# Patient Record
Sex: FEMALE | Race: WHITE | NOT HISPANIC OR LATINO
[De-identification: names, ages, dates, MRNs, and addresses within clinical notes are randomized per-mention and may not be internally consistent; named-entity substitution may affect disease eponyms.]

---

## 2017-02-21 ENCOUNTER — RX RENEWAL (OUTPATIENT)
Age: 77
End: 2017-02-21

## 2017-05-08 ENCOUNTER — RX RENEWAL (OUTPATIENT)
Age: 77
End: 2017-05-08

## 2017-06-07 ENCOUNTER — RX RENEWAL (OUTPATIENT)
Age: 77
End: 2017-06-07

## 2017-07-14 ENCOUNTER — RX RENEWAL (OUTPATIENT)
Age: 77
End: 2017-07-14

## 2017-07-24 ENCOUNTER — RX RENEWAL (OUTPATIENT)
Age: 77
End: 2017-07-24

## 2017-07-25 ENCOUNTER — APPOINTMENT (OUTPATIENT)
Dept: CARDIOLOGY | Facility: CLINIC | Age: 77
End: 2017-07-25

## 2017-07-27 ENCOUNTER — NON-APPOINTMENT (OUTPATIENT)
Age: 77
End: 2017-07-27

## 2017-07-27 ENCOUNTER — APPOINTMENT (OUTPATIENT)
Dept: CARDIOLOGY | Facility: CLINIC | Age: 77
End: 2017-07-27
Payer: MEDICARE

## 2017-07-27 VITALS
HEIGHT: 61 IN | OXYGEN SATURATION: 96 % | SYSTOLIC BLOOD PRESSURE: 147 MMHG | DIASTOLIC BLOOD PRESSURE: 86 MMHG | HEART RATE: 95 BPM | BODY MASS INDEX: 34.17 KG/M2 | WEIGHT: 181 LBS

## 2017-07-27 DIAGNOSIS — G47.30 SLEEP APNEA, UNSPECIFIED: ICD-10-CM

## 2017-07-27 PROCEDURE — 93000 ELECTROCARDIOGRAM COMPLETE: CPT

## 2017-07-27 PROCEDURE — 99214 OFFICE O/P EST MOD 30 MIN: CPT

## 2018-01-09 ENCOUNTER — MEDICATION RENEWAL (OUTPATIENT)
Age: 78
End: 2018-01-09

## 2018-01-24 ENCOUNTER — APPOINTMENT (OUTPATIENT)
Dept: CARDIOLOGY | Facility: CLINIC | Age: 78
End: 2018-01-24

## 2018-01-31 ENCOUNTER — APPOINTMENT (OUTPATIENT)
Dept: CARDIOLOGY | Facility: CLINIC | Age: 78
End: 2018-01-31

## 2018-02-07 ENCOUNTER — APPOINTMENT (OUTPATIENT)
Dept: CARDIOLOGY | Facility: CLINIC | Age: 78
End: 2018-02-07
Payer: MEDICARE

## 2018-02-07 VITALS
HEIGHT: 61 IN | SYSTOLIC BLOOD PRESSURE: 145 MMHG | DIASTOLIC BLOOD PRESSURE: 76 MMHG | WEIGHT: 178 LBS | OXYGEN SATURATION: 96 % | BODY MASS INDEX: 33.61 KG/M2 | HEART RATE: 87 BPM

## 2018-02-07 PROCEDURE — 99214 OFFICE O/P EST MOD 30 MIN: CPT

## 2018-04-13 ENCOUNTER — RX RENEWAL (OUTPATIENT)
Age: 78
End: 2018-04-13

## 2018-06-04 ENCOUNTER — RX RENEWAL (OUTPATIENT)
Age: 78
End: 2018-06-04

## 2018-06-05 ENCOUNTER — APPOINTMENT (OUTPATIENT)
Dept: CARDIOLOGY | Facility: CLINIC | Age: 78
End: 2018-06-05

## 2018-06-20 ENCOUNTER — APPOINTMENT (OUTPATIENT)
Dept: CARDIOLOGY | Facility: CLINIC | Age: 78
End: 2018-06-20
Payer: MEDICARE

## 2018-06-20 VITALS
BODY MASS INDEX: 34.75 KG/M2 | WEIGHT: 177 LBS | HEART RATE: 86 BPM | HEIGHT: 60 IN | DIASTOLIC BLOOD PRESSURE: 78 MMHG | SYSTOLIC BLOOD PRESSURE: 121 MMHG | OXYGEN SATURATION: 94 %

## 2018-06-20 PROCEDURE — 99214 OFFICE O/P EST MOD 30 MIN: CPT

## 2018-09-17 ENCOUNTER — MEDICATION RENEWAL (OUTPATIENT)
Age: 78
End: 2018-09-17

## 2018-12-04 ENCOUNTER — MEDICATION RENEWAL (OUTPATIENT)
Age: 78
End: 2018-12-04

## 2018-12-06 RX ORDER — CITALOPRAM HYDROBROMIDE 40 MG/1
40 TABLET, FILM COATED ORAL
Qty: 90 | Refills: 3 | Status: ACTIVE | COMMUNITY
Start: 2018-06-04 | End: 1900-01-01

## 2018-12-18 ENCOUNTER — MEDICATION RENEWAL (OUTPATIENT)
Age: 78
End: 2018-12-18

## 2018-12-19 ENCOUNTER — NON-APPOINTMENT (OUTPATIENT)
Age: 78
End: 2018-12-19

## 2018-12-19 ENCOUNTER — APPOINTMENT (OUTPATIENT)
Dept: CARDIOLOGY | Facility: CLINIC | Age: 78
End: 2018-12-19
Payer: MEDICARE

## 2018-12-19 VITALS
HEIGHT: 60 IN | WEIGHT: 167 LBS | OXYGEN SATURATION: 96 % | HEART RATE: 85 BPM | DIASTOLIC BLOOD PRESSURE: 75 MMHG | BODY MASS INDEX: 32.79 KG/M2 | SYSTOLIC BLOOD PRESSURE: 119 MMHG

## 2018-12-19 DIAGNOSIS — R42 DIZZINESS AND GIDDINESS: ICD-10-CM

## 2018-12-19 PROCEDURE — 93000 ELECTROCARDIOGRAM COMPLETE: CPT

## 2018-12-19 PROCEDURE — 99215 OFFICE O/P EST HI 40 MIN: CPT

## 2018-12-19 NOTE — REASON FOR VISIT
[Follow-Up - Clinic] : a clinic follow-up of [Hyperlipidemia] : hyperlipidemia [Hypertension] : hypertension [Prosthetic Valve] : a prosthetic valve [FreeTextEntry1] : I. prostatic aortic and mitral replacement

## 2018-12-19 NOTE — HISTORY OF PRESENT ILLNESS
[FreeTextEntry1] : I saw Keisha Marcum in the office today for a followup visit. She is status post aortic and mitral valve replacement for aortic stenosis and mitral insufficiency respectively on March 2012 at [facility]. It was performed by Dr. Wood. Most recent echo 12/16 shows normal ejection fraction left well-seated mitral and aortic valve prosthesis with mild AI.\par \par She is being treated for hypertension, and hyperlipidemia. Fortunately she had no coronary disease. She also has a history of depression, anxiety, and some degree of chronic obstructive pulmonary disease with MARTINA. She is using her mask.. She also is hypothyroid.\par \par She has been diagnosed with promyelocytic leukemia and is undergoing chemotherapy at [facility]. The first treatment 5/13 was complicated by an episode of torsades with cardiac arrest, in the setting of MSSA bacteremia. QT prolongation by Arsenic. Echo showed normal LV systolic function. She subsequently underwent a second and third treatment treatment 10/13 without any trouble. Finished chemotherapy 11/13.  She's been in remission for approximately one year..\par \par The patient is physically limited by shortness of breath. Everything she does is a great effort. She has not seen a pulmonologist for some time.  She is receiving iron infusions for anemia. She is no longer taking the Lasix. Her leg edema has improved and she has no volume overload.\par \par The patient has been anemic and has some blood in her stool and had an endoscopy and colonoscopy at [place] on 1/5/17. This showed acid reflux. Cauterized bleeding.With the iron infusions her hemoglobin has been above 9 and she is feeling much better. She is able to walk and hopefully now and start losing weight.\par \par Blood work from 4/18 demonstrated a cholesterol of 176, triglycerides 238 HDL 57, LDL 71 A1c was 4.6 with a TSH of 4.87. 6/18 hemoglobin was 9.0. More recently her blood counts have dropped into the 70 range and she is getting IV iron infusions. She has been very dizzy and off-balance. Today her resting blood pressure only 96/60 and dropped to 76/50 standing up. She has been taking Lasix on an as needed basis for peripheral edema.\par \par A resting 12-lead electrocardiogram demonstrates sinus rhythm and is normal.

## 2018-12-19 NOTE — DISCUSSION/SUMMARY
[FreeTextEntry1] : The patient has significant anemia and significant orthostatic hypotension. I told her to stop the Lasix and we will reduce the Toprol once a day and lisinopril 20 once a day. She'll get support stockings for edema.\par \par I would see her again in 2 weeks to recheck her blood pressure. If there is any further problems she will call me.\par \par She is going to the hematologist to get further IV infusions and will be seeing Dr. Garcia the endocrinologist about her thyroid. She may be mildly hypothyroid as possible at her medication may need to be adjusted.

## 2018-12-19 NOTE — REVIEW OF SYSTEMS
[Feeling Fatigued] : feeling fatigued [Anxiety] : anxiety [Negative] : Heme/Lymph [Fever] : no fever [Headache] : no headache [Chills] : no chills [Blurry Vision] : no blurred vision [Earache] : no earache [Sore Throat] : no sore throat [Sinus Pressure] : no sinus pressure [Abdominal Pain] : no abdominal pain [Heartburn] : no heartburn [Dysphagia] : no dysphagia [Dysuria] : no dysuria [Joint Pain] : no joint pain [Skin: A Rash] : no rash: [Dizziness] : no dizziness [Tremor] : no tremor was seen [Convulsions] : no convulsions [Confusion] : no confusion was observed [Easy Bleeding] : no tendency for easy bleeding [Easy Bruising] : no tendency for easy bruising

## 2018-12-19 NOTE — PHYSICAL EXAM
[General Appearance - Well Developed] : well developed [Normal Appearance] : normal appearance [Well Groomed] : well groomed [General Appearance - Well Nourished] : well nourished [No Deformities] : no deformities [General Appearance - In No Acute Distress] : no acute distress [Normal Conjunctiva] : the conjunctiva exhibited no abnormalities [Eyelids - No Xanthelasma] : the eyelids demonstrated no xanthelasmas [Normal Oral Mucosa] : normal oral mucosa [Normal Jugular Venous A Waves Present] : normal jugular venous A waves present [Normal Jugular Venous V Waves Present] : normal jugular venous V waves present [No Jugular Venous Jaime A Waves] : no jugular venous jaime A waves [] : no respiratory distress [Respiration, Rhythm And Depth] : normal respiratory rhythm and effort [Exaggerated Use Of Accessory Muscles For Inspiration] : no accessory muscle use [Auscultation Breath Sounds / Voice Sounds] : lungs were clear to auscultation bilaterally [Bowel Sounds] : normal bowel sounds [Abdomen Soft] : soft [Abdomen Tenderness] : non-tender [Abnormal Walk] : normal gait [Gait - Sufficient For Exercise Testing] : the gait was sufficient for exercise testing [Nail Clubbing] : no clubbing of the fingernails [Cyanosis, Localized] : no localized cyanosis [Skin Color & Pigmentation] : normal skin color and pigmentation [Oriented To Time, Place, And Person] : oriented to person, place, and time [Impaired Insight] : insight and judgment were intact [Affect] : the affect was normal [Mood] : the mood was normal [Not Palpable] : not palpable [No Precordial Heave] : no precordial heave was noted [Normal Rate] : normal [Rhythm Regular] : regular [Normal S1] : normal S1 [Normal S2] : normal S2 [No Gallop] : no gallop heard [Prosthetic Aortic Valve] : prosthetic aortic valve heard [Prosthetic Mitral Valve] : prosthetic mitral valve heard [II] : a grade 2 [2+] : left 2+ [1+] : left 1+ [No Abnormalities] : the abdominal aorta was not enlarged and no bruit was heard [___ +] : bilateral [unfilled]U+ pitting edema to the ankles [Apical Thrill] : no thrill palpable at the apex [Click] : no click

## 2018-12-22 ENCOUNTER — OUTPATIENT (OUTPATIENT)
Dept: OUTPATIENT SERVICES | Facility: HOSPITAL | Age: 78
LOS: 1 days | End: 2018-12-22
Payer: MEDICARE

## 2018-12-22 VITALS
OXYGEN SATURATION: 94 % | HEIGHT: 61 IN | SYSTOLIC BLOOD PRESSURE: 98 MMHG | WEIGHT: 167.99 LBS | DIASTOLIC BLOOD PRESSURE: 54 MMHG | TEMPERATURE: 98 F | RESPIRATION RATE: 16 BRPM | HEART RATE: 69 BPM

## 2018-12-22 VITALS
SYSTOLIC BLOOD PRESSURE: 131 MMHG | OXYGEN SATURATION: 95 % | TEMPERATURE: 98 F | DIASTOLIC BLOOD PRESSURE: 79 MMHG | RESPIRATION RATE: 14 BRPM | HEART RATE: 71 BPM

## 2018-12-22 DIAGNOSIS — C92.41 ACUTE PROMYELOCYTIC LEUKEMIA, IN REMISSION: ICD-10-CM

## 2018-12-22 LAB
BLD GP AB SCN SERPL QL: SIGNIFICANT CHANGE UP
HCT VFR BLD CALC: 25.2 % — LOW (ref 34.5–45)
HGB BLD-MCNC: 7.3 G/DL — LOW (ref 11.5–15.5)
MCHC RBC-ENTMCNC: 28.5 PG — SIGNIFICANT CHANGE UP (ref 27–34)
MCHC RBC-ENTMCNC: 29 GM/DL — LOW (ref 32–36)
MCV RBC AUTO: 98.4 FL — SIGNIFICANT CHANGE UP (ref 80–100)
NRBC # BLD: 0 /100 WBCS — SIGNIFICANT CHANGE UP (ref 0–0)
PLATELET # BLD AUTO: 248 K/UL — SIGNIFICANT CHANGE UP (ref 150–400)
RBC # BLD: 2.56 M/UL — LOW (ref 3.8–5.2)
RBC # FLD: 22.1 % — HIGH (ref 10.3–14.5)
WBC # BLD: 4.04 K/UL — SIGNIFICANT CHANGE UP (ref 3.8–10.5)
WBC # FLD AUTO: 4.04 K/UL — SIGNIFICANT CHANGE UP (ref 3.8–10.5)

## 2018-12-22 PROCEDURE — 36430 TRANSFUSION BLD/BLD COMPNT: CPT

## 2018-12-22 PROCEDURE — 86900 BLOOD TYPING SEROLOGIC ABO: CPT

## 2018-12-22 PROCEDURE — 85027 COMPLETE CBC AUTOMATED: CPT

## 2018-12-22 PROCEDURE — 86901 BLOOD TYPING SEROLOGIC RH(D): CPT

## 2018-12-22 PROCEDURE — P9016: CPT

## 2018-12-22 PROCEDURE — 86850 RBC ANTIBODY SCREEN: CPT

## 2018-12-22 PROCEDURE — 86922 COMPATIBILITY TEST ANTIGLOB: CPT

## 2018-12-22 RX ORDER — ACETAMINOPHEN 500 MG
650 TABLET ORAL ONCE
Qty: 0 | Refills: 0 | Status: COMPLETED | OUTPATIENT
Start: 2018-12-22 | End: 2018-12-22

## 2018-12-22 RX ORDER — DIPHENHYDRAMINE HCL 50 MG
25 CAPSULE ORAL ONCE
Qty: 0 | Refills: 0 | Status: COMPLETED | OUTPATIENT
Start: 2018-12-22 | End: 2018-12-22

## 2018-12-22 RX ADMIN — Medication 650 MILLIGRAM(S): at 11:10

## 2018-12-22 RX ADMIN — Medication 25 MILLIGRAM(S): at 11:11

## 2018-12-25 ENCOUNTER — MOBILE ON CALL (OUTPATIENT)
Age: 78
End: 2018-12-25

## 2019-01-02 ENCOUNTER — TRANSCRIPTION ENCOUNTER (OUTPATIENT)
Age: 79
End: 2019-01-02

## 2019-01-08 ENCOUNTER — APPOINTMENT (OUTPATIENT)
Dept: CARDIOLOGY | Facility: CLINIC | Age: 79
End: 2019-01-08

## 2019-01-22 ENCOUNTER — APPOINTMENT (OUTPATIENT)
Dept: CARDIOLOGY | Facility: CLINIC | Age: 79
End: 2019-01-22
Payer: MEDICARE

## 2019-01-22 VITALS
HEIGHT: 60 IN | OXYGEN SATURATION: 94 % | BODY MASS INDEX: 33.38 KG/M2 | HEART RATE: 88 BPM | WEIGHT: 170 LBS | DIASTOLIC BLOOD PRESSURE: 80 MMHG | SYSTOLIC BLOOD PRESSURE: 115 MMHG

## 2019-01-22 DIAGNOSIS — D64.9 ANEMIA, UNSPECIFIED: ICD-10-CM

## 2019-01-22 PROCEDURE — 99214 OFFICE O/P EST MOD 30 MIN: CPT

## 2019-01-22 NOTE — HISTORY OF PRESENT ILLNESS
[FreeTextEntry1] : I saw Keisha Marcum in the office today for a followup visit. She is status post aortic and mitral valve replacement for aortic stenosis and mitral insufficiency respectively on March 2012 at [facility]. It was performed by Dr. Wood. Most recent echo 12/16 shows normal ejection fraction left well-seated mitral and aortic valve prosthesis with mild AI.\par \par She is being treated for hypertension, and hyperlipidemia. Fortunately she had no coronary disease. She also has a history of depression, anxiety, and some degree of chronic obstructive pulmonary disease with MARTINA. She is using her mask.. She also is hypothyroid.\par \par She has been diagnosed with promyelocytic leukemia and is undergoing chemotherapy at [facility]. The first treatment 5/13 was complicated by an episode of torsades with cardiac arrest, in the setting of MSSA bacteremia. QT prolongation by Arsenic. Echo showed normal LV systolic function. She subsequently underwent a second and third treatment treatment 10/13 without any trouble. Finished chemotherapy 11/13.  She's been in remission for approximately one year..\par \par The patient is physically limited by shortness of breath. Everything she does is a great effort. She has not seen a pulmonologist for some time.  She is receiving iron infusions for anemia. She is no longer taking the Lasix. Her leg edema has improved and she has no volume overload.\par \par The patient has been anemic and has some blood in her stool and had an endoscopy and colonoscopy at [place] on 1/5/17. This showed acid reflux. Cauterized bleeding.With the iron infusions her hemoglobin has been above 9 and she is feeling much better. She is able to walk and hopefully now and start losing weight.\par \par Blood work from 4/18 demonstrated a cholesterol of 176, triglycerides 238 HDL 57, LDL 71 A1c was 4.6 with a TSH of 4.87. 6/18 hemoglobin was 9.0. More recently her blood counts have dropped into the 70 range and she is getting IV iron infusions. She has been very dizzy and off-balance. She received blood transfusion and is getting IV iron infusions with B12. According to the patient blood counts are running between 11 and 12 and she is feeling better. Blood pressure and heart rate have improved. She is less short of breath\par

## 2019-01-22 NOTE — DISCUSSION/SUMMARY
[FreeTextEntry1] : The patient is doing better. With the higher blood count she is feeling less short of breath her blood pressure heart rate had stabilized.\par \par She'll stay on her present medication. I will speak with the hematologist. If she is bleeding I probably would stop the aspirin. There is no definite reason she is to continue this medication. Otherwise she should continue on her other medications as prescribed. If she has further problems she will call me. Otherwise I will see her in 3 months.

## 2019-02-12 ENCOUNTER — APPOINTMENT (OUTPATIENT)
Dept: CARDIOLOGY | Facility: CLINIC | Age: 79
End: 2019-02-12

## 2019-07-23 ENCOUNTER — MED ADMIN CHARGE (OUTPATIENT)
Age: 79
End: 2019-07-23

## 2019-08-28 ENCOUNTER — INPATIENT (INPATIENT)
Facility: HOSPITAL | Age: 79
LOS: 3 days | Discharge: HOME | DRG: 660 | End: 2019-09-01
Attending: INTERNAL MEDICINE | Admitting: INTERNAL MEDICINE
Payer: MEDICARE

## 2019-08-28 VITALS
SYSTOLIC BLOOD PRESSURE: 189 MMHG | RESPIRATION RATE: 18 BRPM | HEART RATE: 77 BPM | OXYGEN SATURATION: 100 % | HEIGHT: 57 IN | DIASTOLIC BLOOD PRESSURE: 114 MMHG | WEIGHT: 160.06 LBS | TEMPERATURE: 99 F

## 2019-08-28 DIAGNOSIS — E05.00 THYROTOXICOSIS WITH DIFFUSE GOITER WITHOUT THYROTOXIC CRISIS OR STORM: ICD-10-CM

## 2019-08-28 DIAGNOSIS — F32.9 MAJOR DEPRESSIVE DISORDER, SINGLE EPISODE, UNSPECIFIED: ICD-10-CM

## 2019-08-28 DIAGNOSIS — E78.5 HYPERLIPIDEMIA, UNSPECIFIED: ICD-10-CM

## 2019-08-28 DIAGNOSIS — J44.9 CHRONIC OBSTRUCTIVE PULMONARY DISEASE, UNSPECIFIED: ICD-10-CM

## 2019-08-28 DIAGNOSIS — Z98.890 OTHER SPECIFIED POSTPROCEDURAL STATES: Chronic | ICD-10-CM

## 2019-08-28 DIAGNOSIS — Z96.0 PRESENCE OF UROGENITAL IMPLANTS: Chronic | ICD-10-CM

## 2019-08-28 DIAGNOSIS — K44.9 DIAPHRAGMATIC HERNIA WITHOUT OBSTRUCTION OR GANGRENE: ICD-10-CM

## 2019-08-28 DIAGNOSIS — C95.90 LEUKEMIA, UNSPECIFIED NOT HAVING ACHIEVED REMISSION: ICD-10-CM

## 2019-08-28 DIAGNOSIS — N23 UNSPECIFIED RENAL COLIC: ICD-10-CM

## 2019-08-28 DIAGNOSIS — N20.1 CALCULUS OF URETER: ICD-10-CM

## 2019-08-28 DIAGNOSIS — I10 ESSENTIAL (PRIMARY) HYPERTENSION: ICD-10-CM

## 2019-08-28 DIAGNOSIS — Z29.9 ENCOUNTER FOR PROPHYLACTIC MEASURES, UNSPECIFIED: ICD-10-CM

## 2019-08-28 LAB
ALBUMIN SERPL ELPH-MCNC: 3.8 G/DL — SIGNIFICANT CHANGE UP (ref 3.3–5)
ALP SERPL-CCNC: 92 U/L — SIGNIFICANT CHANGE UP (ref 40–120)
ALT FLD-CCNC: 19 U/L — SIGNIFICANT CHANGE UP (ref 12–78)
ANION GAP SERPL CALC-SCNC: 7 MMOL/L — SIGNIFICANT CHANGE UP (ref 5–17)
APPEARANCE UR: CLEAR — SIGNIFICANT CHANGE UP
APTT BLD: 34.1 SEC — SIGNIFICANT CHANGE UP (ref 28.5–37)
AST SERPL-CCNC: 26 U/L — SIGNIFICANT CHANGE UP (ref 15–37)
BACTERIA # UR AUTO: ABNORMAL
BASOPHILS # BLD AUTO: 0.04 K/UL — SIGNIFICANT CHANGE UP (ref 0–0.2)
BASOPHILS NFR BLD AUTO: 0.4 % — SIGNIFICANT CHANGE UP (ref 0–2)
BILIRUB SERPL-MCNC: 0.5 MG/DL — SIGNIFICANT CHANGE UP (ref 0.2–1.2)
BILIRUB UR-MCNC: NEGATIVE — SIGNIFICANT CHANGE UP
BUN SERPL-MCNC: 20 MG/DL — SIGNIFICANT CHANGE UP (ref 7–23)
CALCIUM SERPL-MCNC: 8.3 MG/DL — LOW (ref 8.5–10.1)
CHLORIDE SERPL-SCNC: 110 MMOL/L — HIGH (ref 96–108)
CO2 SERPL-SCNC: 24 MMOL/L — SIGNIFICANT CHANGE UP (ref 22–31)
COLOR SPEC: SIGNIFICANT CHANGE UP
CREAT SERPL-MCNC: 1.1 MG/DL — SIGNIFICANT CHANGE UP (ref 0.5–1.3)
DIFF PNL FLD: ABNORMAL
EOSINOPHIL # BLD AUTO: 0.02 K/UL — SIGNIFICANT CHANGE UP (ref 0–0.5)
EOSINOPHIL NFR BLD AUTO: 0.2 % — SIGNIFICANT CHANGE UP (ref 0–6)
EPI CELLS # UR: SIGNIFICANT CHANGE UP
GLUCOSE SERPL-MCNC: 121 MG/DL — HIGH (ref 70–99)
GLUCOSE UR QL: NEGATIVE — SIGNIFICANT CHANGE UP
HCT VFR BLD CALC: 42.1 % — SIGNIFICANT CHANGE UP (ref 34.5–45)
HGB BLD-MCNC: 13.4 G/DL — SIGNIFICANT CHANGE UP (ref 11.5–15.5)
IMM GRANULOCYTES NFR BLD AUTO: 0.1 % — SIGNIFICANT CHANGE UP (ref 0–1.5)
INR BLD: 1.01 RATIO — SIGNIFICANT CHANGE UP (ref 0.88–1.16)
KETONES UR-MCNC: NEGATIVE — SIGNIFICANT CHANGE UP
LACTATE SERPL-SCNC: 2 MMOL/L — SIGNIFICANT CHANGE UP (ref 0.7–2)
LEUKOCYTE ESTERASE UR-ACNC: ABNORMAL
LIDOCAIN IGE QN: 163 U/L — SIGNIFICANT CHANGE UP (ref 73–393)
LYMPHOCYTES # BLD AUTO: 0.68 K/UL — LOW (ref 1–3.3)
LYMPHOCYTES # BLD AUTO: 7.2 % — LOW (ref 13–44)
MCHC RBC-ENTMCNC: 29.6 PG — SIGNIFICANT CHANGE UP (ref 27–34)
MCHC RBC-ENTMCNC: 31.8 GM/DL — LOW (ref 32–36)
MCV RBC AUTO: 93.1 FL — SIGNIFICANT CHANGE UP (ref 80–100)
MONOCYTES # BLD AUTO: 0.6 K/UL — SIGNIFICANT CHANGE UP (ref 0–0.9)
MONOCYTES NFR BLD AUTO: 6.4 % — SIGNIFICANT CHANGE UP (ref 2–14)
NEUTROPHILS # BLD AUTO: 8.04 K/UL — HIGH (ref 1.8–7.4)
NEUTROPHILS NFR BLD AUTO: 85.7 % — HIGH (ref 43–77)
NITRITE UR-MCNC: NEGATIVE — SIGNIFICANT CHANGE UP
NRBC # BLD: 0 /100 WBCS — SIGNIFICANT CHANGE UP (ref 0–0)
PH UR: 7 — SIGNIFICANT CHANGE UP (ref 5–8)
PLATELET # BLD AUTO: 174 K/UL — SIGNIFICANT CHANGE UP (ref 150–400)
POTASSIUM SERPL-MCNC: 4.7 MMOL/L — SIGNIFICANT CHANGE UP (ref 3.5–5.3)
POTASSIUM SERPL-SCNC: 4.7 MMOL/L — SIGNIFICANT CHANGE UP (ref 3.5–5.3)
PROT SERPL-MCNC: 7.1 G/DL — SIGNIFICANT CHANGE UP (ref 6–8.3)
PROT UR-MCNC: 25 MG/DL
PROTHROM AB SERPL-ACNC: 11.4 SEC — SIGNIFICANT CHANGE UP (ref 10–12.9)
RBC # BLD: 4.52 M/UL — SIGNIFICANT CHANGE UP (ref 3.8–5.2)
RBC # FLD: 17.3 % — HIGH (ref 10.3–14.5)
RBC CASTS # UR COMP ASSIST: ABNORMAL /HPF (ref 0–4)
SODIUM SERPL-SCNC: 141 MMOL/L — SIGNIFICANT CHANGE UP (ref 135–145)
SP GR SPEC: 1 — LOW (ref 1.01–1.02)
UROBILINOGEN FLD QL: NEGATIVE — SIGNIFICANT CHANGE UP
WBC # BLD: 9.39 K/UL — SIGNIFICANT CHANGE UP (ref 3.8–10.5)
WBC # FLD AUTO: 9.39 K/UL — SIGNIFICANT CHANGE UP (ref 3.8–10.5)
WBC UR QL: SIGNIFICANT CHANGE UP

## 2019-08-28 PROCEDURE — 93010 ELECTROCARDIOGRAM REPORT: CPT

## 2019-08-28 PROCEDURE — 99285 EMERGENCY DEPT VISIT HI MDM: CPT

## 2019-08-28 PROCEDURE — 71045 X-RAY EXAM CHEST 1 VIEW: CPT | Mod: 26

## 2019-08-28 PROCEDURE — 74177 CT ABD & PELVIS W/CONTRAST: CPT | Mod: 26

## 2019-08-28 RX ORDER — LOSARTAN POTASSIUM 100 MG/1
25 TABLET, FILM COATED ORAL DAILY
Refills: 0 | Status: DISCONTINUED | OUTPATIENT
Start: 2019-08-28 | End: 2019-08-30

## 2019-08-28 RX ORDER — ENOXAPARIN SODIUM 100 MG/ML
40 INJECTION SUBCUTANEOUS AT BEDTIME
Refills: 0 | Status: DISCONTINUED | OUTPATIENT
Start: 2019-08-28 | End: 2019-08-30

## 2019-08-28 RX ORDER — METOPROLOL TARTRATE 50 MG
25 TABLET ORAL DAILY
Refills: 0 | Status: DISCONTINUED | OUTPATIENT
Start: 2019-08-28 | End: 2019-08-30

## 2019-08-28 RX ORDER — FAMOTIDINE 10 MG/ML
1 INJECTION INTRAVENOUS
Qty: 0 | Refills: 0 | DISCHARGE

## 2019-08-28 RX ORDER — BUPROPION HYDROCHLORIDE 150 MG/1
75 TABLET, EXTENDED RELEASE ORAL THREE TIMES A DAY
Refills: 0 | Status: DISCONTINUED | OUTPATIENT
Start: 2019-08-28 | End: 2019-08-30

## 2019-08-28 RX ORDER — MORPHINE SULFATE 50 MG/1
4 CAPSULE, EXTENDED RELEASE ORAL ONCE
Refills: 0 | Status: DISCONTINUED | OUTPATIENT
Start: 2019-08-28 | End: 2019-08-28

## 2019-08-28 RX ORDER — ONDANSETRON 8 MG/1
4 TABLET, FILM COATED ORAL ONCE
Refills: 0 | Status: COMPLETED | OUTPATIENT
Start: 2019-08-28 | End: 2019-08-28

## 2019-08-28 RX ORDER — BUPROPION HYDROCHLORIDE 150 MG/1
150 TABLET, EXTENDED RELEASE ORAL DAILY
Refills: 0 | Status: DISCONTINUED | OUTPATIENT
Start: 2019-08-28 | End: 2019-08-28

## 2019-08-28 RX ORDER — ACETAMINOPHEN 500 MG
650 TABLET ORAL ONCE
Refills: 0 | Status: COMPLETED | OUTPATIENT
Start: 2019-08-28 | End: 2019-08-28

## 2019-08-28 RX ORDER — LOSARTAN POTASSIUM 100 MG/1
25 TABLET, FILM COATED ORAL DAILY
Refills: 0 | Status: DISCONTINUED | OUTPATIENT
Start: 2019-08-28 | End: 2019-08-28

## 2019-08-28 RX ORDER — METOPROLOL TARTRATE 50 MG
0 TABLET ORAL
Qty: 0 | Refills: 0 | DISCHARGE

## 2019-08-28 RX ORDER — METOPROLOL TARTRATE 50 MG
25 TABLET ORAL DAILY
Refills: 0 | Status: DISCONTINUED | OUTPATIENT
Start: 2019-08-28 | End: 2019-08-28

## 2019-08-28 RX ORDER — CITALOPRAM 10 MG/1
40 TABLET, FILM COATED ORAL DAILY
Refills: 0 | Status: DISCONTINUED | OUTPATIENT
Start: 2019-08-28 | End: 2019-08-30

## 2019-08-28 RX ORDER — TAMSULOSIN HYDROCHLORIDE 0.4 MG/1
0.4 CAPSULE ORAL AT BEDTIME
Refills: 0 | Status: DISCONTINUED | OUTPATIENT
Start: 2019-08-28 | End: 2019-08-30

## 2019-08-28 RX ORDER — FAMOTIDINE 10 MG/ML
20 INJECTION INTRAVENOUS
Refills: 0 | Status: DISCONTINUED | OUTPATIENT
Start: 2019-08-28 | End: 2019-08-30

## 2019-08-28 RX ORDER — SIMVASTATIN 20 MG/1
40 TABLET, FILM COATED ORAL AT BEDTIME
Refills: 0 | Status: DISCONTINUED | OUTPATIENT
Start: 2019-08-28 | End: 2019-08-30

## 2019-08-28 RX ORDER — LOSARTAN POTASSIUM 100 MG/1
25 TABLET, FILM COATED ORAL
Qty: 0 | Refills: 0 | DISCHARGE

## 2019-08-28 RX ORDER — SODIUM CHLORIDE 9 MG/ML
1000 INJECTION INTRAMUSCULAR; INTRAVENOUS; SUBCUTANEOUS
Refills: 0 | Status: COMPLETED | OUTPATIENT
Start: 2019-08-28 | End: 2019-08-28

## 2019-08-28 RX ORDER — LACTOBACILLUS ACIDOPHILUS 100MM CELL
1 CAPSULE ORAL DAILY
Refills: 0 | Status: DISCONTINUED | OUTPATIENT
Start: 2019-08-28 | End: 2019-08-30

## 2019-08-28 RX ORDER — LEVOTHYROXINE SODIUM 125 MCG
88 TABLET ORAL DAILY
Refills: 0 | Status: DISCONTINUED | OUTPATIENT
Start: 2019-08-28 | End: 2019-08-30

## 2019-08-28 RX ORDER — HEPARIN SODIUM 5000 [USP'U]/ML
5000 INJECTION INTRAVENOUS; SUBCUTANEOUS EVERY 8 HOURS
Refills: 0 | Status: DISCONTINUED | OUTPATIENT
Start: 2019-08-28 | End: 2019-08-28

## 2019-08-28 RX ORDER — ACETAMINOPHEN 500 MG
650 TABLET ORAL EVERY 6 HOURS
Refills: 0 | Status: DISCONTINUED | OUTPATIENT
Start: 2019-08-28 | End: 2019-08-30

## 2019-08-28 RX ORDER — SODIUM CHLORIDE 9 MG/ML
1000 INJECTION, SOLUTION INTRAVENOUS
Refills: 0 | Status: DISCONTINUED | OUTPATIENT
Start: 2019-08-28 | End: 2019-08-29

## 2019-08-28 RX ADMIN — BUPROPION HYDROCHLORIDE 75 MILLIGRAM(S): 150 TABLET, EXTENDED RELEASE ORAL at 17:59

## 2019-08-28 RX ADMIN — ONDANSETRON 4 MILLIGRAM(S): 8 TABLET, FILM COATED ORAL at 09:08

## 2019-08-28 RX ADMIN — Medication 650 MILLIGRAM(S): at 11:36

## 2019-08-28 RX ADMIN — SODIUM CHLORIDE 1000 MILLILITER(S): 9 INJECTION INTRAMUSCULAR; INTRAVENOUS; SUBCUTANEOUS at 09:08

## 2019-08-28 RX ADMIN — BUPROPION HYDROCHLORIDE 75 MILLIGRAM(S): 150 TABLET, EXTENDED RELEASE ORAL at 21:34

## 2019-08-28 RX ADMIN — SIMVASTATIN 40 MILLIGRAM(S): 20 TABLET, FILM COATED ORAL at 21:34

## 2019-08-28 RX ADMIN — ENOXAPARIN SODIUM 40 MILLIGRAM(S): 100 INJECTION SUBCUTANEOUS at 21:34

## 2019-08-28 RX ADMIN — SODIUM CHLORIDE 1000 MILLILITER(S): 9 INJECTION INTRAMUSCULAR; INTRAVENOUS; SUBCUTANEOUS at 09:55

## 2019-08-28 RX ADMIN — CITALOPRAM 40 MILLIGRAM(S): 10 TABLET, FILM COATED ORAL at 17:59

## 2019-08-28 RX ADMIN — FAMOTIDINE 20 MILLIGRAM(S): 10 INJECTION INTRAVENOUS at 17:59

## 2019-08-28 RX ADMIN — Medication 25 MILLIGRAM(S): at 17:59

## 2019-08-28 RX ADMIN — SODIUM CHLORIDE 100 MILLILITER(S): 9 INJECTION, SOLUTION INTRAVENOUS at 15:02

## 2019-08-28 RX ADMIN — TAMSULOSIN HYDROCHLORIDE 0.4 MILLIGRAM(S): 0.4 CAPSULE ORAL at 21:34

## 2019-08-28 RX ADMIN — Medication 650 MILLIGRAM(S): at 10:00

## 2019-08-28 RX ADMIN — SODIUM CHLORIDE 1000 MILLILITER(S): 9 INJECTION INTRAMUSCULAR; INTRAVENOUS; SUBCUTANEOUS at 11:37

## 2019-08-28 NOTE — ED ADULT NURSE NOTE - PSH
After cataract, bilateral  2010  h/o  endometrial ablation  1998  H/O cone biopsy of cervix  1974  History of coronary angiogram  1/31/12  History of tonsillectomy  childhood

## 2019-08-28 NOTE — H&P ADULT - PROBLEM SELECTOR PLAN 2
- Not currently on medications at home d/t stated inability to visit otpt pulm/obtain medications  - Follows with Dr. Mcmahon  - Can monitor for symptoms of COPD, if presents, give DuoNebs prn for SOB or wheezing  - Continue to monitor routine pulse ox

## 2019-08-28 NOTE — ED ADULT NURSE NOTE - NSIMPLEMENTINTERV_GEN_ALL_ED
Implemented All Universal Safety Interventions:  [place] to call system. Call bell, personal items and telephone within reach. Instruct patient to call for assistance. Room bathroom lighting operational. Non-slip footwear when patient is off stretcher. Physically safe environment: no spills, clutter or unnecessary equipment. Stretcher in lowest position, wheels locked, appropriate side rails in place.

## 2019-08-28 NOTE — ED PROVIDER NOTE - CPE EDP ENMT NORM
Nathan Hayward       normal...

## 2019-08-28 NOTE — ED PROVIDER NOTE - OBJECTIVE STATEMENT
78 yo F p/w RLQ abd pain since 1am. Assoc with nausea, vomiting. NO cp/sob/palp. no fever/chills. Pt states pain occ rad to R back. no dysuria / hematuria. no numb/ting/focal weak. No neck / back pain. no agg/allev factors. No other inj or co.

## 2019-08-28 NOTE — H&P ADULT - NSICDXPASTMEDICALHX_GEN_ALL_CORE_FT
PAST MEDICAL HISTORY:  Acid reflux     Aortic stenosis     Asthma with COPD with exacerbation     Carpal tunnel syndrome     Coronary atherosclerosis of native coronary artery     Depression     Diverticulosis of colon     Duodenal ulcer at age 24    Dyslipidemia     Emphysema     Former cigarette smoker     Graves disease     Hernia, hiatal     Hypertension     Mitral regurgitation     Obstructive sleep apnea     Osteoporosis     Rotator cuff tear Right    Spinal stenosis

## 2019-08-28 NOTE — ED ADULT NURSE NOTE - CHPI ED NUR SYMPTOMS NEG
no dysuria/no diarrhea/no chills/no vomiting/no abdominal distension/no fever/no hematuria/no blood in stool/no burning urination

## 2019-08-28 NOTE — CONSULT NOTE ADULT - SUBJECTIVE AND OBJECTIVE BOX
CHIEF COMPLAINT: Right flank pain.    HISTORY OF PRESENT ILLNESS:    The patient is an 79 year old female with right renal colic. She awoke at 1AM with abdominal pain and subsequently developed nausea and vomiting. CT stone study demonstrated  4 and 5 mm right distal ureteral calculi. Temp 99.7 in ED. She denies fever and chills at home. She had a h/o urolithiasis in the past requiring intervention in . She was started on Levaquin on admission.    PAST MEDICAL & SURGICAL HISTORY:  Spinal stenosis  Mitral regurgitation  Aortic stenosis  Emphysema  Duodenal ulcer: at age 24  Rotator cuff tear: Right  Diverticulosis of colon  Coronary atherosclerosis of native coronary artery  Carpal tunnel syndrome  Dyslipidemia  Obstructive sleep apnea  Osteoporosis  Hypertension  Hernia, hiatal  Former cigarette smoker  Depression  Asthma with COPD with exacerbation  Acid reflux  Graves disease  History of coronary angiogram: 11  h/o  endometrial ablation:   H/O cone biopsy of cervix:   History of tonsillectomy: childhood  After cataract, bilateral:       REVIEW OF SYSTEMS:    CONSTITUTIONAL: No weakness, fevers or chills  EYES/ENT: No visual changes;  No vertigo or throat pain   NECK: No pain or stiffness  RESPIRATORY: No cough, wheezing, hemoptysis; No shortness of breath  CARDIOVASCULAR: No chest pain or palpitations  GASTROINTESTINAL: as above  GENITOURINARY: as above  NEUROLOGICAL: No numbness or weakness  SKIN: No itching, burning, rashes, or lesions   All other review of systems is negative unless indicated above.    MEDICATIONS  (STANDING):    MEDICATIONS  (PRN):      Allergies    adhesives (Rash; Other)  Cat dander- wheezing, itchy throat, SOB (Other)  penicillin (Rash)  sulfa drugs (Rash)    Intolerances        SOCIAL HISTORY:    FAMILY HISTORY:      Vital Signs Last 24 Hrs  T(C): 37.6 (28 Aug 2019 08:40), Max: 37.6 (28 Aug 2019 08:40)  T(F): 99.7 (28 Aug 2019 08:40), Max: 99.7 (28 Aug 2019 08:40)  HR: 77 (28 Aug 2019 08:31) (77 - 77)  BP: 189/114 (28 Aug 2019 08:31) (189/114 - 189/114)  BP(mean): --  RR: 18 (28 Aug 2019 08:31) (18 - 18)  SpO2: 100% (28 Aug 2019 08:31) (100% - 100%)    PHYSICAL EXAM:    Constitutional: NAD, well-developed  Back: Normal spine flexure, mild right CVA tenderness  Abd: Soft, NT/ND,mild right CVAT  : urethra and meatus normal. No cystocele,  or enterocele  Extremities: No peripheral edema  Neurological: A/O x 3, Psychiatric: Normal mood, normal affect  Skin: No rashes    LABS:                        13.4   9.39  )-----------( 174      ( 28 Aug 2019 09:05 )             42.1         141  |  110<H>  |  20  ----------------------------<  121<H>  4.7   |  24  |  1.10    Ca    8.3<L>      28 Aug 2019 09:05    TPro  7.1  /  Alb  3.8  /  TBili  0.5  /  DBili  x   /  AST  26  /  ALT  19  /  AlkPhos  92      PT/INR - ( 28 Aug 2019 09:05 )   PT: 11.4 sec;   INR: 1.01 ratio         PTT - ( 28 Aug 2019 09:05 )  PTT:34.1 sec  Urinalysis Basic - ( 28 Aug 2019 09:24 )    Color: Pale Yellow / Appearance: Clear / S.005 / pH: x  Gluc: x / Ketone: Negative  / Bili: Negative / Urobili: Negative   Blood: x / Protein: 25 mg/dL / Nitrite: Negative   Leuk Esterase: Trace / RBC: 25-50 /HPF / WBC 3-5   Sq Epi: x / Non Sq Epi: Few / Bacteria: Occasional    RADIOLOGY & ADDITIONAL STUDIES:    < from: CT Abdomen and Pelvis w/ IV Cont (19 @ 10:15) >    EXAM:  CT ABDOMEN AND PELVIS IC                            PROCEDURE DATE:  2019          INTERPRETATION:  CLINICAL INFORMATION: Right lower quadrant abdominal   pain.    COMPARISON: CT scan abdomen pelvis 3/27/2015.    PROCEDURE:   CT of theAbdomen and Pelvis was performed without intravenous contrast.   Intravenous contrast: None.  Oral contrast: None.  Sagittal and coronal reformats were performed.    FINDINGS:    LOWER CHEST: Large hiatal hernia again noted.    LIVER: Several small, subcentimeter indeterminate hypodense lesions   throughout the liver.  Approximately 2.5 cm hypodense lesion posterior right hepatic lobe   compatible with hepatic cysts.  BILE DUCTS: Normal caliber.  GALLBLADDER: Dependent gallstones.  No gallbladderwall thickening noted.  SPLEEN: Within normal limits.  PANCREAS: Within normal limits.  ADRENALS: Within normal limits.  KIDNEYS/URETERS:   There is moderate right-sided hydroureteronephrosis with two,  adjacent,   4 and 5 mm calculi at the distal right ureter and ureterovesical junction.  There is moderate right-sided perinephric stranding as well as   uroepithelial enhancement, findings which may reflect superimposed   urinary tract infection.  There are small bilateral nonobstructing intrarenal calculi.    There  is a cyst at the lower pole of the left kidney as well as smaller   bilateral indeterminate hypodense renal lesions.    BLADDER: Mildly distended otherwise unremarkable.  REPRODUCTIVE ORGANS: Mild fullness left adnexa.  No enlarged pelvic sidewall lymphadenopathy or significant pelvic free   fluid.    BOWEL: No bowel obstruction. Appendix normal in appearance.  PERITONEUM: No ascites.  VESSELS: Mild atherosclerotic calcification of the abdominal aorta, which   is normal in caliber.  RETROPERITONEUM/LYMPH NODES: No lymphadenopathy.    ABDOMINAL WALL: Tiny fat-containing periumbilical hernia.  BONES:   Degenerative changes of the lumbar spine with grade 1 anterior   spondylolisthesis L5 on S1.  Chronic parasymphyseal fracture deformity.  Chronic bilateral sacral alae as insufficiency fractures.    Impression:    Moderate right-sided hydroureteronephrosis secondary to 4 and 5 mm distal   ureteral and UVJ calculi.  Perinephric stranding and urothelial enhancement, may reflect   superimposed urinary tract infection.    Cholelithiasis.    Other findings as discussed above.                VALARIE BROTHERS M.D., ATTENDING RADIOLOGIST  This document has been electronically signed. Aug 28 2019 10:36AM                < end of copied text >

## 2019-08-28 NOTE — H&P ADULT - NSHPPHYSICALEXAM_GEN_ALL_CORE
T(C): 36.9 (08-28-19 @ 12:20), Max: 37.6 (08-28-19 @ 08:40)  HR: 92 (08-28-19 @ 12:20) (77 - 92)  BP: 138/87 (08-28-19 @ 12:20) (138/87 - 189/114)  RR: 17 (08-28-19 @ 12:20) (17 - 18)  SpO2: 100% (08-28-19 @ 12:20) (100% - 100%)    GENERAL: elderly female laying in bed comfortably, pleasant, in NAD  EYES: sclera clear, no exudates  ENMT: oropharynx clear without erythema, no exudates, moist mucous membranes  NECK: supple, soft, no thyromegaly noted  LUNGS: good air entry bilaterally, clear to auscultation, symmetric breath sounds, no wheezing or rhonchi appreciated  HEART: soft S1/S2, regular rate and rhythm, no murmurs noted, no lower extremity edema  GASTROINTESTINAL: abdomen is soft, nontender, nondistended, normoactive bowel sounds, no palpable masses  INTEGUMENT: skin erosion approx. 1in in length of L medial ankle  MUSCULOSKELETAL: no clubbing or cyanosis, no obvious deformity  NEUROLOGIC: awake, alert, oriented x3, good muscle tone in 4 extremities, no obvious sensory deficits, 2+ B/L patellar reflexes  HEME/LYMPH: no palpable supraclavicular nodules, no obvious ecchymosis or petechiae T(C): 36.9 (08-28-19 @ 12:20), Max: 37.6 (08-28-19 @ 08:40)  HR: 92 (08-28-19 @ 12:20) (77 - 92)  BP: 138/87 (08-28-19 @ 12:20) (138/87 - 189/114)  RR: 17 (08-28-19 @ 12:20) (17 - 18)  SpO2: 100% (08-28-19 @ 12:20) (100% - 100%)    GENERAL: elderly female laying in bed comfortably, pleasant, in NAD  EYES: sclera clear, no exudates  ENMT: oropharynx clear without erythema, no exudates, moist mucous membranes  NECK: supple, soft, no thyromegaly noted  LUNGS: good air entry bilaterally, clear to auscultation, symmetric breath sounds, no wheezing or rhonchi appreciated  HEART: soft S1/S2, regular rate and rhythm, no murmurs noted, no lower extremity edema  GASTROINTESTINAL: abdomen is soft, nontender, nondistended, normoactive bowel sounds, no palpable masses  INTEGUMENT: skin erosion approx. 1in in length of L medial ankle  MUSCULOSKELETAL: no clubbing or cyanosis, no obvious deformity  NEUROLOGIC: awake, alert, oriented x3, good muscle tone in 4 extremities, no obvious sensory deficits, 2+ B/L patellar reflexes  EXTREMITIES: 2+ pitting edema of B/L LE up to tibial tuberosity  HEME/LYMPH: no palpable supraclavicular nodules, no obvious ecchymosis or petechiae

## 2019-08-28 NOTE — H&P ADULT - PROBLEM SELECTOR PLAN 8
- Follows as outpatient with Dr. Temple, not an active issue at this time  - Receives monthly iron infusions, states she is next due on 9/4

## 2019-08-28 NOTE — H&P ADULT - PROBLEM SELECTOR PLAN 1
- Admit to medical floor  - CT shows moderate right-sided hydroureteronephrosis with 2 ureteral calculi at UVJ and moderate right-sided perinephric stranding as well as uroepithelial enhancement, possibly reflective of superimposed UTI.  - Received one dose of Levaquin 500mg in ED, c/w IV Levaquin 500mg qDaily  - Received 2L NS in ED, maintenance IVF D5NS @100cc/hr  - Currently afebrile, hemodynamically stable; continue to monitor for signs of emerging sepsis. If pt deteriorates, plan for emergent stent placement per uro Dr. Condon  - F/u AM CBC  - Keep NPO except meds  - C/w Tamsulosin and f/u KUB tomorrow AM, per uro, recs appreciates    Pt has no absolute medical contraindications for  procedure and is medically optimized. 0 pts per RCRI risk score, pt is Class I risk. She is considered low risk for intermediate-risk procedure. - Admit to medical floor  - CT shows moderate right-sided hydroureteronephrosis with 2 ureteral calculi at UVJ and moderate right-sided perinephric stranding as well as uroepithelial enhancement, possibly reflective of superimposed UTI.  - Received one dose of Levaquin 500mg in ED, c/w IV Levaquin 500mg qDaily  - Received 2L NS in ED, maintenance IVF D5NS @100cc/hr  - Currently afebrile, hemodynamically stable; continue to monitor for signs of emerging sepsis. If pt deteriorates, plan for emergent stent placement per uro Dr. Condon  - F/u AM CBC, UCx and blood cx  - Keep NPO except meds  - C/w Tamsulosin and f/u KUB tomorrow AM, per uro, recs appreciates    Pt has no absolute medical contraindications for  procedure and is medically optimized. 0 pts per RCRI risk score, pt is Class I risk. She is considered low risk for intermediate-risk procedure.

## 2019-08-28 NOTE — H&P ADULT - ASSESSMENT
78yo F, w/ PMH/o leukemia, COPD, Grave's disease, HLD, previous aortic stenosis and mitral regurgitation (s/p bovine/porcine valve replacement 5 yrs ago), medullary kidney disease, hiatal hernia, depression, R rotator cuff tear, presenting with RLQ abdominal pain radiating into her R groin without hematuria or other associated urinary symptoms, admitted for R hydroureteronephrosis 2/2 distal ureteral and UVJ caclucli.

## 2019-08-28 NOTE — ED PROVIDER NOTE - PROGRESS NOTE DETAILS
Dw Dr Escobar - states hasn't seen pt in some time, should give to on call urology. Dr Fernandez paged. Gerald Condon - admit to med, will see pt. Dw Dr Condon - agree with admit to med, will see pt. NPO for now, possible OR Dw Dr D Perlman ([facility] [place]) - will see pt to admit

## 2019-08-28 NOTE — PATIENT PROFILE ADULT - ABILITY TO HEAR (WITH HEARING AID OR HEARING APPLIANCE IF NORMALLY USED):
hears well on left ear/Mildly to Moderately Impaired: difficulty hearing in some environments or speaker may need to increase volume or speak distinctly

## 2019-08-28 NOTE — ED ADULT NURSE NOTE - OBJECTIVE STATEMENT
Pt p/w RLQ abdominal pain and R back pain since 0100 today. Per patient noted pain gradually building since yesterday evening, w/ worsening nausea. Denies vomiting, diarrhea, fever, dysuria

## 2019-08-28 NOTE — H&P ADULT - NSICDXPASTSURGICALHX_GEN_ALL_CORE_FT
PAST SURGICAL HISTORY:  After cataract, bilateral 2010    h/o  endometrial ablation 1998    H/O aortic valve repair 2014    H/O cone biopsy of cervix 1974    H/O dilation and curettage     H/O mitral valve repair 2014    History of coronary angiogram 1/31/12    History of tonsillectomy childhood    S/P ureteral stent placement 2015, Dr. Escobar

## 2019-08-28 NOTE — H&P ADULT - PROBLEM SELECTOR PLAN 9
IMPROVE VTE Individual Risk Assessment          RISK                                                          Points  [  ] Previous VTE                                                3  [  ] Thrombophilia                                             2  [  ] Lower limb paralysis                                   2        (unable to hold up >15 seconds)    [ X ] Current Cancer                                             2         (within 6 months)  [  ] Immobilization > 24 hrs                              1  [  ] ICU/CCU stay > 24 hours                             1  [ X ] Age > 60                                                         1    IMPROVE VTE Score: 3    DVT ppx: Lovenox 40mg subq qHs

## 2019-08-28 NOTE — H&P ADULT - HISTORY OF PRESENT ILLNESS
80yo F, w/ PMH/o leukemia (receives monthly iron infusions, next due 9/4), COPD, Grave's disease, HLD, previous aortic stenosis and mitral regurgitation (s/p bovine/porcine valve replacement 5 yrs ago), medullary kidney disease, hiatal hernia, depression, R rotator cuff tear, presenting with RLQ abdominal pain radiating into her R groin that began at 1am this morning. Pt states pain was only associated with nausea and was rated as 6-7/10 at its worst. She attempted to relieve her pain with aspirin at home, but this provided no relief. Of note, pt describes life stressors that have prevented her from visiting doctors and obtaining medications, particularly her COPD meds. She endorse occasional SOB and cough; denies fevers, chills, headaches, vision changes, numbness/tingling, chest pain, palpitations, abdominal pain except as noted above, V/D/C, hematuria, dysuria, increased urinary frequency. Last formed BM this AM.    In the ED:  VS T 98.4 /114 now 138/87 HR 92 RR 17 SpO2 100% RA    UA with large blood, 25-50 RBCs, trace LE, - nitrites, occasional bacteria.   CXR shows hiatal hernia, no infiltrates.   CT shows moderate right-sided hydroureteronephrosis with two,  adjacent, 4 and 5 mm calculi at the distal right ureter and ureterovesical junction. There is moderate right-sided perinephric stranding as well as uroepithelial enhancement, findings which may reflect superimposed UTI. There are small bilateral nonobstructing intrarenal calculi.  EKG: NSR at 76/min  Received IV Levaquin 500mg x1, 2L NS, Tylenol 650mg, IV Zofran 4mg. 78yo F, w/ PMH/o leukemia (receives monthly iron infusions, next due 9/4), COPD, Grave's disease, HLD, previous aortic stenosis and mitral regurgitation (s/p bovine/porcine valve replacement 5 yrs ago), medullary kidney disease, hiatal hernia, depression, R rotator cuff tear, presenting with RLQ abdominal pain radiating into her R groin that began at 1am this morning. Pt states pain was only associated with nausea and was rated as 6-7/10 at its worst. She attempted to relieve her pain with aspirin at home, but this provided no relief. Of note, pt describes life stressors that have prevented her from visiting doctors and obtaining medications, particularly her COPD meds. She endorse occasional SOB and cough; denies fevers, chills, headaches, vision changes, numbness/tingling, chest pain, palpitations, abdominal pain except as noted above, V/D/C, hematuria, dysuria, increased urinary frequency. Last formed BM this AM.    In the ED:  VS T 99.7 now 98.4 /114 now 138/87 HR 92 RR 17 SpO2 100% RA    UA with large blood, 25-50 RBCs, trace LE, - nitrites, occasional bacteria.   CXR shows hiatal hernia, no infiltrates.   CT shows moderate right-sided hydroureteronephrosis with two,  adjacent, 4 and 5 mm calculi at the distal right ureter and ureterovesical junction. There is moderate right-sided perinephric stranding as well as uroepithelial enhancement, findings which may reflect superimposed UTI. There are small bilateral nonobstructing intrarenal calculi.  EKG: NSR at 76/min  Received IV Levaquin 500mg x1, 2L NS, Tylenol 650mg, IV Zofran 4mg.

## 2019-08-28 NOTE — CONSULT NOTE ADULT - PROBLEM SELECTOR RECOMMENDATION 9
2 right ureteral calculi. Continue to observe for signs of sepsis. On Levaquin. Urine and blood cultures pending. Tamsulosin ordered. KUB ordered for AM Thursday. Emergent stent placement will be performed if signs of sepsis emerge. Maintain NPO at this time. 2 right ureteral calculi. Continue to observe for signs of sepsis. On Levaquin. Urine and blood cultures pending. Tamsulosin ordered. KUB ordered for AM Thursday. Emergent stent placement will be performed if signs of sepsis emerge. NPO after midnight in her clinical status changes in a way to justify urgent intervention.

## 2019-08-28 NOTE — H&P ADULT - NSHPSOCIALHISTORY_GEN_ALL_CORE
Former tobacco smoker, quit 33 years ago, previously smoke 1ppd x34yrs  Lost home in 10/18, now living in [place] housing  Ambulates with cane

## 2019-08-28 NOTE — H&P ADULT - NSHPREVIEWOFSYSTEMS_GEN_ALL_CORE
CONSTITUTIONAL: denies fever, chills, fatigue, weakness  HEENT: denies blurred vision, sore throat  SKIN: denies new lesions, rash  CARDIOVASCULAR: denies chest pain, chest pressure, palpitations  RESPIRATORY: admits shortness of breath, cough  GASTROINTESTINAL: admits nausea; denies vomiting, diarrhea, abdominal pain  GENITOURINARY: denies dysuria, discharge, hematuria  NEUROLOGICAL: denies numbness, headache, focal weakness  MUSCULOSKELETAL: denies new joint pain, muscle aches  HEMATOLOGIC: denies gross bleeding, bruising  LYMPHATICS: admits LE swelling; denies enlarged lymph nodes  PSYCHIATRIC: denies recent changes in anxiety, depression  ENDOCRINOLOGIC: denies sweating, cold or heat intolerance

## 2019-08-28 NOTE — ED ADULT NURSE NOTE - PMH
Acid reflux    Aortic stenosis    Asthma with COPD with exacerbation    Carpal tunnel syndrome    Coronary atherosclerosis of native coronary artery    Depression    Diverticulosis of colon    Duodenal ulcer  at age 24  Dyslipidemia    Emphysema    Former cigarette smoker    Graves disease    Hernia, hiatal    Hypertension    Mitral regurgitation    Obstructive sleep apnea    Osteoporosis    Rotator cuff tear  Right  Spinal stenosis

## 2019-08-29 ENCOUNTER — TRANSCRIPTION ENCOUNTER (OUTPATIENT)
Age: 79
End: 2019-08-29

## 2019-08-29 LAB
ALBUMIN SERPL ELPH-MCNC: 2.9 G/DL — LOW (ref 3.3–5)
ALP SERPL-CCNC: 74 U/L — SIGNIFICANT CHANGE UP (ref 40–120)
ALT FLD-CCNC: 15 U/L — SIGNIFICANT CHANGE UP (ref 12–78)
ANION GAP SERPL CALC-SCNC: 7 MMOL/L — SIGNIFICANT CHANGE UP (ref 5–17)
AST SERPL-CCNC: 16 U/L — SIGNIFICANT CHANGE UP (ref 15–37)
BILIRUB SERPL-MCNC: 0.6 MG/DL — SIGNIFICANT CHANGE UP (ref 0.2–1.2)
BUN SERPL-MCNC: 13 MG/DL — SIGNIFICANT CHANGE UP (ref 7–23)
CALCIUM SERPL-MCNC: 7.3 MG/DL — LOW (ref 8.5–10.1)
CHLORIDE SERPL-SCNC: 115 MMOL/L — HIGH (ref 96–108)
CO2 SERPL-SCNC: 25 MMOL/L — SIGNIFICANT CHANGE UP (ref 22–31)
CREAT SERPL-MCNC: 0.72 MG/DL — SIGNIFICANT CHANGE UP (ref 0.5–1.3)
CULTURE RESULTS: SIGNIFICANT CHANGE UP
GLUCOSE SERPL-MCNC: 104 MG/DL — HIGH (ref 70–99)
HCT VFR BLD CALC: 36.3 % — SIGNIFICANT CHANGE UP (ref 34.5–45)
HGB BLD-MCNC: 11.3 G/DL — LOW (ref 11.5–15.5)
MCHC RBC-ENTMCNC: 29.7 PG — SIGNIFICANT CHANGE UP (ref 27–34)
MCHC RBC-ENTMCNC: 31.1 GM/DL — LOW (ref 32–36)
MCV RBC AUTO: 95.5 FL — SIGNIFICANT CHANGE UP (ref 80–100)
NRBC # BLD: 0 /100 WBCS — SIGNIFICANT CHANGE UP (ref 0–0)
PLATELET # BLD AUTO: 151 K/UL — SIGNIFICANT CHANGE UP (ref 150–400)
POTASSIUM SERPL-MCNC: 3.5 MMOL/L — SIGNIFICANT CHANGE UP (ref 3.5–5.3)
POTASSIUM SERPL-SCNC: 3.5 MMOL/L — SIGNIFICANT CHANGE UP (ref 3.5–5.3)
PROT SERPL-MCNC: 5.7 G/DL — LOW (ref 6–8.3)
RBC # BLD: 3.8 M/UL — SIGNIFICANT CHANGE UP (ref 3.8–5.2)
RBC # FLD: 18 % — HIGH (ref 10.3–14.5)
SODIUM SERPL-SCNC: 147 MMOL/L — HIGH (ref 135–145)
SPECIMEN SOURCE: SIGNIFICANT CHANGE UP
T4 AB SER-ACNC: 5.7 UG/DL — SIGNIFICANT CHANGE UP (ref 4.6–12)
TSH SERPL-MCNC: 1.36 UIU/ML — SIGNIFICANT CHANGE UP (ref 0.36–3.74)
WBC # BLD: 4.54 K/UL — SIGNIFICANT CHANGE UP (ref 3.8–10.5)
WBC # FLD AUTO: 4.54 K/UL — SIGNIFICANT CHANGE UP (ref 3.8–10.5)

## 2019-08-29 PROCEDURE — 74018 RADEX ABDOMEN 1 VIEW: CPT | Mod: 26

## 2019-08-29 RX ORDER — DEXTROSE MONOHYDRATE, SODIUM CHLORIDE, AND POTASSIUM CHLORIDE 50; .745; 4.5 G/1000ML; G/1000ML; G/1000ML
1000 INJECTION, SOLUTION INTRAVENOUS
Refills: 0 | Status: DISCONTINUED | OUTPATIENT
Start: 2019-08-29 | End: 2019-08-30

## 2019-08-29 RX ADMIN — FAMOTIDINE 20 MILLIGRAM(S): 10 INJECTION INTRAVENOUS at 06:01

## 2019-08-29 RX ADMIN — BUPROPION HYDROCHLORIDE 75 MILLIGRAM(S): 150 TABLET, EXTENDED RELEASE ORAL at 13:36

## 2019-08-29 RX ADMIN — CITALOPRAM 40 MILLIGRAM(S): 10 TABLET, FILM COATED ORAL at 13:36

## 2019-08-29 RX ADMIN — Medication 88 MICROGRAM(S): at 06:01

## 2019-08-29 RX ADMIN — BUPROPION HYDROCHLORIDE 75 MILLIGRAM(S): 150 TABLET, EXTENDED RELEASE ORAL at 06:01

## 2019-08-29 RX ADMIN — TAMSULOSIN HYDROCHLORIDE 0.4 MILLIGRAM(S): 0.4 CAPSULE ORAL at 21:26

## 2019-08-29 RX ADMIN — Medication 25 MILLIGRAM(S): at 06:01

## 2019-08-29 RX ADMIN — SIMVASTATIN 40 MILLIGRAM(S): 20 TABLET, FILM COATED ORAL at 21:26

## 2019-08-29 RX ADMIN — ENOXAPARIN SODIUM 40 MILLIGRAM(S): 100 INJECTION SUBCUTANEOUS at 21:26

## 2019-08-29 RX ADMIN — BUPROPION HYDROCHLORIDE 75 MILLIGRAM(S): 150 TABLET, EXTENDED RELEASE ORAL at 21:26

## 2019-08-29 RX ADMIN — LOSARTAN POTASSIUM 25 MILLIGRAM(S): 100 TABLET, FILM COATED ORAL at 06:01

## 2019-08-29 RX ADMIN — FAMOTIDINE 20 MILLIGRAM(S): 10 INJECTION INTRAVENOUS at 17:27

## 2019-08-29 RX ADMIN — DEXTROSE MONOHYDRATE, SODIUM CHLORIDE, AND POTASSIUM CHLORIDE 100 MILLILITER(S): 50; .745; 4.5 INJECTION, SOLUTION INTRAVENOUS at 11:17

## 2019-08-29 RX ADMIN — Medication 1 TABLET(S): at 13:36

## 2019-08-29 NOTE — PROGRESS NOTE ADULT - SUBJECTIVE AND OBJECTIVE BOX
INTERVAL HPI/OVERNIGHT EVENTS: There has been no further pain. KUB shows bilateral pelvic calcifications. Tm 100 At 2PM yesterday.    MEDICATIONS  (STANDING):  buPROPion . 75 milliGRAM(s) Oral three times a day  citalopram 40 milliGRAM(s) Oral daily  dextrose 5% + sodium chloride 0.9%. 1000 milliLiter(s) (100 mL/Hr) IV Continuous <Continuous>  enoxaparin Injectable 40 milliGRAM(s) SubCutaneous at bedtime  famotidine    Tablet 20 milliGRAM(s) Oral two times a day  lactobacillus acidophilus 1 Tablet(s) Oral daily  levoFLOXacin IVPB 500 milliGRAM(s) IV Intermittent every 24 hours  levothyroxine 88 MICROGram(s) Oral daily  losartan 25 milliGRAM(s) Oral daily  metoprolol succinate ER 25 milliGRAM(s) Oral daily  simvastatin 40 milliGRAM(s) Oral at bedtime  tamsulosin 0.4 milliGRAM(s) Oral at bedtime    MEDICATIONS  (PRN):  acetaminophen   Tablet .. 650 milliGRAM(s) Oral every 6 hours PRN Temp greater or equal to 38C (100.4F), Mild Pain (1 - 3)        Vital Signs Last 24 Hrs  T(C): 36.7 (29 Aug 2019 05:43), Max: 38.1 (28 Aug 2019 14:13)  T(F): 98 (29 Aug 2019 05:43), Max: 100.5 (28 Aug 2019 14:13)  HR: 78 (29 Aug 2019 05:43) (75 - 92)  BP: 121/70 (29 Aug 2019 05:43) (119/65 - 142/84)  BP(mean): --  RR: 17 (29 Aug 2019 05:43) (16 - 17)  SpO2: 93% (29 Aug 2019 05:43) (93% - 100%)    PHYSICAL EXAM:    ABDOMEN: soft, no cvat      LABS:                        11.3   4.54  )-----------( 151      ( 29 Aug 2019 08:28 )             36.3     08-28    141  |  110<H>  |  20  ----------------------------<  121<H>  4.7   |  24  |  1.10    Ca    8.3<L>      28 Aug 2019 09:05    TPro  7.1  /  Alb  3.8  /  TBili  0.5  /  DBili  x   /  AST  26  /  ALT  19  /  AlkPhos  92  -    PT/INR - ( 28 Aug 2019 09:05 )   PT: 11.4 sec;   INR: 1.01 ratio         PTT - ( 28 Aug 2019 09:05 )  PTT:34.1 sec  Urinalysis Basic - ( 28 Aug 2019 09:24 )    Color: Pale Yellow / Appearance: Clear / S.005 / pH: x  Gluc: x / Ketone: Negative  / Bili: Negative / Urobili: Negative   Blood: x / Protein: 25 mg/dL / Nitrite: Negative   Leuk Esterase: Trace / RBC: 25-50 /HPF / WBC 3-5   Sq Epi: x / Non Sq Epi: Few / Bacteria: Occasional          RADIOLOGY & ADDITIONAL TESTS:  EXAM:  XR KUB 1 VIEW                            PROCEDURE DATE:  2019          INTERPRETATION:  CLINICAL INFORMATION: Right ureteral stones.    TECHNIQUE: AP supine views of the abdomen.    COMPARISON: 2019    FINDINGS:    Air and stool noted within the colon. There are no dilated loops of small   bowel to suggest obstruction. Numerous calcifications are noted within   the pelvis, compatible with multiple phleboliths seen in this location on   CT. Distal ureteral stones may still be present but are difficult to   distinguish. Contrast within the bladder is compatible with excretion   from recent CT. Chronic deformity of the left pubic rami are noted.   Degenerative changes are noted throughout the spine and hips. Patient is   status post median sternotomy and 2 valve replacements.    IMPRESSION: Nonobstructed bowel gas pattern. Difficult to distinguish   distal ureteral stones from multiple phleboliths within the pelvis.                  MARVIN WRIGHT M.D., ATTENDING RADIOLOGIST  This document has been electronically signed. Aug 29 2019  8:31AM

## 2019-08-29 NOTE — PROGRESS NOTE ADULT - PROBLEM SELECTOR PLAN 1
Await c&s, which is pending. No overt signs of infection, will continue levaquin. If pt has not passed stones by tomorrow, will proceed with ureteroscopy.

## 2019-08-29 NOTE — PROGRESS NOTE ADULT - SUBJECTIVE AND OBJECTIVE BOX
Patient is a 79y old  Female who presents with a chief complaint of ureteral stones (29 Aug 2019 08:38)      INTERVAL HPI/OVERNIGHT EVENTS: Pt seen and examined at bedside. She is without any complaints at this time and states she is comfortable. Denies fevers, headaches, chest pain, palpitations, SOB, abd pain, N/V/D/C, hematuria, dysuria, or increased urinary frequency.      T(C): 36.6 (19 @ 13:52), Max: 38.1 (19 @ 14:13)  HR: 76 (19 @ 13:52) (75 - 90)  BP: 138/79 (19 @ 13:52) (119/65 - 142/84)  RR: 17 (19 @ 13:52) (16 - 17)  SpO2: 94% (19 @ 13:52) (93% - 94%)      I&O's Summary    28 Aug 2019 07:  -  29 Aug 2019 07:00  --------------------------------------------------------  IN: 400 mL / OUT: 1 mL / NET: 399 mL    29 Aug 2019 07:  -  29 Aug 2019 14:12  --------------------------------------------------------  IN: 240 mL / OUT: 0 mL / NET: 240 mL        LABS:                        11.3   4.54  )-----------( 151      ( 29 Aug 2019 08:28 )             36.3         147<H>  |  115<H>  |  13  ----------------------------<  104<H>  3.5   |  25  |  0.72    Ca    7.3<L>      29 Aug 2019 08:28    TPro  5.7<L>  /  Alb  2.9<L>  /  TBili  0.6  /  DBili  x   /  AST  16  /  ALT  15  /  AlkPhos  74  08-29    PT/INR - ( 28 Aug 2019 09:05 )   PT: 11.4 sec;   INR: 1.01 ratio      PTT - ( 28 Aug 2019 09:05 )  PTT:34.1 sec      Urinalysis Basic - ( 28 Aug 2019 09:24 )    Color: Pale Yellow / Appearance: Clear / S.005 / pH: x  Gluc: x / Ketone: Negative  / Bili: Negative / Urobili: Negative   Blood: x / Protein: 25 mg/dL / Nitrite: Negative   Leuk Esterase: Trace / RBC: 25-50 /HPF / WBC 3-5   Sq Epi: x / Non Sq Epi: Few / Bacteria: Occasional      MEDICATIONS  (STANDING):  buPROPion . 75 milliGRAM(s) Oral three times a day  citalopram 40 milliGRAM(s) Oral daily  enoxaparin Injectable 40 milliGRAM(s) SubCutaneous at bedtime  famotidine    Tablet 20 milliGRAM(s) Oral two times a day  lactobacillus acidophilus 1 Tablet(s) Oral daily  levoFLOXacin IVPB 500 milliGRAM(s) IV Intermittent every 24 hours  levothyroxine 88 MICROGram(s) Oral daily  losartan 25 milliGRAM(s) Oral daily  metoprolol succinate ER 25 milliGRAM(s) Oral daily  simvastatin 40 milliGRAM(s) Oral at bedtime  sodium chloride 0.45% with potassium chloride 20 mEq/L 1000 milliLiter(s) (100 mL/Hr) IV Continuous <Continuous>  tamsulosin 0.4 milliGRAM(s) Oral at bedtime    MEDICATIONS  (PRN):  acetaminophen   Tablet .. 650 milliGRAM(s) Oral every 6 hours PRN Temp greater or equal to 38C (100.4F), Mild Pain (1 - 3)      REVIEW OF SYSTEMS:  CONSTITUTIONAL: No fever, weight loss, or fatigue  EYES: No eye pain, visual disturbances, or discharge  ENMT:  No difficulty hearing, tinnitus, vertigo; No sinus or throat pain  NECK: No pain or stiffness  RESPIRATORY: No cough, wheezing, chills or hemoptysis; No shortness of breath  CARDIOVASCULAR: No chest pain, palpitations, dizziness, or leg swelling  GASTROINTESTINAL: No abdominal or epigastric pain. No nausea, vomiting, or hematemesis; No diarrhea or constipation. No melena or hematochezia.  GENITOURINARY: No dysuria, frequency, hematuria, or incontinence  NEUROLOGICAL: No headaches, memory loss, loss of strength, numbness, or tremors  SKIN: No itching, burning, rashes, or lesions   LYMPH NODES: No enlarged glands  ENDOCRINE: No heat or cold intolerance; No hair loss  MUSCULOSKELETAL: No joint pain or swelling; No muscle, back, or extremity pain  PSYCHIATRIC: No depression, anxiety, mood swings, or difficulty sleeping  HEME/LYMPH: admits chronic LE swelling; No easy bruising, or bleeding gums  ALLERGY AND IMMUNOLOGIC: No hives or eczema        RADIOLOGY & ADDITIONAL TESTS:     EXAM:  CT ABDOMEN AND PELVIS IC                        PROCEDURE DATE:  2019      INTERPRETATION:  CLINICAL INFORMATION: Right lower quadrant abdominal   pain.    COMPARISON: CT scan abdomen pelvis 3/27/2015.    PROCEDURE:   CT of theAbdomen and Pelvis was performed without intravenous contrast.   Intravenous contrast: None.  Oral contrast: None.  Sagittal and coronal reformats were performed.    FINDINGS:    LOWER CHEST: Large hiatal hernia again noted.    LIVER: Several small, subcentimeter indeterminate hypodense lesions   throughout the liver.  Approximately 2.5 cm hypodense lesion posterior right hepatic lobe   compatible with hepatic cysts.  BILE DUCTS: Normal caliber.  GALLBLADDER: Dependent gallstones.  No gallbladderwall thickening noted.  SPLEEN: Within normal limits.  PANCREAS: Within normal limits.  ADRENALS: Within normal limits.  KIDNEYS/URETERS:   There is moderate right-sided hydroureteronephrosis with two,  adjacent,   4 and 5 mm calculi at the distal right ureter and ureterovesical junction.  There is moderate right-sided perinephric stranding as well as   uroepithelial enhancement, findings which may reflect superimposed   urinary tract infection.  There are small bilateral nonobstructing intrarenal calculi.    There  is a cyst at the lower pole of the left kidney as well as smaller   bilateral indeterminate hypodense renal lesions.    BLADDER: Mildly distended otherwise unremarkable.  REPRODUCTIVE ORGANS: Mild fullness left adnexa.  No enlarged pelvic sidewall lymphadenopathy or significant pelvic free   fluid.    BOWEL: No bowel obstruction. Appendix normal in appearance.  PERITONEUM: No ascites.  VESSELS: Mild atherosclerotic calcification of the abdominal aorta, which   is normal in caliber.  RETROPERITONEUM/LYMPH NODES: No lymphadenopathy.    ABDOMINAL WALL: Tiny fat-containing periumbilical hernia.  BONES:   Degenerative changes of the lumbar spine with grade 1 anterior   spondylolisthesis L5 on S1.  Chronic parasymphyseal fracture deformity.  Chronic bilateral sacral alae as insufficiency fractures.    Impression:    Moderate right-sided hydroureteronephrosis secondary to 4 and 5 mm distal   ureteral and UVJ calculi.  Perinephric stranding and urothelial enhancement, may reflect   superimposed urinary tract infection.    Cholelithiasis.        EXAM:  XR KUB 1 VIEW                        PROCEDURE DATE:  2019      INTERPRETATION:  CLINICAL INFORMATION: Right ureteral stones.    TECHNIQUE: AP supine views of the abdomen.    COMPARISON: 2019    FINDINGS:    Air and stool noted within the colon. There are no dilated loops of small   bowel to suggest obstruction. Numerous calcifications are noted within   the pelvis, compatible with multiple phleboliths seen in this location on   CT. Distal ureteral stones may still be present but are difficult to   distinguish. Contrast within the bladder is compatible with excretion   from recent CT. Chronic deformity of the left pubic rami are noted.   Degenerative changes are noted throughout the spine and hips. Patient is   status post median sternotomy and 2 valve replacements.    IMPRESSION: Nonobstructed bowel gas pattern. Difficult to distinguish   distal ureteral stones from multiple phleboliths within the pelvis.          Imaging Personally Reviewed:  [X ] YES  [ ] NO    Consultant(s) Notes Reviewed:  [X ] YES  [ ] NO        PHYSICAL EXAM:  GENERAL: elderly female laying in bed comfortably in NAD, well-groomed, well-developed  HEAD:  Atraumatic, Normocephalic  EYES: EOMI, conjunctiva and sclera clear  ENMT: No tonsillar erythema, exudates, or enlargement; Moist mucous membranes, Good dentition, No lesions  NECK: Supple, No JVD, Normal thyroid  NERVOUS SYSTEM:  Alert & Oriented X3, Good concentration; Motor Strength 5/5 B/L upper and lower extremities; sensation intact  CHEST/LUNG: CTA bilaterally; No rales, rhonchi, wheezing, or rubs  HEART: Regular rate and rhythm; No murmurs, rubs, or gallops  ABDOMEN: Soft, Nontender, Nondistended; Bowel sounds present  EXTREMITIES:  2+ Peripheral Pulses, No clubbing or cyanosis, 1+ pitting edema B/L LE  LYMPH: No lymphadenopathy noted  SKIN: skin erosion approx. 1in in length of L medial ankle covered with bandage    Care Discussed with Consultants/Other Providers [X ] YES  [ ] NO

## 2019-08-29 NOTE — PROGRESS NOTE ADULT - PROBLEM SELECTOR PLAN 1
- CT shows moderate right-sided hydroureteronephrosis with 2 ureteral calculi at UVJ and moderate right-sided perinephric stranding as well as uroepithelial enhancement, possibly reflective of superimposed UTI.  - KUB shows B/L pelvic calcifications, difficult to distinguish distal ureteral stones  - C/w IV Levaquin 500mg qDaily  - Continue maintenance IVF 0.5NS + 20mEq KCl @100c/hr  - Currently afebrile, hemodynamically stable; continue to monitor for signs of emerging sepsis. If pt deteriorates, plan for emergent stent placement  - If pt has not passed stones by tomorrow, uro will proceed with ureteroscopy  - F/u UCx and blood cx  - Regular diet for today, to be made NPO after midnight for possible procedure tomorrow  - C/w Tamsulosin    Pt has no absolute medical contraindications for  procedure and is medically optimized. 0 pts per RCRI risk score, pt is Class I risk. She is considered low risk for intermediate-risk procedure. - CT shows moderate right-sided hydroureteronephrosis with 2 ureteral calculi at UVJ and moderate right-sided perinephric stranding as well as uroepithelial enhancement, possibly reflective of superimposed UTI.  - KUB shows B/L pelvic calcifications, difficult to distinguish distal ureteral stones  - C/w IV Levaquin 500mg qDaily  - Continue maintenance IVF 0.5NS + 20mEq KCl @100c/hr  - Currently afebrile, hemodynamically stable; continue to monitor for signs of emerging sepsis. If pt deteriorates, plan for emergent stent placement  - If pt has not passed stones by tomorrow, uro will proceed with ureteroscopy  - F/u UCx  - F/u blood cx according to initial plan on admission; cultures not ordered at time of admission prior to abx administration, will order now, aware results will be suboptimal  - Regular diet for today, to be made NPO after midnight for possible procedure tomorrow  - C/w Tamsulosin    Pt has no absolute medical contraindications for  procedure and is medically optimized. 0 pts per RCRI risk score, pt is Class I risk. She is considered low risk for intermediate-risk procedure.

## 2019-08-30 ENCOUNTER — TRANSCRIPTION ENCOUNTER (OUTPATIENT)
Age: 79
End: 2019-08-30

## 2019-08-30 DIAGNOSIS — G47.30 SLEEP APNEA, UNSPECIFIED: ICD-10-CM

## 2019-08-30 LAB
ALBUMIN SERPL ELPH-MCNC: 2.7 G/DL — LOW (ref 3.3–5)
ALP SERPL-CCNC: 67 U/L — SIGNIFICANT CHANGE UP (ref 40–120)
ALT FLD-CCNC: 13 U/L — SIGNIFICANT CHANGE UP (ref 12–78)
ANION GAP SERPL CALC-SCNC: 6 MMOL/L — SIGNIFICANT CHANGE UP (ref 5–17)
AST SERPL-CCNC: 14 U/L — LOW (ref 15–37)
BILIRUB SERPL-MCNC: 0.4 MG/DL — SIGNIFICANT CHANGE UP (ref 0.2–1.2)
BUN SERPL-MCNC: 22 MG/DL — SIGNIFICANT CHANGE UP (ref 7–23)
CALCIUM SERPL-MCNC: 7.3 MG/DL — LOW (ref 8.5–10.1)
CHLORIDE SERPL-SCNC: 111 MMOL/L — HIGH (ref 96–108)
CO2 SERPL-SCNC: 26 MMOL/L — SIGNIFICANT CHANGE UP (ref 22–31)
CREAT SERPL-MCNC: 0.63 MG/DL — SIGNIFICANT CHANGE UP (ref 0.5–1.3)
GLUCOSE SERPL-MCNC: 89 MG/DL — SIGNIFICANT CHANGE UP (ref 70–99)
HCT VFR BLD CALC: 34.4 % — LOW (ref 34.5–45)
HGB BLD-MCNC: 10.7 G/DL — LOW (ref 11.5–15.5)
MCHC RBC-ENTMCNC: 29.6 PG — SIGNIFICANT CHANGE UP (ref 27–34)
MCHC RBC-ENTMCNC: 31.1 GM/DL — LOW (ref 32–36)
MCV RBC AUTO: 95 FL — SIGNIFICANT CHANGE UP (ref 80–100)
NRBC # BLD: 0 /100 WBCS — SIGNIFICANT CHANGE UP (ref 0–0)
PLATELET # BLD AUTO: 151 K/UL — SIGNIFICANT CHANGE UP (ref 150–400)
POTASSIUM SERPL-MCNC: 4.1 MMOL/L — SIGNIFICANT CHANGE UP (ref 3.5–5.3)
POTASSIUM SERPL-SCNC: 4.1 MMOL/L — SIGNIFICANT CHANGE UP (ref 3.5–5.3)
PROT SERPL-MCNC: 5.5 G/DL — LOW (ref 6–8.3)
RBC # BLD: 3.62 M/UL — LOW (ref 3.8–5.2)
RBC # FLD: 17.3 % — HIGH (ref 10.3–14.5)
SODIUM SERPL-SCNC: 143 MMOL/L — SIGNIFICANT CHANGE UP (ref 135–145)
WBC # BLD: 5.21 K/UL — SIGNIFICANT CHANGE UP (ref 3.8–10.5)
WBC # FLD AUTO: 5.21 K/UL — SIGNIFICANT CHANGE UP (ref 3.8–10.5)

## 2019-08-30 PROCEDURE — 74018 RADEX ABDOMEN 1 VIEW: CPT | Mod: 26

## 2019-08-30 RX ORDER — TAMSULOSIN HYDROCHLORIDE 0.4 MG/1
0.4 CAPSULE ORAL AT BEDTIME
Refills: 0 | Status: DISCONTINUED | OUTPATIENT
Start: 2019-08-30 | End: 2019-09-01

## 2019-08-30 RX ORDER — SIMVASTATIN 20 MG/1
40 TABLET, FILM COATED ORAL AT BEDTIME
Refills: 0 | Status: DISCONTINUED | OUTPATIENT
Start: 2019-08-30 | End: 2019-09-01

## 2019-08-30 RX ORDER — DEXTROSE MONOHYDRATE, SODIUM CHLORIDE, AND POTASSIUM CHLORIDE 50; .745; 4.5 G/1000ML; G/1000ML; G/1000ML
1000 INJECTION, SOLUTION INTRAVENOUS
Refills: 0 | Status: DISCONTINUED | OUTPATIENT
Start: 2019-08-30 | End: 2019-09-01

## 2019-08-30 RX ORDER — DEXTROSE MONOHYDRATE, SODIUM CHLORIDE, AND POTASSIUM CHLORIDE 50; .745; 4.5 G/1000ML; G/1000ML; G/1000ML
1000 INJECTION, SOLUTION INTRAVENOUS
Refills: 0 | Status: DISCONTINUED | OUTPATIENT
Start: 2019-08-30 | End: 2019-08-30

## 2019-08-30 RX ORDER — SODIUM CHLORIDE 9 MG/ML
1000 INJECTION, SOLUTION INTRAVENOUS
Refills: 0 | Status: DISCONTINUED | OUTPATIENT
Start: 2019-08-30 | End: 2019-08-30

## 2019-08-30 RX ORDER — LEVOTHYROXINE SODIUM 125 MCG
88 TABLET ORAL DAILY
Refills: 0 | Status: DISCONTINUED | OUTPATIENT
Start: 2019-08-30 | End: 2019-09-01

## 2019-08-30 RX ORDER — ENOXAPARIN SODIUM 100 MG/ML
40 INJECTION SUBCUTANEOUS AT BEDTIME
Refills: 0 | Status: DISCONTINUED | OUTPATIENT
Start: 2019-08-30 | End: 2019-09-01

## 2019-08-30 RX ORDER — ONDANSETRON 8 MG/1
4 TABLET, FILM COATED ORAL ONCE
Refills: 0 | Status: DISCONTINUED | OUTPATIENT
Start: 2019-08-30 | End: 2019-08-30

## 2019-08-30 RX ORDER — FAMOTIDINE 10 MG/ML
20 INJECTION INTRAVENOUS
Refills: 0 | Status: DISCONTINUED | OUTPATIENT
Start: 2019-08-30 | End: 2019-09-01

## 2019-08-30 RX ORDER — HYDROMORPHONE HYDROCHLORIDE 2 MG/ML
0.5 INJECTION INTRAMUSCULAR; INTRAVENOUS; SUBCUTANEOUS
Refills: 0 | Status: DISCONTINUED | OUTPATIENT
Start: 2019-08-30 | End: 2019-08-30

## 2019-08-30 RX ORDER — ACETAMINOPHEN 500 MG
650 TABLET ORAL EVERY 6 HOURS
Refills: 0 | Status: DISCONTINUED | OUTPATIENT
Start: 2019-08-30 | End: 2019-09-01

## 2019-08-30 RX ORDER — LACTOBACILLUS ACIDOPHILUS 100MM CELL
1 CAPSULE ORAL DAILY
Refills: 0 | Status: DISCONTINUED | OUTPATIENT
Start: 2019-08-30 | End: 2019-09-01

## 2019-08-30 RX ORDER — CITALOPRAM 10 MG/1
40 TABLET, FILM COATED ORAL DAILY
Refills: 0 | Status: DISCONTINUED | OUTPATIENT
Start: 2019-08-30 | End: 2019-09-01

## 2019-08-30 RX ORDER — IPRATROPIUM/ALBUTEROL SULFATE 18-103MCG
3 AEROSOL WITH ADAPTER (GRAM) INHALATION ONCE
Refills: 0 | Status: COMPLETED | OUTPATIENT
Start: 2019-08-30 | End: 2019-08-30

## 2019-08-30 RX ADMIN — Medication 88 MICROGRAM(S): at 05:44

## 2019-08-30 RX ADMIN — SODIUM CHLORIDE 100 MILLILITER(S): 9 INJECTION, SOLUTION INTRAVENOUS at 14:21

## 2019-08-30 RX ADMIN — CITALOPRAM 40 MILLIGRAM(S): 10 TABLET, FILM COATED ORAL at 16:58

## 2019-08-30 RX ADMIN — DEXTROSE MONOHYDRATE, SODIUM CHLORIDE, AND POTASSIUM CHLORIDE 100 MILLILITER(S): 50; .745; 4.5 INJECTION, SOLUTION INTRAVENOUS at 16:59

## 2019-08-30 RX ADMIN — Medication 650 MILLIGRAM(S): at 05:54

## 2019-08-30 RX ADMIN — SIMVASTATIN 40 MILLIGRAM(S): 20 TABLET, FILM COATED ORAL at 21:38

## 2019-08-30 RX ADMIN — Medication 1 TABLET(S): at 16:58

## 2019-08-30 RX ADMIN — FAMOTIDINE 20 MILLIGRAM(S): 10 INJECTION INTRAVENOUS at 05:45

## 2019-08-30 RX ADMIN — ENOXAPARIN SODIUM 40 MILLIGRAM(S): 100 INJECTION SUBCUTANEOUS at 21:38

## 2019-08-30 RX ADMIN — FAMOTIDINE 20 MILLIGRAM(S): 10 INJECTION INTRAVENOUS at 16:58

## 2019-08-30 RX ADMIN — Medication 25 MILLIGRAM(S): at 05:44

## 2019-08-30 RX ADMIN — Medication 3 MILLILITER(S): at 10:07

## 2019-08-30 RX ADMIN — BUPROPION HYDROCHLORIDE 75 MILLIGRAM(S): 150 TABLET, EXTENDED RELEASE ORAL at 05:44

## 2019-08-30 RX ADMIN — DEXTROSE MONOHYDRATE, SODIUM CHLORIDE, AND POTASSIUM CHLORIDE 100 MILLILITER(S): 50; .745; 4.5 INJECTION, SOLUTION INTRAVENOUS at 03:49

## 2019-08-30 RX ADMIN — TAMSULOSIN HYDROCHLORIDE 0.4 MILLIGRAM(S): 0.4 CAPSULE ORAL at 21:38

## 2019-08-30 RX ADMIN — LOSARTAN POTASSIUM 25 MILLIGRAM(S): 100 TABLET, FILM COATED ORAL at 05:44

## 2019-08-30 NOTE — PROGRESS NOTE ADULT - PROBLEM SELECTOR PLAN 8
- Follows as outpatient with Dr. Temple, not an active issue at this time  - Receives monthly iron infusions, states she is next due on 9/4 - C/w home Buproprion 2 tabs in AM & 1 tab at night and Celexa

## 2019-08-30 NOTE — DISCHARGE NOTE PROVIDER - HOSPITAL COURSE
80 yo F, w/ PMH/o leukemia, COPD, Grave's disease, HLD, previous aortic stenosis and mitral regurgitation (s/p bovine/porcine valve replacement 5 yrs ago), medullary kidney disease, hiatal hernia, depression, R rotator cuff tear, presented with RLQ abdominal pain radiating into her R groin that began early in the morning of presentation. Pt stated pain was only associated with nausea and was rated as 6-7/10 at its worst. She attempted to relieve her pain with aspirin at home, but this provided no relief. Of note, pt described life stressors that have prevented her from visiting doctors and obtaining medications, particularly her COPD meds. She endorsed occasional SOB and cough; denied fevers, chills, headaches, vision changes, numbness/tingling, chest pain, palpitations, abdominal pain except as noted above, V/D/C, hematuria, dysuria, increased urinary frequency. In the ED, she was hemodynamically stable. Urinalysis showed large blood with occasional bacteria and trace leukocyte esterase. CT showed moderate right-sided hydroureteronephrosis with two, adjacent, 4 and 5 mm calculi at the distal right ureter and ureterovesical with moderate right-sided perinephric stranding as well as uroepithelial enhancement, findings which might have reflected superimposed UTI. There are small bilateral nonobstructing intrarenal calculi. She received one dose of IV Levaquin 500mg, 2L NS, Tylenol 650mg, and IV Zofran 4mg. While still in the ED, she was seen by urologist Dr. Condon who started the patient on tamsulosin and recommended emergent stent placement if patient developed signs of sepsis.         She was subsequently admitted to the general medicine floor. IV fluid hydration was continued. She was continued on IV Levaquin. KUB was ordered and showed bilateral pelvic calcifications though difficult to distinguish ureteral stones. Urine cultures showed >/= 3 organisms, likely contaminated. Patient was given an opportunity to pass the stones but ureteroscopy was inevitable. On day of procedure, patient developed a T 100.9 and received Tylenol. She also developed mild shortness of breath, likely in the setting of her COPD and admitted anxiety over her upcoming procedure, received one treatment of DuoNebs. Stent placement was performed by urologist Dr. Fernandez. 78 yo F, w/ PMH/o leukemia, COPD, Grave's disease, HLD, previous aortic stenosis and mitral regurgitation (s/p bovine/porcine valve replacement 5 yrs ago), medullary kidney disease, hiatal hernia, depression, R rotator cuff tear, presented with RLQ abdominal pain radiating into her R groin that began early in the morning of presentation. Pt stated pain was only associated with nausea and was rated as 6-7/10 at its worst. She attempted to relieve her pain with aspirin at home, but this provided no relief. Of note, pt described life stressors that have prevented her from visiting doctors and obtaining medications, particularly her COPD meds. She endorsed occasional SOB and cough; denied fevers, chills, headaches, vision changes, numbness/tingling, chest pain, palpitations, abdominal pain except as noted above, V/D/C, hematuria, dysuria, increased urinary frequency. In the ED, she was hemodynamically stable. Urinalysis showed large blood with occasional bacteria and trace leukocyte esterase. CT showed moderate right-sided hydroureteronephrosis with two, adjacent, 4 and 5 mm calculi at the distal right ureter and ureterovesical with moderate right-sided perinephric stranding as well as uroepithelial enhancement, findings which might have reflected superimposed UTI. There are small bilateral nonobstructing intrarenal calculi. She received one dose of IV Levaquin 500mg, 2L NS, Tylenol 650mg, and IV Zofran 4mg. While still in the ED, she was seen by urologist Dr. Condon who started the patient on tamsulosin and recommended emergent stent placement if patient developed signs of sepsis.         She was subsequently admitted to the general medicine floor. IV fluid hydration was continued. She was continued on IV Levaquin. KUB was ordered and showed bilateral pelvic calcifications though difficult to distinguish ureteral stones. Urine cultures showed >/= 3 organisms, likely contaminated. Patient was given an opportunity to pass the stones but ureteroscopy was inevitable. On day of procedure, patient developed a T 100.9 and received Tylenol. She also developed mild shortness of breath, likely in the setting of her COPD and admitted anxiety over her upcoming procedure, received one treatment of DuoNebs. Stent placement was performed by urologist Dr. Fernandez. Kidney urine cx and OR collection cx ngtd. Blood cx (8/29) ngtd. Pt completed 5 day course of Levaquin. Patient medically stable for discharge home.         On day of discharge, patient's vitals and physical exam were:     Vital Signs Last 24 Hrs    T(C): 36.8 (01 Sep 2019 06:23), Max: 36.9 (31 Aug 2019 15:36)    T(F): 98.3 (01 Sep 2019 06:23), Max: 98.5 (31 Aug 2019 15:36)    HR: 93 (01 Sep 2019 06:23) (89 - 99)    BP: 124/85 (01 Sep 2019 06:23) (124/85 - 146/83)    BP(mean): 98 (01 Sep 2019 06:23) (98 - 104)    RR: 18 (01 Sep 2019 06:23) (18 - 18)    SpO2: 94% (01 Sep 2019 06:23) (94% - 96%)        PHYSICAL EXAM:    GENERAL: NAD, well-groomed, well-developed    HEAD:  Atraumatic, Normocephalic    EYES: EOMI, PERRLA, conjunctiva and sclera clear    ENMT: No tonsillar erythema, exudates, or enlargement; Moist mucous membranes, Good dentition, No lesions    NECK: Supple, No JVD, Normal thyroid    NERVOUS SYSTEM:  Alert & Oriented, answering questions appropriately, Good concentration    CHEST/LUNG: Clear to percussion bilaterally; No rales, rhonchi, wheezing, or rubs    HEART: Regular rate and rhythm; No murmurs, rubs, or gallops    ABDOMEN: Soft, Nontender, Nondistended; Bowel sounds present    BACK: no cva tenderness     EXTREMITIES:  2+ Peripheral Pulses, No clubbing, cyanosis, or edema    LYMPH: No lymphadenopathy noted    SKIN: skin appears dry. There is a small abrasion located on her left medial aspect of her foot covered with dressing. 78 yo F, w/ PMH/o leukemia, COPD, Grave's disease, HLD, previous aortic stenosis and mitral regurgitation (s/p bovine/porcine valve replacement 5 yrs ago), medullary kidney disease, hiatal hernia, depression, R rotator cuff tear, presented with RLQ abdominal pain radiating into her R groin that began early in the morning of presentation. Pt stated pain was only associated with nausea and was rated as 6-7/10 at its worst. She attempted to relieve her pain with aspirin at home, but this provided no relief. Of note, pt described life stressors that have prevented her from visiting doctors and obtaining medications, particularly her COPD meds. She endorsed occasional SOB and cough; denied fevers, chills, headaches, vision changes, numbness/tingling, chest pain, palpitations, abdominal pain except as noted above, V/D/C, hematuria, dysuria, increased urinary frequency. In the ED, she was hemodynamically stable. Urinalysis showed large blood with occasional bacteria and trace leukocyte esterase. CT showed moderate right-sided hydroureteronephrosis with two, adjacent, 4 and 5 mm calculi at the distal right ureter and ureterovesical with moderate right-sided perinephric stranding as well as uroepithelial enhancement, findings which might have reflected superimposed UTI. There are small bilateral nonobstructing intrarenal calculi. She received one dose of IV Levaquin 500mg, 2L NS, Tylenol 650mg, and IV Zofran 4mg. While still in the ED, she was seen by urologist Dr. Condon who started the patient on tamsulosin and recommended emergent stent placement if patient developed signs of sepsis.         She was subsequently admitted to the general medicine floor. IV fluid hydration was continued. She was continued on IV Levaquin. KUB was ordered and showed bilateral pelvic calcifications though difficult to distinguish ureteral stones. Urine cultures showed >/= 3 organisms, likely contaminated. Patient was given an opportunity to pass the stones but ureteroscopy was inevitable. On day of procedure, patient developed a T 100.9 and received Tylenol. She also developed mild shortness of breath, likely in the setting of her COPD and admitted anxiety over her upcoming procedure, received one treatment of DuoNebs. Stent placement was performed by urologist Dr. Fernandez. Kidney urine cx and OR collection cx ngtd. Blood cx (8/29) ngtd. Pt will complete a  7 day course of Levaquin on 9/3. She was instructed to continue to stay hydrated and strain her urine.  Patient medically stable for discharge home.         On day of discharge, patient's vitals and physical exam were:     Vital Signs Last 24 Hrs    T(C): 36.8 (01 Sep 2019 06:23), Max: 36.9 (31 Aug 2019 15:36)    T(F): 98.3 (01 Sep 2019 06:23), Max: 98.5 (31 Aug 2019 15:36)    HR: 93 (01 Sep 2019 06:23) (89 - 99)    BP: 124/85 (01 Sep 2019 06:23) (124/85 - 146/83)    BP(mean): 98 (01 Sep 2019 06:23) (98 - 104)    RR: 18 (01 Sep 2019 06:23) (18 - 18)    SpO2: 94% (01 Sep 2019 06:23) (94% - 96%)        PHYSICAL EXAM:    GENERAL: NAD, well-groomed, well-developed    HEAD:  Atraumatic, Normocephalic    EYES: EOMI, PERRLA, conjunctiva and sclera clear    ENMT: No tonsillar erythema, exudates, or enlargement; Moist mucous membranes, Good dentition, No lesions    NECK: Supple, No JVD, Normal thyroid    NERVOUS SYSTEM:  Alert & Oriented, answering questions appropriately, Good concentration    CHEST/LUNG: Clear to percussion bilaterally; No rales, rhonchi, wheezing, or rubs    HEART: Regular rate and rhythm; No murmurs, rubs, or gallops    ABDOMEN: Soft, Nontender, Nondistended; Bowel sounds present    BACK: no cva tenderness     EXTREMITIES:  2+ Peripheral Pulses, No clubbing, cyanosis, or edema    LYMPH: No lymphadenopathy noted    SKIN: skin appears dry. There is a small abrasion located on her left medial aspect of her foot covered with dressing.

## 2019-08-30 NOTE — PHYSICAL THERAPY INITIAL EVALUATION ADULT - ADDITIONAL COMMENTS
Pt lives alone in a house, no steps. Ambulates independently without device indoors and uses SC for outdoor ambulation, independent with ADL. No driving

## 2019-08-30 NOTE — PROGRESS NOTE ADULT - PROBLEM SELECTOR PLAN 1
- CT shows moderate right-sided hydroureteronephrosis with 2 ureteral calculi at UVJ and moderate right-sided perinephric stranding as well as uroepithelial enhancement, possibly reflective of superimposed UTI.  - KUB shows B/L pelvic calcifications, difficult to distinguish distal ureteral stones  - C/w IV Levaquin 500mg qDaily  - Continue maintenance IVF 0.5NS + 20mEq KCl @100c/hr  - Current T 100.8, hemodynamically stable; continue to monitor for signs of emerging sepsis  - Plan for ureteroscopy  - UCx likely contaminated  - F/u blood cx according to initial plan on admission; cultures not ordered at time of admission prior to abx administration, will order now, aware results will be suboptimal  - NPO for procedure  - C/w Tamsulosin    Pt has no absolute medical contraindications for  procedure and is medically optimized. 0 pts per RCRI risk score, pt is Class I risk. She is considered low risk for intermediate-risk procedure.

## 2019-08-30 NOTE — PROGRESS NOTE ADULT - PROBLEM SELECTOR PLAN 9
IMPROVE VTE Individual Risk Assessment          RISK                                                          Points  [  ] Previous VTE                                                3  [  ] Thrombophilia                                             2  [  ] Lower limb paralysis                                   2        (unable to hold up >15 seconds)    [ X ] Current Cancer                                             2         (within 6 months)  [  ] Immobilization > 24 hrs                              1  [  ] ICU/CCU stay > 24 hours                             1  [ X ] Age > 60                                                         1    IMPROVE VTE Score: 3    DVT ppx: Lovenox 40mg subq qHs - Follows as outpatient with Dr. Temple, not an active issue at this time  - Receives monthly iron infusions, states she is next due on 9/4

## 2019-08-30 NOTE — PROGRESS NOTE ADULT - SUBJECTIVE AND OBJECTIVE BOX
Patient is a 79y old  Female who presents with a chief complaint of ureteral stones (29 Aug 2019 14:12)      INTERVAL HPI/OVERNIGHT EVENTS: Pt had a temperature overnight 100.9, was given Tylenol 650mg. T 100.8 on reassessment 2hrs later, provided ice packs. She is hemodynamically stable otherwise. Endorses increasingly bothersome post-nasal drip which is chronic for her but feels that it is causing her to wheeze. H/o COPD with inconsistent medication use at home. Denies chills, headache, vision changes, chest pain, abdominal pain, N/V/D/C, hematuria, dysuria, or increased urinary frequency.       T(C): 38.2 (19 @ 07:38), Max: 38.3 (19 @ 05:45)  HR: 73 (19 @ 07:38) (73 - 82)  BP: 154/90 (19 @ 07:38) (138/79 - 165/80)  RR: 18 (19 @ 07:38) (17 - 18)  SpO2: 93% (19 @ 07:38) (92% - 96%)      I&O's Summary    29 Aug 2019 07:01  -  30 Aug 2019 07:00  --------------------------------------------------------  IN: 1340 mL / OUT: 600 mL / NET: 740 mL        LABS:                        10.7   5.21  )-----------( 151      ( 30 Aug 2019 08:42 )             34.4         143  |  111<H>  |  x   ----------------------------<  x   4.1   |  x   |  x     Ca    7.3<L>      29 Aug 2019 08:28    TPro  5.7<L>  /  Alb  2.9<L>  /  TBili  0.6  /  DBili  x   /  AST  16  /  ALT  15  /  AlkPhos  74        Urinalysis Basic - ( 28 Aug 2019 09:24 )    Color: Pale Yellow / Appearance: Clear / S.005 / pH: x  Gluc: x / Ketone: Negative  / Bili: Negative / Urobili: Negative   Blood: x / Protein: 25 mg/dL / Nitrite: Negative   Leuk Esterase: Trace / RBC: 25-50 /HPF / WBC 3-5   Sq Epi: x / Non Sq Epi: Few / Bacteria: Occasional      Urinalysis Basic - ( 28 Aug 2019 09:24 )    Color: Pale Yellow / Appearance: Clear / S.005 / pH: x  Gluc: x / Ketone: Negative  / Bili: Negative / Urobili: Negative   Blood: x / Protein: 25 mg/dL / Nitrite: Negative   Leuk Esterase: Trace / RBC: 25-50 /HPF / WBC 3-5   Sq Epi: x / Non Sq Epi: Few / Bacteria: Occasional        MEDICATIONS  (STANDING):  buPROPion . 75 milliGRAM(s) Oral three times a day  citalopram 40 milliGRAM(s) Oral daily  enoxaparin Injectable 40 milliGRAM(s) SubCutaneous at bedtime  famotidine    Tablet 20 milliGRAM(s) Oral two times a day  lactobacillus acidophilus 1 Tablet(s) Oral daily  levoFLOXacin IVPB 500 milliGRAM(s) IV Intermittent every 24 hours  levothyroxine 88 MICROGram(s) Oral daily  losartan 25 milliGRAM(s) Oral daily  metoprolol succinate ER 25 milliGRAM(s) Oral daily  simvastatin 40 milliGRAM(s) Oral at bedtime  sodium chloride 0.45% with potassium chloride 20 mEq/L 1000 milliLiter(s) (100 mL/Hr) IV Continuous <Continuous>  tamsulosin 0.4 milliGRAM(s) Oral at bedtime    MEDICATIONS  (PRN):  acetaminophen   Tablet .. 650 milliGRAM(s) Oral every 6 hours PRN Temp greater or equal to 38C (100.4F), Mild Pain (1 - 3)      REVIEW OF SYSTEMS:  CONSTITUTIONAL: No fever, weight loss, or fatigue  EYES: No eye pain, visual disturbances, or discharge  ENMT:  No difficulty hearing, tinnitus, vertigo; No sinus or throat pain  NECK: No pain or stiffness  RESPIRATORY: No cough, wheezing, chills or hemoptysis; No shortness of breath  CARDIOVASCULAR: Admits chronic leg swelling; No chest pain, palpitations, dizziness  GASTROINTESTINAL: No abdominal or epigastric pain. No nausea, vomiting, or hematemesis; No diarrhea or constipation. No melena or hematochezia.  GENITOURINARY: No dysuria, frequency, hematuria, or incontinence  NEUROLOGICAL: No headaches, memory loss, loss of strength, numbness, or tremors  SKIN: No itching, burning, rashes, or lesions   LYMPH NODES: No enlarged glands  ENDOCRINE: No heat or cold intolerance; No hair loss  MUSCULOSKELETAL: No joint pain or swelling; No muscle, back, or extremity pain  PSYCHIATRIC: No depression, anxiety, mood swings, or difficulty sleeping  HEME/LYMPH: No easy bruising, or bleeding gums  ALLERY AND IMMUNOLOGIC: No hives or eczema    RADIOLOGY & ADDITIONAL TESTS: No new imaging    Imaging Personally Reviewed:  [X ] YES  [ ] NO    Consultant(s) Notes Reviewed:  [X ] YES  [ ] NO        PHYSICAL EXAM:  GENERAL: NAD, well-groomed, well-developed  HEAD:  Atraumatic, Normocephalic  EYES: EOMI, conjunctiva and sclera clear  ENMT: No tonsillar erythema, exudates, or enlargement; Moist mucous membranes  NECK: Supple, No JVD, Normal thyroid  NERVOUS SYSTEM:  Alert & Oriented X3, Good concentration; Motor Strength 5/5 B/L upper and lower extremities; sensation intact  CHEST/LUNG: CTA bilaterally; No rales, rhonchi, wheezing, or rubs  HEART: Regular rate and rhythm; No murmurs, rubs, or gallops  ABDOMEN: Soft, Nontender, Nondistended; Bowel sounds present  EXTREMITIES:  2+ pitting edema B/L LE, 2+ Peripheral Pulses, No clubbing or cyanosis  LYMPH: No lymphadenopathy noted  SKIN: No rashes or lesions    Care Discussed with Consultants/Other Providers [X ] YES  [ ] NO

## 2019-08-30 NOTE — BRIEF OPERATIVE NOTE - NSICDXBRIEFPOSTOP_GEN_ALL_CORE_FT
POST-OP DIAGNOSIS:  Right ureteral calculus 30-Aug-2019 13:15:33  Jarrell Fernandez  Urinary tract infection with fever 30-Aug-2019 13:15:25  Jarrell Fernandez

## 2019-08-30 NOTE — BRIEF OPERATIVE NOTE - NSICDXBRIEFPREOP_GEN_ALL_CORE_FT
PRE-OP DIAGNOSIS:  Urinary tract infection with fever 30-Aug-2019 13:15:01  Jarrell Fernandez  Right ureteral calculus 30-Aug-2019 13:14:44  Jarrell Fernandez

## 2019-08-30 NOTE — PROGRESS NOTE ADULT - ATTENDING COMMENTS
pt s/p placement of uteral stent   will d/c tomorrow to follow up with urology to remove stent as an out patient.  pt advised to strain urine to document renal stonme loss

## 2019-08-30 NOTE — PROGRESS NOTE ADULT - PROBLEM SELECTOR PLAN 4
- C/w home Losartan 25mg daily  - Monitor routine hemodynamics - C/w home Simvastatin 40mg at bedtime

## 2019-08-30 NOTE — DISCHARGE NOTE PROVIDER - CARE PROVIDER_API CALL
Jarrell Fernandez)  Urology  [address], [address]  [place] [address]  Phone: [phone]  Fax: [phone]  Follow Up Time:     Sammy Man)  Internal Medicine  [address], [address]  [place] [address]  Phone: [phone]  Fax: [phone]  Follow Up Time:

## 2019-08-30 NOTE — PROGRESS NOTE ADULT - PROBLEM SELECTOR PLAN 2
- Not currently on medications at home d/t stated inability to visit otpt pulm/obtain medications  - Follows with Dr. Mcmahon  - Continue to monitor for symptoms of COPD; if present, give DuoNebs prn for SOB or wheezing  - Continue to monitor routine pulse ox - Not currently on medications at home d/t stated inability to visit otpt pulm/obtain medications  - Follows with Dr. Mcmahon  - C/o wheezing today, Nick x1 given  - Will continue to assess for symptoms of COPD; if present, can order Albuterol MDR prn  - Continue to monitor routine pulse ox

## 2019-08-30 NOTE — PROGRESS NOTE ADULT - PROBLEM SELECTOR PLAN 3
- C/w home Simvastatin 40mg at bedtime - Newly aware of pt's h/o sleep apnea  - Has CPAP machine at home but hasn't used it in ~2 yrs, currently refusing  - Will reassess need for respiratory support overnight

## 2019-08-30 NOTE — DISCHARGE NOTE PROVIDER - NSDCCPCAREPLAN_GEN_ALL_CORE_FT
PRINCIPAL DISCHARGE DIAGNOSIS  Diagnosis: Ureteral colic  Assessment and Plan of Treatment: - You were found to have two stones in your right ureter near your bladder.  - You were started on an antibiotic called Levaquin.  - You underwent a procedure to place a stent in your ureter. PRINCIPAL DISCHARGE DIAGNOSIS  Diagnosis: Ureteral colic  Assessment and Plan of Treatment: - You were found to have two stones in your right ureter near your bladder.  - You completed a 5 day course of Levaquin.   - Please continue Flomax 0.4mg at bedtime.   - You underwent a procedure to place a stent in your ureter.  - Please follow up with Urology, Dr. Fernandez upon discharge to remove the stent.

## 2019-08-31 LAB
ALBUMIN SERPL ELPH-MCNC: 2.8 G/DL — LOW (ref 3.3–5)
ALP SERPL-CCNC: 61 U/L — SIGNIFICANT CHANGE UP (ref 40–120)
ALT FLD-CCNC: 13 U/L — SIGNIFICANT CHANGE UP (ref 12–78)
ANION GAP SERPL CALC-SCNC: 6 MMOL/L — SIGNIFICANT CHANGE UP (ref 5–17)
AST SERPL-CCNC: 15 U/L — SIGNIFICANT CHANGE UP (ref 15–37)
BILIRUB SERPL-MCNC: 0.4 MG/DL — SIGNIFICANT CHANGE UP (ref 0.2–1.2)
BUN SERPL-MCNC: 19 MG/DL — SIGNIFICANT CHANGE UP (ref 7–23)
CALCIUM SERPL-MCNC: 7.5 MG/DL — LOW (ref 8.5–10.1)
CHLORIDE SERPL-SCNC: 110 MMOL/L — HIGH (ref 96–108)
CO2 SERPL-SCNC: 25 MMOL/L — SIGNIFICANT CHANGE UP (ref 22–31)
CREAT SERPL-MCNC: 0.72 MG/DL — SIGNIFICANT CHANGE UP (ref 0.5–1.3)
GLUCOSE SERPL-MCNC: 97 MG/DL — SIGNIFICANT CHANGE UP (ref 70–99)
HCT VFR BLD CALC: 33 % — LOW (ref 34.5–45)
HGB BLD-MCNC: 10.4 G/DL — LOW (ref 11.5–15.5)
MCHC RBC-ENTMCNC: 29.7 PG — SIGNIFICANT CHANGE UP (ref 27–34)
MCHC RBC-ENTMCNC: 31.5 GM/DL — LOW (ref 32–36)
MCV RBC AUTO: 94.3 FL — SIGNIFICANT CHANGE UP (ref 80–100)
NRBC # BLD: 0 /100 WBCS — SIGNIFICANT CHANGE UP (ref 0–0)
PLATELET # BLD AUTO: 152 K/UL — SIGNIFICANT CHANGE UP (ref 150–400)
POTASSIUM SERPL-MCNC: 4.3 MMOL/L — SIGNIFICANT CHANGE UP (ref 3.5–5.3)
POTASSIUM SERPL-SCNC: 4.3 MMOL/L — SIGNIFICANT CHANGE UP (ref 3.5–5.3)
PROT SERPL-MCNC: 5.6 G/DL — LOW (ref 6–8.3)
RBC # BLD: 3.5 M/UL — LOW (ref 3.8–5.2)
RBC # FLD: 17.6 % — HIGH (ref 10.3–14.5)
SODIUM SERPL-SCNC: 141 MMOL/L — SIGNIFICANT CHANGE UP (ref 135–145)
WBC # BLD: 5.98 K/UL — SIGNIFICANT CHANGE UP (ref 3.8–10.5)
WBC # FLD AUTO: 5.98 K/UL — SIGNIFICANT CHANGE UP (ref 3.8–10.5)

## 2019-08-31 RX ORDER — NYSTATIN CREAM 100000 [USP'U]/G
1 CREAM TOPICAL
Refills: 0 | Status: DISCONTINUED | OUTPATIENT
Start: 2019-08-31 | End: 2019-09-01

## 2019-08-31 RX ORDER — FLUTICASONE PROPIONATE 50 MCG
1 SPRAY, SUSPENSION NASAL
Refills: 0 | Status: DISCONTINUED | OUTPATIENT
Start: 2019-08-31 | End: 2019-09-01

## 2019-08-31 RX ADMIN — SIMVASTATIN 40 MILLIGRAM(S): 20 TABLET, FILM COATED ORAL at 21:25

## 2019-08-31 RX ADMIN — NYSTATIN CREAM 1 APPLICATION(S): 100000 CREAM TOPICAL at 17:51

## 2019-08-31 RX ADMIN — FAMOTIDINE 20 MILLIGRAM(S): 10 INJECTION INTRAVENOUS at 17:51

## 2019-08-31 RX ADMIN — Medication 1 SPRAY(S): at 14:26

## 2019-08-31 RX ADMIN — Medication 88 MICROGRAM(S): at 06:09

## 2019-08-31 RX ADMIN — TAMSULOSIN HYDROCHLORIDE 0.4 MILLIGRAM(S): 0.4 CAPSULE ORAL at 21:24

## 2019-08-31 RX ADMIN — ENOXAPARIN SODIUM 40 MILLIGRAM(S): 100 INJECTION SUBCUTANEOUS at 21:25

## 2019-08-31 RX ADMIN — FAMOTIDINE 20 MILLIGRAM(S): 10 INJECTION INTRAVENOUS at 06:09

## 2019-08-31 RX ADMIN — DEXTROSE MONOHYDRATE, SODIUM CHLORIDE, AND POTASSIUM CHLORIDE 100 MILLILITER(S): 50; .745; 4.5 INJECTION, SOLUTION INTRAVENOUS at 04:39

## 2019-08-31 RX ADMIN — Medication 1 TABLET(S): at 12:24

## 2019-08-31 RX ADMIN — CITALOPRAM 40 MILLIGRAM(S): 10 TABLET, FILM COATED ORAL at 12:24

## 2019-08-31 NOTE — PROGRESS NOTE ADULT - PROBLEM SELECTOR PLAN 2
- Not currently on medications at home d/t stated inability to visit otpt pulm/obtain medications  - Follows with Dr. Mcmahon  - Will continue to assess for symptoms of COPD; if present, can order Albuterol MDR prn  - Continue to monitor routine pulse ox  - added flonase for post nasal drip

## 2019-08-31 NOTE — PROGRESS NOTE ADULT - PROBLEM SELECTOR PLAN 3
- Has CPAP machine at home but hasn't used it in ~2 yrs, currently refusing  - Will reassess need for respiratory support overnight

## 2019-08-31 NOTE — PROGRESS NOTE ADULT - PROBLEM SELECTOR PLAN 1
- CT shows moderate right-sided hydroureteronephrosis with 2 ureteral calculi at UVJ and moderate right-sided perinephric stranding as well as uroepithelial enhancement, possibly reflective of superimposed UTI.   - KUB shows B/L pelvic calcifications, difficult to distinguish distal ureteral stones  - She is s/p right ureteroscopy on 8/30.   - C/w IV Levaquin 500mg qDaily pending repeat Urine cx (8/30). If cx results return, will transition to PO abx for dc planning.    - Continue maintenance IVF 0.5NS + 20mEq KCl @100c/hr  - Afebrile overnight. hemodynamically stable; continue to monitor for signs of emerging sepsis  - UCx likely contaminated. f/u repeat ucx on 8/30   - blood cx were drawn after abx as they were not ordered on admission (aware results are suboptimal). NGTD (8/29)   - C/w Tamsulosin

## 2019-08-31 NOTE — PROGRESS NOTE ADULT - PROBLEM SELECTOR PLAN 10
IMPROVE VTE Individual Risk Assessment          RISK                                                          Points  [  ] Previous VTE                                                3  [  ] Thrombophilia                                             2  [  ] Lower limb paralysis                                   2        (unable to hold up >15 seconds)    [ X ] Current Cancer                                             2         (within 6 months)  [  ] Immobilization > 24 hrs                              1  [  ] ICU/CCU stay > 24 hours                             1  [ X ] Age > 60                                                         1    IMPROVE VTE Score: 3    DVT ppx: Lovenox 40mg subq qHs
IMPROVE VTE Individual Risk Assessment          RISK                                                          Points  [  ] Previous VTE                                                3  [  ] Thrombophilia                                             2  [  ] Lower limb paralysis                                   2        (unable to hold up >15 seconds)    [ X ] Current Cancer                                             2         (within 6 months)  [  ] Immobilization > 24 hrs                              1  [  ] ICU/CCU stay > 24 hours                             1  [ X ] Age > 60                                                         1    IMPROVE VTE Score: 3    DVT ppx: Lovenox 40mg subq qHs

## 2019-08-31 NOTE — PROGRESS NOTE ADULT - SUBJECTIVE AND OBJECTIVE BOX
INTERVAL HPI/OVERNIGHT EVENTS:  Pain resolved. Feeling better. Afeb    MEDICATIONS  (STANDING):  citalopram 40 milliGRAM(s) Oral daily  enoxaparin Injectable 40 milliGRAM(s) SubCutaneous at bedtime  famotidine    Tablet 20 milliGRAM(s) Oral two times a day  lactobacillus acidophilus 1 Tablet(s) Oral daily  levoFLOXacin IVPB 500 milliGRAM(s) IV Intermittent every 24 hours  levothyroxine 88 MICROGram(s) Oral daily  simvastatin 40 milliGRAM(s) Oral at bedtime  sodium chloride 0.45% with potassium chloride 20 mEq/L 1000 milliLiter(s) (100 mL/Hr) IV Continuous <Continuous>  tamsulosin 0.4 milliGRAM(s) Oral at bedtime    MEDICATIONS  (PRN):  acetaminophen   Tablet .. 650 milliGRAM(s) Oral every 6 hours PRN Temp greater or equal to 38C (100.4F), Mild Pain (1 - 3)        Vital Signs Last 24 Hrs  T(C): 36.7 (31 Aug 2019 05:24), Max: 37.5 (30 Aug 2019 20:08)  T(F): 98.1 (31 Aug 2019 05:24), Max: 99.5 (30 Aug 2019 20:08)  HR: 81 (31 Aug 2019 05:24) (72 - 88)  BP: 135/76 (31 Aug 2019 05:24) (130/65 - 174/86)  BP(mean): --  RR: 18 (31 Aug 2019 05:24) (12 - 18)  SpO2: 95% (31 Aug 2019 05:24) (94% - 99%)    PHYSICAL EXAM:    No CVAT  ABDOMEN: benign    LABS:                        10.7   5.21  )-----------( 151      ( 30 Aug 2019 08:42 )             34.4     08-30    143  |  111<H>  |  22  ----------------------------<  89  4.1   |  26  |  0.63    Ca    7.3<L>      30 Aug 2019 08:42    TPro  5.5<L>  /  Alb  2.7<L>  /  TBili  0.4  /  DBili  x   /  AST  14<L>  /  ALT  13  /  AlkPhos  67  08-30        Urine culture:  08-29 @ 21:11 --   No growth to date.  Urine culture:  08-29 @ 21:06 --   No growth to date.  Urine culture:  08-28 @ 11:38 --   >=3 organisms. Probable collection contamination.    Blood Cultures:  08-29 @ 21:11   No growth to date.  --  --  08-29 @ 21:06   No growth to date.  --  --  08-28 @ 11:38   >=3 organisms. Probable collection contamination.  --  --    RADIOLOGY & ADDITIONAL TESTS:

## 2019-08-31 NOTE — PROGRESS NOTE ADULT - SUBJECTIVE AND OBJECTIVE BOX
Patient is a 79y old  Female who presents with a chief complaint of ureteral stones (31 Aug 2019 08:40)      INTERVAL HPI/OVERNIGHT EVENTS: No events overnight. She was seen and examined lying in bed comfortably. She stated she has a chronic post nasal drip. She denied chest pain, sob, abdominal pain, dysuria.     T(C): 36.7 (08-31-19 @ 05:24), Max: 37.5 (08-30-19 @ 20:08)  HR: 81 (08-31-19 @ 05:24) (72 - 88)  BP: 135/76 (08-31-19 @ 05:24) (130/65 - 174/86)  RR: 18 (08-31-19 @ 05:24) (12 - 18)  SpO2: 95% (08-31-19 @ 05:24) (94% - 99%)    I&O's Summary    30 Aug 2019 07:01  -  31 Aug 2019 07:00  --------------------------------------------------------  IN: 1700 mL / OUT: 602 mL / NET: 1098 mL    31 Aug 2019 07:01  -  31 Aug 2019 09:45  --------------------------------------------------------  IN: 200 mL / OUT: 0 mL / NET: 200 mL        PAST MEDICAL & SURGICAL HISTORY:  Spinal stenosis  Mitral regurgitation  Aortic stenosis  Emphysema  Duodenal ulcer: at age 24  Rotator cuff tear: Right  Diverticulosis of colon  Coronary atherosclerosis of native coronary artery  Carpal tunnel syndrome  Dyslipidemia  Obstructive sleep apnea  Osteoporosis  Hypertension  Hernia, hiatal  Former cigarette smoker  Depression  Asthma with COPD with exacerbation  Acid reflux  Graves disease  H/O dilation and curettage  S/P ureteral stent placement: 2015, Dr. Escobar  H/O mitral valve repair: 2014  H/O aortic valve repair: 2014  History of coronary angiogram: 1/31/12  h/o  endometrial ablation: 1998  H/O cone biopsy of cervix: 1974  History of tonsillectomy: childhood  After cataract, bilateral: 2010    Home Medications:  buPROPion 75 mg oral tablet: 2 tabs in AM, 1 tab at night (28 Aug 2019 15:27)  CeleXA 40 mg oral tablet: 1 tab(s) orally once a day (28 Aug 2019 15:27)  levothyroxine 88 mcg (0.088 mg) oral tablet: 1 tab(s) orally once a day (28 Aug 2019 15:27)  losartan 25 mg oral tablet: 1 tab(s) orally once a day (28 Aug 2019 15:27)  metoprolol succinate 25 mg oral tablet, extended release: 1 tab(s) orally once a day (28 Aug 2019 15:27)  Pepcid 40 mg oral tablet: 1 tab(s) orally 2 times a day (28 Aug 2019 15:27)  simvastatin 40 mg oral tablet: 1 tab(s) orally once a day (at bedtime) (28 Aug 2019 15:27)        LABS:                        10.4   5.98  )-----------( 152      ( 31 Aug 2019 09:05 )             33.0     08-31    141  |  110<H>  |  19  ----------------------------<  97  4.3   |  25  |  0.72    Ca    7.5<L>      31 Aug 2019 09:05    TPro  5.6<L>  /  Alb  2.8<L>  /  TBili  0.4  /  DBili  x   /  AST  15  /  ALT  13  /  AlkPhos  61  08-31        CAPILLARY BLOOD GLUCOSE    MEDICATIONS  (STANDING):  citalopram 40 milliGRAM(s) Oral daily  enoxaparin Injectable 40 milliGRAM(s) SubCutaneous at bedtime  famotidine    Tablet 20 milliGRAM(s) Oral two times a day  lactobacillus acidophilus 1 Tablet(s) Oral daily  levoFLOXacin IVPB 500 milliGRAM(s) IV Intermittent every 24 hours  levothyroxine 88 MICROGram(s) Oral daily  simvastatin 40 milliGRAM(s) Oral at bedtime  sodium chloride 0.45% with potassium chloride 20 mEq/L 1000 milliLiter(s) (100 mL/Hr) IV Continuous <Continuous>  tamsulosin 0.4 milliGRAM(s) Oral at bedtime    MEDICATIONS  (PRN):  acetaminophen   Tablet .. 650 milliGRAM(s) Oral every 6 hours PRN Temp greater or equal to 38C (100.4F), Mild Pain (1 - 3)      REVIEW OF SYSTEMS:  CONSTITUTIONAL: No fever, weight loss, or fatigue  EYES: No eye pain, visual disturbances, or discharge  ENMT:  Complained of post nasal drip. No difficulty hearing, tinnitus, vertigo; No sinus or throat pain  NECK: No pain or stiffness  RESPIRATORY: No cough, wheezing, chills or hemoptysis; No shortness of breath  CARDIOVASCULAR: No chest pain, palpitations, dizziness, or leg swelling  GASTROINTESTINAL: No abdominal or epigastric pain. No nausea, vomiting, or hematemesis; No diarrhea or constipation. No melena or hematochezia.  GENITOURINARY: No dysuria, frequency, hematuria, or incontinence  NEUROLOGICAL: No headaches, memory loss, loss of strength, numbness, or tremors  SKIN: No itching, burning, rashes, or lesions   LYMPH NODES: No enlarged glands  ENDOCRINE: No heat or cold intolerance; No hair loss  MUSCULOSKELETAL: No joint pain or swelling; No muscle, back, or extremity pain  PSYCHIATRIC: No depression, anxiety, mood swings, or difficulty sleeping  HEME/LYMPH: No easy bruising, or bleeding gums  ALLERY AND IMMUNOLOGIC: No hives or eczema    RADIOLOGY & ADDITIONAL TESTS: no new imaging     Imaging Personally Reviewed:  [x ] YES  [ ] NO    Consultant(s) Notes Reviewed:  [x ] YES  [ ] NO      Culture - Blood (collected 29 Aug 2019 21:11)  Source: .Blood Blood-Peripheral  Preliminary Report (30 Aug 2019 22:01):    No growth to date.    Culture - Blood (collected 29 Aug 2019 21:06)  Source: .Blood Blood-Peripheral  Preliminary Report (30 Aug 2019 22:01):    No growth to date.    Culture - Urine (collected 28 Aug 2019 11:38)  Source: .Urine Clean Catch (Midstream)  Final Report (29 Aug 2019 17:06):    >=3 organisms. Probable collection contamination.      PHYSICAL EXAM:  GENERAL: NAD, well-groomed, well-developed  HEAD:  Atraumatic, Normocephalic  EYES: EOMI, PERRLA, conjunctiva and sclera clear  ENMT: No tonsillar erythema, exudates, or enlargement; Moist mucous membranes, Good dentition, No lesions  NECK: Supple, No JVD, Normal thyroid  NERVOUS SYSTEM:  Alert & Oriented X3, Good concentration  CHEST/LUNG: Clear to percussion bilaterally; No rales, rhonchi, wheezing, or rubs  HEART: Regular rate and rhythm; No murmurs, rubs, or gallops  ABDOMEN: Soft, Nontender, Nondistended; Bowel sounds present  BACK: no cva tenderness   EXTREMITIES:  2+ Peripheral Pulses, No clubbing, cyanosis, or edema  LYMPH: No lymphadenopathy noted  SKIN: skin appears dry. There is a small abrasion located on her left medial aspect of her foot covered with dressign.     Care Discussed with Consultants/Other Providers [ ] YES  [ ] NO Patient is a 79y old  Female who presents with a chief complaint of ureteral stones (31 Aug 2019 08:40)      INTERVAL HPI/OVERNIGHT EVENTS: No events overnight. She was seen and examined lying in bed comfortably. She stated she has a chronic post nasal drip. She denied chest pain, sob, abdominal pain, dysuria.     T(C): 36.7 (08-31-19 @ 05:24), Max: 37.5 (08-30-19 @ 20:08)  HR: 81 (08-31-19 @ 05:24) (72 - 88)  BP: 135/76 (08-31-19 @ 05:24) (130/65 - 174/86)  RR: 18 (08-31-19 @ 05:24) (12 - 18)  SpO2: 95% (08-31-19 @ 05:24) (94% - 99%)    I&O's Summary    30 Aug 2019 07:01  -  31 Aug 2019 07:00  --------------------------------------------------------  IN: 1700 mL / OUT: 602 mL / NET: 1098 mL    31 Aug 2019 07:01  -  31 Aug 2019 09:45  --------------------------------------------------------  IN: 200 mL / OUT: 0 mL / NET: 200 mL        PAST MEDICAL & SURGICAL HISTORY:  Spinal stenosis  Mitral regurgitation  Aortic stenosis  Emphysema  Duodenal ulcer: at age 24  Rotator cuff tear: Right  Diverticulosis of colon  Coronary atherosclerosis of native coronary artery  Carpal tunnel syndrome  Dyslipidemia  Obstructive sleep apnea  Osteoporosis  Hypertension  Hernia, hiatal  Former cigarette smoker  Depression  Asthma with COPD with exacerbation  Acid reflux  Graves disease  H/O dilation and curettage  S/P ureteral stent placement: 2015, Dr. Escobar  H/O mitral valve repair: 2014  H/O aortic valve repair: 2014  History of coronary angiogram: 1/31/12  h/o  endometrial ablation: 1998  H/O cone biopsy of cervix: 1974  History of tonsillectomy: childhood  After cataract, bilateral: 2010    Home Medications:  buPROPion 75 mg oral tablet: 2 tabs in AM, 1 tab at night (28 Aug 2019 15:27)  CeleXA 40 mg oral tablet: 1 tab(s) orally once a day (28 Aug 2019 15:27)  levothyroxine 88 mcg (0.088 mg) oral tablet: 1 tab(s) orally once a day (28 Aug 2019 15:27)  losartan 25 mg oral tablet: 1 tab(s) orally once a day (28 Aug 2019 15:27)  metoprolol succinate 25 mg oral tablet, extended release: 1 tab(s) orally once a day (28 Aug 2019 15:27)  Pepcid 40 mg oral tablet: 1 tab(s) orally 2 times a day (28 Aug 2019 15:27)  simvastatin 40 mg oral tablet: 1 tab(s) orally once a day (at bedtime) (28 Aug 2019 15:27)        LABS:                        10.4   5.98  )-----------( 152      ( 31 Aug 2019 09:05 )             33.0     08-31    141  |  110<H>  |  19  ----------------------------<  97  4.3   |  25  |  0.72    Ca    7.5<L>      31 Aug 2019 09:05    TPro  5.6<L>  /  Alb  2.8<L>  /  TBili  0.4  /  DBili  x   /  AST  15  /  ALT  13  /  AlkPhos  61  08-31        CAPILLARY BLOOD GLUCOSE    MEDICATIONS  (STANDING):  citalopram 40 milliGRAM(s) Oral daily  enoxaparin Injectable 40 milliGRAM(s) SubCutaneous at bedtime  famotidine    Tablet 20 milliGRAM(s) Oral two times a day  lactobacillus acidophilus 1 Tablet(s) Oral daily  levoFLOXacin IVPB 500 milliGRAM(s) IV Intermittent every 24 hours  levothyroxine 88 MICROGram(s) Oral daily  simvastatin 40 milliGRAM(s) Oral at bedtime  sodium chloride 0.45% with potassium chloride 20 mEq/L 1000 milliLiter(s) (100 mL/Hr) IV Continuous <Continuous>  tamsulosin 0.4 milliGRAM(s) Oral at bedtime    MEDICATIONS  (PRN):  acetaminophen   Tablet .. 650 milliGRAM(s) Oral every 6 hours PRN Temp greater or equal to 38C (100.4F), Mild Pain (1 - 3)      REVIEW OF SYSTEMS:  CONSTITUTIONAL: No fever, weight loss, or fatigue  EYES: No eye pain, visual disturbances, or discharge  ENMT:  Complained of post nasal drip. No difficulty hearing, tinnitus, vertigo; No sinus or throat pain  NECK: No pain or stiffness  RESPIRATORY: No cough, wheezing, chills or hemoptysis; No shortness of breath  CARDIOVASCULAR: No chest pain, palpitations, dizziness, or leg swelling  GASTROINTESTINAL: No abdominal or epigastric pain. No nausea, vomiting, or hematemesis; No diarrhea or constipation. No melena or hematochezia.  GENITOURINARY: No dysuria, frequency, hematuria, or incontinence  NEUROLOGICAL: No headaches, memory loss, loss of strength, numbness, or tremors  SKIN: No itching, burning, rashes, or lesions   LYMPH NODES: No enlarged glands  ENDOCRINE: No heat or cold intolerance; No hair loss  MUSCULOSKELETAL: No joint pain or swelling; No muscle, back, or extremity pain  PSYCHIATRIC: No depression, anxiety, mood swings, or difficulty sleeping  HEME/LYMPH: No easy bruising, or bleeding gums  ALLERY AND IMMUNOLOGIC: No hives or eczema    RADIOLOGY & ADDITIONAL TESTS: no new imaging     Imaging Personally Reviewed:  [x ] YES  [ ] NO    Consultant(s) Notes Reviewed:  [x ] YES  [ ] NO      Culture - Blood (collected 29 Aug 2019 21:11)  Source: .Blood Blood-Peripheral  Preliminary Report (30 Aug 2019 22:01):    No growth to date.    Culture - Blood (collected 29 Aug 2019 21:06)  Source: .Blood Blood-Peripheral  Preliminary Report (30 Aug 2019 22:01):    No growth to date.    Culture - Urine (collected 28 Aug 2019 11:38)  Source: .Urine Clean Catch (Midstream)  Final Report (29 Aug 2019 17:06):    >=3 organisms. Probable collection contamination.      PHYSICAL EXAM:  GENERAL: NAD, well-groomed, well-developed  HEAD:  Atraumatic, Normocephalic  EYES: EOMI, PERRLA, conjunctiva and sclera clear  ENMT: No tonsillar erythema, exudates, or enlargement; Moist mucous membranes, Good dentition, No lesions  NECK: Supple, No JVD, Normal thyroid  NERVOUS SYSTEM:  Alert & Oriented, answering questions appropriately, Good concentration  CHEST/LUNG: Clear to percussion bilaterally; No rales, rhonchi, wheezing, or rubs  HEART: Regular rate and rhythm; No murmurs, rubs, or gallops  ABDOMEN: Soft, Nontender, Nondistended; Bowel sounds present  BACK: no cva tenderness   EXTREMITIES:  2+ Peripheral Pulses, No clubbing, cyanosis, or edema  LYMPH: No lymphadenopathy noted  SKIN: skin appears dry. There is a small abrasion located on her left medial aspect of her foot covered with dressing.     Care Discussed with Consultants/Other Providers [x ] YES  [ ] NO

## 2019-08-31 NOTE — PROGRESS NOTE ADULT - SUBJECTIVE AND OBJECTIVE BOX
The patient was interviewed and evaluated  79y Female    Vital Signs Last 24 Hrs  T(C): 36.7 (31 Aug 2019 05:24), Max: 37.5 (30 Aug 2019 20:08)  T(F): 98.1 (31 Aug 2019 05:24), Max: 99.5 (30 Aug 2019 20:08)  HR: 81 (31 Aug 2019 05:24) (72 - 81)  BP: 135/76 (31 Aug 2019 05:24) (130/65 - 143/82)  BP(mean): --  RR: 18 (31 Aug 2019 05:24) (12 - 18)  SpO2: 95% (31 Aug 2019 05:24) (94% - 98%)    Pt seen, doing well, no anesthesia complications or complaints noted or reported.   No Nausea    No additional recommendations.     Pain well controlled

## 2019-09-01 ENCOUNTER — TRANSCRIPTION ENCOUNTER (OUTPATIENT)
Age: 79
End: 2019-09-01

## 2019-09-01 VITALS
SYSTOLIC BLOOD PRESSURE: 124 MMHG | HEART RATE: 93 BPM | TEMPERATURE: 98 F | DIASTOLIC BLOOD PRESSURE: 85 MMHG | RESPIRATION RATE: 18 BRPM | OXYGEN SATURATION: 94 %

## 2019-09-01 LAB
ANION GAP SERPL CALC-SCNC: 8 MMOL/L — SIGNIFICANT CHANGE UP (ref 5–17)
BUN SERPL-MCNC: 14 MG/DL — SIGNIFICANT CHANGE UP (ref 7–23)
CALCIUM SERPL-MCNC: 7.6 MG/DL — LOW (ref 8.5–10.1)
CHLORIDE SERPL-SCNC: 111 MMOL/L — HIGH (ref 96–108)
CO2 SERPL-SCNC: 24 MMOL/L — SIGNIFICANT CHANGE UP (ref 22–31)
CREAT SERPL-MCNC: 0.64 MG/DL — SIGNIFICANT CHANGE UP (ref 0.5–1.3)
CULTURE RESULTS: SIGNIFICANT CHANGE UP
CULTURE RESULTS: SIGNIFICANT CHANGE UP
GLUCOSE SERPL-MCNC: 92 MG/DL — SIGNIFICANT CHANGE UP (ref 70–99)
HCT VFR BLD CALC: 30.6 % — LOW (ref 34.5–45)
HGB BLD-MCNC: 9.5 G/DL — LOW (ref 11.5–15.5)
MCHC RBC-ENTMCNC: 29.5 PG — SIGNIFICANT CHANGE UP (ref 27–34)
MCHC RBC-ENTMCNC: 31 GM/DL — LOW (ref 32–36)
MCV RBC AUTO: 95 FL — SIGNIFICANT CHANGE UP (ref 80–100)
NRBC # BLD: 0 /100 WBCS — SIGNIFICANT CHANGE UP (ref 0–0)
PLATELET # BLD AUTO: 152 K/UL — SIGNIFICANT CHANGE UP (ref 150–400)
POTASSIUM SERPL-MCNC: 4.3 MMOL/L — SIGNIFICANT CHANGE UP (ref 3.5–5.3)
POTASSIUM SERPL-SCNC: 4.3 MMOL/L — SIGNIFICANT CHANGE UP (ref 3.5–5.3)
RBC # BLD: 3.22 M/UL — LOW (ref 3.8–5.2)
RBC # FLD: 17.9 % — HIGH (ref 10.3–14.5)
SODIUM SERPL-SCNC: 143 MMOL/L — SIGNIFICANT CHANGE UP (ref 135–145)
SPECIMEN SOURCE: SIGNIFICANT CHANGE UP
SPECIMEN SOURCE: SIGNIFICANT CHANGE UP
WBC # BLD: 5.13 K/UL — SIGNIFICANT CHANGE UP (ref 3.8–10.5)
WBC # FLD AUTO: 5.13 K/UL — SIGNIFICANT CHANGE UP (ref 3.8–10.5)

## 2019-09-01 PROCEDURE — 80048 BASIC METABOLIC PNL TOTAL CA: CPT

## 2019-09-01 PROCEDURE — 85730 THROMBOPLASTIN TIME PARTIAL: CPT

## 2019-09-01 PROCEDURE — 84443 ASSAY THYROID STIM HORMONE: CPT

## 2019-09-01 PROCEDURE — 36415 COLL VENOUS BLD VENIPUNCTURE: CPT

## 2019-09-01 PROCEDURE — 81001 URINALYSIS AUTO W/SCOPE: CPT

## 2019-09-01 PROCEDURE — 96375 TX/PRO/DX INJ NEW DRUG ADDON: CPT

## 2019-09-01 PROCEDURE — 87040 BLOOD CULTURE FOR BACTERIA: CPT

## 2019-09-01 PROCEDURE — 83605 ASSAY OF LACTIC ACID: CPT

## 2019-09-01 PROCEDURE — 94640 AIRWAY INHALATION TREATMENT: CPT

## 2019-09-01 PROCEDURE — 80053 COMPREHEN METABOLIC PANEL: CPT

## 2019-09-01 PROCEDURE — 93005 ELECTROCARDIOGRAM TRACING: CPT

## 2019-09-01 PROCEDURE — 84436 ASSAY OF TOTAL THYROXINE: CPT

## 2019-09-01 PROCEDURE — 87086 URINE CULTURE/COLONY COUNT: CPT

## 2019-09-01 PROCEDURE — 71045 X-RAY EXAM CHEST 1 VIEW: CPT

## 2019-09-01 PROCEDURE — C1769: CPT

## 2019-09-01 PROCEDURE — 99221 1ST HOSP IP/OBS SF/LOW 40: CPT

## 2019-09-01 PROCEDURE — 85027 COMPLETE CBC AUTOMATED: CPT

## 2019-09-01 PROCEDURE — 96365 THER/PROPH/DIAG IV INF INIT: CPT

## 2019-09-01 PROCEDURE — 74177 CT ABD & PELVIS W/CONTRAST: CPT

## 2019-09-01 PROCEDURE — 85610 PROTHROMBIN TIME: CPT

## 2019-09-01 PROCEDURE — 97161 PT EVAL LOW COMPLEX 20 MIN: CPT

## 2019-09-01 PROCEDURE — 99285 EMERGENCY DEPT VISIT HI MDM: CPT | Mod: 25

## 2019-09-01 PROCEDURE — C1758: CPT

## 2019-09-01 PROCEDURE — 94760 N-INVAS EAR/PLS OXIMETRY 1: CPT

## 2019-09-01 PROCEDURE — 76000 FLUOROSCOPY <1 HR PHYS/QHP: CPT

## 2019-09-01 PROCEDURE — C2617: CPT

## 2019-09-01 PROCEDURE — 83690 ASSAY OF LIPASE: CPT

## 2019-09-01 PROCEDURE — 94664 DEMO&/EVAL PT USE INHALER: CPT

## 2019-09-01 PROCEDURE — 74018 RADEX ABDOMEN 1 VIEW: CPT

## 2019-09-01 RX ORDER — TAMSULOSIN HYDROCHLORIDE 0.4 MG/1
1 CAPSULE ORAL
Qty: 7 | Refills: 0
Start: 2019-09-01 | End: 2019-09-07

## 2019-09-01 RX ORDER — TAMSULOSIN HYDROCHLORIDE 0.4 MG/1
1 CAPSULE ORAL
Qty: 7 | Refills: 0
Start: 2019-09-01

## 2019-09-01 RX ORDER — CIPROFLOXACIN LACTATE 400MG/40ML
1 VIAL (ML) INTRAVENOUS
Qty: 2 | Refills: 0
Start: 2019-09-01 | End: 2019-09-02

## 2019-09-01 RX ADMIN — FAMOTIDINE 20 MILLIGRAM(S): 10 INJECTION INTRAVENOUS at 05:39

## 2019-09-01 RX ADMIN — NYSTATIN CREAM 1 APPLICATION(S): 100000 CREAM TOPICAL at 05:39

## 2019-09-01 RX ADMIN — DEXTROSE MONOHYDRATE, SODIUM CHLORIDE, AND POTASSIUM CHLORIDE 100 MILLILITER(S): 50; .745; 4.5 INJECTION, SOLUTION INTRAVENOUS at 05:39

## 2019-09-01 RX ADMIN — Medication 1 TABLET(S): at 11:31

## 2019-09-01 RX ADMIN — CITALOPRAM 40 MILLIGRAM(S): 10 TABLET, FILM COATED ORAL at 11:31

## 2019-09-01 RX ADMIN — Medication 88 MICROGRAM(S): at 05:39

## 2019-09-01 NOTE — DISCHARGE NOTE NURSING/CASE MANAGEMENT/SOCIAL WORK - PATIENT PORTAL LINK FT
You can access the FollowMyHealth Patient Portal offered by [facility] by registering at the following website: [email or web address]. By joining [facility]’s FollowMyHealth portal, you will also be able to view your health information using other applications (apps) compatible with our system.

## 2019-09-01 NOTE — PROGRESS NOTE ADULT - ASSESSMENT
80yo F, w/ PMH/o leukemia, COPD, Grave's disease, HLD, previous aortic stenosis and mitral regurgitation (s/p bovine/porcine valve replacement 5 yrs ago), medullary kidney disease, hiatal hernia, depression, R rotator cuff tear, presenting with RLQ abdominal pain radiating into her R groin without hematuria or other associated urinary symptoms, admitted for R hydroureteronephrosis 2/2 distal ureteral and UVJ caclucli.
Clinically improved. Cx's neg. Afeb.      OK for  discharge on po antibx  Outpt  f/u for definitive stone tx
Improved post stent.      Antibx per cx results.   Probable discharge 9/1  Cecily need outpt tx of ureteral calculus
78yo F, w/ PMH/o leukemia, COPD, Grave's disease, HLD, previous aortic stenosis and mitral regurgitation (s/p bovine/porcine valve replacement 5 yrs ago), medullary kidney disease, hiatal hernia, depression, R rotator cuff tear, presenting with RLQ abdominal pain radiating into her R groin without hematuria or other associated urinary symptoms, admitted for R hydroureteronephrosis 2/2 distal ureteral and UVJ caclucli.
80yo F, w/ PMH/o leukemia, COPD, Grave's disease, HLD, previous aortic stenosis and mitral regurgitation (s/p bovine/porcine valve replacement 5 yrs ago), medullary kidney disease, hiatal hernia, depression, R rotator cuff tear, presenting with RLQ abdominal pain radiating into her R groin without hematuria or other associated urinary symptoms, admitted for R hydroureteronephrosis 2/2 distal ureteral and UVJ caclucli.

## 2019-09-01 NOTE — PROGRESS NOTE ADULT - REASON FOR ADMISSION
ureteral stones

## 2019-09-03 LAB
CULTURE RESULTS: SIGNIFICANT CHANGE UP
CULTURE RESULTS: SIGNIFICANT CHANGE UP
SPECIMEN SOURCE: SIGNIFICANT CHANGE UP
SPECIMEN SOURCE: SIGNIFICANT CHANGE UP

## 2019-10-01 ENCOUNTER — APPOINTMENT (OUTPATIENT)
Dept: RADIOLOGY | Facility: CLINIC | Age: 79
End: 2019-10-01
Payer: MEDICARE

## 2019-10-01 ENCOUNTER — OUTPATIENT (OUTPATIENT)
Dept: OUTPATIENT SERVICES | Facility: HOSPITAL | Age: 79
LOS: 1 days | End: 2019-10-01
Payer: MEDICARE

## 2019-10-01 DIAGNOSIS — Z96.0 PRESENCE OF UROGENITAL IMPLANTS: Chronic | ICD-10-CM

## 2019-10-01 DIAGNOSIS — Z98.890 OTHER SPECIFIED POSTPROCEDURAL STATES: Chronic | ICD-10-CM

## 2019-10-01 DIAGNOSIS — Z00.8 ENCOUNTER FOR OTHER GENERAL EXAMINATION: ICD-10-CM

## 2019-10-01 PROCEDURE — 74018 RADEX ABDOMEN 1 VIEW: CPT | Mod: 26

## 2019-10-01 PROCEDURE — 74018 RADEX ABDOMEN 1 VIEW: CPT

## 2019-11-02 ENCOUNTER — APPOINTMENT (OUTPATIENT)
Dept: RADIOLOGY | Facility: CLINIC | Age: 79
End: 2019-11-02

## 2019-11-15 ENCOUNTER — OUTPATIENT (OUTPATIENT)
Dept: OUTPATIENT SERVICES | Facility: HOSPITAL | Age: 79
LOS: 1 days | End: 2019-11-15
Payer: MEDICARE

## 2019-11-15 VITALS
HEART RATE: 77 BPM | HEIGHT: 62 IN | RESPIRATION RATE: 16 BRPM | DIASTOLIC BLOOD PRESSURE: 55 MMHG | OXYGEN SATURATION: 97 % | TEMPERATURE: 98 F | WEIGHT: 160.94 LBS | SYSTOLIC BLOOD PRESSURE: 152 MMHG

## 2019-11-15 DIAGNOSIS — Z98.890 OTHER SPECIFIED POSTPROCEDURAL STATES: Chronic | ICD-10-CM

## 2019-11-15 DIAGNOSIS — Z96.0 PRESENCE OF UROGENITAL IMPLANTS: Chronic | ICD-10-CM

## 2019-11-15 DIAGNOSIS — Z01.818 ENCOUNTER FOR OTHER PREPROCEDURAL EXAMINATION: ICD-10-CM

## 2019-11-15 DIAGNOSIS — N20.1 CALCULUS OF URETER: ICD-10-CM

## 2019-11-15 LAB
ALBUMIN SERPL ELPH-MCNC: 3.4 G/DL — SIGNIFICANT CHANGE UP (ref 3.3–5)
ALP SERPL-CCNC: 94 U/L — SIGNIFICANT CHANGE UP (ref 40–120)
ALT FLD-CCNC: 16 U/L — SIGNIFICANT CHANGE UP (ref 12–78)
ANION GAP SERPL CALC-SCNC: 5 MMOL/L — SIGNIFICANT CHANGE UP (ref 5–17)
APPEARANCE UR: ABNORMAL
AST SERPL-CCNC: 20 U/L — SIGNIFICANT CHANGE UP (ref 15–37)
BILIRUB SERPL-MCNC: 0.3 MG/DL — SIGNIFICANT CHANGE UP (ref 0.2–1.2)
BILIRUB UR-MCNC: ABNORMAL
BUN SERPL-MCNC: 21 MG/DL — SIGNIFICANT CHANGE UP (ref 7–23)
CALCIUM SERPL-MCNC: 7.9 MG/DL — LOW (ref 8.5–10.1)
CHLORIDE SERPL-SCNC: 113 MMOL/L — HIGH (ref 96–108)
CO2 SERPL-SCNC: 23 MMOL/L — SIGNIFICANT CHANGE UP (ref 22–31)
COLOR SPEC: ABNORMAL
CREAT SERPL-MCNC: 1 MG/DL — SIGNIFICANT CHANGE UP (ref 0.5–1.3)
DIFF PNL FLD: ABNORMAL
GLUCOSE SERPL-MCNC: 96 MG/DL — SIGNIFICANT CHANGE UP (ref 70–99)
GLUCOSE UR QL: NEGATIVE — SIGNIFICANT CHANGE UP
HCT VFR BLD CALC: 34.4 % — LOW (ref 34.5–45)
HGB BLD-MCNC: 10.6 G/DL — LOW (ref 11.5–15.5)
KETONES UR-MCNC: ABNORMAL
LEUKOCYTE ESTERASE UR-ACNC: ABNORMAL
MCHC RBC-ENTMCNC: 28.1 PG — SIGNIFICANT CHANGE UP (ref 27–34)
MCHC RBC-ENTMCNC: 30.8 GM/DL — LOW (ref 32–36)
MCV RBC AUTO: 91.2 FL — SIGNIFICANT CHANGE UP (ref 80–100)
NITRITE UR-MCNC: POSITIVE
NRBC # BLD: 0 /100 WBCS — SIGNIFICANT CHANGE UP (ref 0–0)
PH UR: 5 — SIGNIFICANT CHANGE UP (ref 5–8)
PLATELET # BLD AUTO: 214 K/UL — SIGNIFICANT CHANGE UP (ref 150–400)
POTASSIUM SERPL-MCNC: 4.5 MMOL/L — SIGNIFICANT CHANGE UP (ref 3.5–5.3)
POTASSIUM SERPL-SCNC: 4.5 MMOL/L — SIGNIFICANT CHANGE UP (ref 3.5–5.3)
PROT SERPL-MCNC: 6.7 G/DL — SIGNIFICANT CHANGE UP (ref 6–8.3)
PROT UR-MCNC: 500 MG/DL
RBC # BLD: 3.77 M/UL — LOW (ref 3.8–5.2)
RBC # FLD: 22.9 % — HIGH (ref 10.3–14.5)
SODIUM SERPL-SCNC: 141 MMOL/L — SIGNIFICANT CHANGE UP (ref 135–145)
SP GR SPEC: 1.02 — SIGNIFICANT CHANGE UP (ref 1.01–1.02)
UROBILINOGEN FLD QL: NEGATIVE — SIGNIFICANT CHANGE UP
WBC # BLD: 6.58 K/UL — SIGNIFICANT CHANGE UP (ref 3.8–10.5)
WBC # FLD AUTO: 6.58 K/UL — SIGNIFICANT CHANGE UP (ref 3.8–10.5)

## 2019-11-15 PROCEDURE — 81001 URINALYSIS AUTO W/SCOPE: CPT

## 2019-11-15 PROCEDURE — 36415 COLL VENOUS BLD VENIPUNCTURE: CPT

## 2019-11-15 PROCEDURE — 86900 BLOOD TYPING SEROLOGIC ABO: CPT

## 2019-11-15 PROCEDURE — 93010 ELECTROCARDIOGRAM REPORT: CPT

## 2019-11-15 PROCEDURE — 86901 BLOOD TYPING SEROLOGIC RH(D): CPT

## 2019-11-15 PROCEDURE — 87086 URINE CULTURE/COLONY COUNT: CPT

## 2019-11-15 PROCEDURE — 80053 COMPREHEN METABOLIC PANEL: CPT

## 2019-11-15 PROCEDURE — 93005 ELECTROCARDIOGRAM TRACING: CPT

## 2019-11-15 PROCEDURE — 85027 COMPLETE CBC AUTOMATED: CPT

## 2019-11-15 PROCEDURE — 86850 RBC ANTIBODY SCREEN: CPT

## 2019-11-15 PROCEDURE — G0463: CPT

## 2019-11-15 NOTE — H&P PST ADULT - ASSESSMENT
80 yo F for cystoscopy   right ureteroscopy   stone extraction   insertion right stent      Med cl pending

## 2019-11-15 NOTE — H&P PST ADULT - NSALCOHOLAMT_GEN_A_CORE_SD
[address] [facility] [address] [facility]  [phone]  Veronia Florence, MD Coy Lundborg, MD    Patient ID:  Edwina Red  [id number]  1938/79 y.o. Visit date: 2/20/2018      Diagnosis:       ICD-10-CM ICD-9-CM    1. 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 Recent office visit for vulvitis. Rx Clobetasol qhs. Pathology:   FINAL PATHOLOGIC DIAGNOSIS  1. Vulva, right 11 o'clock, biopsy:  Subacute spongiotic vulvitis  2. Vulva, left 1 o'clock, biopsy:  Subacute spongiotic vulvitis  See comment  Comment  The findings could be seen in a variety of forms of spongiotic (eczematous) vulvitis. Leading considerations would  include contact vulvitis and a drug reaction. A review of medications especially topical agents may be beneficial    Imaging history: Mammogram current  Chemotherapy history: See record    2/20/2018: The patient is seen acutely today with a concern for progressive labial agglutination. Ongoing Urology workup  Continues to void without pain. OB/GYN ROS: Denies, dysuria, hematuria, vaginal discharge, abnormal vaginal bleeding, pelvic pain    Past Medical History:   Diagnosis Date    History of chemotherapy 2002    Taxol/carboplatin x 6    Ovarian cancer ([address] 2002       Past Surgical History:   Procedure Laterality Date    BREAST SURGERY PROCEDURE UNLISTED      breast biopsy 1983    HX GYN  2002    MARGARETTE/BSO, dilation and curratagex2    HX TONSILLECTOMY      HX VASCULAR ACCESS  2002    INSERTION / REMOVAL       Social History     Social History    Marital status:      Spouse name: N/A    Number of children: N/A    Years of education: N/A     Occupational History    Not on file.      Social History Main Topics    Smoking status: Former Smoker    Smokeless tobacco: Never Used    Alcohol use Yes      Comment: RARE    Drug use: No    Sexual activity: Not on file     Other Topics Concern    Not on file     Social History Narrative       Family History   Problem Relation Age of Onset    Heart Disease Mother     Heart Disease Father     Cancer Maternal Aunt      LUNG    Cancer Maternal Aunt      BREAST       Current Outpatient Prescriptions on File Prior to Visit   Medication Sig Dispense Refill    ESTRACE 0.01 % (0.1 mg/gram) vaginal cream       CALCIUM CARBONATE/VITAMIN D3 (CALCIUM + D PO) Take  by mouth daily.  CARBOXYMETHYLCELLULOSE SODIUM (THERA TEARS OP) Apply  to eye four (4) times daily.  BIOTIN PO Take  by mouth daily.  cholecalciferol (VITAMIN D3) 1,000 unit tablet Take  by mouth daily.  clobetasol (TEMOVATE) 0.05 % ointment Apply  to affected area nightly. 15 g 3     No current facility-administered medications on file prior to visit. No Known Allergies    ROS:  Review of Systems - General ROS: negative for - chills, fatigue, hot flashes or weight loss  Hematological and Lymphatic ROS: negative for - bruising or weight loss  Endocrine ROS: Negative  Respiratory ROS: no cough, shortness of breath, or wheezing  Cardiovascular ROS: no chest pain or dyspnea on exertion  Gastrointestinal ROS: no abdominal pain, change in bowel habits, or black or bloody stools  Genito-Urinary ROS: negative for - genital discharge, hematuria       OBJECTIVE:  PHYSICAL EXAM  VITAL SIGNS: Visit Vitals    BP (!) 180/93 (BP 1 Location: Left arm, BP Patient Position: Sitting)    Pulse 64    Ht 5' 6\" (1.676 m)    Wt 174 lb 9.6 oz (79.2 kg)    BMI 28.18 kg/m2      GENERAL ERIN: in no apparent distress, well developed and well nourished, in no respiratory distress and acyanotic, alert and afebrile  Nodes negative  Lungs: Clear to auscultation and percussion  Cardiac: RRR  Abdomen: Soft, nontender. No masses, organomegaly or fluid wave.         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 No raised or friable lesions. Introitus not accessible. Neuro: Grossly intact. DATE REVIEW as available:  Lab Results   Component Value Date/Time    WBC 6.2 02/26/2013 01:50 PM     Lab Results   Component Value Date/Time    Sodium 139 02/26/2013 01:50 PM       IMPRESSION AND PLAN:    Pooja Funez has a working diagnosis of a normal annual examination. History of ovarian cancer. ZACK   Labial agglutination--inflamatory    Office EUA, release/vulvoplasty of the Labia  I have expressed for concern for complete obliteration of introital outlet. The patient desires to consider my surgical recommendations and will get back with her decision   The patient is encouraged to continue her Gyn-Urology consultation.         Jackie Leigh MD  2/20/2018/7:41 AM 1-2 drinks

## 2019-11-15 NOTE — H&P PST ADULT - ATTENDING COMMENTS
Pt was noted to have low Phosphorus by oncology after Fe transfusion, she is now on supplementation and hematology this should not interfere with surgery/anesthesia.

## 2019-11-15 NOTE — H&P PST ADULT - HISTORY OF PRESENT ILLNESS
78 yo F  for cystoscopy   right ureteroscopy  stone extraction   insertion right stent   Pt poor historian and unsure if she has a ureteral stent presently 78 yo F  for cystoscopy   right ureteroscopy  stone extraction   insertion right stent   Pt poor historian and unsure if she has a ureteral stent presently   Pt has an indwelling stent placed when she had fever and obstructing stone 8/19

## 2019-11-15 NOTE — H&P PST ADULT - NSICDXPASTMEDICALHX_GEN_ALL_CORE_FT
PAST MEDICAL HISTORY:  Acid reflux     Anemia     Aortic stenosis     Asthma with COPD with exacerbation     Carpal tunnel syndrome     Coronary atherosclerosis of native coronary artery     Depression     Diverticulosis of colon     Duodenal ulcer at age 24    Dyslipidemia     Emphysema     Former cigarette smoker     Graves disease     Hernia, hiatal     Hypertension     Leukemia     Mitral regurgitation     Obstructive sleep apnea     Osteoporosis     Rotator cuff tear Right    Spinal stenosis PAST MEDICAL HISTORY:  Acid reflux     Anemia     Aortic stenosis     Asthma with COPD with exacerbation     Carpal tunnel syndrome     Coronary atherosclerosis of native coronary artery     Depression     Diverticulosis of colon     Duodenal ulcer at age 24    Dyslipidemia     Emphysema     Former cigarette smoker     Graves disease     Hernia, hiatal     Hypertension     Leukemia     Mitral regurgitation     Obstructive sleep apnea cpap    Osteoporosis     Rotator cuff tear Right    Spinal stenosis

## 2019-11-16 LAB
CULTURE RESULTS: NO GROWTH — SIGNIFICANT CHANGE UP
SPECIMEN SOURCE: SIGNIFICANT CHANGE UP

## 2019-11-25 ENCOUNTER — APPOINTMENT (OUTPATIENT)
Dept: CARDIOLOGY | Facility: CLINIC | Age: 79
End: 2019-11-25
Payer: MEDICARE

## 2019-11-25 ENCOUNTER — NON-APPOINTMENT (OUTPATIENT)
Age: 79
End: 2019-11-25

## 2019-11-25 VITALS
SYSTOLIC BLOOD PRESSURE: 120 MMHG | HEART RATE: 81 BPM | WEIGHT: 161 LBS | DIASTOLIC BLOOD PRESSURE: 73 MMHG | HEIGHT: 60 IN | OXYGEN SATURATION: 93 % | BODY MASS INDEX: 31.61 KG/M2

## 2019-11-25 PROCEDURE — 93000 ELECTROCARDIOGRAM COMPLETE: CPT

## 2019-11-25 PROCEDURE — 99214 OFFICE O/P EST MOD 30 MIN: CPT

## 2019-11-25 NOTE — HISTORY OF PRESENT ILLNESS
[FreeTextEntry1] : I saw Keisha Marcum in the office today for a followup visit, in anticipation of cystoscopy. She is status post aortic and mitral valve replacement for aortic stenosis and mitral insufficiency respectively on March 2012 at [facility]. It was performed by Dr. Wood. Most recent echo 12/16 shows normal ejection fraction left well-seated mitral and aortic valve prosthesis with mild AI.\par \par She is being treated for hypertension, and hyperlipidemia. Fortunately she had no coronary disease. She also has a history of depression, anxiety, and some degree of chronic obstructive pulmonary disease with MARTINA. She is using her mask.. She also is hypothyroid.\par \par She has been diagnosed with promyelocytic leukemia and is undergoing chemotherapy at [facility]. The first treatment 5/13 was complicated by an episode of torsades with cardiac arrest, in the setting of MSSA bacteremia. QT prolongation by Arsenic. Echo showed normal LV systolic function. She subsequently underwent a second and third treatment treatment 10/13 without any trouble. Finished chemotherapy 11/13.  She's been in remission for approximately one year..\par \par The patient is physically limited by shortness of breath. Everything she does is a great effort. She has not seen a pulmonologist for some time.  She is receiving iron infusions for anemia. She is no longer taking the Lasix. Her leg edema has improved and she has no volume overload.She is wearing support stockings.\par \par The patient has been anemic and has some blood in her stool and had an endoscopy and colonoscopy at [facility] on 1/5/17. This showed acid reflux. Cauterized bleeding.With the iron infusions her hemoglobin has been above 9 and she is feeling much better. She is able to walk and hopefully now and start losing weight.She has been off of low-dose aspirin for several years.\par \par She has recurrent kidney stones and had a stent placed in September. She is now scheduled for cystoscopy with laser therapy to treat her stones. She'll then have a new stent placed in about 2 weeks.\par \par ECG shows sinus rhythm with poor R. progression anteroseptal leads. This represents no change.\par

## 2019-11-25 NOTE — REASON FOR VISIT
[Hyperlipidemia] : hyperlipidemia [Follow-Up - Clinic] : a clinic follow-up of [Hypertension] : hypertension [Prosthetic Valve] : a prosthetic valve [FreeTextEntry1] : I. prostatic aortic and mitral replacement

## 2019-11-25 NOTE — DISCUSSION/SUMMARY
[FreeTextEntry1] : The patient has no signs or symptoms of active heart disease. On her medications her blood pressure heart rate well controlled. She has chronic shortness of breath which has been stable over the past several years.\par \par Review of presurgical testing demonstrates an ECG without change. Hemoglobin is 10.6 with normal chemistries.\par \par The patient's cardiac status is stable and she represents a satisfactory candidate for the planned cystoscopy. No special precautions other than routine hemodynamic monitoring and antibiotic prophylaxis should be required.

## 2019-11-25 NOTE — PHYSICAL EXAM
[General Appearance - Well Developed] : well developed [Normal Appearance] : normal appearance [General Appearance - Well Nourished] : well nourished [Well Groomed] : well groomed [Normal Conjunctiva] : the conjunctiva exhibited no abnormalities [General Appearance - In No Acute Distress] : no acute distress [No Deformities] : no deformities [Eyelids - No Xanthelasma] : the eyelids demonstrated no xanthelasmas [Normal Jugular Venous V Waves Present] : normal jugular venous V waves present [Normal Oral Mucosa] : normal oral mucosa [Normal Jugular Venous A Waves Present] : normal jugular venous A waves present [] : no respiratory distress [No Jugular Venous Jaime A Waves] : no jugular venous jaime A waves [Exaggerated Use Of Accessory Muscles For Inspiration] : no accessory muscle use [Respiration, Rhythm And Depth] : normal respiratory rhythm and effort [Abdomen Soft] : soft [Auscultation Breath Sounds / Voice Sounds] : lungs were clear to auscultation bilaterally [Bowel Sounds] : normal bowel sounds [Gait - Sufficient For Exercise Testing] : the gait was sufficient for exercise testing [Abdomen Tenderness] : non-tender [Abnormal Walk] : normal gait [Skin Color & Pigmentation] : normal skin color and pigmentation [Cyanosis, Localized] : no localized cyanosis [Nail Clubbing] : no clubbing of the fingernails [Oriented To Time, Place, And Person] : oriented to person, place, and time [Affect] : the affect was normal [Impaired Insight] : insight and judgment were intact [Mood] : the mood was normal [Not Palpable] : not palpable [No Precordial Heave] : no precordial heave was noted [Rhythm Regular] : regular [Normal S1] : normal S1 [Normal Rate] : normal [No Gallop] : no gallop heard [Normal S2] : normal S2 [Prosthetic Mitral Valve] : prosthetic mitral valve heard [Prosthetic Aortic Valve] : prosthetic aortic valve heard [2+] : Carotid: right 2+ [II] : a grade 2 [No Abnormalities] : the abdominal aorta was not enlarged and no bruit was heard [1+] : right 1+ [___ +] : bilateral [unfilled]U+ pitting edema to the ankles [Click] : no click [Apical Thrill] : no thrill palpable at the apex

## 2019-11-25 NOTE — REVIEW OF SYSTEMS
[Feeling Fatigued] : feeling fatigued [Anxiety] : anxiety [Negative] : Heme/Lymph [Chills] : no chills [Headache] : no headache [Fever] : no fever [Blurry Vision] : no blurred vision [Sore Throat] : no sore throat [Earache] : no earache [Abdominal Pain] : no abdominal pain [Heartburn] : no heartburn [Dysphagia] : no dysphagia [Sinus Pressure] : no sinus pressure [Dysuria] : no dysuria [Joint Pain] : no joint pain [Skin: A Rash] : no rash: [Dizziness] : no dizziness [Convulsions] : no convulsions [Tremor] : no tremor was seen [Confusion] : no confusion was observed [Easy Bleeding] : no tendency for easy bleeding [Easy Bruising] : no tendency for easy bruising

## 2019-12-04 ENCOUNTER — APPOINTMENT (OUTPATIENT)
Dept: RADIOLOGY | Facility: CLINIC | Age: 79
End: 2019-12-04
Payer: MEDICARE

## 2019-12-04 ENCOUNTER — OUTPATIENT (OUTPATIENT)
Dept: OUTPATIENT SERVICES | Facility: HOSPITAL | Age: 79
LOS: 1 days | End: 2019-12-04
Payer: MEDICARE

## 2019-12-04 DIAGNOSIS — Z00.8 ENCOUNTER FOR OTHER GENERAL EXAMINATION: ICD-10-CM

## 2019-12-04 DIAGNOSIS — Z98.890 OTHER SPECIFIED POSTPROCEDURAL STATES: Chronic | ICD-10-CM

## 2019-12-04 DIAGNOSIS — Z96.0 PRESENCE OF UROGENITAL IMPLANTS: Chronic | ICD-10-CM

## 2019-12-04 PROCEDURE — 74018 RADEX ABDOMEN 1 VIEW: CPT

## 2019-12-04 PROCEDURE — 74018 RADEX ABDOMEN 1 VIEW: CPT | Mod: 26

## 2019-12-05 ENCOUNTER — TRANSCRIPTION ENCOUNTER (OUTPATIENT)
Age: 79
End: 2019-12-05

## 2019-12-05 NOTE — ASU PATIENT PROFILE, ADULT - PSH
After cataract, bilateral  2010  h/o  endometrial ablation  1998  H/O aortic valve repair  2014  H/O cone biopsy of cervix  1974  H/O dilation and curettage    H/O mitral valve repair  2014  History of coronary angiogram  1/31/12  History of tonsillectomy  childhood  S/P ureteral stent placement  2015, Dr. Escobar

## 2019-12-05 NOTE — ASU PATIENT PROFILE, ADULT - PMH
Acid reflux    Anemia    Aortic stenosis    Asthma with COPD with exacerbation    Carpal tunnel syndrome    Coronary atherosclerosis of native coronary artery    Depression    Diverticulosis of colon    Duodenal ulcer  at age 24  Dyslipidemia    Emphysema    Former cigarette smoker    Graves disease    Hernia, hiatal    Hypertension    Leukemia    Mitral regurgitation    Obstructive sleep apnea  cpap  Osteoporosis    Rotator cuff tear  Right  Spinal stenosis

## 2019-12-06 ENCOUNTER — RESULT REVIEW (OUTPATIENT)
Age: 79
End: 2019-12-06

## 2019-12-06 ENCOUNTER — OUTPATIENT (OUTPATIENT)
Dept: OUTPATIENT SERVICES | Facility: HOSPITAL | Age: 79
LOS: 1 days | End: 2019-12-06
Payer: MEDICARE

## 2019-12-06 VITALS
WEIGHT: 160.94 LBS | DIASTOLIC BLOOD PRESSURE: 75 MMHG | RESPIRATION RATE: 14 BRPM | TEMPERATURE: 99 F | OXYGEN SATURATION: 94 % | HEART RATE: 73 BPM | HEIGHT: 62 IN | SYSTOLIC BLOOD PRESSURE: 143 MMHG

## 2019-12-06 VITALS — OXYGEN SATURATION: 95 % | RESPIRATION RATE: 14 BRPM

## 2019-12-06 DIAGNOSIS — Z01.818 ENCOUNTER FOR OTHER PREPROCEDURAL EXAMINATION: ICD-10-CM

## 2019-12-06 DIAGNOSIS — Z96.0 PRESENCE OF UROGENITAL IMPLANTS: Chronic | ICD-10-CM

## 2019-12-06 DIAGNOSIS — Z98.890 OTHER SPECIFIED POSTPROCEDURAL STATES: Chronic | ICD-10-CM

## 2019-12-06 DIAGNOSIS — N20.1 CALCULUS OF URETER: ICD-10-CM

## 2019-12-06 PROCEDURE — C1769: CPT

## 2019-12-06 PROCEDURE — 86901 BLOOD TYPING SEROLOGIC RH(D): CPT

## 2019-12-06 PROCEDURE — 36415 COLL VENOUS BLD VENIPUNCTURE: CPT

## 2019-12-06 PROCEDURE — 52356 CYSTO/URETERO W/LITHOTRIPSY: CPT | Mod: RT

## 2019-12-06 PROCEDURE — 76000 FLUOROSCOPY <1 HR PHYS/QHP: CPT

## 2019-12-06 PROCEDURE — C1758: CPT

## 2019-12-06 PROCEDURE — C1889: CPT

## 2019-12-06 PROCEDURE — 86850 RBC ANTIBODY SCREEN: CPT

## 2019-12-06 PROCEDURE — 88300 SURGICAL PATH GROSS: CPT | Mod: 26

## 2019-12-06 PROCEDURE — 88300 SURGICAL PATH GROSS: CPT

## 2019-12-06 PROCEDURE — C2617: CPT

## 2019-12-06 PROCEDURE — 86900 BLOOD TYPING SEROLOGIC ABO: CPT

## 2019-12-06 RX ORDER — HYDROMORPHONE HYDROCHLORIDE 2 MG/ML
0.5 INJECTION INTRAMUSCULAR; INTRAVENOUS; SUBCUTANEOUS
Refills: 0 | Status: DISCONTINUED | OUTPATIENT
Start: 2019-12-06 | End: 2019-12-06

## 2019-12-06 RX ORDER — SODIUM CHLORIDE 9 MG/ML
1000 INJECTION, SOLUTION INTRAVENOUS
Refills: 0 | Status: DISCONTINUED | OUTPATIENT
Start: 2019-12-06 | End: 2019-12-06

## 2019-12-06 RX ORDER — HYDROMORPHONE HYDROCHLORIDE 2 MG/ML
1 INJECTION INTRAMUSCULAR; INTRAVENOUS; SUBCUTANEOUS
Refills: 0 | Status: DISCONTINUED | OUTPATIENT
Start: 2019-12-06 | End: 2019-12-06

## 2019-12-06 RX ORDER — VANCOMYCIN HCL 1 G
1000 VIAL (EA) INTRAVENOUS ONCE
Refills: 0 | Status: COMPLETED | OUTPATIENT
Start: 2019-12-06 | End: 2019-12-06

## 2019-12-06 RX ORDER — GENTAMICIN SULFATE 40 MG/ML
80 VIAL (ML) INJECTION ONCE
Refills: 0 | Status: COMPLETED | OUTPATIENT
Start: 2019-12-06 | End: 2019-12-06

## 2019-12-06 RX ORDER — ONDANSETRON 8 MG/1
4 TABLET, FILM COATED ORAL ONCE
Refills: 0 | Status: DISCONTINUED | OUTPATIENT
Start: 2019-12-06 | End: 2019-12-06

## 2019-12-06 RX ADMIN — SODIUM CHLORIDE 75 MILLILITER(S): 9 INJECTION, SOLUTION INTRAVENOUS at 11:00

## 2019-12-06 RX ADMIN — Medication 250 MILLIGRAM(S): at 10:56

## 2019-12-06 RX ADMIN — SODIUM CHLORIDE 50 MILLILITER(S): 9 INJECTION, SOLUTION INTRAVENOUS at 13:57

## 2019-12-06 NOTE — ASU DISCHARGE PLAN (ADULT/PEDIATRIC) - CARE PROVIDER_API CALL
Jarrell Fernandez)  Urology  [address], [address]  [place] [address]  Phone: [phone]  Fax: [phone]  Follow Up Time: 2 weeks

## 2019-12-06 NOTE — ASU DISCHARGE PLAN (ADULT/PEDIATRIC) - PROCEDURE
cysto ,right retrograde pylogram right ureteroscopy , laser lithotripsy cysto ,right retrograde pyelogram right ureteroscopy , laser lithotripsy

## 2019-12-06 NOTE — ASU DISCHARGE PLAN (ADULT/PEDIATRIC) - CALL YOUR DOCTOR IF YOU HAVE ANY OF THE FOLLOWING:
Fever greater than (need to indicate Fahrenheit or Celsius) Fever greater than (need to indicate Fahrenheit or Celsius)/Unable to urinate

## 2019-12-11 LAB — SURGICAL PATHOLOGY STUDY: SIGNIFICANT CHANGE UP

## 2019-12-12 RX ORDER — SODIUM,POTASSIUM PHOSPHATES 278-250MG
0 POWDER IN PACKET (EA) ORAL
Qty: 0 | Refills: 0 | DISCHARGE
Start: 2019-12-12 | End: 2019-12-19

## 2019-12-19 ENCOUNTER — OUTPATIENT (OUTPATIENT)
Dept: OUTPATIENT SERVICES | Facility: HOSPITAL | Age: 79
LOS: 1 days | End: 2019-12-19
Payer: MEDICARE

## 2019-12-19 VITALS
OXYGEN SATURATION: 96 % | HEART RATE: 71 BPM | DIASTOLIC BLOOD PRESSURE: 60 MMHG | SYSTOLIC BLOOD PRESSURE: 109 MMHG | WEIGHT: 160.94 LBS | TEMPERATURE: 98 F | RESPIRATION RATE: 16 BRPM

## 2019-12-19 DIAGNOSIS — Z98.890 OTHER SPECIFIED POSTPROCEDURAL STATES: Chronic | ICD-10-CM

## 2019-12-19 DIAGNOSIS — Z41.9 ENCOUNTER FOR PROCEDURE FOR PURPOSES OTHER THAN REMEDYING HEALTH STATE, UNSPECIFIED: Chronic | ICD-10-CM

## 2019-12-19 DIAGNOSIS — G47.33 OBSTRUCTIVE SLEEP APNEA (ADULT) (PEDIATRIC): ICD-10-CM

## 2019-12-19 DIAGNOSIS — Z01.818 ENCOUNTER FOR OTHER PREPROCEDURAL EXAMINATION: ICD-10-CM

## 2019-12-19 DIAGNOSIS — N20.1 CALCULUS OF URETER: ICD-10-CM

## 2019-12-19 DIAGNOSIS — Z96.0 PRESENCE OF UROGENITAL IMPLANTS: Chronic | ICD-10-CM

## 2019-12-19 LAB
ALBUMIN SERPL ELPH-MCNC: 3.5 G/DL — SIGNIFICANT CHANGE UP (ref 3.3–5)
ALP SERPL-CCNC: 104 U/L — SIGNIFICANT CHANGE UP (ref 40–120)
ALT FLD-CCNC: 21 U/L — SIGNIFICANT CHANGE UP (ref 12–78)
ANION GAP SERPL CALC-SCNC: 5 MMOL/L — SIGNIFICANT CHANGE UP (ref 5–17)
APPEARANCE UR: ABNORMAL
AST SERPL-CCNC: 23 U/L — SIGNIFICANT CHANGE UP (ref 15–37)
BILIRUB SERPL-MCNC: 0.4 MG/DL — SIGNIFICANT CHANGE UP (ref 0.2–1.2)
BILIRUB UR-MCNC: ABNORMAL
BUN SERPL-MCNC: 19 MG/DL — SIGNIFICANT CHANGE UP (ref 7–23)
CALCIUM SERPL-MCNC: 8.4 MG/DL — LOW (ref 8.5–10.1)
CHLORIDE SERPL-SCNC: 112 MMOL/L — HIGH (ref 96–108)
CO2 SERPL-SCNC: 24 MMOL/L — SIGNIFICANT CHANGE UP (ref 22–31)
COLOR SPEC: ABNORMAL
CREAT SERPL-MCNC: 1.2 MG/DL — SIGNIFICANT CHANGE UP (ref 0.5–1.3)
DIFF PNL FLD: ABNORMAL
GLUCOSE SERPL-MCNC: 92 MG/DL — SIGNIFICANT CHANGE UP (ref 70–99)
GLUCOSE UR QL: NEGATIVE — SIGNIFICANT CHANGE UP
HCT VFR BLD CALC: 40.1 % — SIGNIFICANT CHANGE UP (ref 34.5–45)
HGB BLD-MCNC: 12.5 G/DL — SIGNIFICANT CHANGE UP (ref 11.5–15.5)
KETONES UR-MCNC: ABNORMAL
LEUKOCYTE ESTERASE UR-ACNC: ABNORMAL
MCHC RBC-ENTMCNC: 29.8 PG — SIGNIFICANT CHANGE UP (ref 27–34)
MCHC RBC-ENTMCNC: 31.2 GM/DL — LOW (ref 32–36)
MCV RBC AUTO: 95.5 FL — SIGNIFICANT CHANGE UP (ref 80–100)
NITRITE UR-MCNC: POSITIVE
NRBC # BLD: 0 /100 WBCS — SIGNIFICANT CHANGE UP (ref 0–0)
PH UR: 5 — SIGNIFICANT CHANGE UP (ref 5–8)
PLATELET # BLD AUTO: 188 K/UL — SIGNIFICANT CHANGE UP (ref 150–400)
POTASSIUM SERPL-MCNC: 4.6 MMOL/L — SIGNIFICANT CHANGE UP (ref 3.5–5.3)
POTASSIUM SERPL-SCNC: 4.6 MMOL/L — SIGNIFICANT CHANGE UP (ref 3.5–5.3)
PROT SERPL-MCNC: 7.3 G/DL — SIGNIFICANT CHANGE UP (ref 6–8.3)
PROT UR-MCNC: 500 MG/DL
RBC # BLD: 4.2 M/UL — SIGNIFICANT CHANGE UP (ref 3.8–5.2)
RBC # FLD: 20.7 % — HIGH (ref 10.3–14.5)
SODIUM SERPL-SCNC: 141 MMOL/L — SIGNIFICANT CHANGE UP (ref 135–145)
SP GR SPEC: 1.01 — SIGNIFICANT CHANGE UP (ref 1.01–1.02)
UROBILINOGEN FLD QL: NEGATIVE — SIGNIFICANT CHANGE UP
WBC # BLD: 5.54 K/UL — SIGNIFICANT CHANGE UP (ref 3.8–10.5)
WBC # FLD AUTO: 5.54 K/UL — SIGNIFICANT CHANGE UP (ref 3.8–10.5)

## 2019-12-19 PROCEDURE — 85027 COMPLETE CBC AUTOMATED: CPT

## 2019-12-19 PROCEDURE — 87186 SC STD MICRODIL/AGAR DIL: CPT

## 2019-12-19 PROCEDURE — 87086 URINE CULTURE/COLONY COUNT: CPT

## 2019-12-19 PROCEDURE — 80053 COMPREHEN METABOLIC PANEL: CPT

## 2019-12-19 PROCEDURE — 81001 URINALYSIS AUTO W/SCOPE: CPT

## 2019-12-19 PROCEDURE — 36415 COLL VENOUS BLD VENIPUNCTURE: CPT

## 2019-12-19 PROCEDURE — G0463: CPT

## 2019-12-19 PROCEDURE — 84100 ASSAY OF PHOSPHORUS: CPT

## 2019-12-19 NOTE — H&P PST ADULT - ASSESSMENT
78 yo F for cystoscopy   right ureteroscopy   stone extraction   insertion right stent      Med cl pending 80yo female with calculus of ureter.

## 2019-12-19 NOTE — H&P PST ADULT - HISTORY OF PRESENT ILLNESS
80 yo F  for cystoscopy   right ureteroscopy  stone extraction   insertion right stent   Pt poor historian and unsure if she has a ureteral stent presently   Pt has an indwelling stent placed when she had fever and obstructing stone 8/19    ADDENDUM: Pt's history reviewed as above. Pt s/p cystoscopy, right ureteroscopy, laser lithotripsy of right ureteral stone, right ureteral stent removal and replacement, right retrograde pyelogram on 12/6/19. Pt reports to having hematuria "but bleeding getting better than before". Pt denies dysuria but reports to urinary frequency. Pt denies abdominal pain and right flank pain. Pt electing for removal right ureteral stent on 12/27/19.

## 2019-12-19 NOTE — H&P PST ADULT - PSYCHIATRIC
details… detailed exam Affect and characteristics of appearance, verbalizations, behaviors are appropriate

## 2019-12-19 NOTE — H&P PST ADULT - NSICDXPASTSURGICALHX_GEN_ALL_CORE_FT
PAST SURGICAL HISTORY:  After cataract, bilateral 2010    Elective surgery cystoscopy, right ureteroscopy, laser lithotripsy of right ureteral stone, right ureteral stent removal and replacement, right retrograde pyelogram on 12/6/19    h/o  endometrial ablation 1998    H/O aortic valve repair 2014    H/O cone biopsy of cervix 1974    H/O dilation and curettage     H/O mitral valve repair 2014    History of coronary angiogram 1/31/12    History of tonsillectomy childhood    S/P ureteral stent placement 2015, Dr. Escobar PAST SURGICAL HISTORY:  After cataract, bilateral 2010    Elective surgery Cystoscopy, right ureteroscopy, laser lithotripsy of right ureteral stone, right ureteral stent removal and replacement, right retrograde pyelogram on 12/6/19    h/o  endometrial ablation 1998    H/O aortic valve repair 2014    H/O cone biopsy of cervix 1974    H/O dilation and curettage     H/O mitral valve repair 2014    History of coronary angiogram 1/31/12    History of tonsillectomy childhood    S/P ureteral stent placement 2015, Dr. Escobar

## 2019-12-19 NOTE — H&P PST ADULT - MUSCULOSKELETAL COMMENTS
(+) spinal stenosis cane use (+) spinal stenosis, (+) right shoulder rotator cuff tear Pt ambulates with cane, (+) unsteady gait

## 2019-12-19 NOTE — H&P PST ADULT - NS PRO TALK SOMEONE YN
----- Message from MARKO Howell sent at 2018  5:59 AM CDT -----  Please notify [employer] of the followin.  PSA is within recommended range  2.  Electrolytes are normal  3.  Fasting blood sugars are normal at 86  4.  Liver function is normal  5.  Kidney function remains stable  6.  Cholesterol is as follows:  -total cholesterol is at goal at 129  -LDL is at goal at 49  -HDL is at goal at 46  -triglycerides are just a tab bit above goal at 168.    Continue with current dose of Crestor.  Recommend to follow a heart healthy, mediterranean diet along with exercise and weight management.  Thank you.  
Patient informed of labs and advise per PCP.   
no

## 2019-12-19 NOTE — H&P PST ADULT - NEGATIVE CARDIOVASCULAR SYMPTOMS
no peripheral edema/no palpitations/no orthopnea/no paroxysmal nocturnal dyspnea/no claudication/no chest pain

## 2019-12-19 NOTE — H&P PST ADULT - NSICDXPASTMEDICALHX_GEN_ALL_CORE_FT
PAST MEDICAL HISTORY:  Acid reflux     Anemia     Aortic stenosis     Asthma with COPD with exacerbation     Carpal tunnel syndrome     Coronary atherosclerosis of native coronary artery     Depression     Diverticulosis of colon     Dyslipidemia     Emphysema     Former cigarette smoker     Graves disease     Hernia, hiatal     Hypertension     Leukemia (APL, s/p chemo in 2012, now in remission, followed by oncologist)    Mitral regurgitation     MARTINA on CPAP     Osteoporosis     Rotator cuff tear Right    Spinal stenosis PAST MEDICAL HISTORY:  Acid reflux     Anemia     Aortic stenosis     Asthma with COPD with exacerbation     Calculus of ureter     Carpal tunnel syndrome     Coronary atherosclerosis of native coronary artery     Depression     Diverticulosis of colon     Dyslipidemia     Emphysema     Former cigarette smoker     Graves disease     Hernia, hiatal     Hypertension     Leukemia (APL, s/p chemo in 2012, now in remission, followed by oncologist)    Mitral regurgitation     MARTINA on CPAP     Osteoporosis     Rotator cuff tear Right    Spinal stenosis

## 2019-12-19 NOTE — H&P PST ADULT - LYMPHATIC
anterior cervical L/supraclavicular R/posterior cervical R/anterior cervical R/supraclavicular L/posterior cervical L

## 2019-12-19 NOTE — H&P PST ADULT - ENMT COMMENTS
Pt complaining of having "sore under tongue for the last 3-4 days after starting Phosphorous supplement ordered by oncologist" (+) apthous ulcer to below right tongue

## 2019-12-19 NOTE — H&P PST ADULT - RS GEN PE MLT RESP DETAILS PC
clear to auscultation bilaterally/respirations non-labored/breath sounds equal normal/airway patent/respirations non-labored/good air movement/breath sounds equal/clear to auscultation bilaterally

## 2019-12-19 NOTE — H&P PST ADULT - NSICDXPROBLEM_GEN_ALL_CORE_FT
PROBLEM DIAGNOSES  Problem: Preoperative testing  Assessment and Plan: Medical clearance needed as per surgeon. Cardiology clearance from last procedure on 12/6/19 in chart. CBC, Comprehensive panel, UA, Urine culture and EKG ordered. Pre-op instructions given and pt verbalized understanding. Operative note in chart.       Problem: MARTINA on CPAP  Assessment and Plan: Pt informed of need for extended stay post op if deemed necessary by anesthesiologist. Pt verbalized understanding.      Problem: Calculus of ureter  Assessment and Plan: Removal right ureteral stent on 12/27/19.

## 2020-01-06 PROBLEM — C95.90 LEUKEMIA, UNSPECIFIED NOT HAVING ACHIEVED REMISSION: Chronic | Status: ACTIVE | Noted: 2019-11-15

## 2020-01-13 ENCOUNTER — OUTPATIENT (OUTPATIENT)
Dept: OUTPATIENT SERVICES | Facility: HOSPITAL | Age: 80
LOS: 1 days | End: 2020-01-13
Payer: MEDICARE

## 2020-01-13 VITALS — HEIGHT: 62 IN | WEIGHT: 164.02 LBS

## 2020-01-13 DIAGNOSIS — Z01.818 ENCOUNTER FOR OTHER PREPROCEDURAL EXAMINATION: ICD-10-CM

## 2020-01-13 DIAGNOSIS — N20.1 CALCULUS OF URETER: ICD-10-CM

## 2020-01-13 DIAGNOSIS — Z96.0 PRESENCE OF UROGENITAL IMPLANTS: Chronic | ICD-10-CM

## 2020-01-13 DIAGNOSIS — Z41.9 ENCOUNTER FOR PROCEDURE FOR PURPOSES OTHER THAN REMEDYING HEALTH STATE, UNSPECIFIED: Chronic | ICD-10-CM

## 2020-01-13 DIAGNOSIS — Z98.890 OTHER SPECIFIED POSTPROCEDURAL STATES: Chronic | ICD-10-CM

## 2020-01-13 LAB
ALBUMIN SERPL ELPH-MCNC: 3.3 G/DL — SIGNIFICANT CHANGE UP (ref 3.3–5)
ALP SERPL-CCNC: 106 U/L — SIGNIFICANT CHANGE UP (ref 40–120)
ALT FLD-CCNC: 19 U/L — SIGNIFICANT CHANGE UP (ref 12–78)
ANION GAP SERPL CALC-SCNC: 7 MMOL/L — SIGNIFICANT CHANGE UP (ref 5–17)
APPEARANCE UR: ABNORMAL
AST SERPL-CCNC: 21 U/L — SIGNIFICANT CHANGE UP (ref 15–37)
BILIRUB SERPL-MCNC: 0.3 MG/DL — SIGNIFICANT CHANGE UP (ref 0.2–1.2)
BILIRUB UR-MCNC: ABNORMAL
BUN SERPL-MCNC: 21 MG/DL — SIGNIFICANT CHANGE UP (ref 7–23)
CALCIUM SERPL-MCNC: 8.4 MG/DL — LOW (ref 8.5–10.1)
CHLORIDE SERPL-SCNC: 112 MMOL/L — HIGH (ref 96–108)
CO2 SERPL-SCNC: 24 MMOL/L — SIGNIFICANT CHANGE UP (ref 22–31)
COLOR SPEC: YELLOW — SIGNIFICANT CHANGE UP
CREAT SERPL-MCNC: 0.99 MG/DL — SIGNIFICANT CHANGE UP (ref 0.5–1.3)
DIFF PNL FLD: ABNORMAL
GLUCOSE SERPL-MCNC: 128 MG/DL — HIGH (ref 70–99)
GLUCOSE UR QL: NEGATIVE — SIGNIFICANT CHANGE UP
HCT VFR BLD CALC: 39.9 % — SIGNIFICANT CHANGE UP (ref 34.5–45)
HGB BLD-MCNC: 12.6 G/DL — SIGNIFICANT CHANGE UP (ref 11.5–15.5)
KETONES UR-MCNC: ABNORMAL
LEUKOCYTE ESTERASE UR-ACNC: ABNORMAL
MCHC RBC-ENTMCNC: 30.3 PG — SIGNIFICANT CHANGE UP (ref 27–34)
MCHC RBC-ENTMCNC: 31.6 GM/DL — LOW (ref 32–36)
MCV RBC AUTO: 95.9 FL — SIGNIFICANT CHANGE UP (ref 80–100)
NITRITE UR-MCNC: NEGATIVE — SIGNIFICANT CHANGE UP
NRBC # BLD: 0 /100 WBCS — SIGNIFICANT CHANGE UP (ref 0–0)
PH UR: 5 — SIGNIFICANT CHANGE UP (ref 5–8)
PLATELET # BLD AUTO: 194 K/UL — SIGNIFICANT CHANGE UP (ref 150–400)
POTASSIUM SERPL-MCNC: 4 MMOL/L — SIGNIFICANT CHANGE UP (ref 3.5–5.3)
POTASSIUM SERPL-SCNC: 4 MMOL/L — SIGNIFICANT CHANGE UP (ref 3.5–5.3)
PROT SERPL-MCNC: 6.9 G/DL — SIGNIFICANT CHANGE UP (ref 6–8.3)
PROT UR-MCNC: 150 MG/DL
RBC # BLD: 4.16 M/UL — SIGNIFICANT CHANGE UP (ref 3.8–5.2)
RBC # FLD: 17.5 % — HIGH (ref 10.3–14.5)
SODIUM SERPL-SCNC: 143 MMOL/L — SIGNIFICANT CHANGE UP (ref 135–145)
SP GR SPEC: 1.02 — SIGNIFICANT CHANGE UP (ref 1.01–1.02)
UROBILINOGEN FLD QL: NEGATIVE — SIGNIFICANT CHANGE UP
WBC # BLD: 6.44 K/UL — SIGNIFICANT CHANGE UP (ref 3.8–10.5)
WBC # FLD AUTO: 6.44 K/UL — SIGNIFICANT CHANGE UP (ref 3.8–10.5)

## 2020-01-13 PROCEDURE — 71046 X-RAY EXAM CHEST 2 VIEWS: CPT

## 2020-01-13 PROCEDURE — 87086 URINE CULTURE/COLONY COUNT: CPT

## 2020-01-13 PROCEDURE — 85027 COMPLETE CBC AUTOMATED: CPT

## 2020-01-13 PROCEDURE — G0463: CPT

## 2020-01-13 PROCEDURE — 81001 URINALYSIS AUTO W/SCOPE: CPT

## 2020-01-13 PROCEDURE — 71046 X-RAY EXAM CHEST 2 VIEWS: CPT | Mod: 26

## 2020-01-13 PROCEDURE — 80053 COMPREHEN METABOLIC PANEL: CPT

## 2020-01-13 PROCEDURE — 84100 ASSAY OF PHOSPHORUS: CPT

## 2020-01-13 PROCEDURE — 36415 COLL VENOUS BLD VENIPUNCTURE: CPT

## 2020-01-13 NOTE — H&P PST ADULT - NSICDXPROBLEM_GEN_ALL_CORE_FT
PROBLEM DIAGNOSES  Problem: Calculus of ureter  Assessment and Plan: scheduled for cystoscopy right stent removal on 1/23/2020 with Dr. Fernandez.    Problem: Pre-op evaluation  Assessment and Plan: Labs - CBC, CMP, UA, C/S and CXR pending. EKG on chart   MC with Dr. Man  Pre op instructions reviewed and given. Take routine am meds DOS with sip of water. Instructed to avoid NSAIDs and OTC supplements. Verbalized understanding     Problem: MARTINA on CPAP  Assessment and Plan: Close post op monitoring. Advised of a possible extended stay.     Problem: Presence of prosthetic heart valve  Assessment and Plan: CC with Dr. Harmon. Continue with cardio meds. Take DOS with sip of water.     Problem: Serum phosphorus decreased  Assessment and Plan: Repeat phosphorus level. Patient advised compliance with medication, as prescribed. Going to Dr. Temple for heme clearance.

## 2020-01-13 NOTE — H&P PST ADULT - ASSESSMENT
79 yo female with calculus of ureter scheduled for cystoscopy right stent removal on 1/23/2020 with Dr. Fernandez.

## 2020-01-13 NOTE — H&P PST ADULT - NSICDXPASTSURGICALHX_GEN_ALL_CORE_FT
PAST SURGICAL HISTORY:  After cataract, bilateral 2010    Elective surgery Cystoscopy, right ureteroscopy, laser lithotripsy of right ureteral stone, right ureteral stent removal and replacement, right retrograde pyelogram on 12/6/19    h/o  endometrial ablation 1998    H/O aortic valve repair 2014    H/O cone biopsy of cervix 1974    H/O dilation and curettage     H/O mitral valve repair 2014    History of coronary angiogram 1/31/12    History of tonsillectomy childhood    S/P ureteral stent placement 2015, Dr. Escobar

## 2020-01-13 NOTE — H&P PST ADULT - NSICDXPASTMEDICALHX_GEN_ALL_CORE_FT
PAST MEDICAL HISTORY:  Acid reflux     Anemia Iron infusions every 2 months    Aortic stenosis     Asthma with COPD with exacerbation     Calculus of ureter     Carpal tunnel syndrome     Coronary atherosclerosis of native coronary artery     Depression     Diverticulosis of colon     Dyslipidemia     Emphysema     Former cigarette smoker     Graves disease     Hernia, hiatal     Hypertension     Leukemia (APL, s/p chemo in 2012, now in remission, followed by oncologist)    Mitral regurgitation     MARTINA on CPAP     Osteoporosis     Rotator cuff tear Right    Spinal stenosis PAST MEDICAL HISTORY:  Acid reflux     Anemia Iron infusions every 2 months    Aortic stenosis     Asthma with COPD with exacerbation     Calculus of ureter     Carpal tunnel syndrome     Coronary atherosclerosis of native coronary artery     Depression     Diverticulosis of colon     Dyslipidemia     Emphysema     Former cigarette smoker     Graves disease     Hernia, hiatal     Hypertension     Leukemia (APL, s/p chemo in 2012, now in remission, followed by oncologist)    Mitral regurgitation     MARTINA on CPAP     Osteoporosis     Rotator cuff tear Right    Serum phosphorus decreased Last level 1.0. Supplemented by heme, but non compliant with medication    Spinal stenosis

## 2020-01-13 NOTE — H&P PST ADULT - HISTORY OF PRESENT ILLNESS
80 yo F  for cystoscopy   right ureteroscopy  stone extraction   insertion right stent   Pt poor historian and unsure if she has a ureteral stent presently   Pt has an indwelling stent placed when she had fever and obstructing stone 8/19    ADDENDUM: Pt's history reviewed as above. Pt s/p cystoscopy, right ureteroscopy, laser lithotripsy of right ureteral stone, right ureteral stent removal and replacement, right retrograde pyelogram on 12/6/19. Pt reports to having hematuria "but bleeding getting better than before". Pt denies dysuria but reports to urinary frequency. Pt denies abdominal pain and right flank pain. Pt electing for removal right ureteral stent on 12/27/19. 80 yo F  for cystoscopy   right ureteroscopy  stone extraction   insertion right stent   Pt poor historian and unsure if she has a ureteral stent presently   Pt has an indwelling stent placed when she had fever and obstructing stone 8/19    ADDENDUM: Pt's history reviewed as above. Pt s/p cystoscopy, right ureteroscopy, laser lithotripsy of right ureteral stone, right ureteral stent removal and replacement, right retrograde pyelogram on 12/6/19. Pt reports to having hematuria "but bleeding getting better than before". Pt denies dysuria but reports to urinary frequency. Pt denies abdominal pain and right flank pain. Pt electing for removal right ureteral stent on 12/27/19.     ADD 1/13/2020- History noted above. Previously cancelled secondary to UTI. Was started and treated with Ceftin. Presents to PST rescheduled for cystoscopy right stent removal on 1/23/2020 with Dr. Fernandez. Reports frequency and gross hematuria.

## 2020-01-13 NOTE — H&P PST ADULT - HEMATOLOGY/LYMPHATICS COMMENTS
Iron Infusions every 2 months H/O leukemia and anemia; Iron Infusions every 2 months;  Low phosphorus level 1.0 supplemented by heme

## 2020-01-14 DIAGNOSIS — E83.39 OTHER DISORDERS OF PHOSPHORUS METABOLISM: ICD-10-CM

## 2020-01-14 DIAGNOSIS — N20.1 CALCULUS OF URETER: ICD-10-CM

## 2020-01-14 DIAGNOSIS — G47.33 OBSTRUCTIVE SLEEP APNEA (ADULT) (PEDIATRIC): ICD-10-CM

## 2020-01-14 DIAGNOSIS — Z95.2 PRESENCE OF PROSTHETIC HEART VALVE: ICD-10-CM

## 2020-01-14 DIAGNOSIS — Z01.818 ENCOUNTER FOR OTHER PREPROCEDURAL EXAMINATION: ICD-10-CM

## 2020-01-14 LAB
CULTURE RESULTS: SIGNIFICANT CHANGE UP
SPECIMEN SOURCE: SIGNIFICANT CHANGE UP

## 2020-01-15 ENCOUNTER — APPOINTMENT (OUTPATIENT)
Dept: CARDIOLOGY | Facility: CLINIC | Age: 80
End: 2020-01-15

## 2020-01-16 ENCOUNTER — INPATIENT (INPATIENT)
Facility: HOSPITAL | Age: 80
LOS: 4 days | Discharge: HOME HEALTH | DRG: 543 | End: 2020-01-21
Attending: INTERNAL MEDICINE | Admitting: INTERNAL MEDICINE
Payer: MEDICARE

## 2020-01-16 VITALS
HEART RATE: 90 BPM | WEIGHT: 156.09 LBS | SYSTOLIC BLOOD PRESSURE: 134 MMHG | RESPIRATION RATE: 14 BRPM | OXYGEN SATURATION: 97 % | TEMPERATURE: 99 F | HEIGHT: 62 IN | DIASTOLIC BLOOD PRESSURE: 80 MMHG

## 2020-01-16 DIAGNOSIS — Z96.0 PRESENCE OF UROGENITAL IMPLANTS: Chronic | ICD-10-CM

## 2020-01-16 DIAGNOSIS — E83.39 OTHER DISORDERS OF PHOSPHORUS METABOLISM: ICD-10-CM

## 2020-01-16 DIAGNOSIS — G47.33 OBSTRUCTIVE SLEEP APNEA (ADULT) (PEDIATRIC): ICD-10-CM

## 2020-01-16 DIAGNOSIS — J11.1 INFLUENZA DUE TO UNIDENTIFIED INFLUENZA VIRUS WITH OTHER RESPIRATORY MANIFESTATIONS: ICD-10-CM

## 2020-01-16 DIAGNOSIS — F32.9 MAJOR DEPRESSIVE DISORDER, SINGLE EPISODE, UNSPECIFIED: ICD-10-CM

## 2020-01-16 DIAGNOSIS — E78.5 HYPERLIPIDEMIA, UNSPECIFIED: ICD-10-CM

## 2020-01-16 DIAGNOSIS — Z29.9 ENCOUNTER FOR PROPHYLACTIC MEASURES, UNSPECIFIED: ICD-10-CM

## 2020-01-16 DIAGNOSIS — E03.9 HYPOTHYROIDISM, UNSPECIFIED: ICD-10-CM

## 2020-01-16 DIAGNOSIS — Z98.890 OTHER SPECIFIED POSTPROCEDURAL STATES: Chronic | ICD-10-CM

## 2020-01-16 DIAGNOSIS — S22.080A WEDGE COMPRESSION FRACTURE OF T11-T12 VERTEBRA, INITIAL ENCOUNTER FOR CLOSED FRACTURE: ICD-10-CM

## 2020-01-16 DIAGNOSIS — Z41.9 ENCOUNTER FOR PROCEDURE FOR PURPOSES OTHER THAN REMEDYING HEALTH STATE, UNSPECIFIED: Chronic | ICD-10-CM

## 2020-01-16 DIAGNOSIS — I10 ESSENTIAL (PRIMARY) HYPERTENSION: ICD-10-CM

## 2020-01-16 LAB
ALBUMIN SERPL ELPH-MCNC: 3.3 G/DL — SIGNIFICANT CHANGE UP (ref 3.3–5)
ALP SERPL-CCNC: 117 U/L — SIGNIFICANT CHANGE UP (ref 40–120)
ALT FLD-CCNC: 17 U/L — SIGNIFICANT CHANGE UP (ref 12–78)
ANION GAP SERPL CALC-SCNC: 6 MMOL/L — SIGNIFICANT CHANGE UP (ref 5–17)
APPEARANCE UR: ABNORMAL
APTT BLD: 26.7 SEC — LOW (ref 28.5–37)
AST SERPL-CCNC: 20 U/L — SIGNIFICANT CHANGE UP (ref 15–37)
BASOPHILS # BLD AUTO: 0.03 K/UL — SIGNIFICANT CHANGE UP (ref 0–0.2)
BASOPHILS NFR BLD AUTO: 0.4 % — SIGNIFICANT CHANGE UP (ref 0–2)
BILIRUB SERPL-MCNC: 0.5 MG/DL — SIGNIFICANT CHANGE UP (ref 0.2–1.2)
BILIRUB UR-MCNC: NEGATIVE — SIGNIFICANT CHANGE UP
BUN SERPL-MCNC: 14 MG/DL — SIGNIFICANT CHANGE UP (ref 7–23)
CALCIUM SERPL-MCNC: 8.1 MG/DL — LOW (ref 8.5–10.1)
CHLORIDE SERPL-SCNC: 107 MMOL/L — SIGNIFICANT CHANGE UP (ref 96–108)
CO2 SERPL-SCNC: 26 MMOL/L — SIGNIFICANT CHANGE UP (ref 22–31)
COLOR SPEC: ABNORMAL
CREAT SERPL-MCNC: 0.89 MG/DL — SIGNIFICANT CHANGE UP (ref 0.5–1.3)
DIFF PNL FLD: ABNORMAL
EOSINOPHIL # BLD AUTO: 0.07 K/UL — SIGNIFICANT CHANGE UP (ref 0–0.5)
EOSINOPHIL NFR BLD AUTO: 0.9 % — SIGNIFICANT CHANGE UP (ref 0–6)
FLU A RESULT: DETECTED
FLU A RESULT: DETECTED
FLUAV AG NPH QL: DETECTED
FLUBV AG NPH QL: SIGNIFICANT CHANGE UP
GLUCOSE SERPL-MCNC: 109 MG/DL — HIGH (ref 70–99)
GLUCOSE UR QL: NEGATIVE — SIGNIFICANT CHANGE UP
HCT VFR BLD CALC: 39.5 % — SIGNIFICANT CHANGE UP (ref 34.5–45)
HGB BLD-MCNC: 12.5 G/DL — SIGNIFICANT CHANGE UP (ref 11.5–15.5)
IMM GRANULOCYTES NFR BLD AUTO: 0.4 % — SIGNIFICANT CHANGE UP (ref 0–1.5)
INR BLD: 1.14 RATIO — SIGNIFICANT CHANGE UP (ref 0.88–1.16)
KETONES UR-MCNC: ABNORMAL
LACTATE SERPL-SCNC: 0.9 MMOL/L — SIGNIFICANT CHANGE UP (ref 0.7–2)
LEUKOCYTE ESTERASE UR-ACNC: ABNORMAL
LYMPHOCYTES # BLD AUTO: 1.01 K/UL — SIGNIFICANT CHANGE UP (ref 1–3.3)
LYMPHOCYTES # BLD AUTO: 13 % — SIGNIFICANT CHANGE UP (ref 13–44)
MCHC RBC-ENTMCNC: 30.4 PG — SIGNIFICANT CHANGE UP (ref 27–34)
MCHC RBC-ENTMCNC: 31.6 GM/DL — LOW (ref 32–36)
MCV RBC AUTO: 96.1 FL — SIGNIFICANT CHANGE UP (ref 80–100)
MONOCYTES # BLD AUTO: 0.73 K/UL — SIGNIFICANT CHANGE UP (ref 0–0.9)
MONOCYTES NFR BLD AUTO: 9.4 % — SIGNIFICANT CHANGE UP (ref 2–14)
NEUTROPHILS # BLD AUTO: 5.89 K/UL — SIGNIFICANT CHANGE UP (ref 1.8–7.4)
NEUTROPHILS NFR BLD AUTO: 75.9 % — SIGNIFICANT CHANGE UP (ref 43–77)
NITRITE UR-MCNC: NEGATIVE — SIGNIFICANT CHANGE UP
NRBC # BLD: 0 /100 WBCS — SIGNIFICANT CHANGE UP (ref 0–0)
PH UR: 7 — SIGNIFICANT CHANGE UP (ref 5–8)
PLATELET # BLD AUTO: 190 K/UL — SIGNIFICANT CHANGE UP (ref 150–400)
POTASSIUM SERPL-MCNC: 4.5 MMOL/L — SIGNIFICANT CHANGE UP (ref 3.5–5.3)
POTASSIUM SERPL-SCNC: 4.5 MMOL/L — SIGNIFICANT CHANGE UP (ref 3.5–5.3)
PROT SERPL-MCNC: 7.1 G/DL — SIGNIFICANT CHANGE UP (ref 6–8.3)
PROT UR-MCNC: 100
PROTHROM AB SERPL-ACNC: 13 SEC — HIGH (ref 10–12.9)
RBC # BLD: 4.11 M/UL — SIGNIFICANT CHANGE UP (ref 3.8–5.2)
RBC # FLD: 17 % — HIGH (ref 10.3–14.5)
RSV RESULT: SIGNIFICANT CHANGE UP
RSV RNA RESP QL NAA+PROBE: SIGNIFICANT CHANGE UP
SODIUM SERPL-SCNC: 139 MMOL/L — SIGNIFICANT CHANGE UP (ref 135–145)
SP GR SPEC: 1.01 — SIGNIFICANT CHANGE UP (ref 1.01–1.02)
UROBILINOGEN FLD QL: NEGATIVE — SIGNIFICANT CHANGE UP
WBC # BLD: 7.76 K/UL — SIGNIFICANT CHANGE UP (ref 3.8–10.5)
WBC # FLD AUTO: 7.76 K/UL — SIGNIFICANT CHANGE UP (ref 3.8–10.5)

## 2020-01-16 PROCEDURE — 93010 ELECTROCARDIOGRAM REPORT: CPT

## 2020-01-16 PROCEDURE — 99284 EMERGENCY DEPT VISIT MOD MDM: CPT

## 2020-01-16 PROCEDURE — 71045 X-RAY EXAM CHEST 1 VIEW: CPT | Mod: 26

## 2020-01-16 PROCEDURE — 74018 RADEX ABDOMEN 1 VIEW: CPT | Mod: 26

## 2020-01-16 PROCEDURE — 74176 CT ABD & PELVIS W/O CONTRAST: CPT | Mod: 26

## 2020-01-16 RX ORDER — METOPROLOL TARTRATE 50 MG
25 TABLET ORAL DAILY
Refills: 0 | Status: DISCONTINUED | OUTPATIENT
Start: 2020-01-16 | End: 2020-01-21

## 2020-01-16 RX ORDER — CITALOPRAM 10 MG/1
40 TABLET, FILM COATED ORAL DAILY
Refills: 0 | Status: DISCONTINUED | OUTPATIENT
Start: 2020-01-16 | End: 2020-01-21

## 2020-01-16 RX ORDER — ACETAMINOPHEN 500 MG
650 TABLET ORAL ONCE
Refills: 0 | Status: COMPLETED | OUTPATIENT
Start: 2020-01-16 | End: 2020-01-16

## 2020-01-16 RX ORDER — KETOROLAC TROMETHAMINE 30 MG/ML
30 SYRINGE (ML) INJECTION EVERY 6 HOURS
Refills: 0 | Status: DISCONTINUED | OUTPATIENT
Start: 2020-01-16 | End: 2020-01-21

## 2020-01-16 RX ORDER — LEVOTHYROXINE SODIUM 125 MCG
88 TABLET ORAL DAILY
Refills: 0 | Status: DISCONTINUED | OUTPATIENT
Start: 2020-01-16 | End: 2020-01-21

## 2020-01-16 RX ORDER — ACETAMINOPHEN 500 MG
650 TABLET ORAL EVERY 4 HOURS
Refills: 0 | Status: DISCONTINUED | OUTPATIENT
Start: 2020-01-16 | End: 2020-01-21

## 2020-01-16 RX ORDER — FAMOTIDINE 10 MG/ML
40 INJECTION INTRAVENOUS
Refills: 0 | Status: DISCONTINUED | OUTPATIENT
Start: 2020-01-16 | End: 2020-01-21

## 2020-01-16 RX ORDER — IPRATROPIUM/ALBUTEROL SULFATE 18-103MCG
3 AEROSOL WITH ADAPTER (GRAM) INHALATION EVERY 6 HOURS
Refills: 0 | Status: DISCONTINUED | OUTPATIENT
Start: 2020-01-16 | End: 2020-01-21

## 2020-01-16 RX ORDER — LOSARTAN POTASSIUM 100 MG/1
25 TABLET, FILM COATED ORAL DAILY
Refills: 0 | Status: DISCONTINUED | OUTPATIENT
Start: 2020-01-16 | End: 2020-01-21

## 2020-01-16 RX ORDER — CALCITONIN SALMON 200 [IU]/ML
1 INJECTION, SOLUTION INTRAMUSCULAR DAILY
Refills: 0 | Status: DISCONTINUED | OUTPATIENT
Start: 2020-01-16 | End: 2020-01-21

## 2020-01-16 RX ORDER — ENOXAPARIN SODIUM 100 MG/ML
40 INJECTION SUBCUTANEOUS ONCE
Refills: 0 | Status: COMPLETED | OUTPATIENT
Start: 2020-01-16 | End: 2020-01-16

## 2020-01-16 RX ORDER — SODIUM CHLORIDE 9 MG/ML
1550 INJECTION INTRAMUSCULAR; INTRAVENOUS; SUBCUTANEOUS ONCE
Refills: 0 | Status: COMPLETED | OUTPATIENT
Start: 2020-01-16 | End: 2020-01-16

## 2020-01-16 RX ORDER — CEFTRIAXONE 500 MG/1
1000 INJECTION, POWDER, FOR SOLUTION INTRAMUSCULAR; INTRAVENOUS ONCE
Refills: 0 | Status: COMPLETED | OUTPATIENT
Start: 2020-01-16 | End: 2020-01-16

## 2020-01-16 RX ORDER — ACETAMINOPHEN 500 MG
1000 TABLET ORAL EVERY 6 HOURS
Refills: 0 | Status: DISCONTINUED | OUTPATIENT
Start: 2020-01-16 | End: 2020-01-21

## 2020-01-16 RX ORDER — BUPROPION HYDROCHLORIDE 150 MG/1
75 TABLET, EXTENDED RELEASE ORAL AT BEDTIME
Refills: 0 | Status: DISCONTINUED | OUTPATIENT
Start: 2020-01-16 | End: 2020-01-21

## 2020-01-16 RX ORDER — BUPROPION HYDROCHLORIDE 150 MG/1
150 TABLET, EXTENDED RELEASE ORAL
Refills: 0 | Status: DISCONTINUED | OUTPATIENT
Start: 2020-01-16 | End: 2020-01-21

## 2020-01-16 RX ORDER — SIMVASTATIN 20 MG/1
40 TABLET, FILM COATED ORAL AT BEDTIME
Refills: 0 | Status: DISCONTINUED | OUTPATIENT
Start: 2020-01-16 | End: 2020-01-21

## 2020-01-16 RX ADMIN — Medication 3 MILLILITER(S): at 20:53

## 2020-01-16 RX ADMIN — SODIUM CHLORIDE 1550 MILLILITER(S): 9 INJECTION INTRAMUSCULAR; INTRAVENOUS; SUBCUTANEOUS at 13:32

## 2020-01-16 RX ADMIN — CEFTRIAXONE 100 MILLIGRAM(S): 500 INJECTION, POWDER, FOR SOLUTION INTRAMUSCULAR; INTRAVENOUS at 13:32

## 2020-01-16 RX ADMIN — BUPROPION HYDROCHLORIDE 75 MILLIGRAM(S): 150 TABLET, EXTENDED RELEASE ORAL at 23:20

## 2020-01-16 RX ADMIN — ENOXAPARIN SODIUM 40 MILLIGRAM(S): 100 INJECTION SUBCUTANEOUS at 22:50

## 2020-01-16 RX ADMIN — Medication 30 MILLIGRAM(S): at 14:56

## 2020-01-16 RX ADMIN — Medication 650 MILLIGRAM(S): at 13:32

## 2020-01-16 RX ADMIN — CEFTRIAXONE 1000 MILLIGRAM(S): 500 INJECTION, POWDER, FOR SOLUTION INTRAMUSCULAR; INTRAVENOUS at 14:57

## 2020-01-16 RX ADMIN — FAMOTIDINE 40 MILLIGRAM(S): 10 INJECTION INTRAVENOUS at 19:35

## 2020-01-16 RX ADMIN — Medication 650 MILLIGRAM(S): at 14:56

## 2020-01-16 RX ADMIN — Medication 1000 MILLIGRAM(S): at 21:30

## 2020-01-16 RX ADMIN — Medication 1000 MILLIGRAM(S): at 19:34

## 2020-01-16 RX ADMIN — SIMVASTATIN 40 MILLIGRAM(S): 20 TABLET, FILM COATED ORAL at 23:21

## 2020-01-16 RX ADMIN — SODIUM CHLORIDE 1550 MILLILITER(S): 9 INJECTION INTRAMUSCULAR; INTRAVENOUS; SUBCUTANEOUS at 15:00

## 2020-01-16 NOTE — ED PROVIDER NOTE - CARE PLAN
Principal Discharge DX:	Influenza  Secondary Diagnosis:	Compression fracture of T12 vertebra, initial encounter

## 2020-01-16 NOTE — H&P ADULT - HISTORY OF PRESENT ILLNESS
Patient is an 81 y/o F with PMHx of HLD, HTN, hypothyroidism, depression, GERD, leukemia (s/p chemo in 2012 now in remission followed by Dr. Temple) who presents c/o fever and low back pain since last night. Of note, patient was in PST on 1/13/2020 for future removal of R ureteral stent on 1/23/2020.    In ED,  VS: afebrile, HR 90, /80, RR14, SpO2 97% RA  CBC and CMP unremarkable, lactate WNL  UA showing red urine, mod LE, large blood, 11-25 WBCs, >50 RBCs, few bacteria  Flu A positive  Urine Culture from 1/13/2020 showing normal urogenital bjorn  CT A/P: Status post right ureteral stent. No obstructive urolithiasis. Mild right hydroureteronephrosis decreased compared to prior. Bilateral nonobstructive nephrolithiasis. Mild age-indeterminate superior endplate compression deformity at T12, new from prior study.  CXR: Heart surgery, hiatal hernia, and retrocardiac scar again noted.  XRay KUB: Unchanged from 12/4/2019  EKG:     S/p Tamiflu x1, rocephin 1000mg x1, 1550 mL NS bolus x1, 650mg tylenol PO x1 Patient is an 79 y/o F with PMHx of HLD, HTN, Graves s/p thyroid surgery, depression, GERD, leukemia (s/p chemo in 2012 now in remission followed by Dr. Temple) with residual chronic anemia requiring iron infusions which have now been discontinued due to stable Hb, MVR and AVR in 2011, COPD (not on home O2), MARTINA on CPAP (patient admits to being non-compliant with use), hx of R ureteral stone s/p stent in 9/2019 revised in 12/2019, who presents c/o fever and chills since last night. States she took her temperature and it was 99.5 orally at maximum. Endorses two episodes of NBNB vomiting yesterday with associated nausea. No further episodes of n/v today, admits to decreased appetite and decreased oral intake. Endorses associated cough since yesterday, productive, not sure of color of sputum. Admits to some difficulty breathing and hearing herself wheezing. Admits to associated generalized body aches. Patient states she's also had low back pain which started a few weeks ago when she "pulled her back out" as a friend was helping pull her out of her car. States this past Sunday she pulled her back again while moving a garbage can. Pain is located around her waistline, sharp, non-radiating, worse with movement or a deep breath. Patient states tylenol helps the pain. Denies any neurologic symptoms or red flag signs. Of note, patient was in PST on 1/13/2020 for future removal of R ureteral stent on 1/23/2020 with Dr. Fernandez. Stent was placed on 9/2019 and replaced on 12/2019. Patient has had grossly bloody urine and urinary frequency since 12/2019 when stent replaced. Admits to left sided tongue sore which is being followed by her oral surgeon Dr. Renee, to be biopsied in follow up. Denies headache, rhinorrhea, sore throat, chest pain, palpitations, abdominal pain, diarrhea. Admits to some chronic leg swelling.    In ED,  VS: afebrile, HR 90, /80, RR14, SpO2 97% RA  CBC and CMP unremarkable, lactate WNL  UA showing red urine, mod LE, large blood, 11-25 WBCs, >50 RBCs, few bacteria  Flu A positive  Urine Culture from 1/13/2020 showing normal urogenital bjorn  CT A/P: Status post right ureteral stent. No obstructive urolithiasis. Mild right hydroureteronephrosis decreased compared to prior. Bilateral nonobstructive nephrolithiasis. Mild age-indeterminate superior endplate compression deformity at T12, new from prior study.  CXR: Heart surgery, hiatal hernia, and retrocardiac scar again noted.  XRay KUB: Unchanged from 12/4/2019    S/p Tamiflu x1, rocephin 1000mg x1, 1550 mL NS bolus x1, 650mg tylenol PO x1 Patient is an 79 y/o F with PMHx of HLD, HTN, Graves s/p thyroid surgery, depression, GERD, leukemia (s/p chemo in 2012 now in remission followed by Dr. Temple) with residual chronic anemia requiring iron infusions which have now been discontinued due to stable Hb, MVR and AVR in 2011, COPD (not on home O2), MARTINA on CPAP (patient admits to being non-compliant with use), hx of R ureteral stone s/p stent in 9/2019 revised in 12/2019, who presents c/o fever and chills since last night. States she took her temperature and it was 99.5 orally at maximum. Endorses two episodes of NBNB vomiting yesterday with associated nausea. No further episodes of n/v today, admits to decreased appetite and decreased oral intake. Endorses associated cough since yesterday, productive, not sure of color of sputum. Admits to some difficulty breathing and hearing herself wheezing. Admits to associated generalized body aches. Positive sick contacts with multiple sick family members. Patient states she's also had low back pain which started a few weeks ago when she "pulled her back out" as a friend was helping pull her out of her car. States this past Sunday she pulled her back again while moving a garbage can. Pain is located around her waistline, sharp, non-radiating, worse with movement or a deep breath. Patient states tylenol helps the pain. Denies any neurologic symptoms or red flag signs. Of note, patient was in PST on 1/13/2020 for future removal of R ureteral stent on 1/23/2020 with Dr. Fernandez. Stent was placed on 9/2019 and replaced on 12/2019. Patient has had grossly bloody urine and urinary frequency since 12/2019 when stent replaced. Admits to left sided tongue sore which is being followed by her oral surgeon Dr. Renee, to be biopsied in follow up. Denies headache, rhinorrhea, sore throat, chest pain, palpitations, abdominal pain, diarrhea. Admits to some chronic leg swelling.    In ED,  VS: afebrile, HR 90, /80, RR14, SpO2 97% RA  CBC and CMP unremarkable, lactate WNL  UA showing red urine, mod LE, large blood, 11-25 WBCs, >50 RBCs, few bacteria  Flu A positive  Urine Culture from 1/13/2020 showing normal urogenital bjorn  CT A/P: Status post right ureteral stent. No obstructive urolithiasis. Mild right hydroureteronephrosis decreased compared to prior. Bilateral nonobstructive nephrolithiasis. Mild age-indeterminate superior endplate compression deformity at T12, new from prior study.  CXR: Heart surgery, hiatal hernia, and retrocardiac scar again noted.  XRay KUB: Unchanged from 12/4/2019    S/p Tamiflu x1, rocephin 1000mg x1, 1550 mL NS bolus x1, 650mg tylenol PO x1

## 2020-01-16 NOTE — H&P ADULT - PROBLEM SELECTOR PLAN 3
History of hypophosphatemia  - Most recent level 1.5 at PST on 1/13, patient states she was instructed to take phosphorus "packets" but wasn't able to pick them up  - F/u AM phos level and supplemental PRN

## 2020-01-16 NOTE — H&P ADULT - PROBLEM SELECTOR PLAN 9
DVT ppx with lovenox 40mg daily          RISK                                                          Points  [  ] Previous VTE                                                3  [  ] Thrombophilia                                             2  [  ] Lower limb paralysis                                   2        (unable to hold up >15 seconds)    [  ] Current Cancer                                             2         (within 6 months)  [  ] Immobilization > 24 hrs                              1  [  ] ICU/CCU stay > 24 hours                             1  [ x ] Age > 60                                                         1    IMPROVE VTE Score: 1

## 2020-01-16 NOTE — ED ADULT NURSE NOTE - PMH
Acid reflux    Anemia  Iron infusions every 2 months  Aortic stenosis    Asthma with COPD with exacerbation    Calculus of ureter    Carpal tunnel syndrome    Coronary atherosclerosis of native coronary artery    Depression    Diverticulosis of colon    Dyslipidemia    Emphysema    Former cigarette smoker    Graves disease    Hernia, hiatal    Hypertension    Leukemia  (APL, s/p chemo in 2012, now in remission, followed by oncologist)  Mitral regurgitation    MARTINA on CPAP    Osteoporosis    Rotator cuff tear  Right  Serum phosphorus decreased  Last level 1.0. Supplemented by heme, but non compliant with medication  Spinal stenosis

## 2020-01-16 NOTE — H&P ADULT - NSHPREVIEWOFSYSTEMS_GEN_ALL_CORE
Constitutional: denies fever, chills, sweating  HEENT: denies headache, dizziness, or lightheadedness  Respiratory: denies SOB, cough, or wheezing  Cardiovascular: denies CP, palpitations  Gastrointestinal: denies nausea, vomiting, diarrhea, constipation, abdominal pain, or bloody stools  Genitourinary: denies painful urination, increased frequency, urgency, or bloody urine  Skin/Breast: denies rashes or itching  Musculoskeletal: denies muscle aches, joint swelling, or muscle weakness  Neurologic: denies loss of sensation, numbness, or tingling  ROS negative except as noted above Constitutional: admits to fever and chills   HEENT: denies headache, dizziness, or lightheadedness  Respiratory: admits to wheezing and cough; denies SOB  Cardiovascular: denies CP, palpitations  Gastrointestinal: admits to vomiting; denies nausea, diarrhea, constipation, abdominal pain, or bloody stools  Genitourinary: admits to increased frequency and bloody urine; denies painful urination, urgency  Skin/Breast: denies rashes or itching  Musculoskeletal: admits to low back pain; denies joint swelling, or muscle weakness  Neurologic: denies loss of sensation, numbness, or tingling  ROS negative except as noted above

## 2020-01-16 NOTE — H&P ADULT - PROBLEM SELECTOR PLAN 2
Low back pain for weeks with T12 compression fracture found on CT A/P  - Patient neurologically intact with no red flag symptoms  - Lumbar XRays as per Ortho, patient refusing in ED due to pain. Will give pain medication and reassess  - Pain control Tylenol 650mg PRN for mild, Tylenol 1000mg PRN for moderate, and Toradol 30mg IV for severe  - Ortho consulted, will f/u recs Low back pain for weeks with T12 compression fracture found on CT A/P  - Patient neurologically intact with no red flag symptoms  - Lumbar XRays as per Ortho, patient refusing in ED due to pain. Will give pain medication and reassess  - Pain control Tylenol 650mg PRN for mild, Tylenol 1000mg PRN for moderate, and Toradol 30mg IV for severe  - Started intranasal calcitonin daily  - Ortho consulted, recs appreciated

## 2020-01-16 NOTE — CONSULT NOTE ADULT - SUBJECTIVE AND OBJECTIVE BOX
HPI:     Pt is a 80y F complains of pain in the low back. Pt denies any recent falls, however reports she was taking out the trash and felt like she injured her back. Pt denies numbness/tingling of the BL LE. Patient denies changes in bowel or bladder, Denies pain down legs, denies paresthesias. No other complaints at this time.       PAST MEDICAL & SURGICAL HISTORY:  Serum phosphorus decreased: Last level 1.5 at PST   Calculus of ureter: s/p R ureteral stent 2020  MARTINA on CPAP: Pt admits to be non-compliant  Leukemia: (APL, s/p chemo in , now in remission, followed by oncologist)  Anemia: Completed iron infusions every 2 months, now Hb stable  Aortic stenosis  Emphysema  Rotator cuff tear: Right  Diverticulosis of colon  Coronary atherosclerosis of native coronary artery  Carpal tunnel syndrome  Dyslipidemia  Osteoporosis  Hypertension  Hernia, hiatal  Former cigarette smoker  Depression  Asthma with COPD with exacerbation: Not on home O2  Acid reflux  Graves disease: s/p surgery  Elective surgery: Cystoscopy, right ureteroscopy, laser lithotripsy of right ureteral stone, right ureteral stent removal and replacement, right retrograde pyelogram on 19  H/O dilation and curettage  S/P ureteral stent placement: R in 2020  H/O mitral valve repair:   H/O aortic valve repair:   History of coronary angiogram: 12  h/o  endometrial ablation:   H/O cone biopsy of cervix:   History of tonsillectomy: childhood  After cataract, bilateral:       FAMILY HISTORY:  FH: myocardial infarction: (Father )      SOCIAL HISTORY:     Vital Signs Last 24 Hrs  T(C): 37.5 (2020 14:57), Max: 37.5 (2020 14:57)  T(F): 99.5 (2020 14:57), Max: 99.5 (2020 14:57)  HR: 80 (2020 17:29) (80 - 90)  BP: 145/82 (2020 17:29) (129/85 - 145/82)  BP(mean): --  RR: 17 (2020 17:29) (14 - 17)  SpO2: 97% (2020 17:29) (97% - 98%)  I&O's Detail      LABS:                        12.5   7.76  )-----------( 190      ( 2020 13:04 )             39.5     -16    139  |  107  |  14  ----------------------------<  109<H>  4.5   |  26  |  0.89    Ca    8.1<L>      2020 13:04    TPro  7.1  /  Alb  3.3  /  TBili  0.5  /  DBili  x   /  AST  20  /  ALT  17  /  AlkPhos  117  -16    PT/INR - ( 2020 13:04 )   PT: 13.0 sec;   INR: 1.14 ratio         PTT - ( 2020 13:04 )  PTT:26.7 sec  Urinalysis Basic - ( 2020 13:04 )    Color: Red / Appearance: Turbid / S.010 / pH: x  Gluc: x / Ketone: Trace  / Bili: Negative / Urobili: Negative   Blood: x / Protein: 100 / Nitrite: Negative   Leuk Esterase: Moderate / RBC: >50 /HPF / WBC 11-25   Sq Epi: x / Non Sq Epi: Occasional / Bacteria: Few        RADIOLOGY & ADDITIONAL STUDIES:    PHYSICAL EXAM:    General; Awake and alert, Oriented x 3    Hips/Pelvis:   AB  able to SLR bilaterally. Negative log roll, heel strike. No pain on passive Int/Ext hip rotation.  Spine exam:  Neck: supple, NT, Full Painless baseline AROM  Back: TTP Over the L Spine, non TTP C/T spine, skin intact.     Spine:                       Motor exam:    Right Upper extremity: Delt 2+/5, Biceps 5/5, Triceps 5/5, Wrist Ext 5/5, Wrist Flexion 5/5,  Strength 5/5   SILT C5-S1, No Clonus, Negative Garcia, +RP, Compartments soft           Left Upper extremity: Delt 5/5, Biceps 5/5, Triceps 5/5, Wrist Ext 5/5, Wrist Flexion 5/5,  Strength 5/5         SILT C5-S1, No Clonus, Negative Garcia, +RP, Compartments soft           Right Lower Extremity: Hip Flexion 5/5, Hip Extension 5/5, Knee Flexion 5/5, Knee Extension 5/5, Ankle Dorsiflexion 5/5,          Ankle Plantar Flexion 5/5, Extensor hallucis Longus 5/5         SILT L2-S1, No pain with SLR, Negative Clonus, Negative Babinski                  Left Lower Extremity: Hip Flexion 5/5, Hip Extension 5/5, Knee Flexion 5/5, Knee Extension 5/5, Ankle Dorsiflexion 5/5,          Ankle Plantar Flexion 5/5, Extensor hallucis Longus 5/5         SILT L2-S1, No pain with SLR, Negative Clonus, Negative Babinski

## 2020-01-16 NOTE — ED PROVIDER NOTE - PHYSICAL EXAMINATION
Gen:  alert, awake, no acute distress  HEENT:  atraumatic head, airway clear, pupils equal and round  CV:  rrr, nl S1, S2, no m/r/g  Pulm:  lungs with mild rhonchi  Abd: s/nt/nd, +BS  Ext:  moving all extremities  Neuro:  grossly intact, no focal deficits  Skin:  clear, dry, intact  Psych: AOx3, normal affect, no apparent risk to self or others

## 2020-01-16 NOTE — ED PROVIDER NOTE - CLINICAL SUMMARY MEDICAL DECISION MAKING FREE TEXT BOX
pt with increasing back pain and now fever, ED work up shows infection with influenza virus and new T12 compression fracture 2/2 to fall 1 week ago.  pt reporting generalized weakness and inability to take care of herself as she lives alone.

## 2020-01-16 NOTE — H&P ADULT - PROBLEM SELECTOR PLAN 4
Chronic, stable  - Continue home Toprol XL 25mg daily  - Continue home Losartan 25mg daily  - Monitor routine hemodynamics

## 2020-01-16 NOTE — ED ADULT NURSE NOTE - OBJECTIVE STATEMENT
Pt. received alert and oriented x3 with chief complaint of generalized weakness w/ bilateral lower back pain for last two days.

## 2020-01-16 NOTE — H&P ADULT - NSHPOUTPATIENTPROVIDERS_GEN_ALL_CORE
PCP: Dr. Man  Cardio: Dr. Harmon  Heme/Onc: Dr. Temple  Urology: Dr. Fernandez PCP: Dr. Man  Cardio: Dr. Harmon  Heme/Onc: Dr. Temple  Urology: Dr. Fernandez  Oral Surgeon: Dr. Renee

## 2020-01-16 NOTE — H&P ADULT - PROBLEM SELECTOR PLAN 1
Found flu A positive in ED, +sick contacts  - Admit to [facility]  - Tamiflu 75mg BID for total of 5 days  - Duonebs Q6H standing

## 2020-01-16 NOTE — H&P ADULT - NSICDXPASTSURGICALHX_GEN_ALL_CORE_FT
PAST SURGICAL HISTORY:  After cataract, bilateral 2010    Elective surgery Cystoscopy, right ureteroscopy, laser lithotripsy of right ureteral stone, right ureteral stent removal and replacement, right retrograde pyelogram on 12/6/19    h/o  endometrial ablation 1998    H/O aortic valve repair 2014    H/O cone biopsy of cervix 1974    H/O dilation and curettage     H/O mitral valve repair 2014    History of coronary angiogram 1/31/12    History of tonsillectomy childhood    S/P ureteral stent placement 2015, Dr. Escobar PAST SURGICAL HISTORY:  After cataract, bilateral 2010    Elective surgery Cystoscopy, right ureteroscopy, laser lithotripsy of right ureteral stone, right ureteral stent removal and replacement, right retrograde pyelogram on 12/6/19    h/o  endometrial ablation 1998    H/O aortic valve repair 2014    H/O cone biopsy of cervix 1974    H/O dilation and curettage     H/O mitral valve repair 2014    History of coronary angiogram 1/31/12    History of tonsillectomy childhood    S/P ureteral stent placement R in 12/2020

## 2020-01-16 NOTE — CONSULT NOTE ADULT - ASSESSMENT
A/P: 80y F w/ T12 VCF       Patient reports of moderate Low back pain 2/2 to injury that occurred while taking out the trash 2-3 days ago. Pt is able to ambulate.   -No red flag symptoms at this time  -Rec Pain management, limit narcotics   -Nsaids, Steroids   -Will consider intranasal calcitonin  -Osteoporotic Work up as outpatient  -WBAT BL LE   -no further ortho intervention at this time  -Ortho stable   -Will d/w attending and advise if plan changes.

## 2020-01-16 NOTE — ED PROVIDER NOTE - OBJECTIVE STATEMENT
pt reports fever and low back pain last night, pt has a ureteral stent in the L ureter for recent kidney stone, pt c/o moderate severity low back pain and nausea with one episode of vomiting.  also reports now have URI sx as well, + sick contacts at home.

## 2020-01-16 NOTE — H&P ADULT - NSHPSOCIALHISTORY_GEN_ALL_CORE
Social History/Preventive Medicine:    Marital Status:   Occupation:   Lives with:   Ambulates at home:    Substance Use:  Tobacco Usage:   Alcohol Usage:   Illicit Drug Usage: Social History/Preventive Medicine:    Lives alone  Ambulates at home with cane    Substance Use:  Tobacco Usage: quit 34 years ago, smoked for 20 years  Alcohol Usage: Denies  Illicit Drug Usage: Denies

## 2020-01-16 NOTE — H&P ADULT - PROBLEM SELECTOR PLAN 8
Chronic, stable  - Continue home bupropion 150mg in the morning and 75mg in the evening  - Continue home Celexa 40mg daily

## 2020-01-16 NOTE — H&P ADULT - NSHPPHYSICALEXAM_GEN_ALL_CORE
Vital Signs Last 24 Hrs  T(C): 37.5 (16 Jan 2020 14:57), Max: 37.5 (16 Jan 2020 14:57)  T(F): 99.5 (16 Jan 2020 14:57), Max: 99.5 (16 Jan 2020 14:57)  HR: 84 (16 Jan 2020 14:57) (84 - 90)  BP: 129/85 (16 Jan 2020 14:57) (129/85 - 134/80)  RR: 15 (16 Jan 2020 14:57) (14 - 15)  SpO2: 98% (16 Jan 2020 14:57) (97% - 98%)    Physical Exam:  General: Well developed, well nourished, NAD  HEENT: NC/AT, PERRLA, EOMI B/L, moist mucous membranes   Neck: Supple, nontender, no masses  CV: RRR, +S1/S2, no murmurs, rubs or gallops  Respiratory: CTA B/L, No W/R/R  Abdominal: Soft, NT, ND +BSx4  Extremities: No C/C/E, + peripheral pulses  Neurology: AAOx3, nonfocal, CN II-XII grossly intact, sensation intact Vital Signs Last 24 Hrs  T(C): 37.5 (16 Jan 2020 14:57), Max: 37.5 (16 Jan 2020 14:57)  T(F): 99.5 (16 Jan 2020 14:57), Max: 99.5 (16 Jan 2020 14:57)  HR: 84 (16 Jan 2020 14:57) (84 - 90)  BP: 129/85 (16 Jan 2020 14:57) (129/85 - 134/80)  RR: 15 (16 Jan 2020 14:57) (14 - 15)  SpO2: 98% (16 Jan 2020 14:57) (97% - 98%)    Physical Exam:  General: Elderly female, resting comfortably, NAD  HEENT: EOMI B/L, moist mucous membranes, +left sided tongue sore  Neck: Supple, no LAD  CV: RRR, +S1/S2, no murmurs, rubs or gallops  Respiratory: Diffuse bilateral wheezing; moving air well through all lung fields  Abdominal: Soft, NT, ND +BSx4  Extremities: 4/5 strength b/l LEs, sensation intact to light touch, negative babinski, +DP pulses bilaterally; chronic erythematous skin changes on b/l LEs  Neurology: AAOx3, nonfocal

## 2020-01-16 NOTE — ED ADULT NURSE NOTE - NS ED NURSE LEVEL OF CONSCIOUSNESS ORIENTATION
Oriented - self; Oriented - place; Oriented - time Helical Rim Advancement Flap Text: The defect edges were debeveled with a #15 blade scalpel.  Given the location of the defect and the proximity to free margins (helical rim) a double helical rim advancement flap was deemed most appropriate.  Using a sterile surgical marker, the appropriate advancement flaps were drawn incorporating the defect and placing the expected incisions between the helical rim and antihelix where possible.  The area thus outlined was incised through and through with a #15 scalpel blade.  With a skin hook and iris scissors, the flaps were gently and sharply undermined and freed up.

## 2020-01-16 NOTE — H&P ADULT - NSICDXPASTMEDICALHX_GEN_ALL_CORE_FT
PAST MEDICAL HISTORY:  Acid reflux     Anemia Iron infusions every 2 months    Aortic stenosis     Asthma with COPD with exacerbation     Calculus of ureter     Carpal tunnel syndrome     Coronary atherosclerosis of native coronary artery     Depression     Diverticulosis of colon     Dyslipidemia     Emphysema     Former cigarette smoker     Graves disease     Hernia, hiatal     Hypertension     Leukemia (APL, s/p chemo in 2012, now in remission, followed by oncologist)    Mitral regurgitation     MARTINA on CPAP     Osteoporosis     Rotator cuff tear Right    Serum phosphorus decreased Last level 1.0. Supplemented by heme, but non compliant with medication    Spinal stenosis PAST MEDICAL HISTORY:  Acid reflux     Anemia Completed iron infusions every 2 months, now Hb stable    Aortic stenosis     Asthma with COPD with exacerbation Not on home O2    Calculus of ureter s/p R ureteral stent 12/20/2020    Carpal tunnel syndrome     Coronary atherosclerosis of native coronary artery     Depression     Diverticulosis of colon     Dyslipidemia     Emphysema     Former cigarette smoker     Graves disease s/p surgery    Hernia, hiatal     Hypertension     Leukemia (APL, s/p chemo in 2012, now in remission, followed by oncologist)    MARTINA on CPAP Pt admits to be non-compliant    Osteoporosis     Rotator cuff tear Right    Serum phosphorus decreased Last level 1.5 at PST 1/13

## 2020-01-16 NOTE — ED PROVIDER NOTE - PSH
After cataract, bilateral  2010  Elective surgery  Cystoscopy, right ureteroscopy, laser lithotripsy of right ureteral stone, right ureteral stent removal and replacement, right retrograde pyelogram on 12/6/19  h/o  endometrial ablation  1998  H/O aortic valve repair  2014  H/O cone biopsy of cervix  1974  H/O dilation and curettage    H/O mitral valve repair  2014  History of coronary angiogram  1/31/12  History of tonsillectomy  childhood  S/P ureteral stent placement  2015, Dr. Escobar

## 2020-01-16 NOTE — H&P ADULT - ASSESSMENT
Patient is an 79 y/o F with PMHx of HLD, HTN, hypothyroidism, depression, GERD, leukemia (s/p chemo in 2012 now in remission followed by Dr. Temple) who presents c/o fever and low back pain since last night admitted for further management of flu. Patient is an 79 y/o F with PMHx of HLD, HTN, hypothyroidism, depression, GERD, leukemia (s/p chemo in 2012 now in remission followed by Dr. Temple) who presents c/o fever and low back pain since last night admitted for further management of flu and T12 compression fracture Patient is an 79 y/o F with PMHx of HLD, HTN, hypothyroidism, depression, GERD, leukemia (s/p chemo in 2012 now in remission followed by Dr. Temple) who presents c/o fever and low back pain since last night admitted for further management of flu and T12 compression fracture.

## 2020-01-17 ENCOUNTER — TRANSCRIPTION ENCOUNTER (OUTPATIENT)
Age: 80
End: 2020-01-17

## 2020-01-17 LAB
ANION GAP SERPL CALC-SCNC: 6 MMOL/L — SIGNIFICANT CHANGE UP (ref 5–17)
BUN SERPL-MCNC: 12 MG/DL — SIGNIFICANT CHANGE UP (ref 7–23)
CALCIUM SERPL-MCNC: 7.9 MG/DL — LOW (ref 8.5–10.1)
CHLORIDE SERPL-SCNC: 108 MMOL/L — SIGNIFICANT CHANGE UP (ref 96–108)
CO2 SERPL-SCNC: 27 MMOL/L — SIGNIFICANT CHANGE UP (ref 22–31)
CREAT SERPL-MCNC: 0.85 MG/DL — SIGNIFICANT CHANGE UP (ref 0.5–1.3)
CULTURE RESULTS: SIGNIFICANT CHANGE UP
GLUCOSE SERPL-MCNC: 91 MG/DL — SIGNIFICANT CHANGE UP (ref 70–99)
HCT VFR BLD CALC: 40.8 % — SIGNIFICANT CHANGE UP (ref 34.5–45)
HGB BLD-MCNC: 12.6 G/DL — SIGNIFICANT CHANGE UP (ref 11.5–15.5)
MCHC RBC-ENTMCNC: 30.1 PG — SIGNIFICANT CHANGE UP (ref 27–34)
MCHC RBC-ENTMCNC: 30.9 GM/DL — LOW (ref 32–36)
MCV RBC AUTO: 97.6 FL — SIGNIFICANT CHANGE UP (ref 80–100)
NRBC # BLD: 0 /100 WBCS — SIGNIFICANT CHANGE UP (ref 0–0)
PHOSPHATE SERPL-MCNC: 1.6 MG/DL — LOW (ref 2.5–4.5)
PLATELET # BLD AUTO: 157 K/UL — SIGNIFICANT CHANGE UP (ref 150–400)
POTASSIUM SERPL-MCNC: 4.3 MMOL/L — SIGNIFICANT CHANGE UP (ref 3.5–5.3)
POTASSIUM SERPL-SCNC: 4.3 MMOL/L — SIGNIFICANT CHANGE UP (ref 3.5–5.3)
RBC # BLD: 4.18 M/UL — SIGNIFICANT CHANGE UP (ref 3.8–5.2)
RBC # FLD: 17.2 % — HIGH (ref 10.3–14.5)
SODIUM SERPL-SCNC: 141 MMOL/L — SIGNIFICANT CHANGE UP (ref 135–145)
SPECIMEN SOURCE: SIGNIFICANT CHANGE UP
WBC # BLD: 4.38 K/UL — SIGNIFICANT CHANGE UP (ref 3.8–10.5)
WBC # FLD AUTO: 4.38 K/UL — SIGNIFICANT CHANGE UP (ref 3.8–10.5)

## 2020-01-17 PROCEDURE — 72070 X-RAY EXAM THORAC SPINE 2VWS: CPT | Mod: 26

## 2020-01-17 PROCEDURE — 72100 X-RAY EXAM L-S SPINE 2/3 VWS: CPT | Mod: 26

## 2020-01-17 RX ORDER — ACETAMINOPHEN 500 MG
650 TABLET ORAL EVERY 6 HOURS
Refills: 0 | Status: DISCONTINUED | OUTPATIENT
Start: 2020-01-17 | End: 2020-01-21

## 2020-01-17 RX ORDER — SODIUM,POTASSIUM PHOSPHATES 278-250MG
1 POWDER IN PACKET (EA) ORAL ONCE
Refills: 0 | Status: COMPLETED | OUTPATIENT
Start: 2020-01-17 | End: 2020-01-18

## 2020-01-17 RX ORDER — LIDOCAINE 4 G/100G
1 CREAM TOPICAL DAILY
Refills: 0 | Status: DISCONTINUED | OUTPATIENT
Start: 2020-01-17 | End: 2020-01-21

## 2020-01-17 RX ORDER — ENOXAPARIN SODIUM 100 MG/ML
40 INJECTION SUBCUTANEOUS AT BEDTIME
Refills: 0 | Status: DISCONTINUED | OUTPATIENT
Start: 2020-01-17 | End: 2020-01-21

## 2020-01-17 RX ADMIN — Medication 3 MILLILITER(S): at 07:58

## 2020-01-17 RX ADMIN — BUPROPION HYDROCHLORIDE 150 MILLIGRAM(S): 150 TABLET, EXTENDED RELEASE ORAL at 12:54

## 2020-01-17 RX ADMIN — CITALOPRAM 40 MILLIGRAM(S): 10 TABLET, FILM COATED ORAL at 12:54

## 2020-01-17 RX ADMIN — Medication 1000 MILLIGRAM(S): at 19:52

## 2020-01-17 RX ADMIN — Medication 3 MILLILITER(S): at 13:59

## 2020-01-17 RX ADMIN — FAMOTIDINE 40 MILLIGRAM(S): 10 INJECTION INTRAVENOUS at 17:30

## 2020-01-17 RX ADMIN — SIMVASTATIN 40 MILLIGRAM(S): 20 TABLET, FILM COATED ORAL at 22:38

## 2020-01-17 RX ADMIN — Medication 88 MICROGRAM(S): at 06:33

## 2020-01-17 RX ADMIN — ENOXAPARIN SODIUM 40 MILLIGRAM(S): 100 INJECTION SUBCUTANEOUS at 22:38

## 2020-01-17 RX ADMIN — Medication 25 MILLIGRAM(S): at 06:34

## 2020-01-17 RX ADMIN — Medication 75 MILLIGRAM(S): at 17:29

## 2020-01-17 RX ADMIN — FAMOTIDINE 40 MILLIGRAM(S): 10 INJECTION INTRAVENOUS at 06:33

## 2020-01-17 RX ADMIN — Medication 3 MILLILITER(S): at 20:43

## 2020-01-17 RX ADMIN — LIDOCAINE 1 PATCH: 4 CREAM TOPICAL at 12:54

## 2020-01-17 RX ADMIN — LOSARTAN POTASSIUM 25 MILLIGRAM(S): 100 TABLET, FILM COATED ORAL at 06:33

## 2020-01-17 RX ADMIN — BUPROPION HYDROCHLORIDE 75 MILLIGRAM(S): 150 TABLET, EXTENDED RELEASE ORAL at 22:38

## 2020-01-17 RX ADMIN — Medication 75 MILLIGRAM(S): at 06:34

## 2020-01-17 NOTE — PHYSICAL THERAPY INITIAL EVALUATION ADULT - ADDITIONAL COMMENTS
Pt lives alone in an apt, no steps. Pt ambulates independently with SC and is independent with ADLs. Pt has RW at home but does not use.

## 2020-01-17 NOTE — DISCHARGE NOTE PROVIDER - CARE PROVIDERS DIRECT ADDRESSES
[email or web address]

## 2020-01-17 NOTE — PROGRESS NOTE ADULT - PROBLEM SELECTOR PLAN 6
MARTINA on CPAP, patient admits to being non-compliant with CPAP at home MARTINA on CPAP, patient admits to being non-compliant with CPAP at home  - Will recommend outpatient follow up

## 2020-01-17 NOTE — DISCHARGE NOTE PROVIDER - CARE PROVIDER_API CALL
Sammy Man)  Internal Medicine  [address] [address], [address]  [place] [address]  Phone: [phone]  Fax: [phone]  Follow Up Time: 1 week    Kel Harmon)  Cardiovascular Disease; Internal Medicine  [address] [place] [address]  Phone: [phone]  Fax: [phone]  Follow Up Time: 1-3 days Sammy Man)  Internal Medicine  [address] [facility], [address]  [place] [address]  Phone: [phone]  Fax: [phone]  Follow Up Time: 1-3 days    Kel Harmon)  Cardiovascular Disease; Internal Medicine  [address] [place] [address]  Phone: [phone]  Fax: [phone]  Follow Up Time: 1-3 days    Franklin Spann)  Orthopaedic Surgery  [address] [facility], [address] [place] [address]  Phone: [phone]  Fax: [phone]  Follow Up Time: 1 week

## 2020-01-17 NOTE — PROGRESS NOTE ADULT - SUBJECTIVE AND OBJECTIVE BOX
Patient seen and examined at bedside. Pain well controlled. Denies nausea/vomiting.    adhesives (Rash; Other)  Cat dander- wheezing, itchy throat, SOB (Other)  penicillin (Rash)  sulfa drugs (Rash)  tetracycline (Stomach Upset)      Vital Signs Last 24 Hrs  T(C): 37.3 (17 Jan 2020 05:57), Max: 37.5 (16 Jan 2020 14:57)  T(F): 99.2 (17 Jan 2020 05:57), Max: 99.5 (16 Jan 2020 14:57)  HR: 71 (17 Jan 2020 05:57) (71 - 90)  BP: 164/83 (17 Jan 2020 05:57) (129/85 - 164/83)  BP(mean): --  RR: 18 (17 Jan 2020 05:57) (14 - 18)  SpO2: 97% (17 Jan 2020 05:57) (97% - 100%)    I&O's Detail    16 Jan 2020 07:01  -  17 Jan 2020 06:28  --------------------------------------------------------  IN:    Oral Fluid: 200 mL  Total IN: 200 mL    OUT:  Total OUT: 0 mL    Total NET: 200 mL          PE:   Gen: NAD  Spine:   +TA/EHL/FHL/GSc  SILT L3-S1  +AIN/PIN/U  SILT C4-T1  DP 2+  Compartments soft  No calf ttp    A/P: 80yFemale with T12 VCF    -FU AM labs  -Pain control  -PT/OT: WBAT  -DVT ppx- SCDs  -Dispo planning  -No acute orthopaedic surgical intervention at this time, please re-consult as needed  -Ortho Stable for DC  -Case discussed with attending who agrees with above plan

## 2020-01-17 NOTE — DISCHARGE NOTE PROVIDER - NSDCFUADDINST_GEN_ALL_CORE_FT
Follow up with your cardiologist Dr Harmon at scheduled appointment today (1/21/20) and with your primary care provider Dr Man within 1 week of discharge. Follow up with your cardiologist Dr Harmon at scheduled appointment today (1/21/20), with your primary care provider Dr Man within 1-3 days for repeat labwork (BMP, phosphorus) and with orthopedic Dr Spann within 1 week of discharge.

## 2020-01-17 NOTE — DISCHARGE NOTE PROVIDER - PROVIDER TOKENS
PROVIDER:[TOKEN:[[id number]],FOLLOWUP:[1 week]],PROVIDER:[TOKEN:[[id number]],FOLLOWUP:[1-3 days]] PROVIDER:[TOKEN:[[id number]],FOLLOWUP:[1-3 days]],PROVIDER:[TOKEN:[[id number]],FOLLOWUP:[1-3 days]],PROVIDER:[TOKEN:[[id number]],FOLLOWUP:[1 week]]

## 2020-01-17 NOTE — PROGRESS NOTE ADULT - PROBLEM SELECTOR PLAN 1
Found flu A positive in ED, +sick contacts  - Continue Tamiflu 75mg BID for total of 5 days  - Duonebs Q6H standing, wheezing improving  - Febrile this morning to 100.4F, decreased to 99.2F s/p tylenol  - Continue tylenol 650mg PO PRN for fever  - Trend fever curve Found flu A positive in ED, +sick contacts  - Continue Tamiflu 75mg BID for total of 5 days  - Duonebs Q6H standing, wheezing improving  - Febrile this morning to 100.4F, decreased to 99.2F s/p Tylenol  - Continue Tylenol 650mg PO PRN for fever  - Trend fever curve

## 2020-01-17 NOTE — DISCHARGE NOTE PROVIDER - NSDCCPCAREPLAN_GEN_ALL_CORE_FT
PRINCIPAL DISCHARGE DIAGNOSIS  Diagnosis: Influenza  Assessment and Plan of Treatment: You were diagnosed with Influenza A and treated with tamiflu.      SECONDARY DISCHARGE DIAGNOSES  Diagnosis: Hypertension  Assessment and Plan of Treatment: Continue your home metoprolol succinate and losartan    Diagnosis: Chronic GERD  Assessment and Plan of Treatment: Continue home pepcid    Diagnosis: Compression fracture of T12 vertebra, initial encounter  Assessment and Plan of Treatment: You were found to have a compression fracture of your T12 vetebra on xray and cat scan. An orthopedic surgeon evaluated you and no surgery was recommended. Physical therapy evaluated you and recommended ______. Follow up with orthopedic surgeon Dr. Spann within one week.    Diagnosis: Hypothyroidism  Assessment and Plan of Treatment: Continue your home levothyroxine.    Diagnosis: Serum phosphorus decreased  Assessment and Plan of Treatment: You had low phosphorus levels which were supplemented. Continue taking ______    Diagnosis: Dyslipidemia  Assessment and Plan of Treatment: Continue your home simvastatin    Diagnosis: Depression  Assessment and Plan of Treatment: Continue your home bupropion and citalopram. PRINCIPAL DISCHARGE DIAGNOSIS  Diagnosis: Influenza  Assessment and Plan of Treatment: You were diagnosed with Influenza A and treated with Tamiflu.      SECONDARY DISCHARGE DIAGNOSES  Diagnosis: Hypertension  Assessment and Plan of Treatment: Continue your home metoprolol succinate and losartan    Diagnosis: Chronic GERD  Assessment and Plan of Treatment: Continue home pepcid    Diagnosis: Dyslipidemia  Assessment and Plan of Treatment: Continue your home simvastatin    Diagnosis: Serum phosphorus decreased  Assessment and Plan of Treatment: You had low phosphorus levels which were supplemented. Continue taking ______    Diagnosis: Hypothyroidism  Assessment and Plan of Treatment: Continue your home levothyroxine.    Diagnosis: Depression  Assessment and Plan of Treatment: Continue your home bupropion and citalopram.    Diagnosis: Compression fracture of T12 vertebra, initial encounter  Assessment and Plan of Treatment: You were found to have a compression fracture of your T12 vetebra on xray and cat scan. An orthopedic surgeon evaluated you and no surgery was recommended. Physical therapy evaluated you and recommended ______. Follow up with orthopedic surgeon Dr. Spann within one week. PRINCIPAL DISCHARGE DIAGNOSIS  Diagnosis: Influenza  Assessment and Plan of Treatment: You were diagnosed with Influenza A and treated with Tamiflu.      SECONDARY DISCHARGE DIAGNOSES  Diagnosis: Compression fracture of T12 vertebra, initial encounter  Assessment and Plan of Treatment: - You were found to have a compression fracture of your T12 vetebra on xray and cat scan. An orthopedic surgeon evaluated you and no surgery was recommended.   - Physical therapy evaluated you and recommended discharge home with home PT.  - Follow up with orthopedic surgeon Dr. Spann within one week.    Diagnosis: Serum phosphorus decreased  Assessment and Plan of Treatment: - You had low phosphorus levels which were supplemented.   - Have repeat labwork (BMP, phosphorus level) checked in 1-3 days with your primary care provider Dr Man.   - Follow up with Dr Man regarding in 1-3 days regarding continuing the phosphorus packets that were prescribed for you before your hospitalization.    Diagnosis: Hypertension  Assessment and Plan of Treatment: Continue your home metoprolol succinate and losartan    Diagnosis: Chronic GERD  Assessment and Plan of Treatment: Continue home pepcid    Diagnosis: Dyslipidemia  Assessment and Plan of Treatment: Continue your home simvastatin    Diagnosis: Hypothyroidism  Assessment and Plan of Treatment: Continue your home levothyroxine.    Diagnosis: Depression  Assessment and Plan of Treatment: Continue your home bupropion and citalopram.

## 2020-01-17 NOTE — PROGRESS NOTE ADULT - PROBLEM SELECTOR PLAN 9
DVT ppx with lovenox 40mg daily          RISK                                                          Points  [  ] Previous VTE                                                3  [  ] Thrombophilia                                             2  [  ] Lower limb paralysis                                   2        (unable to hold up >15 seconds)    [  ] Current Cancer                                             2         (within 6 months)  [  ] Immobilization > 24 hrs                              1  [  ] ICU/CCU stay > 24 hours                             1  [ x ] Age > 60                                                         1    IMPROVE VTE Score: 1 DVT ppx with Lovenox 40mg daily          RISK                                                          Points  [  ] Previous VTE                                                3  [  ] Thrombophilia                                             2  [  ] Lower limb paralysis                                   2        (unable to hold up >15 seconds)    [  ] Current Cancer                                             2         (within 6 months)  [  ] Immobilization > 24 hrs                              1  [  ] ICU/CCU stay > 24 hours                             1  [ x ] Age > 60                                                         1    IMPROVE VTE Score: 1

## 2020-01-17 NOTE — DISCHARGE NOTE PROVIDER - HOSPITAL COURSE
Patient is a 80y old  Female who presents with a chief complaint of Flu (17 Jan 2020 10:36)            FROM ADMISSION H+P:     HPI:    Patient is an 79 y/o F with PMHx of HLD, HTN, Graves s/p thyroid surgery, depression, GERD, leukemia (s/p chemo in 2012 now in remission followed by Dr. Temple) with residual chronic anemia requiring iron infusions which have now been discontinued due to stable Hb, MVR and AVR in 2011, COPD (not on home O2), MARTINA on CPAP (patient admits to being non-compliant with use), hx of R ureteral stone s/p stent in 9/2019 revised in 12/2019, who presents c/o fever and chills since last night. States she took her temperature and it was 99.5 orally at maximum. Endorses two episodes of NBNB vomiting yesterday with associated nausea. No further episodes of n/v today, admits to decreased appetite and decreased oral intake. Endorses associated cough since yesterday, productive, not sure of color of sputum. Admits to some difficulty breathing and hearing herself wheezing. Admits to associated generalized body aches. Positive sick contacts with multiple sick family members. Patient states she's also had low back pain which started a few weeks ago when she "pulled her back out" as a friend was helping pull her out of her car. States this past Sunday she pulled her back again while moving a garbage can. Pain is located around her waistline, sharp, non-radiating, worse with movement or a deep breath. Patient states tylenol helps the pain. Denies any neurologic symptoms or red flag signs. Of note, patient was in PST on 1/13/2020 for future removal of R ureteral stent on 1/23/2020 with Dr. Fernandez. Stent was placed on 9/2019 and replaced on 12/2019. Patient has had grossly bloody urine and urinary frequency since 12/2019 when stent replaced. Admits to left sided tongue sore which is being followed by her oral surgeon Dr. Renee, to be biopsied in follow up. Denies headache, rhinorrhea, sore throat, chest pain, palpitations, abdominal pain, diarrhea. Admits to some chronic leg swelling.        In ED,    VS: afebrile, HR 90, /80, RR14, SpO2 97% RA    CBC and CMP unremarkable, lactate WNL    UA showing red urine, mod LE, large blood, 11-25 WBCs, >50 RBCs, few bacteria    Flu A positive    Urine Culture from 1/13/2020 showing normal urogenital bojrn    CT A/P: Status post right ureteral stent. No obstructive urolithiasis. Mild right hydroureteronephrosis decreased compared to prior. Bilateral nonobstructive nephrolithiasis. Mild age-indeterminate superior endplate compression deformity at T12, new from prior study.    CXR: Heart surgery, hiatal hernia, and retrocardiac scar again noted.    XRay KUB: Unchanged from 12/4/2019        S/p Tamiflu x1, rocephin 1000mg x1, 1550 mL NS bolus x1, 650mg tylenol PO x1 (16 Jan 2020 16:28)            ----    HOSPITAL COURSE: Patient was admitted to general medical floor for further management of influenza A and T12 compression fracture. Patient was continued on tamiflu and nebulizer treatments for management of influenza. Orthopedics was consulted and no acute surgical intervention was recommended. Patient was started on nasal calcitonin daily for management of osteoporosis related to fracture. XRays of the thoracic and lumbar spine were performed and showed a mild, age indeterminate compression fracture deformity T12 vertebral body , a chronic appearing fracture deformity left parasymphyseal pubic ramus, thoracic kyphosis, and multilevel degenerative change of the thoracic spine. Back pain was managed with as needed oral pain medications and a lidocaine patch. Patient noted to have a fever and was given tylenol. Phosphorus was noted to be low and was supplemented as needed. Patient remained hemodynamically stable throughout her hospital course. Physical therapy evaluated patient and recommended ______. Patient was medically optimized and seen and examined on day of discharge.        ----    DAY OF DISCHARGE VITALS:        PHYSICAL EXAM DAY OF DISCHARGE:        ----    CONSULTANTS:    Ortho Dr. Spann HPI:    Patient is an 79 y/o F with PMHx of HLD, HTN, Graves s/p thyroid surgery, depression, GERD, leukemia (s/p chemo in 2012 now in remission followed by Dr. Temple) with residual chronic anemia requiring iron infusions which have now been discontinued due to stable Hb, MVR and AVR in 2011, COPD (not on home O2), MARTINA on CPAP (patient admits to being non-compliant with use), hx of R ureteral stone s/p stent in 9/2019 revised in 12/2019, who presents c/o fever and chills since last night. States she took her temperature and it was 99.5 orally at maximum. Endorses two episodes of NBNB vomiting yesterday with associated nausea. No further episodes of n/v today, admits to decreased appetite and decreased oral intake. Endorses associated cough since yesterday, productive, not sure of color of sputum. Admits to some difficulty breathing and hearing herself wheezing. Admits to associated generalized body aches. Positive sick contacts with multiple sick family members. Patient states she's also had low back pain which started a few weeks ago when she "pulled her back out" as a friend was helping pull her out of her car. States this past Sunday she pulled her back again while moving a garbage can. Pain is located around her waistline, sharp, non-radiating, worse with movement or a deep breath. Patient states tylenol helps the pain. Denies any neurologic symptoms or red flag signs. Of note, patient was in PST on 1/13/2020 for future removal of R ureteral stent on 1/23/2020 with Dr. Fernandez. Stent was placed on 9/2019 and replaced on 12/2019. Patient has had grossly bloody urine and urinary frequency since 12/2019 when stent replaced. Admits to left sided tongue sore which is being followed by her oral surgeon Dr. Renee, to be biopsied in follow up. Denies headache, rhinorrhea, sore throat, chest pain, palpitations, abdominal pain, diarrhea. Admits to some chronic leg swelling.        In ED,    VS: afebrile, HR 90, /80, RR14, SpO2 97% RA    CBC and CMP unremarkable, lactate WNL    UA showing red urine, mod LE, large blood, 11-25 WBCs, >50 RBCs, few bacteria    Flu A positive    Urine Culture from 1/13/2020 showing normal urogenital bjorn    CT A/P: Status post right ureteral stent. No obstructive urolithiasis. Mild right hydroureteronephrosis decreased compared to prior. Bilateral nonobstructive nephrolithiasis. Mild age-indeterminate superior endplate compression deformity at T12, new from prior study.    CXR: Heart surgery, hiatal hernia, and retrocardiac scar again noted.    XRay KUB: Unchanged from 12/4/2019        S/p Tamiflu x1, rocephin 1000mg x1, 1550 mL NS bolus x1, 650mg tylenol PO x1 (16 Jan 2020 16:28)            ----    HOSPITAL COURSE: Patient was admitted to general medical floor for further management of influenza A and T12 compression fracture. Patient was continued on tamiflu and nebulizer treatments for management of influenza. Orthopedics was consulted and no acute surgical intervention was recommended. Patient was started on nasal calcitonin daily for management of osteoporosis related to fracture. XRays of the thoracic and lumbar spine were performed and showed a mild, age indeterminate compression fracture deformity T12 vertebral body , a chronic appearing fracture deformity left parasymphyseal pubic ramus, thoracic kyphosis, and multilevel degenerative change of the thoracic spine. Back pain was managed with as needed oral pain medications and a lidocaine patch. Patient noted to have a fever and was given tylenol. Phosphorus was noted to be low and was supplemented as needed. Patient remained hemodynamically stable throughout her hospital course. Physical therapy evaluated patient and recommended home with home PT. Patient was medically optimized and seen and examined on day of discharge.        Vital Signs Last 24 Hrs    T(C): 36.6 (21 Jan 2020 05:19), Max: 37.1 (20 Jan 2020 11:21)    T(F): 97.9 (21 Jan 2020 05:19), Max: 98.7 (20 Jan 2020 11:21)    HR: 78 (21 Jan 2020 05:19) (72 - 83)    BP: 128/77 (21 Jan 2020 05:19) (117/75 - 161/81)    BP(mean): --    RR: 18 (21 Jan 2020 05:19) (16 - 18)    SpO2: 91% (21 Jan 2020 05:19) (91% - 96%)        ----    DAY OF DISCHARGE VITALS:        PHYSICAL EXAM DAY OF DISCHARGE:        ----    CONSULTANTS:    Ortho Dr. Spann HPI:    Patient is an 79 y/o F with PMHx of HLD, HTN, Graves s/p thyroid surgery, depression, GERD, leukemia (s/p chemo in 2012 now in remission followed by Dr. Temple) with residual chronic anemia requiring iron infusions which have now been discontinued due to stable Hb, MVR and AVR in 2011, COPD (not on home O2), MARTINA on CPAP (patient admits to being non-compliant with use), hx of R ureteral stone s/p stent in 9/2019 revised in 12/2019, who presents c/o fever and chills since last night. States she took her temperature and it was 99.5 orally at maximum. Endorses two episodes of NBNB vomiting yesterday with associated nausea. No further episodes of n/v today, admits to decreased appetite and decreased oral intake. Endorses associated cough since yesterday, productive, not sure of color of sputum. Admits to some difficulty breathing and hearing herself wheezing. Admits to associated generalized body aches. Positive sick contacts with multiple sick family members. Patient states she's also had low back pain which started a few weeks ago when she "pulled her back out" as a friend was helping pull her out of her car. States this past Sunday she pulled her back again while moving a garbage can. Pain is located around her waistline, sharp, non-radiating, worse with movement or a deep breath. Patient states tylenol helps the pain. Denies any neurologic symptoms or red flag signs. Of note, patient was in PST on 1/13/2020 for future removal of R ureteral stent on 1/23/2020 with Dr. Fernandez. Stent was placed on 9/2019 and replaced on 12/2019. Patient has had grossly bloody urine and urinary frequency since 12/2019 when stent replaced. Admits to left sided tongue sore which is being followed by her oral surgeon Dr. Renee, to be biopsied in follow up. Denies headache, rhinorrhea, sore throat, chest pain, palpitations, abdominal pain, diarrhea. Admits to some chronic leg swelling.        In ED,    VS: afebrile, HR 90, /80, RR14, SpO2 97% RA    CBC and CMP unremarkable, lactate WNL    UA showing red urine, mod LE, large blood, 11-25 WBCs, >50 RBCs, few bacteria    Flu A positive    Urine Culture from 1/13/2020 showing normal urogenital bjorn    CT A/P: Status post right ureteral stent. No obstructive urolithiasis. Mild right hydroureteronephrosis decreased compared to prior. Bilateral nonobstructive nephrolithiasis. Mild age-indeterminate superior endplate compression deformity at T12, new from prior study.    CXR: Heart surgery, hiatal hernia, and retrocardiac scar again noted.    XRay KUB: Unchanged from 12/4/2019        S/p Tamiflu x1, rocephin 1000mg x1, 1550 mL NS bolus x1, 650mg tylenol PO x1 (16 Jan 2020 16:28)            ----    HOSPITAL COURSE: Patient was admitted to general medical floor for further management of influenza A and T12 compression fracture. Patient was continued on tamiflu and nebulizer treatments for management of influenza. Orthopedics was consulted and no acute surgical intervention was recommended. Patient was started on nasal calcitonin daily for management of osteoporosis related to fracture. XRays of the thoracic and lumbar spine were performed and showed a mild, age indeterminate compression fracture deformity T12 vertebral body , a chronic appearing fracture deformity left parasymphyseal pubic ramus, thoracic kyphosis, and multilevel degenerative change of the thoracic spine. Back pain was managed with as needed oral pain medications and a lidocaine patch. Patient noted to have a fever and was given tylenol. Phosphorus was noted to be low and was supplemented as needed. Patient remained hemodynamically stable throughout her hospital course. Physical therapy evaluated patient and recommended home with home PT. Patient was medically optimized and seen and examined on day of discharge.        ----    DAY OF DISCHARGE VITALS:    Vital Signs Last 24 Hrs    T(C): 36.3 (21 Jan 2020 08:00), Max: 37.1 (20 Jan 2020 11:21)    T(F): 97.4 (21 Jan 2020 08:00), Max: 98.7 (20 Jan 2020 11:21)    HR: 73 (21 Jan 2020 08:00) (73 - 83)    BP: 154/90 (21 Jan 2020 08:00) (117/75 - 156/78)    BP(mean): --    RR: 17 (21 Jan 2020 08:00) (16 - 18)    SpO2: 92% (21 Jan 2020 08:00) (91% - 95%)        PHYSICAL EXAM DAY OF DISCHARGE:    GENERAL: NAD, well-groomed, well-developed    HEAD:  Atraumatic, Normocephalic    EYES: EOMI, PERRLA, conjunctiva and sclera clear    ENMT: No tonsillar erythema, exudates, or enlargement; Moist mucous membranes    NECK: Supple    NERVOUS SYSTEM:  Alert & Oriented X3, Good concentration; Motor Strength 5/5 B/L upper and lower extremities    CHEST/LUNG: Clear to percussion bilaterally; No rales, rhonchi, wheezing, or rubs    HEART: Regular rate and rhythm; No murmurs, rubs, or gallops    ABDOMEN: Soft, Nontender, Nondistended; Bowel sounds present    EXTREMITIES:  2+ Peripheral Pulses, No clubbing, cyanosis, or edema    LYMPH: No lymphadenopathy noted        ----    CONSULTANTS:    Ortho Dr. Spann

## 2020-01-17 NOTE — DISCHARGE NOTE PROVIDER - NSDCMRMEDTOKEN_GEN_ALL_CORE_FT
buPROPion 75 mg oral tablet: 2 tabs in AM, 1 tab at night  CeleXA 40 mg oral tablet: 1 tab(s) orally once a day  levothyroxine 88 mcg (0.088 mg) oral tablet: 1 tab(s) orally once a day  losartan 25 mg oral tablet: 1 tab(s) orally once a day  metoprolol succinate 25 mg oral tablet, extended release: 1 tab(s) orally once a day  Pepcid 40 mg oral tablet: 1 tab(s) orally 2 times a day  simvastatin 40 mg oral tablet: 1 tab(s) orally once a day (at bedtime)

## 2020-01-17 NOTE — DISCHARGE NOTE PROVIDER - NSDCFUSCHEDAPPT_GEN_ALL_CORE_FT
TATIANA RAMIREZ ; 01/21/2020 ; [facility] [address]  TATIANA RAMIREZ ; 01/23/2020 ; [facility] ATTIANA RAMIREZ ; 01/21/2020 ; [facility] [address]  TATIANA RAIMREZ ; 01/23/2020 ; [facility]

## 2020-01-18 LAB
ANION GAP SERPL CALC-SCNC: 7 MMOL/L — SIGNIFICANT CHANGE UP (ref 5–17)
BUN SERPL-MCNC: 15 MG/DL — SIGNIFICANT CHANGE UP (ref 7–23)
CALCIUM SERPL-MCNC: 8.2 MG/DL — LOW (ref 8.5–10.1)
CHLORIDE SERPL-SCNC: 109 MMOL/L — HIGH (ref 96–108)
CO2 SERPL-SCNC: 26 MMOL/L — SIGNIFICANT CHANGE UP (ref 22–31)
CREAT SERPL-MCNC: 0.81 MG/DL — SIGNIFICANT CHANGE UP (ref 0.5–1.3)
GLUCOSE SERPL-MCNC: 88 MG/DL — SIGNIFICANT CHANGE UP (ref 70–99)
HCT VFR BLD CALC: 37.1 % — SIGNIFICANT CHANGE UP (ref 34.5–45)
HGB BLD-MCNC: 11.7 G/DL — SIGNIFICANT CHANGE UP (ref 11.5–15.5)
MCHC RBC-ENTMCNC: 30.3 PG — SIGNIFICANT CHANGE UP (ref 27–34)
MCHC RBC-ENTMCNC: 31.5 GM/DL — LOW (ref 32–36)
MCV RBC AUTO: 96.1 FL — SIGNIFICANT CHANGE UP (ref 80–100)
NRBC # BLD: 0 /100 WBCS — SIGNIFICANT CHANGE UP (ref 0–0)
PHOSPHATE SERPL-MCNC: 1.4 MG/DL — LOW (ref 2.5–4.5)
PLATELET # BLD AUTO: 153 K/UL — SIGNIFICANT CHANGE UP (ref 150–400)
POTASSIUM SERPL-MCNC: 4.2 MMOL/L — SIGNIFICANT CHANGE UP (ref 3.5–5.3)
POTASSIUM SERPL-SCNC: 4.2 MMOL/L — SIGNIFICANT CHANGE UP (ref 3.5–5.3)
RBC # BLD: 3.86 M/UL — SIGNIFICANT CHANGE UP (ref 3.8–5.2)
RBC # FLD: 16.8 % — HIGH (ref 10.3–14.5)
SODIUM SERPL-SCNC: 142 MMOL/L — SIGNIFICANT CHANGE UP (ref 135–145)
WBC # BLD: 3.3 K/UL — LOW (ref 3.8–10.5)
WBC # FLD AUTO: 3.3 K/UL — LOW (ref 3.8–10.5)

## 2020-01-18 RX ORDER — LIDOCAINE 4 G/100G
5 CREAM TOPICAL THREE TIMES A DAY
Refills: 0 | Status: DISCONTINUED | OUTPATIENT
Start: 2020-01-18 | End: 2020-01-21

## 2020-01-18 RX ADMIN — Medication 3 MILLILITER(S): at 07:37

## 2020-01-18 RX ADMIN — Medication 75 MILLIGRAM(S): at 06:11

## 2020-01-18 RX ADMIN — Medication 650 MILLIGRAM(S): at 13:57

## 2020-01-18 RX ADMIN — SIMVASTATIN 40 MILLIGRAM(S): 20 TABLET, FILM COATED ORAL at 21:10

## 2020-01-18 RX ADMIN — LOSARTAN POTASSIUM 25 MILLIGRAM(S): 100 TABLET, FILM COATED ORAL at 06:10

## 2020-01-18 RX ADMIN — FAMOTIDINE 40 MILLIGRAM(S): 10 INJECTION INTRAVENOUS at 06:10

## 2020-01-18 RX ADMIN — LIDOCAINE 5 MILLILITER(S): 4 CREAM TOPICAL at 21:10

## 2020-01-18 RX ADMIN — BUPROPION HYDROCHLORIDE 75 MILLIGRAM(S): 150 TABLET, EXTENDED RELEASE ORAL at 21:10

## 2020-01-18 RX ADMIN — ENOXAPARIN SODIUM 40 MILLIGRAM(S): 100 INJECTION SUBCUTANEOUS at 21:10

## 2020-01-18 RX ADMIN — BUPROPION HYDROCHLORIDE 150 MILLIGRAM(S): 150 TABLET, EXTENDED RELEASE ORAL at 11:17

## 2020-01-18 RX ADMIN — Medication 3 MILLILITER(S): at 19:54

## 2020-01-18 RX ADMIN — CITALOPRAM 40 MILLIGRAM(S): 10 TABLET, FILM COATED ORAL at 11:17

## 2020-01-18 RX ADMIN — Medication 650 MILLIGRAM(S): at 13:36

## 2020-01-18 RX ADMIN — LIDOCAINE 5 MILLILITER(S): 4 CREAM TOPICAL at 13:58

## 2020-01-18 RX ADMIN — Medication 88 MICROGRAM(S): at 06:10

## 2020-01-18 RX ADMIN — Medication 75 MILLIGRAM(S): at 17:42

## 2020-01-18 RX ADMIN — Medication 25 MILLIGRAM(S): at 06:10

## 2020-01-18 RX ADMIN — FAMOTIDINE 40 MILLIGRAM(S): 10 INJECTION INTRAVENOUS at 17:43

## 2020-01-18 RX ADMIN — Medication 1 PACKET(S): at 06:10

## 2020-01-18 RX ADMIN — Medication 3 MILLILITER(S): at 13:27

## 2020-01-18 RX ADMIN — CALCITONIN SALMON 1 SPRAY(S): 200 INJECTION, SOLUTION INTRAMUSCULAR at 13:35

## 2020-01-18 NOTE — PROGRESS NOTE ADULT - PROBLEM SELECTOR PLAN 6
MARTINA on CPAP, patient admits to being non-compliant with CPAP at home  - Will recommend outpatient follow up

## 2020-01-18 NOTE — PROGRESS NOTE ADULT - PROBLEM SELECTOR PLAN 1
Found flu A positive in ED, +sick contacts  - Continue Tamiflu 75mg BID for total of 5 days  - Duonebs Q6H standing, wheezing improving  - Febrile this morning to 100.4F, decreased to 99.2F s/p Tylenol  - Continue Tylenol 650mg PO PRN for fever  - Trend fever curve

## 2020-01-18 NOTE — PROGRESS NOTE ADULT - SUBJECTIVE AND OBJECTIVE BOX
Patient is a 80y old  Female who presents with a chief complaint of Flu (2020 14:41)        INTERVAL HPI/OVERNIGHT EVENTS:  T(C): 36.8 (20 @ 07:40), Max: 37.9 (20 @ 19:42)  HR: 72 (20 @ 07:40) (72 - 84)  BP: 142/85 (20 @ 07:40) (109/70 - 145/70)  RR: 17 (20 @ 07:40) (16 - 17)  SpO2: 97% (20 @ 07:40) (91% - 97%)  Wt(kg): --  I&O's Summary      LABS:                        11.7   3.30  )-----------( 153      ( 2020 06:34 )             37.1         142  |  109<H>  |  15  ----------------------------<  88  4.2   |  26  |  0.81    Ca    8.2<L>      2020 06:34  Phos  1.4         TPro  7.1  /  Alb  3.3  /  TBili  0.5  /  DBili  x   /  AST  20  /  ALT  17  /  AlkPhos  117  01-16    PT/INR - ( 2020 13:04 )   PT: 13.0 sec;   INR: 1.14 ratio         PTT - ( 2020 13:04 )  PTT:26.7 sec  Urinalysis Basic - ( 2020 13:04 )    Color: Red / Appearance: Turbid / S.010 / pH: x  Gluc: x / Ketone: Trace  / Bili: Negative / Urobili: Negative   Blood: x / Protein: 100 / Nitrite: Negative   Leuk Esterase: Moderate / RBC: >50 /HPF / WBC 11-25   Sq Epi: x / Non Sq Epi: Occasional / Bacteria: Few      CAPILLARY BLOOD GLUCOSE            Urinalysis Basic - ( 2020 13:04 )    Color: Red / Appearance: Turbid / S.010 / pH: x  Gluc: x / Ketone: Trace  / Bili: Negative / Urobili: Negative   Blood: x / Protein: 100 / Nitrite: Negative   Leuk Esterase: Moderate / RBC: >50 /HPF / WBC 11-25   Sq Epi: x / Non Sq Epi: Occasional / Bacteria: Few        MEDICATIONS  (STANDING):  albuterol/ipratropium for Nebulization 3 milliLiter(s) Nebulizer every 6 hours  buPROPion . 150 milliGRAM(s) Oral <User Schedule>  buPROPion . 75 milliGRAM(s) Oral at bedtime  calcitonin Nasal 1 Spray(s) Nasal daily  citalopram 40 milliGRAM(s) Oral daily  enoxaparin Injectable 40 milliGRAM(s) SubCutaneous at bedtime  famotidine    Tablet 40 milliGRAM(s) Oral two times a day  levothyroxine 88 MICROGram(s) Oral daily  lidocaine   Patch 1 Patch Transdermal daily  lidocaine 2% Viscous 5 milliLiter(s) Swish and Spit three times a day  losartan 25 milliGRAM(s) Oral daily  metoprolol succinate ER 25 milliGRAM(s) Oral daily  oseltamivir 75 milliGRAM(s) Oral two times a day  simvastatin 40 milliGRAM(s) Oral at bedtime    MEDICATIONS  (PRN):  acetaminophen   Tablet .. 650 milliGRAM(s) Oral every 4 hours PRN Mild Pain (1 - 3)  acetaminophen   Tablet .. 1000 milliGRAM(s) Oral every 6 hours PRN Moderate Pain (4 - 6)  acetaminophen   Tablet .. 650 milliGRAM(s) Oral every 6 hours PRN Temp greater or equal to 38C (100.4F)  ketorolac   Injectable 30 milliGRAM(s) IV Push every 6 hours PRN Severe Pain (7 - 10)      REVIEW OF SYSTEMS:  CONSTITUTIONAL: No fever, weight loss, or fatigue  EYES: No eye pain, visual disturbances, or discharge  ENMT:  No difficulty hearing, tinnitus, vertigo; No sinus or throat pain  NECK: No pain or stiffness  RESPIRATORY: No cough, wheezing, chills or hemoptysis; No shortness of breath  CARDIOVASCULAR: No chest pain, palpitations, dizziness, or leg swelling  GASTROINTESTINAL: No abdominal or epigastric pain. No nausea, vomiting, or hematemesis; No diarrhea or constipation. No melena or hematochezia.  GENITOURINARY: No dysuria, frequency, hematuria, or incontinence  NEUROLOGICAL: No headaches, memory loss, loss of strength, numbness, or tremors  SKIN: No itching, burning, rashes, or lesions   LYMPH NODES: No enlarged glands  ENDOCRINE: No heat or cold intolerance; No hair loss  MUSCULOSKELETAL:mid back pain  PSYCHIATRIC: No depression, anxiety, mood swings, or difficulty sleeping  HEME/LYMPH: No easy bruising, or bleeding gums  ALLERY AND IMMUNOLOGIC: No hives or eczema    RADIOLOGY & ADDITIONAL TESTS:    Imaging Personally Reviewed:  [ ] YES  [ ] NO    Consultant(s) Notes Reviewed:  [ ] YES  [ ] NO    PHYSICAL EXAM:  GENERAL: NAD, well-groomed, well-developed  HEAD:  Atraumatic, Normocephalic  EYES: EOMI, PERRLA, conjunctiva and sclera clear  ENMT: No tonsillar erythema, exudates, or enlargement; Moist mucous membranes, Good dentition, No lesions  NECK: Supple, No JVD, Normal thyroid  NERVOUS SYSTEM:  Alert & Oriented X3, Good concentration; Motor Strength 5/5 B/L upper and lower extremities; DTRs 2+ intact and symmetric  CHEST/LUNG: Clear to percussion bilaterally; No rales, rhonchi, wheezing, or rubs  HEART: Regular rate and rhythm; No murmurs, rubs, or gallops  ABDOMEN: Soft, Nontender, Nondistended; Bowel sounds present  EXTREMITIES:  2+ Peripheral Pulses, No clubbing, cyanosis, or edema  LYMPH: No lymphadenopathy noted  SKIN: No rashes or lesions    Care Discussed with Consultants/Other Providers [ ] YES  [ ] NO

## 2020-01-19 RX ORDER — POLYETHYLENE GLYCOL 3350 17 G/17G
17 POWDER, FOR SOLUTION ORAL DAILY
Refills: 0 | Status: DISCONTINUED | OUTPATIENT
Start: 2020-01-19 | End: 2020-01-21

## 2020-01-19 RX ORDER — SENNA PLUS 8.6 MG/1
2 TABLET ORAL AT BEDTIME
Refills: 0 | Status: DISCONTINUED | OUTPATIENT
Start: 2020-01-19 | End: 2020-01-21

## 2020-01-19 RX ORDER — POLYETHYLENE GLYCOL 3350 17 G/17G
17 POWDER, FOR SOLUTION ORAL ONCE
Refills: 0 | Status: COMPLETED | OUTPATIENT
Start: 2020-01-19 | End: 2020-01-19

## 2020-01-19 RX ORDER — SODIUM,POTASSIUM PHOSPHATES 278-250MG
1 POWDER IN PACKET (EA) ORAL ONCE
Refills: 0 | Status: COMPLETED | OUTPATIENT
Start: 2020-01-19 | End: 2020-01-19

## 2020-01-19 RX ADMIN — Medication 3 MILLILITER(S): at 21:01

## 2020-01-19 RX ADMIN — Medication 25 MILLIGRAM(S): at 05:01

## 2020-01-19 RX ADMIN — Medication 75 MILLIGRAM(S): at 05:01

## 2020-01-19 RX ADMIN — LIDOCAINE 5 MILLILITER(S): 4 CREAM TOPICAL at 22:40

## 2020-01-19 RX ADMIN — Medication 75 MILLIGRAM(S): at 17:34

## 2020-01-19 RX ADMIN — ENOXAPARIN SODIUM 40 MILLIGRAM(S): 100 INJECTION SUBCUTANEOUS at 22:41

## 2020-01-19 RX ADMIN — POLYETHYLENE GLYCOL 3350 17 GRAM(S): 17 POWDER, FOR SOLUTION ORAL at 14:18

## 2020-01-19 RX ADMIN — FAMOTIDINE 40 MILLIGRAM(S): 10 INJECTION INTRAVENOUS at 17:34

## 2020-01-19 RX ADMIN — LIDOCAINE 5 MILLILITER(S): 4 CREAM TOPICAL at 05:01

## 2020-01-19 RX ADMIN — Medication 3 MILLILITER(S): at 00:53

## 2020-01-19 RX ADMIN — Medication 88 MICROGRAM(S): at 05:01

## 2020-01-19 RX ADMIN — Medication 1000 MILLIGRAM(S): at 03:06

## 2020-01-19 RX ADMIN — LIDOCAINE 1 PATCH: 4 CREAM TOPICAL at 23:08

## 2020-01-19 RX ADMIN — FAMOTIDINE 40 MILLIGRAM(S): 10 INJECTION INTRAVENOUS at 05:01

## 2020-01-19 RX ADMIN — Medication 1 PACKET(S): at 14:18

## 2020-01-19 RX ADMIN — SIMVASTATIN 40 MILLIGRAM(S): 20 TABLET, FILM COATED ORAL at 22:40

## 2020-01-19 RX ADMIN — LOSARTAN POTASSIUM 25 MILLIGRAM(S): 100 TABLET, FILM COATED ORAL at 05:01

## 2020-01-19 RX ADMIN — SENNA PLUS 2 TABLET(S): 8.6 TABLET ORAL at 22:40

## 2020-01-19 RX ADMIN — Medication 1000 MILLIGRAM(S): at 02:36

## 2020-01-19 RX ADMIN — LIDOCAINE 1 PATCH: 4 CREAM TOPICAL at 19:00

## 2020-01-19 RX ADMIN — CALCITONIN SALMON 1 SPRAY(S): 200 INJECTION, SOLUTION INTRAMUSCULAR at 11:09

## 2020-01-19 RX ADMIN — BUPROPION HYDROCHLORIDE 150 MILLIGRAM(S): 150 TABLET, EXTENDED RELEASE ORAL at 11:08

## 2020-01-19 RX ADMIN — CITALOPRAM 40 MILLIGRAM(S): 10 TABLET, FILM COATED ORAL at 11:09

## 2020-01-19 RX ADMIN — Medication 3 MILLILITER(S): at 13:20

## 2020-01-19 RX ADMIN — BUPROPION HYDROCHLORIDE 75 MILLIGRAM(S): 150 TABLET, EXTENDED RELEASE ORAL at 22:41

## 2020-01-19 RX ADMIN — LIDOCAINE 1 PATCH: 4 CREAM TOPICAL at 11:08

## 2020-01-19 NOTE — PROGRESS NOTE ADULT - PROBLEM SELECTOR PLAN 1
Found flu A positive in ED, +sick contacts - feeling improved  - Continue Tamiflu 75mg BID for total of 5 days, continue  - Duonebs Q6H standing, wheezing improving  -Afebrile >24 hours, continue tylenol PRN fever and Trend fever curve

## 2020-01-19 NOTE — PROGRESS NOTE ADULT - SUBJECTIVE AND OBJECTIVE BOX
Patient is a 80y old  Female who presents with a chief complaint of Flu (18 Jan 2020 09:16)      INTERVAL HPI/OVERNIGHT EVENTS: Patient seen and evaluated at the bedside, seen resting comfortably, NAD. Patient reports she is feeling better, tolerating PO, but remarks she has not had a bowel movement "in a few days." She is afebrile, Vs reviewed, stable    T(C): 36.8 (01-19-20 @ 11:07), Max: 37.1 (01-18-20 @ 15:53)  HR: 72 (01-19-20 @ 11:07) (72 - 89)  BP: 145/80 (01-19-20 @ 11:07) (120/77 - 158/87)  RR: 18 (01-19-20 @ 11:07) (18 - 18)  SpO2: 94% (01-19-20 @ 11:07) (92% - 95%)  Wt(kg): --  I&O's Summary    19 Jan 2020 07:01  -  19 Jan 2020 11:09  --------------------------------------------------------  IN: 120 mL / OUT: 0 mL / NET: 120 mL        LABS:                        11.7   3.30  )-----------( 153      ( 18 Jan 2020 06:34 )             37.1     01-18    142  |  109<H>  |  15  ----------------------------<  88  4.2   |  26  |  0.81    Ca    8.2<L>      18 Jan 2020 06:34  Phos  1.4     01-18          CAPILLARY BLOOD GLUCOSE      MEDICATIONS  (STANDING):  albuterol/ipratropium for Nebulization 3 milliLiter(s) Nebulizer every 6 hours  buPROPion . 150 milliGRAM(s) Oral <User Schedule>  buPROPion . 75 milliGRAM(s) Oral at bedtime  calcitonin Nasal 1 Spray(s) Nasal daily  citalopram 40 milliGRAM(s) Oral daily  enoxaparin Injectable 40 milliGRAM(s) SubCutaneous at bedtime  famotidine    Tablet 40 milliGRAM(s) Oral two times a day  levothyroxine 88 MICROGram(s) Oral daily  lidocaine   Patch 1 Patch Transdermal daily  lidocaine 2% Viscous 5 milliLiter(s) Swish and Spit three times a day  losartan 25 milliGRAM(s) Oral daily  metoprolol succinate ER 25 milliGRAM(s) Oral daily  oseltamivir 75 milliGRAM(s) Oral two times a day  polyethylene glycol 3350 17 Gram(s) Oral once  polyethylene glycol 3350 17 Gram(s) Oral daily  potassium phosphate / sodium phosphate powder 1 Packet(s) Oral once  senna 2 Tablet(s) Oral at bedtime  simvastatin 40 milliGRAM(s) Oral at bedtime    MEDICATIONS  (PRN):  acetaminophen   Tablet .. 650 milliGRAM(s) Oral every 4 hours PRN Mild Pain (1 - 3)  acetaminophen   Tablet .. 1000 milliGRAM(s) Oral every 6 hours PRN Moderate Pain (4 - 6)  acetaminophen   Tablet .. 650 milliGRAM(s) Oral every 6 hours PRN Temp greater or equal to 38C (100.4F)  ketorolac   Injectable 30 milliGRAM(s) IV Push every 6 hours PRN Severe Pain (7 - 10)      REVIEW OF SYSTEMS:  CONSTITUTIONAL: No fever, weight loss, or fatigue  EYES: No eye pain, visual disturbances, or discharge  ENMT:  No difficulty hearing, tinnitus, vertigo; No sinus or throat pain  NECK: No pain or stiffness  RESPIRATORY: No cough, wheezing, chills or hemoptysis; No shortness of breath  CARDIOVASCULAR: No chest pain, palpitations, dizziness, or leg swelling  GASTROINTESTINAL: No abdominal or epigastric pain. No nausea, vomiting, or hematemesis; No diarrhea . No melena or hematochezia. Admits to constipation  GENITOURINARY: No dysuria, frequency, hematuria, or incontinence  NEUROLOGICAL: No headaches, memory loss, loss of strength, numbness, or tremors  SKIN: No itching, burning, rashes, or lesions   LYMPH NODES: No enlarged glands  ENDOCRINE: No heat or cold intolerance; No hair loss  MUSCULOSKELETAL: No joint pain or swelling; No muscle, back, or extremity pain  PSYCHIATRIC: No depression, anxiety, mood swings, or difficulty sleeping  HEME/LYMPH: No easy bruising, or bleeding gums  ALLERY AND IMMUNOLOGIC: No hives or eczema    RADIOLOGY & ADDITIONAL TESTS: no new imaging    Imaging Personally Reviewed:  [x ] YES  [ ] NO    Consultant(s) Notes Reviewed:  [x ] YES  [ ] NO    PHYSICAL EXAM:  GENERAL: NAD, well-groomed, well-developed  HEAD:  Atraumatic, Normocephalic  EYES: EOMI, PERRLA, conjunctiva and sclera clear  ENMT: No tonsillar erythema, exudates, or enlargement; Moist mucous membranes, Good dentition, No lesions  NECK: Supple, No JVD, Normal thyroid  NERVOUS SYSTEM:  Alert & Oriented X3, Good concentration;  CHEST/LUNG: Clear to percussion bilaterally; No rales, rhonchi, wheezing, or rubs  HEART: Regular rate and rhythm; No murmurs, rubs, or gallops  ABDOMEN: Soft, Nontender, Nondistended; Bowel sounds present  EXTREMITIES:  2+ Peripheral Pulses, No clubbing, cyanosis, or edema  LYMPH: No lymphadenopathy noted  SKIN: No rashes or lesions    Care Discussed with Consultants/Other Providers [x ] YES  [ ] NO

## 2020-01-20 LAB
ALBUMIN SERPL ELPH-MCNC: 2.9 G/DL — LOW (ref 3.3–5)
ALP SERPL-CCNC: 85 U/L — SIGNIFICANT CHANGE UP (ref 40–120)
ALT FLD-CCNC: 17 U/L — SIGNIFICANT CHANGE UP (ref 12–78)
ANION GAP SERPL CALC-SCNC: 7 MMOL/L — SIGNIFICANT CHANGE UP (ref 5–17)
AST SERPL-CCNC: 21 U/L — SIGNIFICANT CHANGE UP (ref 15–37)
BASOPHILS # BLD AUTO: 0.02 K/UL — SIGNIFICANT CHANGE UP (ref 0–0.2)
BASOPHILS NFR BLD AUTO: 0.5 % — SIGNIFICANT CHANGE UP (ref 0–2)
BILIRUB SERPL-MCNC: 0.3 MG/DL — SIGNIFICANT CHANGE UP (ref 0.2–1.2)
BUN SERPL-MCNC: 13 MG/DL — SIGNIFICANT CHANGE UP (ref 7–23)
CALCIUM SERPL-MCNC: 7.8 MG/DL — LOW (ref 8.5–10.1)
CHLORIDE SERPL-SCNC: 109 MMOL/L — HIGH (ref 96–108)
CO2 SERPL-SCNC: 25 MMOL/L — SIGNIFICANT CHANGE UP (ref 22–31)
CREAT SERPL-MCNC: 0.69 MG/DL — SIGNIFICANT CHANGE UP (ref 0.5–1.3)
EOSINOPHIL # BLD AUTO: 0.26 K/UL — SIGNIFICANT CHANGE UP (ref 0–0.5)
EOSINOPHIL NFR BLD AUTO: 6.5 % — HIGH (ref 0–6)
GLUCOSE SERPL-MCNC: 98 MG/DL — SIGNIFICANT CHANGE UP (ref 70–99)
HCT VFR BLD CALC: 38.1 % — SIGNIFICANT CHANGE UP (ref 34.5–45)
HGB BLD-MCNC: 12.2 G/DL — SIGNIFICANT CHANGE UP (ref 11.5–15.5)
IMM GRANULOCYTES NFR BLD AUTO: 0.3 % — SIGNIFICANT CHANGE UP (ref 0–1.5)
LYMPHOCYTES # BLD AUTO: 1.42 K/UL — SIGNIFICANT CHANGE UP (ref 1–3.3)
LYMPHOCYTES # BLD AUTO: 35.5 % — SIGNIFICANT CHANGE UP (ref 13–44)
MCHC RBC-ENTMCNC: 30.1 PG — SIGNIFICANT CHANGE UP (ref 27–34)
MCHC RBC-ENTMCNC: 32 GM/DL — SIGNIFICANT CHANGE UP (ref 32–36)
MCV RBC AUTO: 94.1 FL — SIGNIFICANT CHANGE UP (ref 80–100)
MONOCYTES # BLD AUTO: 0.45 K/UL — SIGNIFICANT CHANGE UP (ref 0–0.9)
MONOCYTES NFR BLD AUTO: 11.3 % — SIGNIFICANT CHANGE UP (ref 2–14)
NEUTROPHILS # BLD AUTO: 1.84 K/UL — SIGNIFICANT CHANGE UP (ref 1.8–7.4)
NEUTROPHILS NFR BLD AUTO: 45.9 % — SIGNIFICANT CHANGE UP (ref 43–77)
NRBC # BLD: 0 /100 WBCS — SIGNIFICANT CHANGE UP (ref 0–0)
PHOSPHATE SERPL-MCNC: 1.9 MG/DL — LOW (ref 2.5–4.5)
PLATELET # BLD AUTO: 178 K/UL — SIGNIFICANT CHANGE UP (ref 150–400)
POTASSIUM SERPL-MCNC: 3.8 MMOL/L — SIGNIFICANT CHANGE UP (ref 3.5–5.3)
POTASSIUM SERPL-SCNC: 3.8 MMOL/L — SIGNIFICANT CHANGE UP (ref 3.5–5.3)
PROT SERPL-MCNC: 6.4 G/DL — SIGNIFICANT CHANGE UP (ref 6–8.3)
RBC # BLD: 4.05 M/UL — SIGNIFICANT CHANGE UP (ref 3.8–5.2)
RBC # FLD: 16.4 % — HIGH (ref 10.3–14.5)
SODIUM SERPL-SCNC: 141 MMOL/L — SIGNIFICANT CHANGE UP (ref 135–145)
WBC # BLD: 4 K/UL — SIGNIFICANT CHANGE UP (ref 3.8–10.5)
WBC # FLD AUTO: 4 K/UL — SIGNIFICANT CHANGE UP (ref 3.8–10.5)

## 2020-01-20 RX ADMIN — LIDOCAINE 5 MILLILITER(S): 4 CREAM TOPICAL at 11:34

## 2020-01-20 RX ADMIN — Medication 1000 MILLIGRAM(S): at 01:28

## 2020-01-20 RX ADMIN — POLYETHYLENE GLYCOL 3350 17 GRAM(S): 17 POWDER, FOR SOLUTION ORAL at 11:35

## 2020-01-20 RX ADMIN — SIMVASTATIN 40 MILLIGRAM(S): 20 TABLET, FILM COATED ORAL at 21:49

## 2020-01-20 RX ADMIN — FAMOTIDINE 40 MILLIGRAM(S): 10 INJECTION INTRAVENOUS at 05:16

## 2020-01-20 RX ADMIN — BUPROPION HYDROCHLORIDE 75 MILLIGRAM(S): 150 TABLET, EXTENDED RELEASE ORAL at 21:48

## 2020-01-20 RX ADMIN — SENNA PLUS 2 TABLET(S): 8.6 TABLET ORAL at 21:49

## 2020-01-20 RX ADMIN — LIDOCAINE 1 PATCH: 4 CREAM TOPICAL at 23:00

## 2020-01-20 RX ADMIN — Medication 1000 MILLIGRAM(S): at 00:28

## 2020-01-20 RX ADMIN — CITALOPRAM 40 MILLIGRAM(S): 10 TABLET, FILM COATED ORAL at 11:35

## 2020-01-20 RX ADMIN — Medication 3 MILLILITER(S): at 07:40

## 2020-01-20 RX ADMIN — Medication 88 MICROGRAM(S): at 05:16

## 2020-01-20 RX ADMIN — LIDOCAINE 1 PATCH: 4 CREAM TOPICAL at 19:54

## 2020-01-20 RX ADMIN — Medication 3 MILLILITER(S): at 20:26

## 2020-01-20 RX ADMIN — LOSARTAN POTASSIUM 25 MILLIGRAM(S): 100 TABLET, FILM COATED ORAL at 05:16

## 2020-01-20 RX ADMIN — Medication 3 MILLILITER(S): at 14:00

## 2020-01-20 RX ADMIN — Medication 75 MILLIGRAM(S): at 17:42

## 2020-01-20 RX ADMIN — CALCITONIN SALMON 1 SPRAY(S): 200 INJECTION, SOLUTION INTRAMUSCULAR at 11:34

## 2020-01-20 RX ADMIN — LIDOCAINE 5 MILLILITER(S): 4 CREAM TOPICAL at 21:49

## 2020-01-20 RX ADMIN — Medication 75 MILLIGRAM(S): at 05:16

## 2020-01-20 RX ADMIN — LIDOCAINE 5 MILLILITER(S): 4 CREAM TOPICAL at 05:15

## 2020-01-20 RX ADMIN — LIDOCAINE 1 PATCH: 4 CREAM TOPICAL at 11:35

## 2020-01-20 RX ADMIN — ENOXAPARIN SODIUM 40 MILLIGRAM(S): 100 INJECTION SUBCUTANEOUS at 21:49

## 2020-01-20 RX ADMIN — BUPROPION HYDROCHLORIDE 150 MILLIGRAM(S): 150 TABLET, EXTENDED RELEASE ORAL at 09:58

## 2020-01-20 RX ADMIN — Medication 25 MILLIGRAM(S): at 05:16

## 2020-01-20 NOTE — PROGRESS NOTE ADULT - PROBLEM SELECTOR PROBLEM 3
Serum phosphorus decreased

## 2020-01-20 NOTE — PROGRESS NOTE ADULT - ASSESSMENT
Patient is an 79 y/o F with PMHx of HLD, HTN, hypothyroidism, depression, GERD, leukemia (s/p chemo in 2012 now in remission followed by Dr. Temple) who presents c/o fever and low back pain since last night admitted for further management of flu and T12 compression fracture.
Patient is an 81 y/o F with PMHx of HLD, HTN, hypothyroidism, depression, GERD, leukemia (s/p chemo in 2012 now in remission followed by Dr. Temple) who presents c/o fever and low back pain since last night admitted for further management of flu and T12 compression fracture.

## 2020-01-20 NOTE — PROGRESS NOTE ADULT - PROBLEM SELECTOR PROBLEM 2
Compression fracture of T12 vertebra, initial encounter

## 2020-01-20 NOTE — PROGRESS NOTE ADULT - PROBLEM SELECTOR PLAN 2
Low back pain for weeks with T12 compression fracture found on CT A/P  - Patient neurologically intact with no red flag symptoms  - F/u results of lumbar and thoracic Xrays - age indeterminate T12 VCF,  chronic appearing fracture deformity left parasymphyseal pubic ramus.  - Pain control Tylenol 650mg PRN for mild, Tylenol 1000mg PRN for moderate, and Toradol 30mg IV for severe  - Continue lidocaine patch daily  - Continue intranasal calcitonin daily  - Ortho consulted, no acute need for surgical intervention, management of pain and WBAT on B/L LEs  - Physical therapy consulted, will f/u recs
Low back pain for weeks with T12 compression fracture found on CT A/P  - Patient neurologically intact with no red flag symptoms  - F/u results of lumbar and thoracic Xrays - age indeterminate T12 VCF,  chronic appearing fracture deformity left parasymphyseal pubic ramus.  - Pain control Tylenol 650mg PRN for mild, Tylenol 1000mg PRN for moderate, and Toradol 30mg IV for severe  - Continue lidocaine patch daily  - Continue intranasal calcitonin daily  - Ortho consulted, no acute need for surgical intervention, management of pain and WBAT on B/L LEs  - Physical therapy consulted, will f/u recs
Low back pain for weeks with T12 compression fracture found on CT A/P  - Patient neurologically intact with no red flag symptoms  - F/u results of lumbar and thoracic Xrays, performed  - Pain control Tylenol 650mg PRN for mild, Tylenol 1000mg PRN for moderate, and Toradol 30mg IV for severe  - Continue lidocaine patch daily  - Continue intranasal calcitonin daily  - Ortho consulted, no acute need for surgical intervention, management of pain and WBAT on B/L LEs  - Physical therapy consulted, will f/u recs
Low back pain for weeks with T12 compression fracture found on CT A/P  - Patient neurologically intact with no red flag symptoms  - F/u results of lumbar and thoracic Xrays, performed  - Pain control Tylenol 650mg PRN for mild, Tylenol 1000mg PRN for moderate, and Toradol 30mg IV for severe  - Continue lidocaine patch daily  - Continue intranasal calcitonin daily  - Ortho consulted, no acute need for surgical intervention, management of pain and WBAT on B/L LEs  - Physical therapy consulted, will f/u recs

## 2020-01-20 NOTE — PROGRESS NOTE ADULT - PROBLEM SELECTOR PLAN 3
History of hypophosphatemia  - Level 1.4 this morning, supplemented. Will continue to monitor daily and supplement PRN.  - 1.5 at PST on 1/13, patient states she was instructed to take phosphorus "packets" by PCP but wasn't able to pick them up
History of hypophosphatemia  - Level 1.4 this morning, supplemented. Will continue to monitor daily and supplement PRN.  - 1.5 at PST on 1/13, patient states she was instructed to take phosphorus "packets" by PCP but wasn't able to pick them up
History of hypophosphatemia  - Level 1.6 this morning, supplemented. Will continue to monitor daily and supplement PRN.  - 1.5 at PST on 1/13, patient states she was instructed to take phosphorus "packets" by PCP but wasn't able to pick them up
History of hypophosphatemia  - Level 1.6 this morning, supplemented. Will continue to monitor daily and supplement PRN.  - 1.5 at PST on 1/13, patient states she was instructed to take phosphorus "packets" by PCP but wasn't able to pick them up

## 2020-01-20 NOTE — PROGRESS NOTE ADULT - ATTENDING COMMENTS
plan for dc home w home care and home PT in am tomorrow
above appreciated  all consulta appreciated  T12 compression fracture--continue Physical thearpy  contuinue tamiflu for influenza

## 2020-01-20 NOTE — PROGRESS NOTE ADULT - SUBJECTIVE AND OBJECTIVE BOX
Patient is a 80y old  Female who presents with a chief complaint of Flu (19 Jan 2020 11:09)  still w cough, although much improved  still complains of back pain      INTERVAL HPI/OVERNIGHT EVENTS:  T(C): 37.1 (01-20-20 @ 11:21), Max: 37.1 (01-20-20 @ 11:21)  HR: 75 (01-20-20 @ 11:21) (69 - 84)  BP: 133/78 (01-20-20 @ 11:21) (118/80 - 177/92)  RR: 16 (01-20-20 @ 11:21) (16 - 18)  SpO2: 92% (01-20-20 @ 11:21) (91% - 97%)  Wt(kg): --  I&O's Summary    19 Jan 2020 07:01  -  20 Jan 2020 07:00  --------------------------------------------------------  IN: 320 mL / OUT: 0 mL / NET: 320 mL        LABS:                        12.2   4.00  )-----------( 178      ( 20 Jan 2020 06:04 )             38.1     01-20    141  |  109<H>  |  13  ----------------------------<  98  3.8   |  25  |  0.69    Ca    7.8<L>      20 Jan 2020 06:04  Phos  1.9     01-20    TPro  6.4  /  Alb  2.9<L>  /  TBili  0.3  /  DBili  x   /  AST  21  /  ALT  17  /  AlkPhos  85  01-20        CAPILLARY BLOOD GLUCOSE                MEDICATIONS  (STANDING):  albuterol/ipratropium for Nebulization 3 milliLiter(s) Nebulizer every 6 hours  buPROPion . 150 milliGRAM(s) Oral <User Schedule>  buPROPion . 75 milliGRAM(s) Oral at bedtime  calcitonin Nasal 1 Spray(s) Nasal daily  citalopram 40 milliGRAM(s) Oral daily  enoxaparin Injectable 40 milliGRAM(s) SubCutaneous at bedtime  famotidine    Tablet 40 milliGRAM(s) Oral two times a day  levothyroxine 88 MICROGram(s) Oral daily  lidocaine   Patch 1 Patch Transdermal daily  lidocaine 2% Viscous 5 milliLiter(s) Swish and Spit three times a day  losartan 25 milliGRAM(s) Oral daily  metoprolol succinate ER 25 milliGRAM(s) Oral daily  oseltamivir 75 milliGRAM(s) Oral two times a day  polyethylene glycol 3350 17 Gram(s) Oral daily  senna 2 Tablet(s) Oral at bedtime  simvastatin 40 milliGRAM(s) Oral at bedtime    MEDICATIONS  (PRN):  acetaminophen   Tablet .. 650 milliGRAM(s) Oral every 4 hours PRN Mild Pain (1 - 3)  acetaminophen   Tablet .. 1000 milliGRAM(s) Oral every 6 hours PRN Moderate Pain (4 - 6)  acetaminophen   Tablet .. 650 milliGRAM(s) Oral every 6 hours PRN Temp greater or equal to 38C (100.4F)  ketorolac   Injectable 30 milliGRAM(s) IV Push every 6 hours PRN Severe Pain (7 - 10)      REVIEW OF SYSTEMS:  CONSTITUTIONAL: No fever, weight loss, or fatigue  EYES: No eye pain, visual disturbances, or discharge  ENMT:  No difficulty hearing, tinnitus, vertigo; No sinus or throat pain  NECK: No pain or stiffness  RESPIRATORY: No cough, wheezing, chills or hemoptysis; No shortness of breath  CARDIOVASCULAR: No chest pain, palpitations, dizziness, or leg swelling  GASTROINTESTINAL: No abdominal or epigastric pain. No nausea, vomiting, or hematemesis; No diarrhea or constipation. No melena or hematochezia.  GENITOURINARY: No dysuria, frequency, hematuria, or incontinence  NEUROLOGICAL: No headaches, memory loss, loss of strength, numbness, or tremors  SKIN: No itching, burning, rashes, or lesions   LYMPH NODES: No enlarged glands  ENDOCRINE: No heat or cold intolerance; No hair loss  MUSCULOSKELETAL: mid back pain  PSYCHIATRIC: No depression, anxiety, mood swings, or difficulty sleeping  HEME/LYMPH: No easy bruising, or bleeding gums  ALLERY AND IMMUNOLOGIC: No hives or eczema    RADIOLOGY & ADDITIONAL TESTS:    Imaging Personally Reviewed:  [ ] YES  [ ] NO    Consultant(s) Notes Reviewed:  [ ] YES  [ ] NO    PHYSICAL EXAM:  GENERAL: NAD, well-groomed, well-developed  HEAD:  Atraumatic, Normocephalic  EYES: EOMI, PERRLA, conjunctiva and sclera clear  ENMT: No tonsillar erythema, exudates, or enlargement; Moist mucous membranes, Good dentition, No lesions  NECK: Supple, No JVD, Normal thyroid  NERVOUS SYSTEM:  Alert & Oriented X3, Good concentration; Motor Strength 5/5 B/L upper and lower extremities; DTRs 2+ intact and symmetric  CHEST/LUNG: Clear to percussion bilaterally; No rales, rhonchi, wheezing, or rubs  HEART: Regular rate and rhythm; No murmurs, rubs, or gallops  ABDOMEN: Soft, Nontender, Nondistended; Bowel sounds present  EXTREMITIES:  2+ Peripheral Pulses, No clubbing, cyanosis, or edema  LYMPH: No lymphadenopathy noted  SKIN: No rashes or lesions    Care Discussed with Consultants/Other Providers [ ] YES  [ ] NO

## 2020-01-20 NOTE — PROGRESS NOTE ADULT - PROBLEM SELECTOR PLAN 7
Hx of Graves s/p surgery  - Continue home levothyroxine 88mcg daily

## 2020-01-21 ENCOUNTER — APPOINTMENT (OUTPATIENT)
Dept: CARDIOLOGY | Facility: CLINIC | Age: 80
End: 2020-01-21

## 2020-01-21 VITALS
RESPIRATION RATE: 16 BRPM | SYSTOLIC BLOOD PRESSURE: 147 MMHG | DIASTOLIC BLOOD PRESSURE: 80 MMHG | OXYGEN SATURATION: 94 % | HEART RATE: 72 BPM | TEMPERATURE: 98 F

## 2020-01-21 PROCEDURE — 97161 PT EVAL LOW COMPLEX 20 MIN: CPT

## 2020-01-21 PROCEDURE — 80053 COMPREHEN METABOLIC PANEL: CPT

## 2020-01-21 PROCEDURE — 87631 RESP VIRUS 3-5 TARGETS: CPT

## 2020-01-21 PROCEDURE — 74018 RADEX ABDOMEN 1 VIEW: CPT

## 2020-01-21 PROCEDURE — 94640 AIRWAY INHALATION TREATMENT: CPT

## 2020-01-21 PROCEDURE — 85610 PROTHROMBIN TIME: CPT

## 2020-01-21 PROCEDURE — 72100 X-RAY EXAM L-S SPINE 2/3 VWS: CPT

## 2020-01-21 PROCEDURE — 84100 ASSAY OF PHOSPHORUS: CPT

## 2020-01-21 PROCEDURE — 71045 X-RAY EXAM CHEST 1 VIEW: CPT

## 2020-01-21 PROCEDURE — 85027 COMPLETE CBC AUTOMATED: CPT

## 2020-01-21 PROCEDURE — 99285 EMERGENCY DEPT VISIT HI MDM: CPT | Mod: 25

## 2020-01-21 PROCEDURE — 72070 X-RAY EXAM THORAC SPINE 2VWS: CPT

## 2020-01-21 PROCEDURE — 36415 COLL VENOUS BLD VENIPUNCTURE: CPT

## 2020-01-21 PROCEDURE — 96365 THER/PROPH/DIAG IV INF INIT: CPT

## 2020-01-21 PROCEDURE — 87086 URINE CULTURE/COLONY COUNT: CPT

## 2020-01-21 PROCEDURE — 81001 URINALYSIS AUTO W/SCOPE: CPT

## 2020-01-21 PROCEDURE — 83605 ASSAY OF LACTIC ACID: CPT

## 2020-01-21 PROCEDURE — 85730 THROMBOPLASTIN TIME PARTIAL: CPT

## 2020-01-21 PROCEDURE — 82962 GLUCOSE BLOOD TEST: CPT

## 2020-01-21 PROCEDURE — 74176 CT ABD & PELVIS W/O CONTRAST: CPT

## 2020-01-21 PROCEDURE — 80048 BASIC METABOLIC PNL TOTAL CA: CPT

## 2020-01-21 PROCEDURE — 87040 BLOOD CULTURE FOR BACTERIA: CPT

## 2020-01-21 PROCEDURE — 94760 N-INVAS EAR/PLS OXIMETRY 1: CPT

## 2020-01-21 PROCEDURE — 93005 ELECTROCARDIOGRAM TRACING: CPT

## 2020-01-21 RX ADMIN — CALCITONIN SALMON 1 SPRAY(S): 200 INJECTION, SOLUTION INTRAMUSCULAR at 11:27

## 2020-01-21 RX ADMIN — LIDOCAINE 1 PATCH: 4 CREAM TOPICAL at 11:27

## 2020-01-21 RX ADMIN — Medication 25 MILLIGRAM(S): at 05:14

## 2020-01-21 RX ADMIN — LOSARTAN POTASSIUM 25 MILLIGRAM(S): 100 TABLET, FILM COATED ORAL at 05:14

## 2020-01-21 RX ADMIN — CITALOPRAM 40 MILLIGRAM(S): 10 TABLET, FILM COATED ORAL at 11:27

## 2020-01-21 RX ADMIN — Medication 3 MILLILITER(S): at 07:41

## 2020-01-21 RX ADMIN — Medication 88 MICROGRAM(S): at 05:13

## 2020-01-21 RX ADMIN — FAMOTIDINE 40 MILLIGRAM(S): 10 INJECTION INTRAVENOUS at 05:13

## 2020-01-21 RX ADMIN — POLYETHYLENE GLYCOL 3350 17 GRAM(S): 17 POWDER, FOR SOLUTION ORAL at 11:27

## 2020-01-21 RX ADMIN — BUPROPION HYDROCHLORIDE 150 MILLIGRAM(S): 150 TABLET, EXTENDED RELEASE ORAL at 10:01

## 2020-01-21 RX ADMIN — Medication 75 MILLIGRAM(S): at 05:14

## 2020-01-21 RX ADMIN — LIDOCAINE 5 MILLILITER(S): 4 CREAM TOPICAL at 05:13

## 2020-01-21 NOTE — PHYSICAL EXAM
[General Appearance - Well Developed] : well developed [Normal Appearance] : normal appearance [General Appearance - Well Nourished] : well nourished [Well Groomed] : well groomed [General Appearance - In No Acute Distress] : no acute distress [No Deformities] : no deformities [Eyelids - No Xanthelasma] : the eyelids demonstrated no xanthelasmas [Normal Conjunctiva] : the conjunctiva exhibited no abnormalities [Normal Jugular Venous V Waves Present] : normal jugular venous V waves present [Normal Oral Mucosa] : normal oral mucosa [Normal Jugular Venous A Waves Present] : normal jugular venous A waves present [Respiration, Rhythm And Depth] : normal respiratory rhythm and effort [No Jugular Venous Jaime A Waves] : no jugular venous jaime A waves [] : no respiratory distress [Exaggerated Use Of Accessory Muscles For Inspiration] : no accessory muscle use [Bowel Sounds] : normal bowel sounds [Auscultation Breath Sounds / Voice Sounds] : lungs were clear to auscultation bilaterally [Abdomen Soft] : soft [Abdomen Tenderness] : non-tender [Gait - Sufficient For Exercise Testing] : the gait was sufficient for exercise testing [Abnormal Walk] : normal gait [Nail Clubbing] : no clubbing of the fingernails [Cyanosis, Localized] : no localized cyanosis [Skin Color & Pigmentation] : normal skin color and pigmentation [Impaired Insight] : insight and judgment were intact [Oriented To Time, Place, And Person] : oriented to person, place, and time [Affect] : the affect was normal [Not Palpable] : not palpable [Mood] : the mood was normal [Apical Thrill] : no thrill palpable at the apex [No Precordial Heave] : no precordial heave was noted [Rhythm Regular] : regular [Normal Rate] : normal [No Gallop] : no gallop heard [Normal S1] : normal S1 [Normal S2] : normal S2 [Click] : no click [Prosthetic Aortic Valve] : prosthetic aortic valve heard [II] : a grade 2 [Prosthetic Mitral Valve] : prosthetic mitral valve heard [2+] : Carotid: right 2+ [1+] : left 1+ [No Abnormalities] : the abdominal aorta was not enlarged and no bruit was heard [___ +] : bilateral [unfilled]U+ pitting edema to the ankles

## 2020-01-21 NOTE — REVIEW OF SYSTEMS
[Fever] : no fever [Headache] : no headache [Chills] : no chills [Feeling Fatigued] : feeling fatigued [Blurry Vision] : no blurred vision [Earache] : no earache [Sore Throat] : no sore throat [Sinus Pressure] : no sinus pressure [Abdominal Pain] : no abdominal pain [Heartburn] : no heartburn [Dysphagia] : no dysphagia [Dysuria] : no dysuria [Joint Pain] : no joint pain [Skin: A Rash] : no rash: [Dizziness] : no dizziness [Tremor] : no tremor was seen [Convulsions] : no convulsions [Confusion] : no confusion was observed [Anxiety] : anxiety [Easy Bleeding] : no tendency for easy bleeding [Easy Bruising] : no tendency for easy bruising [Negative] : Heme/Lymph

## 2020-01-21 NOTE — HISTORY OF PRESENT ILLNESS
[FreeTextEntry1] : I saw Keisha Marcum in the office today for a followup visit, in anticipation of cystoscopy. 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. It was performed by Dr. Wood. Most recent echo 12/16 shows normal ejection fraction left well-seated mitral and aortic valve prosthesis with mild AI.\par \par She is being treated for hypertension, and hyperlipidemia. Fortunately she had no coronary disease. She also has a history of depression, anxiety, and some degree of chronic obstructive pulmonary disease with MARTINA. She is using her mask.. She also is hypothyroid.\par \par She has been diagnosed with promyelocytic leukemia and is undergoing chemotherapy at [facility]. The first treatment 5/13 was complicated by an episode of torsades with cardiac arrest, in the setting of MSSA bacteremia. QT prolongation by Arsenic. Echo showed normal LV systolic function. She subsequently underwent a second and third treatment treatment 10/13 without any trouble. Finished chemotherapy 11/13.  She's been in remission for approximately one year..\par \par The patient is physically limited by shortness of breath. Everything she does is a great effort. She has not seen a pulmonologist for some time.  She is receiving iron infusions for anemia. She is no longer taking the Lasix. Her leg edema has improved and she has no volume overload.She is wearing support stockings.\par \par The patient has been anemic and has some blood in her stool and had an endoscopy and colonoscopy at [place] on 1/5/17. This showed acid reflux. Cauterized bleeding.With the iron infusions her hemoglobin has been above 9 and she is feeling much better. She is able to walk and hopefully now and start losing weight.She has been off of low-dose aspirin for several years.\par \par She has recurrent kidney stones and had a stent placed in September. She is now scheduled for cystoscopy with laser therapy to treat her stones. She'll then have a new stent placed in about 2 weeks.\par \par ECG shows sinus rhythm with poor R. progression anteroseptal leads. This represents no change.\par

## 2020-01-22 RX ORDER — SODIUM CHLORIDE 9 MG/ML
1000 INJECTION INTRAMUSCULAR; INTRAVENOUS; SUBCUTANEOUS
Refills: 0 | Status: DISCONTINUED | OUTPATIENT
Start: 2020-02-07 | End: 2020-02-22

## 2020-01-30 PROBLEM — D64.9 ANEMIA, UNSPECIFIED: Chronic | Status: ACTIVE | Noted: 2019-11-15

## 2020-01-30 PROBLEM — N20.1 CALCULUS OF URETER: Chronic | Status: ACTIVE | Noted: 2019-12-19

## 2020-01-30 PROBLEM — E83.39 OTHER DISORDERS OF PHOSPHORUS METABOLISM: Chronic | Status: ACTIVE | Noted: 2020-01-14

## 2020-01-30 PROBLEM — G47.33 OBSTRUCTIVE SLEEP APNEA (ADULT) (PEDIATRIC): Chronic | Status: ACTIVE | Noted: 2019-12-19

## 2020-01-31 ENCOUNTER — NON-APPOINTMENT (OUTPATIENT)
Age: 80
End: 2020-01-31

## 2020-01-31 ENCOUNTER — APPOINTMENT (OUTPATIENT)
Dept: CARDIOLOGY | Facility: CLINIC | Age: 80
End: 2020-01-31
Payer: MEDICARE

## 2020-01-31 VITALS
SYSTOLIC BLOOD PRESSURE: 132 MMHG | BODY MASS INDEX: 29.44 KG/M2 | HEART RATE: 75 BPM | DIASTOLIC BLOOD PRESSURE: 86 MMHG | OXYGEN SATURATION: 96 % | HEIGHT: 62 IN | WEIGHT: 160 LBS

## 2020-01-31 PROCEDURE — 99214 OFFICE O/P EST MOD 30 MIN: CPT

## 2020-01-31 PROCEDURE — 93000 ELECTROCARDIOGRAM COMPLETE: CPT

## 2020-01-31 NOTE — HISTORY OF PRESENT ILLNESS
[FreeTextEntry1] : I saw Keisha Marcum in the office today for a followup visit.  She was last seen by Dr. Harmon in November. She presents in anticipation of cystoscopy. \par \par She is now 80 years old with a history of bioprosthetic aortic and mitral valve replacement for aortic stenosis and mitral insufficiency in March, 2012.  She did not have CAD at that time.  She had her most recent echo in12/16 shows normal ejection fraction left well-seated mitral and aortic valve prosthesis with mild AI.\par \par She is being treated for hypertension, and hyperlipidemia.  She also has a history of depression, anxiety, and some degree of chronic obstructive pulmonary disease with MARTINA. She is using her mask.. She also is hypothyroid. She has been diagnosed with promyelocytic leukemia and follows with Dr. Temple.\par \par From a cardiovascular perspective, she has been stable. She was recently discharged after spinning several days in the hospital for the flu and a vertebral compression fracture. She has recovered nicely. Her functional capacity is somewhat limited, as she has a degree of dyspnea on the basis of orthopedic issues and COPD. She reports no chest discomfort with activity, suggestive of angina. She has had no edema. She denies palpitations, dizziness and syncope. She has been compliant with her medications

## 2020-01-31 NOTE — DISCUSSION/SUMMARY
[FreeTextEntry1] : Keisha has been doing well from a cardiovascular perspective. She has a limited functional capacity on the basis of orthopedic limitations, but otherwise feels well. She has no angina, decompensated heart failure or unstable arrhythmias. Her presurgical testing report shows no significant laboratory abnormalities.\par \par She is optimized from a cardiovascular perspective for her planned low to intermediate risk urologic procedure. Her cardiovascular medications will be continued, without interruption. Routine hemodynamic monitoring is recommended. Antibiotic prophylaxis can be utilized depending upon the infectious status of her  tract.

## 2020-01-31 NOTE — REASON FOR VISIT
[Hyperlipidemia] : hyperlipidemia [Follow-Up - Clinic] : a clinic follow-up of [FreeTextEntry1] : I. prostatic aortic and mitral replacement [Prosthetic Valve] : a prosthetic valve [Hypertension] : hypertension

## 2020-01-31 NOTE — PHYSICAL EXAM
[Normal Appearance] : normal appearance [General Appearance - Well Developed] : well developed [Well Groomed] : well groomed [No Deformities] : no deformities [General Appearance - Well Nourished] : well nourished [General Appearance - In No Acute Distress] : no acute distress [Normal Conjunctiva] : the conjunctiva exhibited no abnormalities [Eyelids - No Xanthelasma] : the eyelids demonstrated no xanthelasmas [Normal Oral Mucosa] : normal oral mucosa [Normal Jugular Venous V Waves Present] : normal jugular venous V waves present [Normal Jugular Venous A Waves Present] : normal jugular venous A waves present [No Jugular Venous Jaime A Waves] : no jugular venous jaime A waves [] : no respiratory distress [Auscultation Breath Sounds / Voice Sounds] : lungs were clear to auscultation bilaterally [Respiration, Rhythm And Depth] : normal respiratory rhythm and effort [Exaggerated Use Of Accessory Muscles For Inspiration] : no accessory muscle use [Abdomen Tenderness] : non-tender [Abdomen Soft] : soft [Bowel Sounds] : normal bowel sounds [Abnormal Walk] : normal gait [Gait - Sufficient For Exercise Testing] : the gait was sufficient for exercise testing [Nail Clubbing] : no clubbing of the fingernails [Cyanosis, Localized] : no localized cyanosis [Skin Color & Pigmentation] : normal skin color and pigmentation [Oriented To Time, Place, And Person] : oriented to person, place, and time [Impaired Insight] : insight and judgment were intact [Affect] : the affect was normal [Mood] : the mood was normal [Not Palpable] : not palpable [Apical Thrill] : no thrill palpable at the apex [No Precordial Heave] : no precordial heave was noted [Normal Rate] : normal [Normal S1] : normal S1 [Rhythm Regular] : regular [Normal S2] : normal S2 [No Gallop] : no gallop heard [Prosthetic Aortic Valve] : prosthetic aortic valve heard [Click] : no click [Prosthetic Mitral Valve] : prosthetic mitral valve heard [2+] : left 2+ [II] : a grade 2 [1+] : right 1+ [___ +] : bilateral [unfilled]U+ pitting edema to the ankles

## 2020-01-31 NOTE — REVIEW OF SYSTEMS
[Fever] : no fever [Chills] : no chills [Headache] : no headache [Feeling Fatigued] : feeling fatigued [Earache] : no earache [Blurry Vision] : no blurred vision [Sore Throat] : no sore throat [Sinus Pressure] : no sinus pressure [Dysphagia] : no dysphagia [Abdominal Pain] : no abdominal pain [Heartburn] : no heartburn [Dysuria] : no dysuria [Joint Pain] : no joint pain [Skin: A Rash] : no rash: [Dizziness] : no dizziness [Tremor] : no tremor was seen [Confusion] : no confusion was observed [Convulsions] : no convulsions [Anxiety] : anxiety [Easy Bleeding] : no tendency for easy bleeding [Easy Bruising] : no tendency for easy bruising [Negative] : Cardiovascular

## 2020-02-04 RX ORDER — SODIUM CHLORIDE 9 MG/ML
1000 INJECTION INTRAMUSCULAR; INTRAVENOUS; SUBCUTANEOUS
Refills: 0 | Status: DISCONTINUED | OUTPATIENT
Start: 2020-02-07 | End: 2020-02-22

## 2020-02-06 ENCOUNTER — TRANSCRIPTION ENCOUNTER (OUTPATIENT)
Age: 80
End: 2020-02-06

## 2020-02-06 NOTE — ASU PATIENT PROFILE, ADULT - PMH
Acid reflux    Anemia  Completed iron infusions every 2 months, now Hb stable  Aortic stenosis    Asthma with COPD with exacerbation  Not on home O2  Calculus of ureter  s/p R ureteral stent 12/20/2020  Carpal tunnel syndrome    Coronary atherosclerosis of native coronary artery    Depression    Diverticulosis of colon    Dyslipidemia    Emphysema    Former cigarette smoker    Graves disease  s/p surgery  Hernia, hiatal    Hypertension    Leukemia  (APL, s/p chemo in 2012, now in remission, followed by oncologist)  MARTINA on CPAP  Pt admits to be non-compliant  Osteoporosis    Rotator cuff tear  Right  Serum phosphorus decreased  Last level 1.5 at PST 1/13

## 2020-02-06 NOTE — ASU PATIENT PROFILE, ADULT - PSH
After cataract, bilateral  2010  Elective surgery  Cystoscopy, right ureteroscopy, laser lithotripsy of right ureteral stone, right ureteral stent removal and replacement, right retrograde pyelogram on 12/6/19  h/o  endometrial ablation  1998  H/O aortic valve repair  2014  H/O cone biopsy of cervix  1974  H/O dilation and curettage    H/O mitral valve repair  2014  History of coronary angiogram  1/31/12  History of tonsillectomy  childhood  S/P ureteral stent placement  R in 12/2020

## 2020-02-07 ENCOUNTER — RESULT REVIEW (OUTPATIENT)
Age: 80
End: 2020-02-07

## 2020-02-07 ENCOUNTER — OUTPATIENT (OUTPATIENT)
Dept: OUTPATIENT SERVICES | Facility: HOSPITAL | Age: 80
LOS: 1 days | End: 2020-02-07
Payer: MEDICARE

## 2020-02-07 VITALS
OXYGEN SATURATION: 99 % | HEART RATE: 70 BPM | SYSTOLIC BLOOD PRESSURE: 118 MMHG | TEMPERATURE: 98 F | DIASTOLIC BLOOD PRESSURE: 64 MMHG | RESPIRATION RATE: 14 BRPM

## 2020-02-07 VITALS
HEART RATE: 70 BPM | TEMPERATURE: 98 F | RESPIRATION RATE: 14 BRPM | WEIGHT: 160.06 LBS | DIASTOLIC BLOOD PRESSURE: 68 MMHG | HEIGHT: 62 IN | OXYGEN SATURATION: 95 % | SYSTOLIC BLOOD PRESSURE: 138 MMHG

## 2020-02-07 DIAGNOSIS — Z96.0 PRESENCE OF UROGENITAL IMPLANTS: Chronic | ICD-10-CM

## 2020-02-07 DIAGNOSIS — Z98.890 OTHER SPECIFIED POSTPROCEDURAL STATES: Chronic | ICD-10-CM

## 2020-02-07 DIAGNOSIS — Z41.9 ENCOUNTER FOR PROCEDURE FOR PURPOSES OTHER THAN REMEDYING HEALTH STATE, UNSPECIFIED: Chronic | ICD-10-CM

## 2020-02-07 DIAGNOSIS — N20.1 CALCULUS OF URETER: ICD-10-CM

## 2020-02-07 DIAGNOSIS — Z01.818 ENCOUNTER FOR OTHER PREPROCEDURAL EXAMINATION: ICD-10-CM

## 2020-02-07 PROCEDURE — 52310 CYSTOSCOPY AND TREATMENT: CPT

## 2020-02-07 PROCEDURE — 94640 AIRWAY INHALATION TREATMENT: CPT

## 2020-02-07 PROCEDURE — 88300 SURGICAL PATH GROSS: CPT

## 2020-02-07 PROCEDURE — 88300 SURGICAL PATH GROSS: CPT | Mod: 26

## 2020-02-07 RX ORDER — GENTAMICIN SULFATE 40 MG/ML
100 VIAL (ML) INJECTION ONCE
Refills: 0 | Status: COMPLETED | OUTPATIENT
Start: 2020-02-07 | End: 2020-02-07

## 2020-02-07 RX ORDER — SODIUM CHLORIDE 9 MG/ML
1000 INJECTION INTRAMUSCULAR; INTRAVENOUS; SUBCUTANEOUS
Refills: 0 | Status: DISCONTINUED | OUTPATIENT
Start: 2020-02-07 | End: 2020-02-07

## 2020-02-07 RX ORDER — VANCOMYCIN HCL 1 G
1000 VIAL (EA) INTRAVENOUS ONCE
Refills: 0 | Status: COMPLETED | OUTPATIENT
Start: 2020-02-07 | End: 2020-02-07

## 2020-02-07 RX ORDER — OXYCODONE HYDROCHLORIDE 5 MG/1
5 TABLET ORAL ONCE
Refills: 0 | Status: DISCONTINUED | OUTPATIENT
Start: 2020-02-07 | End: 2020-02-07

## 2020-02-07 RX ORDER — IPRATROPIUM/ALBUTEROL SULFATE 18-103MCG
3 AEROSOL WITH ADAPTER (GRAM) INHALATION ONCE
Refills: 0 | Status: COMPLETED | OUTPATIENT
Start: 2020-02-07 | End: 2020-02-07

## 2020-02-07 RX ADMIN — SODIUM CHLORIDE 40 MILLILITER(S): 9 INJECTION INTRAMUSCULAR; INTRAVENOUS; SUBCUTANEOUS at 11:43

## 2020-02-07 RX ADMIN — Medication 3 MILLILITER(S): at 12:12

## 2020-02-07 RX ADMIN — Medication 250 MILLIGRAM(S): at 11:43

## 2020-02-07 NOTE — H&P ADULT - NSHPREVIEWOFSYSTEMS_GEN_ALL_CORE
Constitutional: admits to fever and chills   HEENT: denies headache, dizziness, or lightheadedness  Respiratory: admits to wheezing and occasional SOB from COPD  Cardiovascular: denies CP, palpitations  Gastrointestinal: admits to vomiting; denies nausea, diarrhea, constipation, abdominal pain, or bloody stools  Genitourinary: see HPI  Skin/Breast: denies rashes or itching  Musculoskeletal: admits to low back pain; denies joint swelling, or muscle weakness  Neurologic: denies loss of sensation, numbness, or tingling  ROS negative except as noted above

## 2020-02-07 NOTE — ASU DISCHARGE PLAN (ADULT/PEDIATRIC) - CARE PROVIDER_API CALL
Jarrell Fernandez)  Urology  [address], [address]  [place] [address]  Phone: [phone]  Fax: [phone]  Follow Up Time:

## 2020-02-07 NOTE — H&P ADULT - HISTORY OF PRESENT ILLNESS
Patient is an 79 y/o F here for Cystoscopy and stent removal with PMHx of HLD, HTN, Graves s/p thyroid surgery, depression, GERD, leukemia (s/p chemo in 2012 now in remission followed by Dr. Temple) with residual chronic anemia requiring iron infusions which have now been discontinued due to stable Hb, MVR and AVR in 2011, COPD (not on home O2), MARTINA on CPAP (patient admits to being non-compliant with use), hx of R ureteral stone s/p stent in 9/2019 revised in 12/2019. Pt reports frequency and urgency and hematuria on most episodes of urination which she reports are pavan blood. Pt reports back pain and groin pain from hx of compression T12 compression fx. Pt reports she ambulated with a cane.    Denies any CP, palpitations. Reports having COPD and uses inhalers everyday as needed. Pt reports she has ran out of some of the inhalers and needs to visit pulmonologist for refills. Pt recently admitted for FLU- treated with Tamiflu. Patient is an 79 y/o F here for Cystoscopy and stent removal with PMHx of HLD, HTN, Graves s/p thyroid surgery, depression, GERD, leukemia (s/p chemo in 2012 now in remission followed by Dr. Temple) with residual chronic anemia requiring iron infusions which have now been discontinued due to stable Hb, MVR and AVR in 2011, COPD (not on home O2), MARTINA on CPAP (patient admits to being non-compliant with use), hx of R ureteral stone s/p stent in 9/2019 revised in 12/2019. Pt reports frequency and urgency and hematuria on most episodes of urination which she reports are pavan blood. Pt reports back pain and groin pain from hx of compression T12 compression fx. Pt reports she ambulated with a cane.    Denies any CP, palpitations. Reports having COPD and uses inhalers everyday as needed. Pt reports she has ran out of some of the inhalers and needs to visit pulmonologist for refills. Pt recently admitted to [facility] for FLU- treated with Tamiflu.

## 2020-02-07 NOTE — H&P ADULT - NSICDXPASTSURGICALHX_GEN_ALL_CORE_FT
PAST SURGICAL HISTORY:  After cataract, bilateral 2010    Elective surgery Cystoscopy, right ureteroscopy, laser lithotripsy of right ureteral stone, right ureteral stent removal and replacement, right retrograde pyelogram on 12/6/19    h/o  endometrial ablation 1998    H/O aortic valve repair 2014    H/O cone biopsy of cervix 1974    H/O dilation and curettage     H/O mitral valve repair 2014    History of coronary angiogram 1/31/12    History of tonsillectomy childhood    S/P ureteral stent placement R in 12/2020

## 2020-02-07 NOTE — H&P ADULT - NSHPSOCIALHISTORY_GEN_ALL_CORE
Social History/Preventive Medicine:    Lives alone  Ambulates at home with cane    Substance Use:  Tobacco Usage: quit 34 years ago, smoked for 20 years- 1 PPD  Alcohol Usage: Denies  Illicit Drug Usage: Denies

## 2020-02-07 NOTE — H&P ADULT - ASSESSMENT
A/P Cystoscopy, Right ureteral stent removal with Dr. Rebecca CLEMENT w/IVF  [facility] [employer] [facility] preop  To OR with Dr. Fernandez

## 2020-02-07 NOTE — H&P ADULT - NSICDXPASTMEDICALHX_GEN_ALL_CORE_FT
PAST MEDICAL HISTORY:  Acid reflux     Anemia Completed iron infusions every 2 months, now Hb stable    Aortic stenosis     Asthma with COPD with exacerbation Not on home O2    Calculus of ureter s/p R ureteral stent 12/20/2020    Carpal tunnel syndrome     Coronary atherosclerosis of native coronary artery     Depression     Diverticulosis of colon     Dyslipidemia     Emphysema     Former cigarette smoker     Graves disease s/p surgery    Hernia, hiatal     Hypertension     Leukemia (APL, s/p chemo in 2012, now in remission, followed by oncologist)    MARTINA on CPAP Pt admits to be non-compliant    Osteoporosis     Rotator cuff tear Right    Serum phosphorus decreased Last level 1.5 at PST 1/13

## 2020-02-07 NOTE — H&P ADULT - NSHPOUTPATIENTPROVIDERS_GEN_ALL_CORE
PCP: Dr. Man  Cardio: Dr. Harmon  Heme/Onc: Dr. Temple  Urology: Dr. Fernandez  Oral Surgeon: Dr. Renee

## 2020-02-07 NOTE — H&P ADULT - NSHPPHYSICALEXAM_GEN_ALL_CORE
Physical Exam:  General: Elderly female, resting comfortably, NAD  CV: RRR, +S1/S2, no murmurs, rubs or gallops  Respiratory:   Abdominal: Soft, NT, ND +BSx4  Neurology: AAOx3, nonfocal Physical Exam:  General: Elderly female, resting comfortably, NAD  CV: RRR, +S1/S2, no murmurs, rubs or gallops  Respiratory: Clear to auscultation bilaterally, no rales, no wheezing, no ronchi  Abdominal: Soft, NT, ND +BSx4  Neurology: AAOx3, nonfocal

## 2020-02-11 LAB — SURGICAL PATHOLOGY STUDY: SIGNIFICANT CHANGE UP

## 2020-03-03 NOTE — ED ADULT NURSE NOTE - NSIMPLEMENTINTERV_GEN_ALL_ED
Implemented All Universal Safety Interventions:  [place] to call system. Call bell, personal items and telephone within reach. Instruct patient to call for assistance. Room bathroom lighting operational. Non-slip footwear when patient is off stretcher. Physically safe environment: no spills, clutter or unnecessary equipment. Stretcher in lowest position, wheels locked, appropriate side rails in place. Never smoker

## 2020-03-12 ENCOUNTER — APPOINTMENT (OUTPATIENT)
Dept: CARDIOLOGY | Facility: CLINIC | Age: 80
End: 2020-03-12

## 2020-07-22 ENCOUNTER — RX RENEWAL (OUTPATIENT)
Age: 80
End: 2020-07-22

## 2020-07-28 ENCOUNTER — APPOINTMENT (OUTPATIENT)
Dept: CARDIOLOGY | Facility: CLINIC | Age: 80
End: 2020-07-28
Payer: MEDICARE

## 2020-07-28 PROCEDURE — 93306 TTE W/DOPPLER COMPLETE: CPT

## 2020-09-23 ENCOUNTER — APPOINTMENT (OUTPATIENT)
Dept: CARDIOLOGY | Facility: CLINIC | Age: 80
End: 2020-09-23
Payer: MEDICARE

## 2020-09-23 VITALS
HEIGHT: 62 IN | BODY MASS INDEX: 29.26 KG/M2 | HEART RATE: 93 BPM | SYSTOLIC BLOOD PRESSURE: 153 MMHG | WEIGHT: 159 LBS | DIASTOLIC BLOOD PRESSURE: 95 MMHG | OXYGEN SATURATION: 97 %

## 2020-09-23 PROCEDURE — 99214 OFFICE O/P EST MOD 30 MIN: CPT

## 2020-09-23 NOTE — DISCUSSION/SUMMARY
[FreeTextEntry1] : The patient's cardiac status appears stable.Echocardiogram shows some mild stenosis of the actual valve prosthesis. I will have the valve Center review this study see if anything needs to be done. She is on low-dose aspirin.\par \par She'll stay on her present medication. She does have any other symptoms or problems she will call me. I would appreciate sending a copy of the most recent over for my review. She'll follow up with Dr. Mcmahon for her COPD and sleep apnea. See her in 4 months.We did go over medications, and I answered her questions

## 2020-09-23 NOTE — HISTORY OF PRESENT ILLNESS
[FreeTextEntry1] : I saw Keisha Marcum in the office today for a followup visit, She is an 80 y-0 female who is status post aortic and mitral valve replacement for aortic stenosis and mitral insufficiency respectively on March 2012 at [facility]. It was performed by Dr. Wood. Echo 7/20 demonstrated an ejection fraction of 55%. There was a peak gradient across the mitral prosthesis of 16 mm mercury. Normally functioning aortic valve prosthesis. LVH. Mild to moderate TR with a PA pressure of 35..\par \par She is being treated for hypertension, and hyperlipidemia. Fortunately she had no coronary disease. She also has a history of depression, anxiety, and some degree of chronic obstructive pulmonary disease with MARTINA. She is using her mask.. She also is hypothyroid.\par \par She has been diagnosed with promyelocytic leukemia and is undergoing chemotherapy at [facility]. The first treatment 5/13 was complicated by an episode of torsades with cardiac arrest, in the setting of MSSA bacteremia. QT prolongation by Arsenic. Echo showed normal LV systolic function. She subsequently underwent a second and third treatment treatment 10/13 without any trouble. Finished chemotherapy 11/13.  She's been in remission for approximately one year..\par \par The patient is physically limited by shortness of breath. Everything she does is a great effort. She has not seen a pulmonologist for some time.  She is receiving iron infusions for anemia. She is no longer taking the Lasix. Her leg edema has improved and she has no volume overload.She is wearing support stockings.\par \par The patient has been anemic and has some blood in her stool and had an endoscopy and colonoscopy at [facility] on 1/5/17. This showed acid reflux. Cauterized bleeding.With the iron infusions her hemoglobin has been above 9 and she is feeling much better. She is able to walk and hopefully now and start losing weight.She has been off of low-dose aspirin for several years.\par \par She has recurrent kidney stones and had a stent placed in September. She is now scheduled for cystoscopy with laser therapy to treat her stones. She had stents placed for the kidney stones that have been removed.\par \par She does have significant COPD and has been complaining of increasing dyspnea on exertion. She ran out of metoprolol which explains why her heart rate is fast today. She is going to  the prescription today.\par \par \par

## 2020-11-05 ENCOUNTER — INPATIENT (INPATIENT)
Facility: HOSPITAL | Age: 80
LOS: 3 days | Discharge: HOME | DRG: 176 | End: 2020-11-09
Attending: INTERNAL MEDICINE | Admitting: INTERNAL MEDICINE
Payer: MEDICARE

## 2020-11-05 VITALS
TEMPERATURE: 100 F | SYSTOLIC BLOOD PRESSURE: 133 MMHG | OXYGEN SATURATION: 96 % | HEART RATE: 88 BPM | WEIGHT: 160.06 LBS | HEIGHT: 62 IN | RESPIRATION RATE: 18 BRPM | DIASTOLIC BLOOD PRESSURE: 76 MMHG

## 2020-11-05 DIAGNOSIS — Z98.890 OTHER SPECIFIED POSTPROCEDURAL STATES: Chronic | ICD-10-CM

## 2020-11-05 DIAGNOSIS — Z41.9 ENCOUNTER FOR PROCEDURE FOR PURPOSES OTHER THAN REMEDYING HEALTH STATE, UNSPECIFIED: Chronic | ICD-10-CM

## 2020-11-05 DIAGNOSIS — Z96.0 PRESENCE OF UROGENITAL IMPLANTS: Chronic | ICD-10-CM

## 2020-11-05 LAB
ANION GAP SERPL CALC-SCNC: 4 MMOL/L — LOW (ref 5–17)
APTT BLD: 25.4 SEC — LOW (ref 27.5–35.5)
BUN SERPL-MCNC: 17 MG/DL — SIGNIFICANT CHANGE UP (ref 7–23)
CALCIUM SERPL-MCNC: 7.9 MG/DL — LOW (ref 8.5–10.1)
CHLORIDE SERPL-SCNC: 111 MMOL/L — HIGH (ref 96–108)
CO2 SERPL-SCNC: 27 MMOL/L — SIGNIFICANT CHANGE UP (ref 22–31)
CREAT SERPL-MCNC: 0.94 MG/DL — SIGNIFICANT CHANGE UP (ref 0.5–1.3)
GLUCOSE SERPL-MCNC: 104 MG/DL — HIGH (ref 70–99)
HCT VFR BLD CALC: 25.6 % — LOW (ref 34.5–45)
HGB BLD-MCNC: 8 G/DL — LOW (ref 11.5–15.5)
INR BLD: 1.04 RATIO — SIGNIFICANT CHANGE UP (ref 0.88–1.16)
MCHC RBC-ENTMCNC: 26.7 PG — LOW (ref 27–34)
MCHC RBC-ENTMCNC: 31.3 GM/DL — LOW (ref 32–36)
MCV RBC AUTO: 85.3 FL — SIGNIFICANT CHANGE UP (ref 80–100)
NRBC # BLD: 0 /100 WBCS — SIGNIFICANT CHANGE UP (ref 0–0)
PLATELET # BLD AUTO: 208 K/UL — SIGNIFICANT CHANGE UP (ref 150–400)
POTASSIUM SERPL-MCNC: 4.2 MMOL/L — SIGNIFICANT CHANGE UP (ref 3.5–5.3)
POTASSIUM SERPL-SCNC: 4.2 MMOL/L — SIGNIFICANT CHANGE UP (ref 3.5–5.3)
PROTHROM AB SERPL-ACNC: 12.2 SEC — SIGNIFICANT CHANGE UP (ref 10.6–13.6)
RBC # BLD: 3 M/UL — LOW (ref 3.8–5.2)
RBC # FLD: 16.9 % — HIGH (ref 10.3–14.5)
SODIUM SERPL-SCNC: 142 MMOL/L — SIGNIFICANT CHANGE UP (ref 135–145)
WBC # BLD: 5.72 K/UL — SIGNIFICANT CHANGE UP (ref 3.8–10.5)
WBC # FLD AUTO: 5.72 K/UL — SIGNIFICANT CHANGE UP (ref 3.8–10.5)

## 2020-11-05 PROCEDURE — 71275 CT ANGIOGRAPHY CHEST: CPT | Mod: 26

## 2020-11-05 PROCEDURE — 74174 CTA ABD&PLVS W/CONTRAST: CPT | Mod: 26

## 2020-11-05 PROCEDURE — 93010 ELECTROCARDIOGRAM REPORT: CPT

## 2020-11-05 NOTE — ED ADULT NURSE NOTE - ED STAT RN HANDOFF DETAILS
Bedside report given to on coming nurse Louie. Understands pmh, medications given and plan of care for patient. Patient in stable condition, vital signs updated, has no complaints at this time and has been updated on care plan. Explained to patient that it is change of shift and new nurse is taking over, pt verbalized understanding.

## 2020-11-05 NOTE — ED ADULT NURSE NOTE - OBJECTIVE STATEMENT
Pt presents to ED c/o L flank pain. Pt reports it started 3 days ago. Pt has hx of kidney stones. Pt denies difficulty urinating, blood in urine, pelvic pain. Pt states her temp at home was 99.6 denies chills. Pt presents to ED c/o L flank pain. Pt reports it started 3 days ago and has SOB. Pt has hx of kidney stones. Pt denies difficulty urinating, blood in urine, pelvic pain, chest pain. Pt states her temp at home was 99.6 denies chills.

## 2020-11-05 NOTE — ED PROVIDER NOTE - OBJECTIVE STATEMENT
81 yo white female with H/O Acid reflux    Anemia  Completed iron infusions every 2 months, now Hb stable  Aortic stenosis    Asthma with COPD with exacerbation  Not on home O2  Calculus of ureter  s/p R ureteral stent 12/20/2020  Carpal tunnel syndrome    Coronary atherosclerosis of native coronary artery    Depression    Diverticulosis of colon    Dyslipidemia    Emphysema    Graves disease  s/p surgery  Hernia, hiatal    Hypertension    Leukemia  (APL, s/p chemo in 2012, now in remission, followed by oncologist)  MARTINA on CPAP and   Osteoporosis who was at baseline health state until few days ago when she began to complain of left upper flank pain, aching, increased with movement and better with rest and immobilization. In addition patient states that she feels slightly more SOB than her baseline level of SOB. No chest pain. No abdominal pains. No cough. No fever or chills. No dysuria or hematuria. No weakness in legs. No bowel and or bladder complaints.

## 2020-11-05 NOTE — ED ADULT NURSE NOTE - ED STAT RN HANDOFF DETAILS 2
At Jared' request, called Dr. Perlman to obtain "Remote Telemetry Admission " order.  Matteo Donahue spoke to Dr. Perlman and he will change order

## 2020-11-05 NOTE — ED PROVIDER NOTE - CARE PLAN
Principal Discharge DX:	Pulmonary embolism  Secondary Diagnosis:	Asthma with COPD with exacerbation   Principal Discharge DX:	Pulmonary embolism  Secondary Diagnosis:	Asthma with COPD with exacerbation  Secondary Diagnosis:	Anemia

## 2020-11-05 NOTE — ED ADULT NURSE NOTE - NSIMPLEMENTINTERV_GEN_ALL_ED
Implemented All Fall Risk Interventions:  [place] to call system. Call bell, personal items and telephone within reach. Instruct patient to call for assistance. Room bathroom lighting operational. Non-slip footwear when patient is off stretcher. Physically safe environment: no spills, clutter or unnecessary equipment. Stretcher in lowest position, wheels locked, appropriate side rails in place. Provide visual cue, wrist band, yellow gown, etc. Monitor gait and stability. Monitor for mental status changes and reorient to person, place, and time. Review medications for side effects contributing to fall risk. Reinforce activity limits and safety measures with patient and family.

## 2020-11-06 DIAGNOSIS — J44.9 CHRONIC OBSTRUCTIVE PULMONARY DISEASE, UNSPECIFIED: ICD-10-CM

## 2020-11-06 DIAGNOSIS — Z29.9 ENCOUNTER FOR PROPHYLACTIC MEASURES, UNSPECIFIED: ICD-10-CM

## 2020-11-06 DIAGNOSIS — K21.9 GASTRO-ESOPHAGEAL REFLUX DISEASE WITHOUT ESOPHAGITIS: ICD-10-CM

## 2020-11-06 DIAGNOSIS — E03.9 HYPOTHYROIDISM, UNSPECIFIED: ICD-10-CM

## 2020-11-06 DIAGNOSIS — D64.9 ANEMIA, UNSPECIFIED: ICD-10-CM

## 2020-11-06 DIAGNOSIS — I26.99 OTHER PULMONARY EMBOLISM WITHOUT ACUTE COR PULMONALE: ICD-10-CM

## 2020-11-06 DIAGNOSIS — I10 ESSENTIAL (PRIMARY) HYPERTENSION: ICD-10-CM

## 2020-11-06 DIAGNOSIS — G47.33 OBSTRUCTIVE SLEEP APNEA (ADULT) (PEDIATRIC): ICD-10-CM

## 2020-11-06 DIAGNOSIS — F41.9 ANXIETY DISORDER, UNSPECIFIED: ICD-10-CM

## 2020-11-06 DIAGNOSIS — E78.5 HYPERLIPIDEMIA, UNSPECIFIED: ICD-10-CM

## 2020-11-06 LAB
ALBUMIN SERPL ELPH-MCNC: 2.8 G/DL — LOW (ref 3.3–5)
ALP SERPL-CCNC: 64 U/L — SIGNIFICANT CHANGE UP (ref 40–120)
ALT FLD-CCNC: 13 U/L — SIGNIFICANT CHANGE UP (ref 12–78)
ANION GAP SERPL CALC-SCNC: 5 MMOL/L — SIGNIFICANT CHANGE UP (ref 5–17)
APPEARANCE UR: CLEAR — SIGNIFICANT CHANGE UP
APTT BLD: 70.7 SEC — HIGH (ref 27.5–35.5)
AST SERPL-CCNC: 16 U/L — SIGNIFICANT CHANGE UP (ref 15–37)
BASE EXCESS BLDV CALC-SCNC: -0.8 MMOL/L — SIGNIFICANT CHANGE UP (ref -2–2)
BILIRUB SERPL-MCNC: 0.2 MG/DL — SIGNIFICANT CHANGE UP (ref 0.2–1.2)
BILIRUB UR-MCNC: NEGATIVE — SIGNIFICANT CHANGE UP
BLOOD GAS COMMENTS, VENOUS: SIGNIFICANT CHANGE UP
BUN SERPL-MCNC: 14 MG/DL — SIGNIFICANT CHANGE UP (ref 7–23)
CALCIUM SERPL-MCNC: 7.5 MG/DL — LOW (ref 8.5–10.1)
CHLORIDE SERPL-SCNC: 114 MMOL/L — HIGH (ref 96–108)
CO2 SERPL-SCNC: 25 MMOL/L — SIGNIFICANT CHANGE UP (ref 22–31)
COLOR SPEC: YELLOW — SIGNIFICANT CHANGE UP
CREAT SERPL-MCNC: 0.76 MG/DL — SIGNIFICANT CHANGE UP (ref 0.5–1.3)
DIFF PNL FLD: NEGATIVE — SIGNIFICANT CHANGE UP
GLUCOSE SERPL-MCNC: 93 MG/DL — SIGNIFICANT CHANGE UP (ref 70–99)
GLUCOSE UR QL: NEGATIVE — SIGNIFICANT CHANGE UP
HCO3 BLDV-SCNC: 23 MMOL/L — SIGNIFICANT CHANGE UP (ref 21–29)
HCT VFR BLD CALC: 27.8 % — LOW (ref 34.5–45)
HCT VFR BLD CALC: 28.4 % — LOW (ref 34.5–45)
HGB BLD-MCNC: 8.6 G/DL — LOW (ref 11.5–15.5)
HGB BLD-MCNC: 8.9 G/DL — LOW (ref 11.5–15.5)
KETONES UR-MCNC: NEGATIVE — SIGNIFICANT CHANGE UP
LEUKOCYTE ESTERASE UR-ACNC: NEGATIVE — SIGNIFICANT CHANGE UP
MCHC RBC-ENTMCNC: 26.7 PG — LOW (ref 27–34)
MCHC RBC-ENTMCNC: 27.1 PG — SIGNIFICANT CHANGE UP (ref 27–34)
MCHC RBC-ENTMCNC: 30.9 GM/DL — LOW (ref 32–36)
MCHC RBC-ENTMCNC: 31.3 GM/DL — LOW (ref 32–36)
MCV RBC AUTO: 86.3 FL — SIGNIFICANT CHANGE UP (ref 80–100)
MCV RBC AUTO: 86.3 FL — SIGNIFICANT CHANGE UP (ref 80–100)
NITRITE UR-MCNC: NEGATIVE — SIGNIFICANT CHANGE UP
NRBC # BLD: 0 /100 WBCS — SIGNIFICANT CHANGE UP (ref 0–0)
NRBC # BLD: 0 /100 WBCS — SIGNIFICANT CHANGE UP (ref 0–0)
NT-PROBNP SERPL-SCNC: 492 PG/ML — HIGH (ref 0–450)
PCO2 BLDV: 43 MMHG — SIGNIFICANT CHANGE UP (ref 35–50)
PH BLDV: 7.36 — SIGNIFICANT CHANGE UP (ref 7.35–7.45)
PH UR: 7 — SIGNIFICANT CHANGE UP (ref 5–8)
PLATELET # BLD AUTO: 179 K/UL — SIGNIFICANT CHANGE UP (ref 150–400)
PLATELET # BLD AUTO: 193 K/UL — SIGNIFICANT CHANGE UP (ref 150–400)
PO2 BLDV: <44 MMHG — SIGNIFICANT CHANGE UP (ref 25–45)
POTASSIUM SERPL-MCNC: 4 MMOL/L — SIGNIFICANT CHANGE UP (ref 3.5–5.3)
POTASSIUM SERPL-SCNC: 4 MMOL/L — SIGNIFICANT CHANGE UP (ref 3.5–5.3)
PROT SERPL-MCNC: 5.7 G/DL — LOW (ref 6–8.3)
PROT UR-MCNC: NEGATIVE — SIGNIFICANT CHANGE UP
RBC # BLD: 3.22 M/UL — LOW (ref 3.8–5.2)
RBC # BLD: 3.29 M/UL — LOW (ref 3.8–5.2)
RBC # FLD: 16 % — HIGH (ref 10.3–14.5)
RBC # FLD: 16.2 % — HIGH (ref 10.3–14.5)
SAO2 % BLDV: 76 % — SIGNIFICANT CHANGE UP (ref 67–88)
SARS-COV-2 RNA SPEC QL NAA+PROBE: SIGNIFICANT CHANGE UP
SODIUM SERPL-SCNC: 144 MMOL/L — SIGNIFICANT CHANGE UP (ref 135–145)
SP GR SPEC: 1 — LOW (ref 1.01–1.02)
TROPONIN I SERPL-MCNC: <.015 NG/ML — SIGNIFICANT CHANGE UP (ref 0.01–0.04)
TSH SERPL-MCNC: 2.57 UIU/ML — SIGNIFICANT CHANGE UP (ref 0.36–3.74)
UROBILINOGEN FLD QL: NEGATIVE — SIGNIFICANT CHANGE UP
WBC # BLD: 5.46 K/UL — SIGNIFICANT CHANGE UP (ref 3.8–10.5)
WBC # BLD: 6.06 K/UL — SIGNIFICANT CHANGE UP (ref 3.8–10.5)
WBC # FLD AUTO: 5.46 K/UL — SIGNIFICANT CHANGE UP (ref 3.8–10.5)
WBC # FLD AUTO: 6.06 K/UL — SIGNIFICANT CHANGE UP (ref 3.8–10.5)

## 2020-11-06 PROCEDURE — 93306 TTE W/DOPPLER COMPLETE: CPT | Mod: 26

## 2020-11-06 PROCEDURE — 93970 EXTREMITY STUDY: CPT | Mod: 26

## 2020-11-06 PROCEDURE — 99223 1ST HOSP IP/OBS HIGH 75: CPT

## 2020-11-06 PROCEDURE — 99285 EMERGENCY DEPT VISIT HI MDM: CPT

## 2020-11-06 RX ORDER — SIMVASTATIN 20 MG/1
40 TABLET, FILM COATED ORAL AT BEDTIME
Refills: 0 | Status: DISCONTINUED | OUTPATIENT
Start: 2020-11-06 | End: 2020-11-09

## 2020-11-06 RX ORDER — ALBUTEROL 90 UG/1
2 AEROSOL, METERED ORAL EVERY 6 HOURS
Refills: 0 | Status: DISCONTINUED | OUTPATIENT
Start: 2020-11-06 | End: 2020-11-09

## 2020-11-06 RX ORDER — HEPARIN SODIUM 5000 [USP'U]/ML
6000 INJECTION INTRAVENOUS; SUBCUTANEOUS EVERY 6 HOURS
Refills: 0 | Status: DISCONTINUED | OUTPATIENT
Start: 2020-11-06 | End: 2020-11-08

## 2020-11-06 RX ORDER — BUDESONIDE AND FORMOTEROL FUMARATE DIHYDRATE 160; 4.5 UG/1; UG/1
2 AEROSOL RESPIRATORY (INHALATION)
Refills: 0 | Status: DISCONTINUED | OUTPATIENT
Start: 2020-11-06 | End: 2020-11-09

## 2020-11-06 RX ORDER — HEPARIN SODIUM 5000 [USP'U]/ML
INJECTION INTRAVENOUS; SUBCUTANEOUS
Qty: 25000 | Refills: 0 | Status: DISCONTINUED | OUTPATIENT
Start: 2020-11-06 | End: 2020-11-07

## 2020-11-06 RX ORDER — ACETAMINOPHEN 500 MG
650 TABLET ORAL ONCE
Refills: 0 | Status: COMPLETED | OUTPATIENT
Start: 2020-11-06 | End: 2020-11-06

## 2020-11-06 RX ORDER — BUPROPION HYDROCHLORIDE 150 MG/1
75 TABLET, EXTENDED RELEASE ORAL
Refills: 0 | Status: DISCONTINUED | OUTPATIENT
Start: 2020-11-06 | End: 2020-11-09

## 2020-11-06 RX ORDER — TIOTROPIUM BROMIDE 18 UG/1
1 CAPSULE ORAL; RESPIRATORY (INHALATION) DAILY
Refills: 0 | Status: DISCONTINUED | OUTPATIENT
Start: 2020-11-06 | End: 2020-11-09

## 2020-11-06 RX ORDER — ASPIRIN/CALCIUM CARB/MAGNESIUM 324 MG
81 TABLET ORAL DAILY
Refills: 0 | Status: DISCONTINUED | OUTPATIENT
Start: 2020-11-06 | End: 2020-11-09

## 2020-11-06 RX ORDER — FAMOTIDINE 10 MG/ML
40 INJECTION INTRAVENOUS
Refills: 0 | Status: DISCONTINUED | OUTPATIENT
Start: 2020-11-06 | End: 2020-11-09

## 2020-11-06 RX ORDER — METOPROLOL TARTRATE 50 MG
25 TABLET ORAL DAILY
Refills: 0 | Status: DISCONTINUED | OUTPATIENT
Start: 2020-11-06 | End: 2020-11-09

## 2020-11-06 RX ORDER — HEPARIN SODIUM 5000 [USP'U]/ML
3000 INJECTION INTRAVENOUS; SUBCUTANEOUS EVERY 6 HOURS
Refills: 0 | Status: DISCONTINUED | OUTPATIENT
Start: 2020-11-06 | End: 2020-11-08

## 2020-11-06 RX ORDER — BUPROPION HYDROCHLORIDE 150 MG/1
150 TABLET, EXTENDED RELEASE ORAL
Refills: 0 | Status: DISCONTINUED | OUTPATIENT
Start: 2020-11-06 | End: 2020-11-09

## 2020-11-06 RX ORDER — LEVOTHYROXINE SODIUM 125 MCG
88 TABLET ORAL DAILY
Refills: 0 | Status: DISCONTINUED | OUTPATIENT
Start: 2020-11-06 | End: 2020-11-09

## 2020-11-06 RX ORDER — LOSARTAN POTASSIUM 100 MG/1
25 TABLET, FILM COATED ORAL DAILY
Refills: 0 | Status: DISCONTINUED | OUTPATIENT
Start: 2020-11-06 | End: 2020-11-09

## 2020-11-06 RX ORDER — ENOXAPARIN SODIUM 100 MG/ML
70 INJECTION SUBCUTANEOUS ONCE
Refills: 0 | Status: COMPLETED | OUTPATIENT
Start: 2020-11-06 | End: 2020-11-06

## 2020-11-06 RX ORDER — ALBUTEROL 90 UG/1
2.5 AEROSOL, METERED ORAL EVERY 8 HOURS
Refills: 0 | Status: DISCONTINUED | OUTPATIENT
Start: 2020-11-06 | End: 2020-11-06

## 2020-11-06 RX ORDER — CITALOPRAM 10 MG/1
40 TABLET, FILM COATED ORAL DAILY
Refills: 0 | Status: DISCONTINUED | OUTPATIENT
Start: 2020-11-06 | End: 2020-11-09

## 2020-11-06 RX ADMIN — BUPROPION HYDROCHLORIDE 75 MILLIGRAM(S): 150 TABLET, EXTENDED RELEASE ORAL at 17:13

## 2020-11-06 RX ADMIN — CITALOPRAM 40 MILLIGRAM(S): 10 TABLET, FILM COATED ORAL at 14:50

## 2020-11-06 RX ADMIN — BUPROPION HYDROCHLORIDE 150 MILLIGRAM(S): 150 TABLET, EXTENDED RELEASE ORAL at 07:29

## 2020-11-06 RX ADMIN — Medication 650 MILLIGRAM(S): at 04:48

## 2020-11-06 RX ADMIN — Medication 81 MILLIGRAM(S): at 14:50

## 2020-11-06 RX ADMIN — Medication 650 MILLIGRAM(S): at 22:28

## 2020-11-06 RX ADMIN — Medication 88 MICROGRAM(S): at 05:49

## 2020-11-06 RX ADMIN — HEPARIN SODIUM 1300 UNIT(S)/HR: 5000 INJECTION INTRAVENOUS; SUBCUTANEOUS at 14:45

## 2020-11-06 RX ADMIN — LOSARTAN POTASSIUM 25 MILLIGRAM(S): 100 TABLET, FILM COATED ORAL at 05:50

## 2020-11-06 RX ADMIN — SIMVASTATIN 40 MILLIGRAM(S): 20 TABLET, FILM COATED ORAL at 22:28

## 2020-11-06 RX ADMIN — Medication 650 MILLIGRAM(S): at 23:00

## 2020-11-06 RX ADMIN — Medication 650 MILLIGRAM(S): at 05:48

## 2020-11-06 RX ADMIN — BUDESONIDE AND FORMOTEROL FUMARATE DIHYDRATE 2 PUFF(S): 160; 4.5 AEROSOL RESPIRATORY (INHALATION) at 22:27

## 2020-11-06 RX ADMIN — FAMOTIDINE 40 MILLIGRAM(S): 10 INJECTION INTRAVENOUS at 05:50

## 2020-11-06 RX ADMIN — BUDESONIDE AND FORMOTEROL FUMARATE DIHYDRATE 2 PUFF(S): 160; 4.5 AEROSOL RESPIRATORY (INHALATION) at 07:29

## 2020-11-06 RX ADMIN — TIOTROPIUM BROMIDE 1 CAPSULE(S): 18 CAPSULE ORAL; RESPIRATORY (INHALATION) at 07:30

## 2020-11-06 RX ADMIN — ENOXAPARIN SODIUM 70 MILLIGRAM(S): 100 INJECTION SUBCUTANEOUS at 02:19

## 2020-11-06 RX ADMIN — Medication 25 MILLIGRAM(S): at 05:50

## 2020-11-06 RX ADMIN — FAMOTIDINE 40 MILLIGRAM(S): 10 INJECTION INTRAVENOUS at 17:12

## 2020-11-06 NOTE — H&P ADULT - PROBLEM SELECTOR PLAN 9
Dispo: Social Work needed as patient lives alone and is requesting services of aide  DVT PPX: Patient was given Pharmacological Prophylaxis with Lovenox 70mg x1 in the ER, will schedule Heparin gtt for 2PM as it may be reversible in the event of any bleed    IMPROVE VTE Individual Risk Assessment          RISK                                                          Points  [  ] Previous VTE                                                3  [ x ] Thrombophilia                                             2  [  ] Lower limb paralysis                                   2        (unable to hold up >15 seconds)    [ x ] Current Cancer                                             2         (within 6 months)  [ x ] Immobilization > 24 hrs                              1  [ x ] ICU/CCU stay > 24 hours                             1  [ x ] Age > 60                                                         1    IMPROVE VTE Score: 7 Chronic  -On Pepcid 40mg BID at home, continue

## 2020-11-06 NOTE — H&P ADULT - NSHPREVIEWOFSYSTEMS_GEN_ALL_CORE
Constitutional: denies fever, chills, diaphoresis   HEENT: denies blurry vision, difficulty hearing  Respiratory: Admits to SOB and BOOTH; denies cough, sputum production  Cardiovascular: Admits to chronic b/l LE edema; denies CP, palpitations  Gastrointestinal: denies nausea, vomiting, diarrhea, constipation, abdominal pain, melena, hematochezia   Genitourinary: denies dysuria, frequency, urgency, hematuria   Skin/Breast: denies rash, itching  Musculoskeletal: Admits to L-sided flank pain; denies myalgias, joint swelling, muscle weakness  Neurologic: denies headache, weakness, dizziness, paresthesias, numbness/tingling

## 2020-11-06 NOTE — H&P ADULT - NSHPSOCIALHISTORY_GEN_ALL_CORE
Lives alone  Ambulates at home with cane  Recreational Drug use: Denies  Tobacco Usage: quit 34 years ago, smoked for 20 years- 1 PPD  Alcohol Usage: Drinks 1-2 glasses of wine/month

## 2020-11-06 NOTE — H&P ADULT - ASSESSMENT
81 y/o F PMHx of leukemia (s/p chemo in 2012 now in remission followed by Dr. Temple) with residual chronic anemia requiring iron infusions, MVR and AVR in 2011, COPD (not on home O2), MARTINA on CPAP (patient admits to being non-compliant with use), hx of R ureteral stone s/p stent in 9/2019 revised in 12/2019, Graves s/p thyroid surgery, HLD, HTN, depression, GERD, who presents with worsening shortness of breath and L-sided flank pain.

## 2020-11-06 NOTE — H&P ADULT - PROBLEM SELECTOR PLAN 3
MARTINA on CPAP, patient admits to being non-compliant with CPAP at home  -Pulm(Dr. Olmos) consulted

## 2020-11-06 NOTE — H&P ADULT - PROBLEM SELECTOR PLAN 5
Chronic, stable  - Continue home Toprol XL 25mg qD, Losartan 25mg qD  - Monitor routine hemodynamics Chronic, stable  - Continue home bupropion 150mg in the morning and 75mg in the evening  - Continue home Celexa 40mg daily.

## 2020-11-06 NOTE — CONSULT NOTE ADULT - SUBJECTIVE AND OBJECTIVE BOX
Date/Time Patient Seen:  		  Referring MD:   Data Reviewed	       Patient is a 80y old  Female who presents with a chief complaint of Shortness of breath (2020 03:16)      Subjective/HPI  in bed  seen and examined  vs and meds reviewed  labs reviewed  h and p reviewed  er provider note reviewed  lives alone  ex smoker  follows with dr rosario for pulm med  retired  ct shows PE - right side -      81 y/o F PMHx of leukemia (s/p chemo in  now in remission followed by Dr. Temple) with residual chronic anemia requiring iron infusions, MVR and AVR in , COPD (not on home O2), MARTINA on CPAP (patient admits to being non-compliant with use), hx of R ureteral stone s/p stent in 2019 revised in 2019, Graves s/p thyroid surgery, HLD, HTN, depression, GERD, who presents with worsening shortness of breath and L-sided flank pain. States she has had chronic shortness of breath, but for the past 3 days, her breathing was so bad to the point that she would become short of breath and her heart would be racing after simply taking out the trash. Patient also endorses L-sided flank pain that started about 2 weeks ago, which has also worsened over the past 3 days or so. States that the pain is a 5/10 at its worst and is exacerbated by bending or any kind of movement. The pain is intermittent and occasionally radiates to the groin. Patient also endorses chronic b/l lower extremity edema, which hasn't significantly worsened. However, pt admittedly states she hasn't been ambulating as much as she usually does because of her breathing difficulty and has been sleeping upright on her cough most days. She lives by herself with no aide at home and has been having a lot of difficulty performing a lot of her ADLs, i.e. showering, cleaning, etc. She struggles to cook food for herself and has a local friend that helps drive her to places. Patient is admits to receiving iron infusions and recently had a virtual visit with Dr. Temple last night, where she was told that she would need IV Iron transfusions. Denies any fevers, chills, chest pain, dizziness, dysuria, hematuria, or any bleeding.     Of note, patient was hospitalized in [facility] from 20-20 for influenza and was also c/o low back pain and was found to have a T12 compression fracture during the hospital course.     ED Course  Vitals: T 99.6, HR 88, /76, O2 96% on RA  Significant Labs: Hb 8.0, Hct 25.6, aPTT 25.4, D-dimer 708, AG 4, Ca 7.9, eGFR 57  EKG: NSR  CT abd/pelvis/chest w/ IV cont: Filling defect identified segmental to subsegmental branches of the right lower lobe pulmonary artery, compatible with pulmonary embolism. No aortic aneurysm or dissection. Atherosclerotic disease. 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 Scattered bilateral nephrolithiasis without hydronephrosis or obstructive uropathy. Large paraesophageal hiatal hernia. Interval indeterminate age compression deformity of T12 with mild retropulsion. This results in less than 50% vertebral height loss.      FAMILY HISTORY:  FH: myocardial infarction, (Father ).     Social History:  Social History (marital status, living situation, occupation, tobacco use, alcohol and drug use, and sexual history): Lives alone  Ambulates at home with cane  Recreational Drug use: Denies  Tobacco Usage: quit 34 years ago, smoked for 20 years- 1 PPD  Alcohol Usage: Drinks 1-2 glasses of wine/month     Tobacco Screening:  · Core Measure Site	Yes  · Has the patient used tobacco in the past 30 days?	No    Risk Assessment:    Present on Admission:  Deep Venous Thrombosis	no  Pulmonary Embolus	yes     Heart Failure:  Does this patient have a history of or has been diagnosed with heart failure? no.  PAST MEDICAL & SURGICAL HISTORY:  Serum phosphorus decreased  Last level 1.5 at PST     Calculus of ureter  s/p R ureteral stent 2020    MARTINA on CPAP  Pt admits to be non-compliant    Leukemia  (APL, s/p chemo in , now in remission, followed by oncologist)    Anemia  Completed iron infusions every 2 months, now Hb stable    Spinal stenosis    Mitral regurgitation    Aortic stenosis    Emphysema    Duodenal ulcer  at age 24    Rotator cuff tear  Right    Diverticulosis of colon    Coronary atherosclerosis of native coronary artery    Carpal tunnel syndrome    Dyslipidemia    Obstructive sleep apnea  cpap    Osteoporosis    Hypertension    Hernia, hiatal    Former cigarette smoker    Depression    Asthma with COPD with exacerbation  Not on home O2    Asthma    Acid reflux    Graves disease  s/p surgery    Elective surgery  Cystoscopy, right ureteroscopy, laser lithotripsy of right ureteral stone, right ureteral stent removal and replacement, right retrograde pyelogram on 19    H/O dilation and curettage    S/P ureteral stent placement  R in 2020    H/O mitral valve repair      H/O aortic valve repair      History of coronary angiogram  11    h/o  endometrial ablation      H/O cone biopsy of cervix  1974    History of tonsillectomy  childhood    After cataract, bilateral  2010    Carpal tunnel right repair          Medication list         MEDICATIONS  (STANDING):  ALBUTerol    0.083% 2.5 milliGRAM(s) Nebulizer every 8 hours  aspirin enteric coated 81 milliGRAM(s) Oral daily  budesonide  80 MICROgram(s)/formoterol 4.5 MICROgram(s) Inhaler 2 Puff(s) Inhalation two times a day  buPROPion . 150 milliGRAM(s) Oral <User Schedule>  buPROPion . 75 milliGRAM(s) Oral <User Schedule>  citalopram 40 milliGRAM(s) Oral daily  famotidine    Tablet 40 milliGRAM(s) Oral two times a day  levothyroxine 88 MICROGram(s) Oral daily  losartan 25 milliGRAM(s) Oral daily  metoprolol succinate ER 25 milliGRAM(s) Oral daily  simvastatin 40 milliGRAM(s) Oral at bedtime  tiotropium 18 MICROgram(s) Capsule 1 Capsule(s) Inhalation daily    MEDICATIONS  (PRN):         Vitals log        ICU Vital Signs Last 24 Hrs  T(C): 36.6 (2020 04:20), Max: 37.6 (2020 20:24)  T(F): 97.8 (2020 04:20), Max: 99.6 (2020 20:24)  HR: 87 (2020 04:20) (84 - 88)  BP: 134/66 (2020 04:20) (133/76 - 168/81)  BP(mean): --  ABP: --  ABP(mean): --  RR: 16 (2020 04:20) (15 - 18)  SpO2: 98% (2020 04:20) (96% - 98%)           Input and Output:  I&O's Detail      Lab Data                        8.0    5.72  )-----------( 208      ( 2020 22:01 )             25.6     11-05    142  |  111<H>  |  17  ----------------------------<  104<H>  4.2   |  27  |  0.94    Ca    7.9<L>      2020 22:01              Review of Systems	  pain  sob  ontiveros  anxious    Objective     Physical Examination  head nc  heart s1s2  lung dec BS  abd soft  cn grossly int  verbal  alert        Pertinent Lab findings & Imaging      Suggs:  NO   Adequate UO     I&O's Detail           Discussed with:     Cultures:	        Radiology    EXAM:  CT ANGIO ABD PELV (W)AW IC                          EXAM:  CT ANGIO CHEST (W)AW IC                            PROCEDURE DATE:  2020          INTERPRETATION:  CT ANGIOGRAPHY OF THE CHEST, ABDOMEN AND PELVIS    INDICATION: Asthma. Hypertension. Flank pain. Evaluate for dissection and pulmonary embolism.    TECHNIQUE: Noncontrast CT of the chest was performed, followed by CT angiography performed of the chest, abdomen and pelvis after administration of intravenous contrast. Postprocessed MIP and 3-D reformatted images were created and reviewed.    90 mL of Omnipaque 350 contrast material was injected IV.    COMPARISON: CT abdomen pelvis 2020.    FINDINGS:  Vessels: Precontrast images show no evidence of intramural hematoma.  Normal caliber aorta. No intimal flap is seen to suggest aortic dissection. Atherosclerotic disease of the aorta and its branches. Evaluation of the aortic root is limited due to cardiac pulsation.    Celiac axis and SMA are patent. There is plaque or nonocclusive thrombus identified within the proximal celiac vessel on image 73 of series 602 which is otherwise patent. RASHID is patent. Distal SMA and RASHID branches are not well assessed by CT technique.Bilateral renal arteries are patent. Bilateral iliac arteries are normal caliber.    There is filling defect identified segmental to subsegmental branches of the right lower lobe pulmonary artery as best seen on images 72 through 73 of series 4, compatible with pulmonary embolism.    Thorax:  Airways: Central airways patent.  Lungs: Trace bilateral pleural effusions with bibasilar opacities, likely represent atelectasis and/or scarring.  Mediastinum and lymph nodes: No mass or hemorrhage. No bulky adenopathy.  Heart: Mild cardiomegaly. No pericardial effusion.Cardiac valve replacement.  Soft tissues: Median sternotomy.    Liver: Right posterior hepatic lobe cyst. Additional too small to characterize hypodensities.  Biliary: No significant dilatation. Cholelithiasis.  Spleen: No suspicious lesions.  Pancreas: No inflammatory changes or ductal dilatation.  Adrenals: Normal.  Kidneys: Symmetrically enhancing without hydronephrosis. Scattered bilateral nephrolithiasis. Bilateral renal cysts as well as too small to characterize hypodensities.    Peritoneum/retroperitoneum and mesentery: No free air. No organized fluid collection. No adenopathy. Diverticulosis without evidence of acute diverticulitis. Normal appendix.  GI tract: Large paraesophageal hiatal hernia. No significant dilatation or wall thickening though detailed evaluation the GI tract is limited secondary to inadequate distention.    Pelvic organs/Bladder: Globular uterus, likely containing fibroids. No adnexal masses. Bladder is normal.    Abdominal wall: A small fat containing umbilical hernia is noted.  Bones and soft tissues: Multilevel degenerative changes of the spine noted. Interval indeterminate age compression deformity of T12 with mild retropulsion. This results in less than 50% vertebral height loss. Grade 1 anterior spondylolisthesis L5 on S1. Chronic parasymphyseal fracture deformity. Chronic bilateral sacral alae insufficiency fractures.    IMPRESSION:    Filling defect identified segmental to subsegmental branches of the right lower lobe pulmonary artery, compatible with pulmonary embolism.    No aortic aneurysm or dissection. Atherosclerotic disease. 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    Scattered bilateral nephrolithiasis without hydronephrosis or obstructive uropathy.    Large paraesophageal hiatal hernia.    Interval indeterminate age compression deformity of T12 with mild retropulsion. This results in less than 50% vertebral height loss.    Additional findings as mentioned above.    These critical results were discussed via telephone at 2020 1:10 AM by Dr. Bal of radiology with Dr. Marshall, institution read-back verification policy was followed.                  FANTA BAL MD; Attending Radiologist  This document has been electronically signed. 2020  1:13AM

## 2020-11-06 NOTE — H&P ADULT - PROBLEM SELECTOR PLAN 6
Hx of Graves s/p surgery  - Continue home levothyroxine 88mcg daily Chronic, stable  - Continue home Toprol XL 25mg qD, Losartan 25mg qD  - Monitor routine hemodynamics

## 2020-11-06 NOTE — CONSULT NOTE ADULT - ATTENDING COMMENTS
Seen/examined. agree with above.  short of breath with emphysema and pe  a/c  echo, though doubt rh strain  history of mildly elevated gradients with mv replacement

## 2020-11-06 NOTE — H&P ADULT - PROBLEM SELECTOR PLAN 7
Chronic  - Continue home simvastatin 40mg daily. Hx of Graves s/p surgery  - Continue home levothyroxine 88mcg daily

## 2020-11-06 NOTE — H&P ADULT - PROBLEM SELECTOR PLAN 10
Dispo: Social Work needed as patient lives alone and is requesting services of aide  DVT PPX: Patient was given Pharmacological Prophylaxis with Lovenox 70mg x1 in the ER, recommend ordering Heparin gtt for 2PM as it may be reversible in the event of any bleed    IMPROVE VTE Individual Risk Assessment          RISK                                                          Points  [  ] Previous VTE                                                3  [ x ] Thrombophilia                                             2  [  ] Lower limb paralysis                                   2        (unable to hold up >15 seconds)    [ x ] Current Cancer                                             2         (within 6 months)  [ x ] Immobilization > 24 hrs                              1  [ x ] ICU/CCU stay > 24 hours                             1  [ x ] Age > 60                                                         1    IMPROVE VTE Score: 7

## 2020-11-06 NOTE — H&P ADULT - PROBLEM SELECTOR PLAN 1
Patient with 3 day history of worsening shortness of breath  -Likely 2/2 PE simultaneously with deconditioning, worsening lung function, and COPD   -PE provoked likely 2/2 decreased mobility with simultaneous hypercoagulability   -CTA with IV cont showing RLL filling defect c/w PE  -Hemodynamically stable, satting well on RA   -s/p Lovenox 70mg x 1 in ER, patient is HD stable, Hb 8.0 despite no evidence of bleeding, will order Heparin gtt for 2PM given reversibility in event of bleed  -Pulm(Dr. Olmos) consulted  -Heme(Dr. Temple consulted) Patient with 3 day history of worsening shortness of breath  -Likely 2/2 PE simultaneously with deconditioning, worsening lung function, and COPD   -PE provoked likely 2/2 decreased mobility with simultaneous hypercoagulability   -CTA with IV cont showing RLL filling defect c/w PE  -Hemodynamically stable, satting well on RA   -s/p Lovenox 70mg x 1 in ER, patient is HD stable, Hb 8.0 despite no evidence of bleeding, recommend ordering Heparin gtt for 2PM given reversibility in event of bleed  -Pulm(Dr. Olmos) consulted  -Heme(Dr. Temple consulted)

## 2020-11-06 NOTE — ED ADULT NURSE REASSESSMENT NOTE - NS ED NURSE REASSESS COMMENT FT1
PRBC given. Consent in chart. Risks and benefits explained to patient. Patient verbalized understanding of risks and benefits. Patient aware of possible side effects. Vital signs stable. Second RN at bedside for confirmation.
Pt tolerated blood transfusion well.  No reaction noted.  VSS. Will continue to monitor pt progress.
Patient  and report received at 0700.  Patient is awake and alert and oriented x 4.  Respirations are even and unlabored, skin is warm and dry.  Patient states she always feels a little short of breath because of her COPD, but she feels a little worse than usual.  Oxygen saturation is 98 on room air.  Patient denies pain, she denies needs.  Call bell is within reach.

## 2020-11-06 NOTE — CONSULT NOTE ADULT - SUBJECTIVE AND OBJECTIVE BOX
Patient is a 80y old  Female who presents with a chief complaint of Shortness of breath (2020 12:33)      HPI:  81 y/o F PMHx of leukemia (s/p chemo in  now in remission followed by Dr. Temple) with residual chronic anemia requiring iron infusions, MVR and AVR in , COPD (not on home O2), MARTINA on CPAP (patient admits to being non-compliant with use), hx of R ureteral stone s/p stent in 2019 revised in 2019, Graves s/p thyroid surgery, HLD, HTN, depression, GERD, who presents with worsening shortness of breath and L-sided flank pain. States she has had chronic shortness of breath, but for the past 3 days, her breathing was so bad to the point that she would become short of breath and her heart would be racing after simply taking out the trash. Patient also endorses L-sided flank pain that started about 2 weeks ago, which has also worsened over the past 3 days or so. States that the pain is a 5/10 at its worst and is exacerbated by bending or any kind of movement. The pain is intermittent and occasionally radiates to the groin. Patient also endorses chronic b/l lower extremity edema, which hasn't significantly worsened. However, pt admittedly states she hasn't been ambulating as much as she usually does because of her breathing difficulty and has been sleeping upright on her cough most days. She lives by herself with no aide at home and has been having a lot of difficulty performing a lot of her ADLs, i.e. showering, cleaning, etc. She struggles to cook food for herself and has a local friend that helps drive her to places. Patient is admits to receiving iron infusions and recently had a virtual visit with Dr. Temple last night, where she was told that she would need IV Iron transfusions. Denies any fevers, chills, chest pain, dizziness, dysuria, hematuria, or any bleeding.     Of note, patient was hospitalized in [facility] from 20-20 for influenza and was also c/o low back pain and was found to have a T12 compression fracture during the hospital course.     ED Course  Vitals: T 99.6, HR 88, /76, O2 96% on RA  Significant Labs: Hb 8.0, Hct 25.6, aPTT 25.4, D-dimer 708, AG 4, Ca 7.9, eGFR 57  EKG: NSR  CT abd/pelvis/chest w/ IV cont: Filling defect identified segmental to subsegmental branches of the right lower lobe pulmonary artery, compatible with pulmonary embolism. No aortic aneurysm or dissection. Atherosclerotic disease. 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 Scattered bilateral nephrolithiasis without hydronephrosis or obstructive uropathy. Large paraesophageal hiatal hernia. Interval indeterminate age compression deformity of T12 with mild retropulsion. This results in less than 50% vertebral height loss. (2020 03:16)        PAST MEDICAL & SURGICAL HISTORY:  Serum phosphorus decreased  Last level 1.5 at PST     Calculus of ureter  s/p R ureteral stent 2020    MARTINA on CPAP  Pt admits to be non-compliant    Leukemia  (APL, s/p chemo in , now in remission, followed by oncologist)    Anemia  Completed iron infusions every 2 months, now Hb stable    Aortic stenosis    Emphysema    Rotator cuff tear  Right    Diverticulosis of colon    Coronary atherosclerosis of native coronary artery    Carpal tunnel syndrome    Dyslipidemia    Osteoporosis    Hypertension    Hernia, hiatal    Former cigarette smoker    Depression    Asthma with COPD with exacerbation  Not on home O2    Acid reflux    Graves disease  s/p surgery    Elective surgery  Cystoscopy, right ureteroscopy, laser lithotripsy of right ureteral stone, right ureteral stent removal and replacement, right retrograde pyelogram on 19    H/O dilation and curettage    S/P ureteral stent placement  R in 2020    H/O mitral valve repair      H/O aortic valve repair      History of coronary angiogram  11    h/o  endometrial ablation      H/O cone biopsy of cervix  1974    History of tonsillectomy  childhood    After cataract, bilateral           HEALTH ISSUES - PROBLEM Dx:  COPD (chronic obstructive pulmonary disease)  COPD (chronic obstructive pulmonary disease)    Pulmonary thromboembolism    Need for prophylactic measure  Need for prophylactic measure    Acid reflux  Acid reflux    Hyperlipidemia  Hyperlipidemia    Hypothyroidism  Hypothyroidism    Hypertension  Hypertension    Anxiety  Anxiety    MARTINA on CPAP  MARTINA on CPAP    Anemia  Anemia    Pulmonary embolism  Pulmonary embolism            Other pulmonary embolism without acute cor pulmonale [I26.99]    Serum phosphorus decreased [E83.39]    Calculus of ureter [N20.1]    MARTINA on CPAP [G47.33]    Leukemia [C95.90]    Anemia [D64.9]    Spinal stenosis [724.00]    Mitral regurgitation [424.0]    Aortic stenosis [424.1]    Emphysema [492.8]    Duodenal ulcer [532.90]    Rotator cuff tear [840.4]    Diverticulosis of colon [562.10]    Coronary atherosclerosis of native coronary artery [414.01]    Carpal tunnel syndrome [354.0]    Dyslipidemia [272.4]    Obstructive sleep apnea [327.23]    Osteoporosis [733.00]    Hypertension [401.9]    Hernia, hiatal [553.3]    Former cigarette smoker [V15.82]    Depression [311]    Asthma with COPD with exacerbation [493.22]    Asthma [493.90]    Acid reflux [530.81]    Graves disease [242.00]    Elective surgery [Z41.9]    H/O dilation and curettage [Z98.890]    S/P ureteral stent placement [Z96.0]    H/O mitral valve repair [Z98.890]    H/O aortic valve repair [Z98.890]    History of coronary angiogram [V15.1]    h/o  endometrial ablation [V45.89]    H/O cone biopsy of cervix [V45.89]    History of tonsillectomy [V45.89]    After cataract, bilateral [366.50]    Carpal tunnel right repair [354.0]    Anemia [D64.9]    Asthma with COPD with exacerbation [J44.1]        FAMILY HISTORY:  FH: myocardial infarction  (Father )          [SOCIAL HISTORY: ]     smoking:       EtOH:       illicit drugs:       occupation:       marital status:       Other:       [ALLERGIES/INTOLERANCES:]  Allergies    adhesives (Rash; Other)  Cat dander- wheezing, itchy throat, SOB (Other)  penicillin (Rash)  sulfa drugs (Rash)  tetracycline (Stomach Upset)    Intolerances          [MEDICATIONS]  MEDICATIONS  (STANDING):  aspirin enteric coated 81 milliGRAM(s) Oral daily  budesonide  80 MICROgram(s)/formoterol 4.5 MICROgram(s) Inhaler 2 Puff(s) Inhalation two times a day  buPROPion . 150 milliGRAM(s) Oral <User Schedule>  buPROPion . 75 milliGRAM(s) Oral <User Schedule>  citalopram 40 milliGRAM(s) Oral daily  famotidine    Tablet 40 milliGRAM(s) Oral two times a day  heparin  Infusion.  Unit(s)/Hr (13 mL/Hr) IV Continuous <Continuous>  levothyroxine 88 MICROGram(s) Oral daily  losartan 25 milliGRAM(s) Oral daily  metoprolol succinate ER 25 milliGRAM(s) Oral daily  simvastatin 40 milliGRAM(s) Oral at bedtime  tiotropium 18 MICROgram(s) Capsule 1 Capsule(s) Inhalation daily    MEDICATIONS  (PRN):  ALBUTerol    90 MICROgram(s) HFA Inhaler 2 Puff(s) Inhalation every 6 hours PRN Shortness of Breath and/or Wheezing  heparin   Injectable 6000 Unit(s) IV Push every 6 hours PRN For aPTT less than 40  heparin   Injectable 3000 Unit(s) IV Push every 6 hours PRN For aPTT between 40 - 57        [REVIEW OF SYSTEMS: ]  CONSTITUTIONAL: normal, no fever, no shakes, no chills   EYES: No eye pain, no visual disturbances, no discharge  ENMT:  no discharge  NECK: No pain, no stiffness  BREASTS: No pain, no masses, no nipple discharge  RESPIRATORY: No cough, no wheezing, no chills, no hemoptysis; No shortness of breath  CARDIOVASCULAR: No chest pain, no palpitations, no dizziness, no leg swelling  GASTROINTESTINAL: No abdominal, no epigastric pain. No nausea, no vomiting, no hematemesis; No diarrhea , no constipation. No melena, no hematochezia.  GENITOURINARY: No dysuria, no frequency, no hematuria, no incontinence  NEUROLOGICAL: No headaches, no memory loss, no loss of strength, no numbness, no tremors  SKIN: No itching, no burning, no rashes, no lesions   LYMPH NODES: No enlarged glands  ENDOCRINE: No heat or cold intolerance; No hair loss  MUSCULOSKELETAL: No joint pain or swelling; No muscle, no back, no extremity pain  PSYCHIATRIC: No depression, no anxiety, no mood swings, no difficulty sleeping  HEME/LYMPH: No easy bruising, no bleeding gums      [VITALS SIGNS 24hrs]  Vital Signs Last 24 Hrs  T(C): 36.3 (2020 11:28), Max: 37.6 (2020 20:24)  T(F): 97.4 (2020 11:28), Max: 99.6 (2020 20:24)  HR: 82 (2020 11:28) (43 - 88)  BP: 124/84 (2020 17:20) (110/73 - 177/85)  BP(mean): --  RR: 18 (2020 11:28) (15 - 18)  SpO2: 95% (2020 11:28) (95% - 98%)    [PHYSICAL EXAM]  General: adult in NAD,  WN,  WD.  HEENT: clear oropharynx, anicteric sclera, pink conjunctivae.  Neck: supple, no masses.  CV: normal S1S2, no murmur, no rubs, no gallops.  Lungs: clear to auscultation, no wheezes, no rales, no rhonchi.  Abdomen: soft, non-tender, non-distended, no hepatosplenomegaly, normal BS, no guarding.  Ext: no clubbing, no cyanosis, no edema.  Skin: no rashes,  no petechiae, no venous stasis changes.  Neuro: alert and oriented X3, no focal motor deficits.  LN: no SC JUAN.      [LABS: ]                        8.9    6.06  )-----------( 179      ( 2020 07:13 )             28.4     CBC Full  -  ( 2020 07:13 )  WBC Count : 6.06 K/uL  RBC Count : 3.29 M/uL  Hemoglobin : 8.9 g/dL  Hematocrit : 28.4 %  Platelet Count - Automated : 179 K/uL  Mean Cell Volume : 86.3 fl  Mean Cell Hemoglobin : 27.1 pg  Mean Cell Hemoglobin Concentration : 31.3 gm/dL  Auto Neutrophil # : x  Auto Lymphocyte # : x  Auto Monocyte # : x  Auto Eosinophil # : x  Auto Basophil # : x  Auto Neutrophil % : x  Auto Lymphocyte % : x  Auto Monocyte % : x  Auto Eosinophil % : x  Auto Basophil % : x    11-06    144  |  114<H>  |  14  ----------------------------<  93  4.0   |  25  |  0.76    Ca    7.5<L>      2020 07:13    TPro  5.7<L>  /  Alb  2.8<L>  /  TBili  0.2  /  DBili  x   /  AST  16  /  ALT  13  /  AlkPhos  64  11-06    PT/INR - ( 2020 22:01 )   PT: 12.2 sec;   INR: 1.04 ratio         PTT - ( 2020 22:01 )  PTT:25.4 sec  LIVER FUNCTIONS - ( 2020 07:13 )  Alb: 2.8 g/dL / Pro: 5.7 g/dL / ALK PHOS: 64 U/L / ALT: 13 U/L / AST: 16 U/L / GGT: x           CARDIAC MARKERS ( 2020 07:47 )  <.015 ng/mL / x     / x     / x     / x          Urinalysis Basic - ( 2020 02:43 )    Color: Yellow / Appearance: Clear / S.005 / pH: x  Gluc: x / Ketone: Negative  / Bili: Negative / Urobili: Negative   Blood: x / Protein: Negative / Nitrite: Negative   Leuk Esterase: Negative / RBC: x / WBC x   Sq Epi: x / Non Sq Epi: x / Bacteria: x        WBC  TREND (5 Days)  WBC Count: 6.06 K/uL ( @ 07:13)  WBC Count: 5.72 K/uL ( @ 22:01)    HGB  TREND (5 Days)  Hemoglobin: 8.9 g/dL ( @ 07:13)  Hemoglobin: 8.0 g/dL ( @ 22:01)    HCT  TREND (5 Days)  Hematocrit: 28.4 % ( 07:13)  Hematocrit: 25.6 % ( @ 22:01)    PLT  TREND (5 Days)  Platelet Count - Automated: 179 K/uL ( @ 07:13)  Platelet Count - Automated: 208 K/uL ( @ 22:01)      [RADIOLOGY & ADDITIONAL STUDIES:]        Patient is a 80y old  Female who presents with a chief complaint of Shortness of breath (2020 12:33)      HPI:  79 y/o F PMHx of leukemia (s/p chemo in  now in remission followed by Dr. Temple) with residual chronic anemia requiring iron infusions, MVR and AVR in , COPD (not on home O2), MARTINA on CPAP (patient admits to being non-compliant with use), hx of R ureteral stone s/p stent in 2019 revised in 2019, Graves s/p thyroid surgery, HLD, HTN, depression, GERD, who presents with worsening shortness of breath and L-sided flank pain. States she has had chronic shortness of breath, but for the past 3 days, her breathing was so bad to the point that she would become short of breath and her heart would be racing after simply taking out the trash. Patient also endorses L-sided flank pain that started about 2 weeks ago, which has also worsened over the past 3 days or so. States that the pain is a 5/10 at its worst and is exacerbated by bending or any kind of movement. The pain is intermittent and occasionally radiates to the groin. Patient also endorses chronic b/l lower extremity edema, which hasn't significantly worsened. However, pt admittedly states she hasn't been ambulating as much as she usually does because of her breathing difficulty and has been sleeping upright on her cough most days. She lives by herself with no aide at home and has been having a lot of difficulty performing a lot of her ADLs, i.e. showering, cleaning, etc. She struggles to cook food for herself and has a local friend that helps drive her to places. Patient is admits to receiving iron infusions and recently had a virtual visit with Dr. Temple last night, where she was told that she would need IV Iron transfusions. Denies any fevers, chills, chest pain, dizziness, dysuria, hematuria, or any bleeding.     Of note, patient was hospitalized in [facility] from 20-20 for influenza and was also c/o low back pain and was found to have a T12 compression fracture during the hospital course.     ED Course  Vitals: T 99.6, HR 88, /76, O2 96% on RA  Significant Labs: Hb 8.0, Hct 25.6, aPTT 25.4, D-dimer 708, AG 4, Ca 7.9, eGFR 57  EKG: NSR  CT abd/pelvis/chest w/ IV cont: Filling defect identified segmental to subsegmental branches of the right lower lobe pulmonary artery, compatible with pulmonary embolism. No aortic aneurysm or dissection. Atherosclerotic disease. 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 Scattered bilateral nephrolithiasis without hydronephrosis or obstructive uropathy. Large paraesophageal hiatal hernia. Interval indeterminate age compression deformity of T12 with mild retropulsion. This results in less than 50% vertebral height loss. (2020 03:16)    Heme asked to see pt  c/o 1wk BOOTH  in office noted anemia.   1wk prior had dark stools.   Felt week after and in bed alot for last 2-3d      PAST MEDICAL & SURGICAL HISTORY:  Serum phosphorus decreased; Last level 1.5 at PST   Calculus of ureter; s/p R ureteral stent 2020  MARTINA on CPAP; Pt admits to be non-compliant  Leukemia: (APL, s/p chemo in , now in remission, followed by oncologist)  Anemia: Completed iron infusions every 2 months, now Hb stable  Aortic stenosis  Emphysema  Rotator cuff tear; Right  Diverticulosis of colon  Coronary atherosclerosis of native coronary artery  Carpal tunnel syndrome  Dyslipidemia  Osteoporosis  Hypertension  Hernia, hiatal  Former cigarette smoker  Depression  Asthma with COPD with exacerbation; Not on home O2  Acid reflux  Graves disease; s/p surgery  Elective surgery; Cystoscopy, right ureteroscopy, laser lithotripsy of right ureteral stone, right ureteral stent removal and replacement, right retrograde pyelogram on 19  H/O dilation and curettage  S/P ureteral stent placement; R in 2020  H/O mitral valve repair;   H/O aortic valve repair;   History of coronary angiogram; 12  h/o  endometrial ablation;   H/O cone biopsy of cervix;   History of tonsillectomy; childhood  After cataract, bilateral;        HEALTH ISSUES - PROBLEM Dx:  COPD (chronic obstructive pulmonary disease)  Pulmonary thromboembolism  Acid reflux  Hyperlipidemia  Hypothyroidism  Hypertension  Anxiety  MARTINA on CPAP  Anemia  Pulmonary embolism        Other pulmonary embolism without acute cor pulmonale [I26.99]  Anemia [D64.9]    Serum phosphorus decreased [E83.39]  Calculus of ureter [N20.1]  MARTINA on CPAP [G47.33]  Leukemia [C95.90]  Spinal stenosis [724.00]  Mitral regurgitation [424.0]  Aortic stenosis [424.1]  Emphysema [492.8]  Duodenal ulcer [532.90]  Rotator cuff tear [840.4]  Diverticulosis of colon [562.10]  Coronary atherosclerosis of native coronary artery [414.01]  Carpal tunnel syndrome [354.0]  Dyslipidemia [272.4]  Obstructive sleep apnea [327.23]  Osteoporosis [733.00]  Hypertension [401.9]  Hernia, hiatal [553.3]  Former cigarette smoker [V15.82]  Depression [311]  Asthma with COPD with exacerbation [493.22]  Asthma [493.90]  Acid reflux [530.81]  Graves disease [242.00]  Elective surgery [Z41.9]  H/O dilation and curettage [Z98.890]  S/P ureteral stent placement [Z96.0]  H/O mitral valve repair [Z98.890]  H/O aortic valve repair [Z98.890]  History of coronary angiogram [V15.1]  h/o  endometrial ablation [V45.89]  H/O cone biopsy of cervix [V45.89]  History of tonsillectomy [V45.89]  After cataract, bilateral [366.50]  Carpal tunnel right repair [354.0]  Anemia [D64.9]  Asthma with COPD with exacerbation [J44.1]        FAMILY HISTORY:  FH: myocardial infarction  (Father )      [SOCIAL HISTORY: ]     smoking:  ex-smoker     EtOH:  denies     illicit drugs:  denies     occupation:  retired     marital status:       Other:       [ALLERGIES/INTOLERANCES:]  Allergies     adhesives (Rash; Other)     Cat dander- wheezing, itchy throat, SOB (Other)     penicillin (Rash)     sulfa drugs (Rash)     tetracycline (Stomach Upset)  Intolerances      [MEDICATIONS]  MEDICATIONS  (STANDING):  aspirin enteric coated 81 milliGRAM(s) Oral daily  budesonide  80 MICROgram(s)/formoterol 4.5 MICROgram(s) Inhaler 2 Puff(s) Inhalation two times a day  buPROPion . 150 milliGRAM(s) Oral <User Schedule>  buPROPion . 75 milliGRAM(s) Oral <User Schedule>  citalopram 40 milliGRAM(s) Oral daily  famotidine    Tablet 40 milliGRAM(s) Oral two times a day  heparin  Infusion.  Unit(s)/Hr (13 mL/Hr) IV Continuous <Continuous>  levothyroxine 88 MICROGram(s) Oral daily  losartan 25 milliGRAM(s) Oral daily  metoprolol succinate ER 25 milliGRAM(s) Oral daily  simvastatin 40 milliGRAM(s) Oral at bedtime  tiotropium 18 MICROgram(s) Capsule 1 Capsule(s) Inhalation daily    MEDICATIONS  (PRN):  ALBUTerol    90 MICROgram(s) HFA Inhaler 2 Puff(s) Inhalation every 6 hours PRN Shortness of Breath and/or Wheezing  heparin   Injectable 6000 Unit(s) IV Push every 6 hours PRN For aPTT less than 40  heparin   Injectable 3000 Unit(s) IV Push every 6 hours PRN For aPTT between 40 - 57      [REVIEW OF SYSTEMS: ]  CONSTITUTIONAL: normal, no fever, no shakes, no chills   EYES: No eye pain, no visual disturbances, no discharge  ENMT:  no discharge  NECK: No pain, no stiffness  BREASTS: No pain, no masses, no nipple discharge  RESPIRATORY: No cough, no wheezing, no chills, no hemoptysis; No shortness of breath  CARDIOVASCULAR: No chest pain, no palpitations, no dizziness, no leg swelling  GASTROINTESTINAL: No abdominal, no epigastric pain. No nausea, no vomiting, no hematemesis; No diarrhea , no constipation. No melena, no hematochezia.  GENITOURINARY: No dysuria, no frequency, no hematuria, no incontinence  NEUROLOGICAL: No headaches, no memory loss, no loss of strength, no numbness, no tremors  SKIN: No itching, no burning, no rashes, no lesions   LYMPH NODES: No enlarged glands  ENDOCRINE: No heat or cold intolerance; No hair loss  MUSCULOSKELETAL: No joint pain or swelling; No muscle, no back, no extremity pain  PSYCHIATRIC: No depression, no anxiety, no mood swings, no difficulty sleeping  HEME/LYMPH: No easy bruising, no bleeding gums      [VITALS SIGNS 24hrs]  Vital Signs Last 24 Hrs  T(C): 36.3 (2020 11:28), Max: 37.6 (2020 20:24)  T(F): 97.4 (2020 11:28), Max: 99.6 (2020 20:24)  HR: 82 (2020 11:28) (43 - 88)  BP: 124/84 (2020 17:20) (110/73 - 177/85)  BP(mean): --  RR: 18 (2020 11:28) (15 - 18)  SpO2: 95% (2020 11:28) (95% - 98%)      [PHYSICAL EXAM]  General: adult in NAD,  WN,  WD.  HEENT: clear oropharynx, anicteric sclera, pink conjunctivae.  Neck: supple, no masses.  CV: normal S1S2, no murmur, no rubs, no gallops.  Lungs: clear to auscultation, no wheezes, no rales, no rhonchi.  Abdomen: soft, non-tender, non-distended, no hepatosplenomegaly, normal BS, no guarding.  Ext: no clubbing, no cyanosis, no edema.  Skin: no rashes,  no petechiae, no venous stasis changes.  Neuro: alert and oriented X3, no focal motor deficits.  LN: no SC JUAN.      [LABS: ]                        8.9    6.06  )-----------( 179      ( 2020 07:13 )             28.4     CBC Full  -  ( 2020 07:13 )  WBC Count : 6.06 K/uL  RBC Count : 3.29 M/uL  Hemoglobin : 8.9 g/dL  Hematocrit : 28.4 %  Platelet Count - Automated : 179 K/uL  Mean Cell Volume : 86.3 fl  Mean Cell Hemoglobin : 27.1 pg  Mean Cell Hemoglobin Concentration : 31.3 gm/dL  Auto Neutrophil # : x  Auto Lymphocyte # : x  Auto Monocyte # : x  Auto Eosinophil # : x  Auto Basophil # : x  Auto Neutrophil % : x  Auto Lymphocyte % : x  Auto Monocyte % : x  Auto Eosinophil % : x  Auto Basophil % : x        144  |  114<H>  |  14  ----------------------------<  93  4.0   |  25  |  0.76    Ca    7.5<L>      2020 07:13    TPro  5.7<L>  /  Alb  2.8<L>  /  TBili  0.2  /  DBili  x   /  AST  16  /  ALT  13  /  AlkPhos  64  11-06    PT/INR - ( 2020 22:01 )   PT: 12.2 sec;   INR: 1.04 ratio         PTT - ( 2020 22:01 )  PTT:25.4 sec  LIVER FUNCTIONS - ( 2020 07:13 )  Alb: 2.8 g/dL / Pro: 5.7 g/dL / ALK PHOS: 64 U/L / ALT: 13 U/L / AST: 16 U/L / GGT: x           CARDIAC MARKERS ( 2020 07:47 )  <.015 ng/mL / x     / x     / x     / x          Urinalysis Basic - ( 2020 02:43 )    Color: Yellow / Appearance: Clear / S.005 / pH: x  Gluc: x / Ketone: Negative  / Bili: Negative / Urobili: Negative   Blood: x / Protein: Negative / Nitrite: Negative   Leuk Esterase: Negative / RBC: x / WBC x   Sq Epi: x / Non Sq Epi: x / Bacteria: x        WBC  TREND (5 Days)  WBC Count: 6.06 K/uL ( @ 07:13)  WBC Count: 5.72 K/uL ( @ 22:01)    HGB  TREND (5 Days)  Hemoglobin: 8.9 g/dL ( @ 07:13)  Hemoglobin: 8.0 g/dL ( @ 22:01)    HCT  TREND (5 Days)  Hematocrit: 28.4 % ( @ 07:13)  Hematocrit: 25.6 % ( @ 22:01)    PLT  TREND (5 Days)  Platelet Count - Automated: 179 K/uL ( @ :13)  Platelet Count - Automated: 208 K/uL ( @ 22:01)      [RADIOLOGY & ADDITIONAL STUDIES:]       < from: CT Angio Chest w/ IV Cont (20 @ 23:52) >  EXAM:  CT ANGIO ABD PELV (W)AW IC                        EXAM:  CT ANGIO CHEST (W)AW IC                        PROCEDURE DATE:  2020    INTERPRETATION:  CT ANGIOGRAPHY OF THE CHEST, ABDOMEN AND PELVIS  INDICATION: Asthma. Hypertension. Flank pain. Evaluate for dissection and pulmonary embolism.  TECHNIQUE:   ·	Noncontrast CT of the chest was performed, followed by CT angiography performed of the chest, abdomen and pelvis after administration of intravenous contrast. Postprocessed MIP and 3-D reformatted images were created and reviewed.  ·	90 mL of Omnipaque 350 contrast material was injected IV.  COMPARISON: CT abdomen pelvis 2020.  FINDINGS:  ·	Vessels: Precontrast images show no evidence of intramural hematoma.  Normal caliber aorta. No intimal flap is seen to suggest aortic dissection. Atherosclerotic disease of the aorta and its branches. Evaluation of the aortic root is limited due to cardiac pulsation.  ·	Celiac axis and SMA are patent. There is plaque ornonocclusive thrombus identified within the proximal celiac vessel on image 73 of series 602 which is otherwise patent. RASHID is patent. Distal SMA and RASHID branches are not well assessed by CT technique.Bilateral renal arteries are patent. Bilateral iliac arteries are normal caliber.  ·	There is filling defect identified segmental to subsegmental branches of the right lower lobe pulmonary artery as best seen on images 72 through 73 of series 4, compatible with pulmonary embolism.  ·	Thorax:  ·	Airways: Central airways patent.  ·	Lungs: Trace bilateral pleural effusions with bibasilar opacities, likely represent atelectasis and/or scarring.  ·	Mediastinum and lymph nodes: No mass or hemorrhage. No bulky adenopathy.  ·	Heart: Mild cardiomegaly. No pericardial effusion.Cardiac valve replacement.  ·	Soft tissues: Median sternotomy.  ·	Liver: Right posterior hepatic lobe cyst. Additional too small to characterize hypodensities.  ·	Biliary: No significant dilatation. Cholelithiasis.  ·	Spleen: No suspicious lesions.  ·	Pancreas: No inflammatory changes or ductal dilatation.  ·	Adrenals: Normal.  ·	Kidneys: Symmetrically enhancing without hydronephrosis. Scattered bilateral nephrolithiasis. Bilateral renal cysts as well as too small to characterize hypodensities.  ·	Peritoneum/retroperitoneum and mesentery: No free air. No organized fluid collection. No adenopathy. Diverticulosis without evidence of acute diverticulitis. Normal appendix.  ·	GI tract: Large paraesophageal hiatal hernia. No significant dilatationor wall thickening though detailed evaluation the GI tract is limited secondary to inadequate distention.  ·	Pelvic organs/Bladder: Globular uterus, likely containing fibroids. No adnexal masses. Bladder is normal.  ·	  ·	Abdominal wall: A small fat containing umbilical hernia is noted.  ·	Bones and soft tissues: Multilevel degenerative changes of the spine noted. Interval indeterminate age compression deformity of T12 with mild retropulsion. This results in less than 50% vertebral height loss. Grade 1anterior spondylolisthesis L5 on S1. Chronic parasymphyseal fracture deformity. Chronic bilateral sacral alae insufficiency fractures.  IMPRESSION:  ·	Filling defect identified segmental to subsegmental branches of the right lower lobe pulmonary artery, compatible with pulmonary embolism.  ·	No aortic aneurysm or dissection. Atherosclerotic disease. 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  ·	Scattered bilateral nephrolithiasis without hydronephrosis or obstructive uropathy.  ·	Large paraesophageal hiatal hernia.  ·	Interval indeterminate age compression deformity of T12 with mild retropulsion. This results in less than 50% vertebral height loss.  ·	Additional findings as mentioned above.  ·	These critical results were discussed via telephone at 2020 1:10 AM by Dr. Salmon of radiology with Dr. Marshall, institution read-back verification policy was followed.  < end of copied text >        < from: US Duplex Venous Lower Ext Complete, Bilateral (20 @ 08:47) >  EXAM:  US DPLX LWR EXT VEINS COMPL BI                        PROCEDURE DATE:  2020    ·	INTERPRETATION:  CLINICAL INFORMATION: New pulmonary embolism  ·	COMPARISON: None available.  TECHNIQUE: Duplex sonography of the BILATERAL LOWER extremity veins with color and spectral Doppler, with and without compression.  FINDINGS:  ·	There is normal compressibility of the bilateral common femoral, femoral and popliteal veins.  ·	Doppler examination shows normal spontaneous and phasic flow.  ·	No left calf vein thrombosis is detected. Noncompressible thrombus is noted in the right posterior tibial vein, compatible with deep vein thrombosis.  IMPRESSION:  ·	Below-knee deep vein thrombosis in the right posterior tibial vein.  ·	No evidence for deep vein thrombosis in the left leg.  < end of copied text >

## 2020-11-06 NOTE — CONSULT NOTE ADULT - PROBLEM SELECTOR RECOMMENDATION 9
79 y/o F PMHx of leukemia (s/p chemo in 2012 now in remission followed by Dr. Temple) with residual chronic anemia requiring iron infusions, MVR and AVR in 2011, COPD (not on home O2), MARTINA on CPAP (patient admits to being non-compliant with use), hx of R ureteral stone s/p stent in 9/2019 revised in 12/2019, Graves s/p thyroid surgery, HLD, HTN, depression, GERD, who presents with worsening shortness of breath  martina - does not use CPAP - sleep hygiene -   copd - ex smoker -   new PE - got lovenox last night - will likely need Heparin gtt until Anemia - bleeding eval is complete  will check LE doppler  will check Trop and ProBNP - eval for RV strain  pt is on RA - vss  copd - proventil PRN - Symbicort - Spiriva - VBG...  will need Heme eval and follow up - known to Dr Temple -   Heme occult -   [facility] rx regimen - known Heart disease - valv heart disease -

## 2020-11-06 NOTE — H&P ADULT - NSHPPHYSICALEXAM_GEN_ALL_CORE
T(C): 36.9 (11-06-20 @ 01:33), Max: 37.6 (11-05-20 @ 20:24)  HR: 87 (11-06-20 @ 01:33) (87 - 88)  BP: 144/81 (11-06-20 @ 01:33) (133/76 - 144/81)  RR: 15 (11-06-20 @ 01:33) (15 - 18)  SpO2: 96% (11-06-20 @ 01:33) (96% - 96%)    GENERAL: patient appears well, no acute distress, appropriately interactive  EYES: sclera clear, no exudates  ENMT: oropharynx clear without erythema, no exudates, moist mucous membranes  NECK: supple, soft  LUNGS: good air entry bilaterally, clear to auscultation, symmetric breath sounds; no wheezing or rhonchi appreciated  HEART: soft S1/S2, regular rate and rhythm, no murmurs noted; +2 b/l lower extremity pitting edema  GASTROINTESTINAL: abdomen is soft, nontender, nondistended, normoactive bowel sounds  INTEGUMENT: good skin turgor, warm skin, appears well perfused  MUSCULOSKELETAL: no clubbing or cyanosis, no obvious deformity  NEUROLOGIC: awake, alert, oriented x3, good muscle tone in all 4 extremities  HEME/LYMPH: no obvious ecchymosis or petechiae

## 2020-11-06 NOTE — H&P ADULT - PROBLEM SELECTOR PLAN 2
Acute, pt with h/o chronic IV iron transfusions in the past, follows Dr. Temple, scheduled to have transfusion in the near future  -Hb 8, per chart, baseline Hb 11.7-12.6  -Likely 2/2 leukemia  -monitor for s/sx of bleeding  -Heme(Dr. Temple) consulted

## 2020-11-06 NOTE — H&P ADULT - HISTORY OF PRESENT ILLNESS
79 y/o F PMHx of leukemia (s/p chemo in 2012 now in remission followed by Dr. Temple) with residual chronic anemia requiring iron infusions, MVR and AVR in 2011, COPD (not on home O2), MARTINA on CPAP (patient admits to being non-compliant with use), hx of R ureteral stone s/p stent in 9/2019 revised in 12/2019, Graves s/p thyroid surgery, HLD, HTN, depression, GERD, who presents with worsening shortness of breath and L-sided flank pain. States she has had chronic shortness of breath, but for the past 3 days, her breathing was so bad to the point that she would become short of breath and her heart would be racing after simply taking out the trash. Patient also endorses L-sided flank pain that started about 2 weeks ago, which has also worsened over the past 3 days or so. States that the pain is a 5/10 at its worst and is exacerbated by bending or any kind of movement. The pain is intermittent and occasionally radiates to the groin. Patient also endorses chronic b/l lower extremity edema, which hasn't significantly worsened. However, pt admittedly states she hasn't been ambulating as much as she usually does because of her breathing difficulty and has been sleeping upright on her cough most days. She lives by herself with no aide at home and has been having a lot of difficulty performing a lot of her ADLs, i.e. showering, cleaning, etc. She struggles to cook food for herself and has a local friend that helps drive her to places. Patient is admits to receiving iron infusions and recently had a virtual visit with Dr. Temple last night, where she was told that she would need IV Iron transfusions. Denies any fevers, chills, chest pain, dizziness, dysuria, hematuria, or any bleeding.     Of note, patient was hospitalized in [facility] from 1/16/20-1/20/20 for influenza and was also c/o low back pain and was found to have a T12 compression fracture during the hospital course.     ED Course  Vitals: T 99.6, HR 88, /76, O2 96% on RA  Significant Labs: Hb 8.0, Hct 25.6, aPTT 25.4, D-dimer 708, AG 4, Ca 7.9, eGFR 57  EKG: NSR  CT abd/pelvis/chest w/ IV cont: Filling defect identified segmental to subsegmental branches of the right lower lobe pulmonary artery, compatible with pulmonary embolism. No aortic aneurysm or dissection. Atherosclerotic disease. 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 Scattered bilateral nephrolithiasis without hydronephrosis or obstructive uropathy. Large paraesophageal hiatal hernia. Interval indeterminate age compression deformity of T12 with mild retropulsion. This results in less than 50% vertebral height loss.

## 2020-11-06 NOTE — CONSULT NOTE ADULT - ASSESSMENT
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 Last hgb 12g/dL in office before recent decline    Admitted with Anemia, and PE  ·	Filling defect identified segmental to subsegmental branches of the right lower lobe pulmonary artery, compatible with pulmonary embolism.    Comorbid atheroscloerosis  ·	Atherosclerotic disease. 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          RECOMMENDATIONS  Transfuse PRBC as clinically indicated.   Transfuse PRBC if Hgb <7.0 or if symptomatic.   Follow CBC    Check Anemia studies.     DVT Prophyalxis    Thank you for consulting us.     I have discussed the above plan of care with patient/family in detail. They expressed understanding of the treatment plan . Risks, benefits and alternatives discussed in detail. I have asked if they have any questions or concerns and appropriately addressed them; all questions answered to their satisfactions and in lay terms.     Discussed with  xxxxxxx.    > xxxxxx minutes spent in direct patient care, examining and counseling patient,  reviewing  the notes, lab data/ imaging , discussion with multidisciplinary team.    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 Last hgb 12g/dL in office before recent decline    Admitted with Anemia, and PE and DVT. Possibly provoked by immobility  ·	Filling defect identified segmental to subsegmental branches of the right lower lobe pulmonary artery, compatible with pulmonary embolism.  ·	Below-knee deep vein thrombosis in the right posterior tibial vein.    Comorbid atherosclerosis  ·	Atherosclerotic disease. 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      RECOMMENDATIONS  Agree with IV heparin for tx PE due to concurrent concern for bleeding.   Monitor CBC closely.   Transfuse PRBC as clinically indicated.   Transfuse PRBC if Hgb <7.0 or if symptomatic.   Check FOBT    Thank you for consulting us.     I have discussed the above plan of care with patient/family in detail. They expressed understanding of the treatment plan . Risks, benefits and alternatives discussed in detail. I have asked if they have any questions or concerns and appropriately addressed them; all questions answered to their satisfactions and in lay terms.     Discussed with Dr Burton.    > 56 minutes spent in direct patient care, examining and counseling patient,  reviewing  the notes, lab data/ imaging , discussion with multidisciplinary team.

## 2020-11-06 NOTE — CONSULT NOTE ADULT - ASSESSMENT
**** INCOMPLETE ****     79 y/o F PMHx of leukemia (s/p chemo in 2012 now in remission followed by Dr. Temple) with residual chronic anemia requiring iron infusions, MVR and AVR in 2012,, COPD (not on home O2), MARTINA on CPAP (patient admits to being non-compliant with use), hx of R ureteral stone s/p stent in 9/2019 revised in 12/2019, Graves s/p thyroid surgery, HLD, HTN, depression, GERD who presented to the ER complaining of SOB, found to have acute PE and DVT of right posterior tibial vein     - Patient complaining of significant BOOTH. She does experience SOB and BOOTH 2/2 COPD, however this SOB is also driven by her acute PE and DVT   -s/p Lovenox 70 mg x 1, Heparin gtt was held as patient was receiving one unit of blood for her anemia. Start___   -Would recommend obtaining TTE to evaluate for RV strain, pro BNP only mildly elevated and troponin negative   - EKG:   -Hgb improved s/p 1 unit of PRBCs; transfuse if Hgb < 8 or if hemodynamically unstable   - No meaningful evidence of volume overload.  - BP elevated, would continue losartan 25 mg daily and metoprolol 25 mg daily for now, monitor routine hemodynamics.  - Monitor and replete lytes, keep K>4, Mg>2.  - Other cardiovascular workup will depend on clinical course.  - All other workup per primary team.  - Will continue to follow.             79 y/o F PMHx of leukemia (s/p chemo in 2012 now in remission followed by Dr. Temple) with residual chronic anemia requiring iron infusions, MVR and AVR in 2012,, COPD (not on home O2), MARTINA on CPAP (patient admits to being non-compliant with use), hx of R ureteral stone s/p stent in 9/2019 revised in 12/2019, Graves s/p thyroid surgery, HLD, HTN, depression, GERD who presented to the ER complaining of SOB, found to have acute PE and DVT of right posterior tibial vein     - Patient complaining of significant BOOTH. She does experience SOB and BOOTH 2/2 COPD/emphysema, however this SOB is likely also being driven by her acute PE and DVT   - Recommend continuation of full dose AC.   - Recommend obtaining TTE to evaluate for RV strain, pro BNP only mildly elevated and troponin negative. No need to trend proBNP and troponin as patient is currently stable.   - Would obtain O2 saturation at rest and with ambulation to evaluate progression of acute process.   - EKG: significant artifact, however NSR noted   - Hgb improved s/p 1 unit of PRBCs; transfuse if Hgb < 8 or if hemodynamically unstable. Anemia management in coordination with AC management per heme/onc recommendations   - No meaningful evidence of volume overload.  - BP elevated, would continue losartan 25 mg daily and metoprolol 25 mg daily for now, monitor routine hemodynamics.  - Monitor and replete lytes, keep K>4, Mg>2.  - Other cardiovascular workup will depend on clinical course.  - All other workup per primary team.  - Will continue to follow.

## 2020-11-06 NOTE — H&P ADULT - PROBLEM SELECTOR PLAN 4
Chronic, stable  - Continue home bupropion 150mg in the morning and 75mg in the evening  - Continue home Celexa 40mg daily. Chronic, not on home O2  -Takes Trelegy Ellipta at home, but has difficulty affording it~Will start therapeutic interchange Symbicort and Spiriva  -Albuterol nebs TID   -satting well on RA, keep O2>88

## 2020-11-06 NOTE — H&P ADULT - ATTENDING COMMENTS
Patient seen and examined. NAD. No complaints.  Patient comes with SOB/BOOTH  Found to be anemic with PE and DVT  Received lovenox and PRBC in ER  OK to start iv heparin as per heme  Monitor CBC  Check ECHO to evaluate for RV strain  Pulm/cardio/heme consults    MINI Burton MD

## 2020-11-07 LAB
ANION GAP SERPL CALC-SCNC: 3 MMOL/L — LOW (ref 5–17)
APTT BLD: 178.2 SEC — CRITICAL HIGH (ref 27.5–35.5)
APTT BLD: >200 SEC — CRITICAL HIGH (ref 27.5–35.5)
BUN SERPL-MCNC: 14 MG/DL — SIGNIFICANT CHANGE UP (ref 7–23)
CALCIUM SERPL-MCNC: 8 MG/DL — LOW (ref 8.5–10.1)
CHLORIDE SERPL-SCNC: 113 MMOL/L — HIGH (ref 96–108)
CO2 SERPL-SCNC: 28 MMOL/L — SIGNIFICANT CHANGE UP (ref 22–31)
CREAT SERPL-MCNC: 0.92 MG/DL — SIGNIFICANT CHANGE UP (ref 0.5–1.3)
GLUCOSE SERPL-MCNC: 94 MG/DL — SIGNIFICANT CHANGE UP (ref 70–99)
HCT VFR BLD CALC: 26.9 % — LOW (ref 34.5–45)
HGB BLD-MCNC: 8.8 G/DL — LOW (ref 11.5–15.5)
INR BLD: 1.15 RATIO — SIGNIFICANT CHANGE UP (ref 0.88–1.16)
MAGNESIUM SERPL-MCNC: 2.3 MG/DL — SIGNIFICANT CHANGE UP (ref 1.6–2.6)
MCHC RBC-ENTMCNC: 27.8 PG — SIGNIFICANT CHANGE UP (ref 27–34)
MCHC RBC-ENTMCNC: 32.7 GM/DL — SIGNIFICANT CHANGE UP (ref 32–36)
MCV RBC AUTO: 85.1 FL — SIGNIFICANT CHANGE UP (ref 80–100)
NRBC # BLD: 0 /100 WBCS — SIGNIFICANT CHANGE UP (ref 0–0)
PLATELET # BLD AUTO: 186 K/UL — SIGNIFICANT CHANGE UP (ref 150–400)
POTASSIUM SERPL-MCNC: 4.3 MMOL/L — SIGNIFICANT CHANGE UP (ref 3.5–5.3)
POTASSIUM SERPL-SCNC: 4.3 MMOL/L — SIGNIFICANT CHANGE UP (ref 3.5–5.3)
PROTHROM AB SERPL-ACNC: 13.4 SEC — SIGNIFICANT CHANGE UP (ref 10.6–13.6)
RBC # BLD: 3.16 M/UL — LOW (ref 3.8–5.2)
RBC # FLD: 16.7 % — HIGH (ref 10.3–14.5)
SODIUM SERPL-SCNC: 144 MMOL/L — SIGNIFICANT CHANGE UP (ref 135–145)
WBC # BLD: 5.08 K/UL — SIGNIFICANT CHANGE UP (ref 3.8–10.5)
WBC # FLD AUTO: 5.08 K/UL — SIGNIFICANT CHANGE UP (ref 3.8–10.5)

## 2020-11-07 PROCEDURE — 99232 SBSQ HOSP IP/OBS MODERATE 35: CPT

## 2020-11-07 RX ORDER — HEPARIN SODIUM 5000 [USP'U]/ML
900 INJECTION INTRAVENOUS; SUBCUTANEOUS
Qty: 25000 | Refills: 0 | Status: DISCONTINUED | OUTPATIENT
Start: 2020-11-07 | End: 2020-11-08

## 2020-11-07 RX ORDER — ACETAMINOPHEN 500 MG
650 TABLET ORAL ONCE
Refills: 0 | Status: COMPLETED | OUTPATIENT
Start: 2020-11-07 | End: 2020-11-07

## 2020-11-07 RX ADMIN — CITALOPRAM 40 MILLIGRAM(S): 10 TABLET, FILM COATED ORAL at 11:23

## 2020-11-07 RX ADMIN — FAMOTIDINE 40 MILLIGRAM(S): 10 INJECTION INTRAVENOUS at 06:13

## 2020-11-07 RX ADMIN — Medication 88 MICROGRAM(S): at 06:13

## 2020-11-07 RX ADMIN — BUPROPION HYDROCHLORIDE 75 MILLIGRAM(S): 150 TABLET, EXTENDED RELEASE ORAL at 18:09

## 2020-11-07 RX ADMIN — Medication 81 MILLIGRAM(S): at 11:23

## 2020-11-07 RX ADMIN — FAMOTIDINE 40 MILLIGRAM(S): 10 INJECTION INTRAVENOUS at 18:09

## 2020-11-07 RX ADMIN — BUPROPION HYDROCHLORIDE 150 MILLIGRAM(S): 150 TABLET, EXTENDED RELEASE ORAL at 06:13

## 2020-11-07 RX ADMIN — BUDESONIDE AND FORMOTEROL FUMARATE DIHYDRATE 2 PUFF(S): 160; 4.5 AEROSOL RESPIRATORY (INHALATION) at 09:50

## 2020-11-07 RX ADMIN — Medication 25 MILLIGRAM(S): at 06:13

## 2020-11-07 RX ADMIN — TIOTROPIUM BROMIDE 1 CAPSULE(S): 18 CAPSULE ORAL; RESPIRATORY (INHALATION) at 09:50

## 2020-11-07 RX ADMIN — HEPARIN SODIUM 0 UNIT(S)/HR: 5000 INJECTION INTRAVENOUS; SUBCUTANEOUS at 09:37

## 2020-11-07 RX ADMIN — BUDESONIDE AND FORMOTEROL FUMARATE DIHYDRATE 2 PUFF(S): 160; 4.5 AEROSOL RESPIRATORY (INHALATION) at 19:37

## 2020-11-07 RX ADMIN — HEPARIN SODIUM 900 UNIT(S)/HR: 5000 INJECTION INTRAVENOUS; SUBCUTANEOUS at 18:46

## 2020-11-07 RX ADMIN — SIMVASTATIN 40 MILLIGRAM(S): 20 TABLET, FILM COATED ORAL at 21:34

## 2020-11-07 RX ADMIN — LOSARTAN POTASSIUM 25 MILLIGRAM(S): 100 TABLET, FILM COATED ORAL at 06:14

## 2020-11-07 RX ADMIN — Medication 650 MILLIGRAM(S): at 23:42

## 2020-11-07 NOTE — PHYSICAL THERAPY INITIAL EVALUATION ADULT - PERTINENT HX OF CURRENT PROBLEM, REHAB EVAL
79 y/o F presents with worsening SOB and L-sided flank pain. Of note, pt was hospitalized in [facility] from 1/16/20-1/20/20 for influenza and was also c/o low back pain and was found to have a T12 compression fracture during the hospital course. US: Below-knee deep vein thrombosis in the right posterior tibial vein.CTA with IV cont showing RLL filling defect c/w PE

## 2020-11-07 NOTE — PROGRESS NOTE ADULT - SUBJECTIVE AND OBJECTIVE BOX
Patient is a 80y old  Female who presents with a chief complaint of Shortness of breath (2020 06:23)    INTERVAL HPI/OVERNIGHT EVENTS:  Patient seen and examined at bedside this morning. Patient states that she feels well this morning and does not have shortness of breath while laying in the bed. She does admit dyspnea on exertion and has not gotten up to ambulate to avoid this. Her flank pain is improving since admission but still apparent. She denies any chest pain, palpitations, abdominal pain, nausea, vomiting, weakness, fatigue, dysuria. No bowel movements since before admission.     T(C): 36.6 (20 @ 06:02), Max: 36.7 (20 @ 10:26)  HR: 87 (20 @ 06:02) (43 - 87)  BP: 145/75 (20 @ 06:02) (110/73 - 177/85)  RR: 18 (20 @ 06:02) (17 - 18)  SpO2: 100% (20 @ 06:02) (94% - 100%)  Wt(kg): --    I&O's Summary    2020 07:01  -  2020 07:00  --------------------------------------------------------  IN: 120 mL / OUT: 0 mL / NET: 120 mL        LABS:                        8.8    5.08  )-----------( 186      ( 2020 07:16 )             26.9     11    144  |  113<H>  |  14  ----------------------------<  94  4.3   |  28  |  0.92    Ca    8.0<L>      2020 07:16  Mg     2.3         TPro  5.7<L>  /  Alb  2.8<L>  /  TBili  0.2  /  DBili  x   /  AST  16  /  ALT  13  /  AlkPhos  64  11-06    PT/INR - ( 2020 22:01 )   PT: 12.2 sec;   INR: 1.04 ratio         PTT - ( 2020 21:43 )  PTT:70.7 sec  CAPILLARY BLOOD GLUCOSE          Urinalysis Basic - ( 2020 02:43 )    Color: Yellow / Appearance: Clear / S.005 / pH: x  Gluc: x / Ketone: Negative  / Bili: Negative / Urobili: Negative   Blood: x / Protein: Negative / Nitrite: Negative   Leuk Esterase: Negative / RBC: x / WBC x   Sq Epi: x / Non Sq Epi: x / Bacteria: x           MEDICATIONS  (STANDING):  aspirin enteric coated 81 milliGRAM(s) Oral daily  budesonide  80 MICROgram(s)/formoterol 4.5 MICROgram(s) Inhaler 2 Puff(s) Inhalation two times a day  buPROPion . 150 milliGRAM(s) Oral <User Schedule>  buPROPion . 75 milliGRAM(s) Oral <User Schedule>  citalopram 40 milliGRAM(s) Oral daily  famotidine    Tablet 40 milliGRAM(s) Oral two times a day  heparin  Infusion.  Unit(s)/Hr (13 mL/Hr) IV Continuous <Continuous>  levothyroxine 88 MICROGram(s) Oral daily  losartan 25 milliGRAM(s) Oral daily  metoprolol succinate ER 25 milliGRAM(s) Oral daily  simvastatin 40 milliGRAM(s) Oral at bedtime  tiotropium 18 MICROgram(s) Capsule 1 Capsule(s) Inhalation daily    MEDICATIONS  (PRN):  ALBUTerol    90 MICROgram(s) HFA Inhaler 2 Puff(s) Inhalation every 6 hours PRN Shortness of Breath and/or Wheezing  heparin   Injectable 6000 Unit(s) IV Push every 6 hours PRN For aPTT less than 40  heparin   Injectable 3000 Unit(s) IV Push every 6 hours PRN For aPTT between 40 - 57      REVIEW OF SYSTEMS:  CONSTITUTIONAL: No fever, weight loss, or fatigue  EYES: No eye pain, visual disturbances, or discharge  ENMT: No difficulty hearing, tinnitus, vertigo; No sinus or throat pain  NECK: No pain or stiffness  RESPIRATORY: Admits shortness of breath. Denies cough, wheezing, chills or hemoptysis  CARDIOVASCULAR: No chest pain, palpitations, dizziness, or leg swelling  GASTROINTESTINAL: No abdominal or epigastric pain. No nausea, vomiting, or hematemesis; No diarrhea or constipation; No melena or hematochezia  GENITOURINARY: No dysuria, frequency, hematuria, or incontinence  NEUROLOGICAL: No headaches, memory loss, loss of strength, numbness, or tremors  SKIN: No itching, burning, rashes, or lesions   LYMPH NODES: No enlarged glands  ENDOCRINE: No heat or cold intolerance; No hair loss  MUSCULOSKELETAL: L flank pain, improved. No joint pain or swelling;  PSYCHIATRIC: No depression, anxiety, mood swings, or difficulty sleeping  HEME/LYMPH: No easy bruising, or bleeding gums  ALLERGY AND IMMUNOLOGIC: No hives or eczema      PHYSICAL EXAM:  GENERAL: NAD, well-groomed, well-developed  HEAD: Atraumatic, Normocephalic  EYES: EOMI, PERRL, conjunctiva and sclera clear  ENMT: No tonsillar erythema, exudates, or enlargement; Moist mucous membranes  NECK: Supple, No JVD  NERVOUS SYSTEM: Alert & Oriented X3, Good concentration; Good motor strength in B/L upper and lower extremities  CHEST/LUNG: Clear to auscultation bilaterally; No rales, rhonchi, wheezing, or rubs  HEART: Regular rate and rhythm; Clicks of prosthetic mitral and aortic valves noted. Grade 2 systolic murmurs noted on LUSB and LLSB  ABDOMEN: Soft, Nontender, Nondistended; Bowel sounds present  EXTREMITIES: 2+ Peripheral Pulses, Trace nonpitting edema B/L  LYMPH: No lymphadenopathy noted  SKIN: Warm, dry, well-perfused      RADIOLOGY & ADDITIONAL TESTS:  < from: CT Angio Chest w/ IV Cont (20 @ 23:52) >  EXAM:  CT ANGIO ABD PELV (W)AW IC                          EXAM:  CT ANGIO CHEST (W)AW IC                            PROCEDURE DATE:  2020          INTERPRETATION:  CT ANGIOGRAPHY OF THE CHEST, ABDOMEN AND PELVIS    INDICATION: Asthma. Hypertension. Flank pain. Evaluate for dissection and pulmonary embolism.    TECHNIQUE: Noncontrast CT of the chest was performed, followed by CT angiography performed of the chest, abdomen and pelvis after administration of intravenous contrast. Postprocessed MIP and 3-D reformatted images were created and reviewed.    90 mL of Omnipaque 350 contrast material was injected IV.    COMPARISON: CT abdomen pelvis 2020.    FINDINGS:  Vessels: Precontrast images show no evidence of intramural hematoma.  Normal caliber aorta. No intimal flap is seen to suggest aortic dissection. Atherosclerotic disease of the aorta and its branches. Evaluation of the aortic root is limited due to cardiac pulsation.    Celiac axis and SMA are patent. There is plaque ornonocclusive thrombus identified within the proximal celiac vessel on image 73 of series 602 which is otherwise patent. RASHID is patent. Distal SMA and RASHID branches are not well assessed by CT technique.Bilateral renal arteries are patent. Bilateral iliac arteries are normal caliber.    There is filling defect identified segmental to subsegmental branches of the right lower lobe pulmonary artery as best seen on images 72 through 73 of series 4, compatible with pulmonary embolism.    Thorax:  Airways: Central airways patent.  Lungs: Trace bilateral pleural effusions with bibasilar opacities, likely represent atelectasis and/or scarring.  Mediastinum and lymph nodes: No mass or hemorrhage. No bulky adenopathy.  Heart: Mild cardiomegaly. No pericardial effusion.Cardiac valve replacement.  Soft tissues: Median sternotomy.    Liver: Right posterior hepatic lobe cyst. Additional too small to characterize hypodensities.  Biliary: No significant dilatation. Cholelithiasis.  Spleen: No suspicious lesions.  Pancreas: No inflammatory changes or ductal dilatation.  Adrenals: Normal.  Kidneys: Symmetrically enhancing without hydronephrosis. Scattered bilateral nephrolithiasis. Bilateral renal cysts as well as too small to characterize hypodensities.    Peritoneum/retroperitoneum and mesentery: No free air. No organized fluid collection. No adenopathy. Diverticulosis without evidence of acute diverticulitis. Normal appendix.  GI tract: Large paraesophageal hiatal hernia. No significant dilatationor wall thickening though detailed evaluation the GI tract is limited secondary to inadequate distention.    Pelvic organs/Bladder: Globular uterus, likely containing fibroids. No adnexal masses. Bladder is normal.    Abdominal wall: A small fat containing umbilical hernia is noted.  Bones and soft tissues: Multilevel degenerative changes of the spine noted. Interval indeterminate age compression deformity of T12 with mild retropulsion. This results in less than 50% vertebral height loss. Grade 1anterior spondylolisthesis L5 on S1. Chronic parasymphyseal fracture deformity. Chronic bilateral sacral alae insufficiency fractures.    IMPRESSION:    Filling defect identified segmental to subsegmental branches of the right lower lobe pulmonary artery, compatible with pulmonary embolism.    No aortic aneurysm or dissection. Atherosclerotic disease. 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    Scattered bilateral nephrolithiasis without hydronephrosis or obstructive uropathy.    Large paraesophageal hiatal hernia.    Interval indeterminate age compression deformity of T12 with mild retropulsion. This results in less than 50% vertebral height loss.    Additional findings as mentioned above.    These critical results were discussed via telephone at 2020 1:10 AM by Dr. Bal of radiology with Dr. Marshall, institution read-back verification policy was followed.                  FANTA BAL MD; Attending Radiologist  This document has been electronically signed. 2020  1:13AM    < end of copied text >      Imaging Personally Reviewed:  [ x ] YES  [ ] NO    Consultant(s) Notes Reviewed:  [ x ] YES  [ ] NO    Care Discussed with Consultants/Other Providers [ x ] YES  [ ] NO

## 2020-11-07 NOTE — PROGRESS NOTE ADULT - PROBLEM SELECTOR PLAN 1
81 y/o F PMHx of leukemia (s/p chemo in 2012 now in remission followed by Dr. Temple) with residual chronic anemia requiring iron infusions, MVR and AVR in 2011, COPD (not on home O2), MARTINA on CPAP (patient admits to being non-compliant with use), hx of R ureteral stone s/p stent in 9/2019 revised in 12/2019, Graves s/p thyroid surgery, HLD, HTN, depression, GERD, who presents with worsening shortness of breath  martina - does not use CPAP - sleep hygiene -   copd - ex smoker -   new PE and below the knee DVT - heparin gtt - monitor Hgb and HD  trop and proBNP - no evidence of RV strain  pt is on RA - vss  copd - proventil PRN - Symbicort - Spiriva - VBG...   Heme eval and follow up - known to Dr Temple -   Heme occult -   [facility] rx regimen - known Heart disease - valv heart disease -.

## 2020-11-07 NOTE — PROGRESS NOTE ADULT - PROBLEM SELECTOR PLAN 6
Chronic, stable  - Continue home Toprol XL 25mg qD, Losartan 25mg qD  - Monitor routine hemodynamics

## 2020-11-07 NOTE — PROGRESS NOTE ADULT - SUBJECTIVE AND OBJECTIVE BOX
All interim records and events noted.    breathing much improved      MEDICATIONS  (STANDING):  aspirin enteric coated 81 milliGRAM(s) Oral daily  budesonide  80 MICROgram(s)/formoterol 4.5 MICROgram(s) Inhaler 2 Puff(s) Inhalation two times a day  buPROPion . 150 milliGRAM(s) Oral <User Schedule>  buPROPion . 75 milliGRAM(s) Oral <User Schedule>  citalopram 40 milliGRAM(s) Oral daily  famotidine    Tablet 40 milliGRAM(s) Oral two times a day  heparin  Infusion.  Unit(s)/Hr (13 mL/Hr) IV Continuous <Continuous>  levothyroxine 88 MICROGram(s) Oral daily  losartan 25 milliGRAM(s) Oral daily  metoprolol succinate ER 25 milliGRAM(s) Oral daily  simvastatin 40 milliGRAM(s) Oral at bedtime  tiotropium 18 MICROgram(s) Capsule 1 Capsule(s) Inhalation daily    MEDICATIONS  (PRN):  ALBUTerol    90 MICROgram(s) HFA Inhaler 2 Puff(s) Inhalation every 6 hours PRN Shortness of Breath and/or Wheezing  heparin   Injectable 6000 Unit(s) IV Push every 6 hours PRN For aPTT less than 40  heparin   Injectable 3000 Unit(s) IV Push every 6 hours PRN For aPTT between 40 - 57      Vital Signs Last 24 Hrs  T(C): 36.6 (07 Nov 2020 06:02), Max: 36.7 (06 Nov 2020 22:04)  T(F): 97.8 (07 Nov 2020 06:02), Max: 98.1 (06 Nov 2020 22:04)  HR: 87 (07 Nov 2020 06:02) (72 - 87)  BP: 145/75 (07 Nov 2020 06:02) (124/84 - 177/85)  BP(mean): --  RR: 18 (07 Nov 2020 06:02) (18 - 18)  SpO2: 100% (07 Nov 2020 06:02) (94% - 100%)    PHYSICAL EXAM  General: well developed  well nourished, in no acute distress  Head: atraumatic, normocephalic  ENT: sclera anicteric, buccal mucosa moist  Neck: supple, trachea midline, no palpable masses  CV: S1 S2, regular rate and rhythm  Lungs: clear to auscultation, no wheezes/rhonchi  Abdomen: soft, nontender, bowel sounds present, no palpable masses, pouchy  Extrem: no clubbing/cyanosis/edema  Skin: no significant increased ecchymosis/petechiae  Neuro: alert and oriented X3,  no focal deficits      LABS:             8.8    5.08  )-----------( 186      ( 11-07 @ 07:16 )             26.9                8.6    5.46  )-----------( 193      ( 11-06 @ 21:43 )             27.8                8.9    6.06  )-----------( 179      ( 11-06 @ 07:13 )             28.4                8.0    5.72  )-----------( 208      ( 11-05 @ 22:01 )             25.6       11-07    144  |  113<H>  |  14  ----------------------------<  94  4.3   |  28  |  0.92    Ca    8.0<L>      07 Nov 2020 07:16  Mg     2.3     11-07    TPro  5.7<L>  /  Alb  2.8<L>  /  TBili  0.2  /  DBili  x   /  AST  16  /  ALT  13  /  AlkPhos  64  11-06 11-07 @ 08:18  PT13.4 INR1.15  PTT>200.0  11-06 @ 21:43  PT-- INR--  PTT70.7  11-05 @ 22:01  PT12.2 INR1.04  PTT25.4      RADIOLOGY & ADDITIONAL STUDIES:  < from: US Duplex Venous Lower Ext Complete, Bilateral (11.06.20 @ 08:47) >  EXAM:  US DPLX LWR EXT VEINS COMPL BI                            PROCEDURE DATE:  11/06/2020          INTERPRETATION:  CLINICAL INFORMATION: New pulmonary embolism    COMPARISON: None available.    TECHNIQUE: Duplex sonography of the BILATERAL LOWER extremity veins with color and spectral Doppler, with and without compression.    FINDINGS:    There is normal compressibility of the bilateral common femoral, femoral and popliteal veins.  Doppler examination shows normal spontaneous and phasic flow.    No left calf vein thrombosis is detected. Noncompressible thrombus is noted in the right posterior tibial vein, compatible with deep vein thrombosis.    IMPRESSION:  Below-knee deep vein thrombosis in the right posterior tibial vein.    No evidence for deep vein thrombosis in the left leg.      < end of copied text >  < from: CT Angio Abdomen and Pelvis w/ IV Cont (11.05.20 @ 23:52) >    EXAM:  CT ANGIO ABD PELV (W)AW IC                          EXAM:  CT ANGIO CHEST (W)AW IC                            PROCEDURE DATE:  11/05/2020          INTERPRETATION:  CT ANGIOGRAPHY OF THE CHEST, ABDOMEN AND PELVIS    INDICATION: Asthma. Hypertension. Flank pain. Evaluate for dissection and pulmonary embolism.    TECHNIQUE: Noncontrast CT of the chest was performed, followed by CT angiography performed of the chest, abdomen and pelvis after administration of intravenous contrast. Postprocessed MIP and 3-D reformatted images were created and reviewed.    90 mL of Omnipaque 350 contrast material was injected IV.    COMPARISON: CT abdomen pelvis 1/16/2020.    FINDINGS:  Vessels: Precontrast images show no evidence of intramural hematoma.  Normal caliber aorta. No intimal flap is seen to suggest aortic dissection. Atherosclerotic disease of the aorta and its branches. Evaluation of the aortic root is limited due to cardiac pulsation.    Celiac axis and SMA are patent. There is plaque ornonocclusive thrombus identified within the proximal celiac vessel on image 73 of series 602 which is otherwise patent. RASHID is patent. Distal SMA and RASHID branches are not well assessed by CT technique.Bilateral renal arteries are patent. Bilateral iliac arteries are normal caliber.    There is filling defect identified segmental to subsegmental branches of the right lower lobe pulmonary artery as best seen on images 72 through 73 of series 4, compatible with pulmonary embolism.    Thorax:  Airways: Central airways patent.  Lungs: Trace bilateral pleural effusions with bibasilar opacities, likely represent atelectasis and/or scarring.  Mediastinum and lymph nodes: No mass or hemorrhage. No bulky adenopathy.  Heart: Mild cardiomegaly. No pericardial effusion.Cardiac valve replacement.  Soft tissues: Median sternotomy.    Liver: Right posterior hepatic lobe cyst. Additional too small to characterize hypodensities.  Biliary: No significant dilatation. Cholelithiasis.  Spleen: No suspicious lesions.  Pancreas: No inflammatory changes or ductal dilatation.  Adrenals: Normal.  Kidneys: Symmetrically enhancing without hydronephrosis. Scattered bilateral nephrolithiasis. Bilateral renal cysts as well as too small to characterize hypodensities.    Peritoneum/retroperitoneum and mesentery: No free air. No organized fluid collection. No adenopathy. Diverticulosis without evidence of acute diverticulitis. Normal appendix.  GI tract: Large paraesophageal hiatal hernia. No significant dilatationor wall thickening though detailed evaluation the GI tract is limited secondary to inadequate distention.    Pelvic organs/Bladder: Globular uterus, likely containing fibroids. No adnexal masses. Bladder is normal.    Abdominal wall: A small fat containing umbilical hernia is noted.  Bones and soft tissues: Multilevel degenerative changes of the spine noted. Interval indeterminate age compression deformity of T12 with mild retropulsion. This results in less than 50% vertebral height loss. Grade 1anterior spondylolisthesis L5 on S1. Chronic parasymphyseal fracture deformity. Chronic bilateral sacral alae insufficiency fractures.    IMPRESSION:    Filling defect identified segmental to subsegmental branches of the right lower lobe pulmonary artery, compatible with pulmonary embolism.    No aortic aneurysm or dissection. Atherosclerotic disease. 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    Scattered bilateral nephrolithiasis without hydronephrosis or obstructive uropathy.    Large paraesophageal hiatal hernia.    Interval indeterminate age compression deformity of T12 with mild retropulsion. This results in less than 50% vertebral height loss.    Additional findings as mentioned above.    These critical results were discussed via telephone at 11/6/2020 1:10 AM by Dr. Salmon of radiology with Dr. Marshall, institution read-back verification policy was followed.      < end of copied text >      IMPRESSION/RECOMMENDATIONS:

## 2020-11-07 NOTE — PROGRESS NOTE ADULT - PROBLEM SELECTOR PLAN 10
DVT PPX: Patient was given Pharmacological Prophylaxis with Lovenox 70mg x1 in the ER, recommend ordering Heparin gtt for 2PM as it may be reversible in the event of any bleed  Dispo: Social Work needed as patient lives alone and is requesting services of aide; f/u PT consult DVT PPX: Patient was given Pharmacological Prophylaxis with Lovenox 70mg x1 in the ER, recommend ordering Heparin gtt for 2PM as it may be reversible in the event of any bleed  Dispo: Social Work needed as patient lives alone and is requesting services of aide  - PT recommending home with home PT

## 2020-11-07 NOTE — PROGRESS NOTE ADULT - ASSESSMENT
81 y/o F PMHx of leukemia (s/p chemo in 2012 now in remission followed by Dr. Temple) with residual chronic anemia requiring iron infusions, MVR and AVR in 2011, COPD (not on home O2), MARTINA on CPAP (patient admits to being non-compliant with use), hx of R ureteral stone s/p stent in 9/2019 revised in 12/2019, Graves s/p thyroid surgery, HLD, HTN, depression, GERD, who presents with worsening shortness of breath and L-sided flank pain admitted for pulmonary embolism.

## 2020-11-07 NOTE — PROGRESS NOTE ADULT - PROBLEM SELECTOR PLAN 5
Chronic, stable  - Continue home bupropion 150mg in the morning and 75mg in the evening  - Continue home Celexa 40mg daily.

## 2020-11-07 NOTE — PROGRESS NOTE ADULT - ATTENDING COMMENTS
Patient seen and examined. NAD. No complaints.   Feels better. Less SOB/BOOTH  H/H stable  Continue iv heparin for now  Change to DOAC upon discharge  D/C planning for Monday    MINI Burton MD Patient seen and examined. NAD. No complaints.   Feels better. Less SOB/BOOTH  H/H stable  Continue iv heparin for now  Change to DOAC upon discharge  D/C planning for Monday    Advanced care planning discussed with patient/family. Patient's health status was discussed. All appropriate changes have been made regarding patient's end-of-life care. Advanced care planning forms reviewed/discussed/completed.  20 minutes spent.      MINI Burton MD

## 2020-11-07 NOTE — PROGRESS NOTE ADULT - SUBJECTIVE AND OBJECTIVE BOX
[facility] -- Brandy Harmon, Ilan Leary, [place] Begum [place], [place]  Office # [phone]    Follow Up:  Tissue AVR/MVR, Acute DVT, PE    Subjective/Observations: ambulated to the BR with PT on RA.  c/o BOOTH, though, better than PTA.  c/o right lower back pain/tenderness, worse on inspiration.  Denies chest pain or palpitations.  Denies any form of bleeding    REVIEW OF SYSTEMS: All other review of systems is negative unless indicated above  PAST MEDICAL & SURGICAL HISTORY:  Serum phosphorus decreased  Last level 1.5 at PST 1/13    Calculus of ureter  s/p R ureteral stent 12/20/2020    MARTINA on CPAP  Pt admits to be non-compliant    Leukemia  (APL, s/p chemo in 2012, now in remission, followed by oncologist)    Anemia  Completed iron infusions every 2 months, now Hb stable    Aortic stenosis    Emphysema    Rotator cuff tear  Right    Diverticulosis of colon    Coronary atherosclerosis of native coronary artery    Carpal tunnel syndrome    Dyslipidemia    Osteoporosis    Hypertension    Hernia, hiatal    Former cigarette smoker    Depression    Asthma with COPD with exacerbation  Not on home O2    Acid reflux    Graves disease  s/p surgery    Elective surgery  Cystoscopy, right ureteroscopy, laser lithotripsy of right ureteral stone, right ureteral stent removal and replacement, right retrograde pyelogram on 12/6/19    H/O dilation and curettage    S/P ureteral stent placement  R in 12/2020    H/O mitral valve repair  2014    H/O aortic valve repair  2014    History of coronary angiogram  1/31/12    h/o  endometrial ablation  1998    H/O cone biopsy of cervix  1974    History of tonsillectomy  childhood    After cataract, bilateral  2010    MEDICATIONS  (STANDING):  aspirin enteric coated 81 milliGRAM(s) Oral daily  budesonide  80 MICROgram(s)/formoterol 4.5 MICROgram(s) Inhaler 2 Puff(s) Inhalation two times a day  buPROPion . 150 milliGRAM(s) Oral <User Schedule>  buPROPion . 75 milliGRAM(s) Oral <User Schedule>  citalopram 40 milliGRAM(s) Oral daily  famotidine    Tablet 40 milliGRAM(s) Oral two times a day  heparin  Infusion.  Unit(s)/Hr (13 mL/Hr) IV Continuous <Continuous>  levothyroxine 88 MICROGram(s) Oral daily  losartan 25 milliGRAM(s) Oral daily  metoprolol succinate ER 25 milliGRAM(s) Oral daily  simvastatin 40 milliGRAM(s) Oral at bedtime  tiotropium 18 MICROgram(s) Capsule 1 Capsule(s) Inhalation daily    MEDICATIONS  (PRN):  ALBUTerol    90 MICROgram(s) HFA Inhaler 2 Puff(s) Inhalation every 6 hours PRN Shortness of Breath and/or Wheezing  heparin   Injectable 6000 Unit(s) IV Push every 6 hours PRN For aPTT less than 40  heparin   Injectable 3000 Unit(s) IV Push every 6 hours PRN For aPTT between 40 - 57    Allergies    adhesives (Rash; Other)  Cat dander- wheezing, itchy throat, SOB (Other)  penicillin (Rash)  sulfa drugs (Rash)  tetracycline (Stomach Upset)    Intolerances    Vital Signs Last 24 Hrs  T(C): 36.6 (07 Nov 2020 06:02), Max: 36.7 (06 Nov 2020 22:04)  T(F): 97.8 (07 Nov 2020 06:02), Max: 98.1 (06 Nov 2020 22:04)  HR: 87 (07 Nov 2020 06:02) (72 - 87)  BP: 145/75 (07 Nov 2020 06:02) (124/84 - 145/75)  BP(mean): --  RR: 18 (07 Nov 2020 06:02) (18 - 18)  SpO2: 100% (07 Nov 2020 06:02) (94% - 100%)  I&O's Summary    06 Nov 2020 07:01  -  07 Nov 2020 07:00  --------------------------------------------------------  IN: 120 mL / OUT: 0 mL / NET: 120 mL     PHYSICAL EXAM:  TELE: NSR  Constitutional: NAD, awake and alert, obese  HEENT: Moist Mucous Membranes, Anicteric  Pulmonary: Non-labored, breath sounds are clear bilaterally, No wheezing, rales or rhonchi  Cardiovascular: Regular, S1 and S2, +Murmur no rubs, gallops or clicks  Gastrointestinal: Bowel Sounds present, soft, nontender.   Lymph: + BLE edema R>L. No lymphadenopathy.  Skin: No visible rashes or ulcers.  Psych:  Mood & affect appropriate  LABS: All Labs Reviewed:                        8.8    5.08  )-----------( 186      ( 07 Nov 2020 07:16 )             26.9                         8.6    5.46  )-----------( 193      ( 06 Nov 2020 21:43 )             27.8                         8.9    6.06  )-----------( 179      ( 06 Nov 2020 07:13 )             28.4     07 Nov 2020 07:16    144    |  113    |  14     ----------------------------<  94     4.3     |  28     |  0.92   06 Nov 2020 07:13    144    |  114    |  14     ----------------------------<  93     4.0     |  25     |  0.76   05 Nov 2020 22:01    142    |  111    |  17     ----------------------------<  104    4.2     |  27     |  0.94     Ca    8.0        07 Nov 2020 07:16  Ca    7.5        06 Nov 2020 07:13  Ca    7.9        05 Nov 2020 22:01  Mg     2.3       07 Nov 2020 07:16    TPro  5.7    /  Alb  2.8    /  TBili  0.2    /  DBili  x      /  AST  16     /  ALT  13     /  AlkPhos  64     06 Nov 2020 07:13    PT/INR - ( 07 Nov 2020 08:18 )   PT: 13.4 sec;   INR: 1.15 ratio       PTT - ( 07 Nov 2020 08:18 )  PTT:>200.0 sec  CARDIAC MARKERS ( 06 Nov 2020 07:47 )  <.015 ng/mL / x     / x     / x     / x          Patient name: TATIANA RAMIREZ  YOB: 1940   Age: 72 (F)   MR#: [id number]  Study Date: 2/3/2012  Location: O/PSonographer: Yanelis Sorensen [facility]  2nd Sonographer: Parris Acevedo MD  Study quality: Technically good  Referring Physician: JOLYNN ESPINO MD  Blood Pressure: 123/73 mmHg  Height: 5ft 3in  Weight: 158 lb  BSA: 1.8 m2  ------------------------------------------------------------------------  PROCEDURE: Transesophageal and transthoracic  echocardiograms with 2-D, M-Mode and complete spectral and  color flow Doppler were performed in the echocardiography  laboratory.  Informed consent was first obtained for JULIÁN.  The patient was sedated by the anesthesiologist.   The  procedure was monitored with automatic blood pressure  monitoring, ECG tracings and pulse oximetry.  The  transesophageal probe was placed in the esophagus posterior  to the heart without complications.  The patient tolerated  the procedure well.   Intravenous catheter inserted.  Patient was injected with 10 cc's of aerosolized saline.  INDICATION: Aortic Valve Disease (424.1), Aortic Stenosis  (424.1)  ------------------------------------------------------------------------  Dimensions:    Normal Values:  LA:     5.0    2.0 - 4.0 cm  Ao:     2.7    2.0 - 3.8 cm  SEPTUM: 0.9    0.6 - 1.2 cm  PWT:    0.7    0.6 - 1.1 cm  LVIDd:  3.5    3.0 - 5.6 cm  LVIDs:  1.8    1.8 - 4.0 cm  Derived variables:  LVMI: 43 g/m2  RWT: 0.40  Ejection Fraction: 65 %  Doppler Peak Velocity (m/sec): AoV=3.8  ------------------------------------------------------------------------  Observations:  Mitral Valve: Significant mitral annular calcification and  heavily calcified mitral leaflets with decreased diastolic  opening. Moderate-severe mitral regurgitation.  Vena  contracta = 0.9cm EROA = 0.31cm2 Regurgitant volume = 63cc  at a SBP of 150mmHg. Peak mitral valve gradient equals 21  mm Hg, mean transmitral valve gradient equals 10 mm Hg,  consistent with moderate to severe mitral stenosis.  Gradients may be further elevated in the setting of  signficant mitral regurgitation.  Aortic Valve/Aorta: Calcified trileaflet aortic valve with  decreased opening. Peak transaortic valve gradient equals  57 mm Hg, mean transaortic valve gradient equals 36 mm Hg,  estimated aortic valve area equals 0.8 sqcm (by continuity  equation and by planimetry), consistent with severe aortic  stenosis. Peak left ventricular outflow tract gradient  equals 4.8 mm Hg.  Moderate sessile atheroma noted in aortic arch/descending  aorta meauring 0.2-0.3cm in thickness.  No mobile elements  or ulcerations visualized.  Left Atrium: Severely dilated left atrium.  LA volume index  = 46 cc/m2.   No left atrial or left atrial appendage  thrombus.  Normal left atrial appendage velocities of  48cm/s.  Left Ventricle: Normal left ventricular systolic function.  No segmental wall motion abnormalities.  Ejection fraction  is 65%. Normal left ventricular internal dimensions and  wall thicknesses. (DT:480 ms).  Right Heart: Normal right atrium. Normal right ventricular  size and function. Normal tricuspid valve. Mild tricuspid  regurgitation. Mild-moderate tricuspid regurgitation.  Normal pulmonic valve.  Pericardium/Pleura: Normal pericardium with no pericardial  effusion.  Hemodynamic: Inadequate tricuspid regurgitation Doppler  envelope precludes estimation of RVSP. Agitated saline  injection demonstrates no evidence of a patent foramen  ovale.  ------------------------------------------------------------------------  Conclusions:  1. Significant mitral annular calcification and heavily  calcified mitral leaflets with decreased diastolic opening.  Moderate-severe mitral regurgitation.  Vena contracta =  0.9cm EROA = 0.31cm2 Regurgitant volume = 63cc at a SBP of  150mmHg. Peakmitral valve gradient equals 21 mm Hg, mean  transmitral valve gradient equals 10 mm Hg, consistent with  moderate to severe mitral stenosis.  Gradients may be  further elevated in the setting of signficant mitral  regurgitation.  2. Calcified trileaflet aortic valve with decreased  opening. Peak transaortic valve gradient equals 57 mm Hg,  mean transaortic valve gradient equals 36 mm Hg, estimated  aortic valve area equals 0.8 sqcm (by continuity equation  and by planimetry), consistent with severe aortic stenosis.  3. Moderate sessile atheroma noted in aortic  arch/descending aorta measuring 0.2-0.3cm in thickness.  No  mobile elements or ulcerations visualized.  4. Severely dilated left atrium.  LA volume index = 46  cc/m2.   No left atrialor left atrial appendage thrombus.  Normal left atrial appendage velocities of 48cm/s.  5. Normal left ventricular systolic function. No segmental  wall motion abnormalities.  Ejection fraction is 65%.  ------------------------------------------------------------------------  Confirmed on  2/3/2012 - 11:52:47 by Pat Monahan M.D.  ------------------------------------------------------------------------       EXAM:  CT ANGIO ABD PELV (W)AW IC                          EXAM:  CT ANGIO CHEST (W)AW IC                            PROCEDURE DATE:  11/05/2020          INTERPRETATION:  CT ANGIOGRAPHY OF THE CHEST, ABDOMEN AND PELVIS    INDICATION: Asthma. Hypertension. Flank pain. Evaluate for dissection and pulmonary embolism.    TECHNIQUE: Noncontrast CT of the chest was performed, followed by CT angiography performed of the chest, abdomen and pelvis after administration of intravenous contrast. Postprocessed MIP and 3-D reformatted images were created and reviewed.    90 mL of Omnipaque 350 contrast material was injected IV.    COMPARISON: CT abdomen pelvis 1/16/2020.    FINDINGS:  Vessels: Precontrast images show no evidence of intramural hematoma.  Normal caliber aorta. No intimal flap is seen to suggest aortic dissection. Atherosclerotic disease of the aorta and its branches. Evaluation of the aortic root is limited due to cardiac pulsation.    Celiac axis and SMA are patent. There is plaque ornonocclusive thrombus identified within the proximal celiac vessel on image 73 of series 602 which is otherwise patent. RASHID is patent. Distal SMA and RASHID branches are not well assessed by CT technique.Bilateral renal arteries are patent. Bilateral iliac arteries are normal caliber.    There is filling defect identified segmental to subsegmental branches of the right lower lobe pulmonary artery as best seen on images 72 through 73 of series 4, compatible with pulmonary embolism.    Thorax:  Airways: Central airways patent.  Lungs: Trace bilateral pleural effusions with bibasilar opacities, likely represent atelectasis and/or scarring.  Mediastinum and lymph nodes: No mass or hemorrhage. No bulky adenopathy.  Heart: Mild cardiomegaly. No pericardial effusion.Cardiac valve replacement.  Soft tissues: Median sternotomy.    Liver: Right posterior hepatic lobe cyst. Additional too small to characterize hypodensities.  Biliary: No significant dilatation. Cholelithiasis.  Spleen: No suspicious lesions.  Pancreas: No inflammatory changes or ductal dilatation.  Adrenals: Normal.  Kidneys: Symmetrically enhancing without hydronephrosis. Scattered bilateral nephrolithiasis. Bilateral renal cysts as well as too small to characterize hypodensities.    Peritoneum/retroperitoneum and mesentery: No free air. No organized fluid collection. No adenopathy. Diverticulosis without evidence of acute diverticulitis. Normal appendix.  GI tract: Large paraesophageal hiatal hernia. No significant dilatationor wall thickening though detailed evaluation the GI tract is limited secondary to inadequate distention.    Pelvic organs/Bladder: Globular uterus, likely containing fibroids. No adnexal masses. Bladder is normal.    Abdominal wall: A small fat containing umbilical hernia is noted.  Bones and soft tissues: Multilevel degenerative changes of the spine noted. Interval indeterminate age compression deformity of T12 with mild retropulsion. This results in less than 50% vertebral height loss. Grade 1anterior spondylolisthesis L5 on S1. Chronic parasymphyseal fracture deformity. Chronic bilateral sacral alae insufficiency fractures.    IMPRESSION:    Filling defect identified segmental to subsegmental branches of the right lower lobe pulmonary artery, compatible with pulmonary embolism.    No aortic aneurysm or dissection. Atherosclerotic disease. 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    Scattered bilateral nephrolithiasis without hydronephrosis or obstructive uropathy.    Large paraesophageal hiatal hernia.    Interval indeterminate age compression deformity of T12 with mild retropulsion. This results in less than 50% vertebral height loss.    Additional findings as mentioned above.    These critical results were discussed via telephone at 11/6/2020 1:10 AM by Dr. Bal of radiology with Dr. Marshall, institution read-back verification policy was followed.    FANTA BAL MD; Attending Radiologist  This document has been electronically signed. Nov 6 2020  1:13AM    Ventricular Rate 79 BPM    Atrial Rate 79 BPM    P-R Interval 188 ms    QRS Duration 76 ms    Q-T Interval 422 ms    QTC Calculation(Bazett) 483 ms    P Axis 100 degrees    R Axis 0 degrees    T Axis 69 degrees    Diagnosis Line Normal sinus rhythm  Nonspecific ST and T wave abnormality  Abnormal ECG  Confirmed by kena Shah ([id number]) on 11/6/2020 3:23:59 PM

## 2020-11-07 NOTE — PHYSICAL THERAPY INITIAL EVALUATION ADULT - DIAGNOSIS, PT EVAL
79 y/o F presents with worsening SOB and L-sided flank pain. Of note, pt was hospitalized in [facility] from 1/16/20-1/20/20 for influenza and was also c/o low back pain and was found to have a T12 compression fracture during the hospital course. US: Below-knee deep vein thrombosis in the right posterior tibial vein.CTA with IV cont showing RLL filling defect c/w PE

## 2020-11-07 NOTE — PROGRESS NOTE ADULT - SUBJECTIVE AND OBJECTIVE BOX
Date/Time Patient Seen:  		  Referring MD:   Data Reviewed	       Patient is a 80y old  Female who presents with a chief complaint of Shortness of breath (06 Nov 2020 19:52)      Subjective/HPI     PAST MEDICAL & SURGICAL HISTORY:  Serum phosphorus decreased  Last level 1.5 at PST 1/13    Calculus of ureter  s/p R ureteral stent 12/20/2020    MARTINA on CPAP  Pt admits to be non-compliant    Leukemia  (APL, s/p chemo in 2012, now in remission, followed by oncologist)    Anemia  Completed iron infusions every 2 months, now Hb stable    Spinal stenosis    Mitral regurgitation    Aortic stenosis    Emphysema    Duodenal ulcer  at age 24    Rotator cuff tear  Right    Diverticulosis of colon    Coronary atherosclerosis of native coronary artery    Carpal tunnel syndrome    Dyslipidemia    Obstructive sleep apnea  cpap    Osteoporosis    Hypertension    Hernia, hiatal    Former cigarette smoker    Depression    Asthma with COPD with exacerbation  Not on home O2    Asthma    Acid reflux    Graves disease  s/p surgery    Elective surgery  Cystoscopy, right ureteroscopy, laser lithotripsy of right ureteral stone, right ureteral stent removal and replacement, right retrograde pyelogram on 12/6/19    H/O dilation and curettage    S/P ureteral stent placement  R in 12/2020    H/O mitral valve repair  2014    H/O aortic valve repair  2014    History of coronary angiogram  1/31/12    h/o  endometrial ablation  1998    H/O cone biopsy of cervix  1974    History of tonsillectomy  childhood    After cataract, bilateral  2010    Carpal tunnel right repair          Medication list         MEDICATIONS  (STANDING):  aspirin enteric coated 81 milliGRAM(s) Oral daily  budesonide  80 MICROgram(s)/formoterol 4.5 MICROgram(s) Inhaler 2 Puff(s) Inhalation two times a day  buPROPion . 150 milliGRAM(s) Oral <User Schedule>  buPROPion . 75 milliGRAM(s) Oral <User Schedule>  citalopram 40 milliGRAM(s) Oral daily  famotidine    Tablet 40 milliGRAM(s) Oral two times a day  heparin  Infusion.  Unit(s)/Hr (13 mL/Hr) IV Continuous <Continuous>  levothyroxine 88 MICROGram(s) Oral daily  losartan 25 milliGRAM(s) Oral daily  metoprolol succinate ER 25 milliGRAM(s) Oral daily  simvastatin 40 milliGRAM(s) Oral at bedtime  tiotropium 18 MICROgram(s) Capsule 1 Capsule(s) Inhalation daily    MEDICATIONS  (PRN):  ALBUTerol    90 MICROgram(s) HFA Inhaler 2 Puff(s) Inhalation every 6 hours PRN Shortness of Breath and/or Wheezing  heparin   Injectable 6000 Unit(s) IV Push every 6 hours PRN For aPTT less than 40  heparin   Injectable 3000 Unit(s) IV Push every 6 hours PRN For aPTT between 40 - 57         Vitals log        ICU Vital Signs Last 24 Hrs  T(C): 36.6 (07 Nov 2020 06:02), Max: 36.7 (06 Nov 2020 10:26)  T(F): 97.8 (07 Nov 2020 06:02), Max: 98.1 (06 Nov 2020 22:04)  HR: 87 (07 Nov 2020 06:02) (43 - 87)  BP: 145/75 (07 Nov 2020 06:02) (110/73 - 177/85)  BP(mean): --  ABP: --  ABP(mean): --  RR: 18 (07 Nov 2020 06:02) (17 - 18)  SpO2: 100% (07 Nov 2020 06:02) (94% - 100%)           Input and Output:  I&O's Detail    06 Nov 2020 07:01  -  07 Nov 2020 06:23  --------------------------------------------------------  IN:    Oral Fluid: 120 mL  Total IN: 120 mL    OUT:    Voided (mL): 0 mL  Total OUT: 0 mL    Total NET: 120 mL          Lab Data                        8.6    5.46  )-----------( 193      ( 06 Nov 2020 21:43 )             27.8     11-06    144  |  114<H>  |  14  ----------------------------<  93  4.0   |  25  |  0.76    Ca    7.5<L>      06 Nov 2020 07:13    TPro  5.7<L>  /  Alb  2.8<L>  /  TBili  0.2  /  DBili  x   /  AST  16  /  ALT  13  /  AlkPhos  64  11-06      CARDIAC MARKERS ( 06 Nov 2020 07:47 )  <.015 ng/mL / x     / x     / x     / x            Review of Systems	      Objective     Physical Examination    heart s1s2  lung dec bS  abd soft      Pertinent Lab findings & Imaging      Ifeanyi:  NO   Adequate UO     I&O's Detail    06 Nov 2020 07:01  -  07 Nov 2020 06:23  --------------------------------------------------------  IN:    Oral Fluid: 120 mL  Total IN: 120 mL    OUT:    Voided (mL): 0 mL  Total OUT: 0 mL    Total NET: 120 mL               Discussed with:     Cultures:	        Radiology

## 2020-11-07 NOTE — PROGRESS NOTE ADULT - ASSESSMENT
81 y/o F PMHx of leukemia (s/p chemo in 2012 now in remission followed by Dr. Temple) with residual chronic anemia requiring iron infusions, MVR and AVR in 2012,, COPD (not on home O2), MARTINA on CPAP (patient admits to being non-compliant with use), hx of R ureteral stone s/p stent in 9/2019 revised in 12/2019, Graves s/p thyroid surgery, HLD, HTN, depression, GERD who presented to the ER complaining of SOB, found to have acute PE and DVT of right posterior tibial vein     PE/DVT  - Remains dyspneic and tachypneic on exertion  - Supplemental O2 as needed  - Continue Heparin for now.  Will eventually switch to DOAC  - Follow up TTE to evaluate cardiac structures and to assess for RV strain  - Heme following.  Follow recs    s/p tissue AVR/MVR  - No evidence of volume overload  - Continue ASA    HTN  - BP more stable.  Elevated yesterday to systolic of 170's  - Will not overtreat BP at this point to avoid hemodynamic collapse  - EKG: significant artifact, however, NSR noted   - Monitor and replete lytes, keep K>4, Mg>2.    - Other cardiovascular workup will depend on clinical course.  Follows with Dr. Harmon as outpatient   - All other workup per primary team.  - Will continue to follow.    Sophy Salcedo DNP, NP-C  Cardiology   Spectra #[phone]

## 2020-11-07 NOTE — PROGRESS NOTE ADULT - PROBLEM SELECTOR PLAN 3
MARTINA on CPAP, patient admits to being non-compliant with CPAP at home  - Pulm(Dr. Olmos) consulted, continue on proventil PRN, Symbicort, Spiriva

## 2020-11-07 NOTE — PROGRESS NOTE ADULT - PROBLEM SELECTOR PLAN 4
Chronic, not on home O2  -Takes Trelegy Ellipta at home, but has difficulty affording it  - Continue therapeutic interchange Symbicort and Spiriva  - Satting well on RA, keep O2>88

## 2020-11-07 NOTE — PROGRESS NOTE ADULT - ASSESSMENT
81 y/o woman known to us from office, hx PML in remission, iron deficiency anemia.  Adm w symptomatic RLL PE, CTA a/p also possible proximal celiac plaque vs thrombus, and right post tibial DVT, on IV Heparin    -clinically much improved since on anticoag   -encouraged activity and ambulation to see if symptoms remain improved  -can change Heparin to oral DOAC ie Eliquis prior to discharge      discussed w pt

## 2020-11-07 NOTE — PROGRESS NOTE ADULT - PROBLEM SELECTOR PLAN 1
Patient presented with 3 day history of worsening shortness of breath  - Likely 2/2 PE simultaneously with deconditioning, worsening lung function, and COPD   - PE provoked likely 2/2 decreased mobility with simultaneous hypercoagulability   - CTA with IV cont showing RLL filling defect c/w PE  - Hemodynamically stable, satting well on RA  - Heparin gtt   - Pulm(Dr. Olmos) consulted, recs appreciated   - Heme (Dr. Temple consulted), agree with heparin gtt with concern for bleed

## 2020-11-07 NOTE — PHYSICAL THERAPY INITIAL EVALUATION ADULT - FOLLOWS COMMANDS/ANSWERS QUESTIONS, REHAB EVAL
[facility] [address] [facility], [address] [facility] Phone: [phone]  Fax: [phone] Discharge Summary  2-15 Patient name: Evangelista Urbano  : 1955  Provider#:[id number] Referral source: Ace Camacho MD     
Medical/Treatment Diagnosis: Low back pain [M54.5] Prior Hospitalization: see medical history Comorbidities: mitral valve prolapse, scoliosis Medications: Verified on Patient Summary List 
 
Start of Care: 10/30/2018 Visits from Start of Care: 4     Missed Visits: 1 Reporting Period : 10/30/2018 to 2018 Short Term Goals: To be accomplished in 2 weeks:  
            3. Patient will be I in HEP to promote self management of symptoms. MET 
            2. Patient will report pain level at worst as less than or equal to 4/10 so they can be performed without pain. NOT MET Long Term Goals: To be accomplished in 4-6 weeks:  
            1.  Patient will report pain level at worst as less than or equal to 2/10 so they can be performed without pain. NOT MET 
            2. The patient will be able to drive greater than or equal to 45 min without increase in low back or LE pain.  NOT MET 
            3. The patient will be able to stand greater than or equal to 2 hours without increase in low back or LE pain.  NOT MET 
  
 
 
 
 
ASSESSMENT/SUMMARY OF CARE: Patient continues to be limited by LE cramping and pain daily despite progression of exercise program and manual techniques. RECOMMENDATIONS: 
[x]Discontinue therapy: []Patient has reached or is progressing toward set goals []Patient is non-compliant or has abdicated 
    [x]Due to lack of appreciable progress towards set goals Seymour Solomon, PT 2018 6:28 PM 
 

100% of the time

## 2020-11-07 NOTE — PHYSICAL THERAPY INITIAL EVALUATION ADULT - ADDITIONAL COMMENTS
pt report lives in ground floor apartment w/ no stairs at [place].  Pt report friends gotten her a rollator.

## 2020-11-07 NOTE — PROGRESS NOTE ADULT - PROBLEM SELECTOR PLAN 2
Acute, pt with h/o chronic IV iron transfusions in the past, follows with Dr. 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

## 2020-11-08 ENCOUNTER — TRANSCRIPTION ENCOUNTER (OUTPATIENT)
Age: 80
End: 2020-11-08

## 2020-11-08 DIAGNOSIS — M54.9 DORSALGIA, UNSPECIFIED: ICD-10-CM

## 2020-11-08 LAB
ANION GAP SERPL CALC-SCNC: 5 MMOL/L — SIGNIFICANT CHANGE UP (ref 5–17)
APTT BLD: 101.1 SEC — HIGH (ref 27.5–35.5)
APTT BLD: 61.5 SEC — HIGH (ref 27.5–35.5)
APTT BLD: 83.9 SEC — HIGH (ref 27.5–35.5)
APTT BLD: 99.4 SEC — HIGH (ref 27.5–35.5)
BUN SERPL-MCNC: 20 MG/DL — SIGNIFICANT CHANGE UP (ref 7–23)
CALCIUM SERPL-MCNC: 8.3 MG/DL — LOW (ref 8.5–10.1)
CHLORIDE SERPL-SCNC: 111 MMOL/L — HIGH (ref 96–108)
CO2 SERPL-SCNC: 26 MMOL/L — SIGNIFICANT CHANGE UP (ref 22–31)
CREAT SERPL-MCNC: 0.96 MG/DL — SIGNIFICANT CHANGE UP (ref 0.5–1.3)
GLUCOSE SERPL-MCNC: 96 MG/DL — SIGNIFICANT CHANGE UP (ref 70–99)
HCT VFR BLD CALC: 26.9 % — LOW (ref 34.5–45)
HCT VFR BLD CALC: 30.2 % — LOW (ref 34.5–45)
HGB BLD-MCNC: 8.9 G/DL — LOW (ref 11.5–15.5)
HGB BLD-MCNC: 9.3 G/DL — LOW (ref 11.5–15.5)
INR BLD: 1.14 RATIO — SIGNIFICANT CHANGE UP (ref 0.88–1.16)
MCHC RBC-ENTMCNC: 26.6 PG — LOW (ref 27–34)
MCHC RBC-ENTMCNC: 28 PG — SIGNIFICANT CHANGE UP (ref 27–34)
MCHC RBC-ENTMCNC: 30.8 GM/DL — LOW (ref 32–36)
MCHC RBC-ENTMCNC: 33.1 GM/DL — SIGNIFICANT CHANGE UP (ref 32–36)
MCV RBC AUTO: 84.6 FL — SIGNIFICANT CHANGE UP (ref 80–100)
MCV RBC AUTO: 86.3 FL — SIGNIFICANT CHANGE UP (ref 80–100)
NRBC # BLD: 0 /100 WBCS — SIGNIFICANT CHANGE UP (ref 0–0)
NRBC # BLD: 0 /100 WBCS — SIGNIFICANT CHANGE UP (ref 0–0)
PLATELET # BLD AUTO: 176 K/UL — SIGNIFICANT CHANGE UP (ref 150–400)
PLATELET # BLD AUTO: 197 K/UL — SIGNIFICANT CHANGE UP (ref 150–400)
POTASSIUM SERPL-MCNC: 4.3 MMOL/L — SIGNIFICANT CHANGE UP (ref 3.5–5.3)
POTASSIUM SERPL-SCNC: 4.3 MMOL/L — SIGNIFICANT CHANGE UP (ref 3.5–5.3)
PROTHROM AB SERPL-ACNC: 13.2 SEC — SIGNIFICANT CHANGE UP (ref 10.6–13.6)
RBC # BLD: 3.18 M/UL — LOW (ref 3.8–5.2)
RBC # BLD: 3.5 M/UL — LOW (ref 3.8–5.2)
RBC # FLD: 17 % — HIGH (ref 10.3–14.5)
RBC # FLD: 17 % — HIGH (ref 10.3–14.5)
SARS-COV-2 IGG SERPL QL IA: NEGATIVE — SIGNIFICANT CHANGE UP
SARS-COV-2 IGM SERPL IA-ACNC: <0.1 INDEX — SIGNIFICANT CHANGE UP
SODIUM SERPL-SCNC: 142 MMOL/L — SIGNIFICANT CHANGE UP (ref 135–145)
WBC # BLD: 6.31 K/UL — SIGNIFICANT CHANGE UP (ref 3.8–10.5)
WBC # BLD: 6.77 K/UL — SIGNIFICANT CHANGE UP (ref 3.8–10.5)
WBC # FLD AUTO: 6.31 K/UL — SIGNIFICANT CHANGE UP (ref 3.8–10.5)
WBC # FLD AUTO: 6.77 K/UL — SIGNIFICANT CHANGE UP (ref 3.8–10.5)

## 2020-11-08 PROCEDURE — 99232 SBSQ HOSP IP/OBS MODERATE 35: CPT

## 2020-11-08 PROCEDURE — 71045 X-RAY EXAM CHEST 1 VIEW: CPT | Mod: 26

## 2020-11-08 RX ORDER — APIXABAN 2.5 MG/1
10 TABLET, FILM COATED ORAL EVERY 12 HOURS
Refills: 0 | Status: DISCONTINUED | OUTPATIENT
Start: 2020-11-08 | End: 2020-11-09

## 2020-11-08 RX ORDER — ACETAMINOPHEN 500 MG
2 TABLET ORAL
Qty: 0 | Refills: 0 | DISCHARGE
Start: 2020-11-08

## 2020-11-08 RX ORDER — LIDOCAINE 4 G/100G
1 CREAM TOPICAL DAILY
Refills: 0 | Status: DISCONTINUED | OUTPATIENT
Start: 2020-11-08 | End: 2020-11-09

## 2020-11-08 RX ORDER — ACETAMINOPHEN 500 MG
650 TABLET ORAL EVERY 6 HOURS
Refills: 0 | Status: DISCONTINUED | OUTPATIENT
Start: 2020-11-08 | End: 2020-11-09

## 2020-11-08 RX ADMIN — SIMVASTATIN 40 MILLIGRAM(S): 20 TABLET, FILM COATED ORAL at 22:20

## 2020-11-08 RX ADMIN — Medication 650 MILLIGRAM(S): at 18:46

## 2020-11-08 RX ADMIN — BUPROPION HYDROCHLORIDE 150 MILLIGRAM(S): 150 TABLET, EXTENDED RELEASE ORAL at 05:55

## 2020-11-08 RX ADMIN — Medication 650 MILLIGRAM(S): at 12:58

## 2020-11-08 RX ADMIN — BUPROPION HYDROCHLORIDE 75 MILLIGRAM(S): 150 TABLET, EXTENDED RELEASE ORAL at 18:09

## 2020-11-08 RX ADMIN — TIOTROPIUM BROMIDE 1 CAPSULE(S): 18 CAPSULE ORAL; RESPIRATORY (INHALATION) at 06:09

## 2020-11-08 RX ADMIN — Medication 25 MILLIGRAM(S): at 05:55

## 2020-11-08 RX ADMIN — Medication 81 MILLIGRAM(S): at 11:33

## 2020-11-08 RX ADMIN — FAMOTIDINE 40 MILLIGRAM(S): 10 INJECTION INTRAVENOUS at 06:09

## 2020-11-08 RX ADMIN — FAMOTIDINE 40 MILLIGRAM(S): 10 INJECTION INTRAVENOUS at 18:06

## 2020-11-08 RX ADMIN — CITALOPRAM 40 MILLIGRAM(S): 10 TABLET, FILM COATED ORAL at 11:33

## 2020-11-08 RX ADMIN — HEPARIN SODIUM 900 UNIT(S)/HR: 5000 INJECTION INTRAVENOUS; SUBCUTANEOUS at 02:28

## 2020-11-08 RX ADMIN — Medication 650 MILLIGRAM(S): at 11:33

## 2020-11-08 RX ADMIN — HEPARIN SODIUM 900 UNIT(S)/HR: 5000 INJECTION INTRAVENOUS; SUBCUTANEOUS at 16:17

## 2020-11-08 RX ADMIN — Medication 650 MILLIGRAM(S): at 18:07

## 2020-11-08 RX ADMIN — LIDOCAINE 1 PATCH: 4 CREAM TOPICAL at 19:54

## 2020-11-08 RX ADMIN — Medication 600 MILLIGRAM(S): at 18:37

## 2020-11-08 RX ADMIN — LOSARTAN POTASSIUM 25 MILLIGRAM(S): 100 TABLET, FILM COATED ORAL at 05:56

## 2020-11-08 RX ADMIN — BUDESONIDE AND FORMOTEROL FUMARATE DIHYDRATE 2 PUFF(S): 160; 4.5 AEROSOL RESPIRATORY (INHALATION) at 19:55

## 2020-11-08 RX ADMIN — Medication 650 MILLIGRAM(S): at 00:12

## 2020-11-08 RX ADMIN — LIDOCAINE 1 PATCH: 4 CREAM TOPICAL at 11:34

## 2020-11-08 RX ADMIN — APIXABAN 10 MILLIGRAM(S): 2.5 TABLET, FILM COATED ORAL at 18:07

## 2020-11-08 RX ADMIN — Medication 88 MICROGRAM(S): at 05:56

## 2020-11-08 RX ADMIN — HEPARIN SODIUM 900 UNIT(S)/HR: 5000 INJECTION INTRAVENOUS; SUBCUTANEOUS at 09:16

## 2020-11-08 RX ADMIN — ALBUTEROL 2 PUFF(S): 90 AEROSOL, METERED ORAL at 06:10

## 2020-11-08 RX ADMIN — BUDESONIDE AND FORMOTEROL FUMARATE DIHYDRATE 2 PUFF(S): 160; 4.5 AEROSOL RESPIRATORY (INHALATION) at 05:55

## 2020-11-08 NOTE — DISCHARGE NOTE PROVIDER - CARE PROVIDERS DIRECT ADDRESSES
[email or web address]

## 2020-11-08 NOTE — DISCHARGE NOTE PROVIDER - NSDCCPCAREPLAN_GEN_ALL_CORE_FT
PRINCIPAL DISCHARGE DIAGNOSIS  Diagnosis: Pulmonary embolism  Assessment and Plan of Treatment: You came to the hospital with shortness of breath and were found to have a pulmonary embolism on CT scan of your chest.  - You were treated with a blood thinner, heparin and transitioned to an oral blood thinner, Eliquis.  - START taking Eliquis 10mg twice daily for 6 more days (stop date 11/15, last dose in the morning).  - THEN, START Eliquis 5mg twice daily starting on 11/15 at night.   - Follow up with your cardiologist, Dr. Harmon, in 1 week following discharge.   - Follow up with your primary doctor in 1 week of discharge.      SECONDARY DISCHARGE DIAGNOSES  Diagnosis: Back pain  Assessment and Plan of Treatment: You were noted to have back pain that was worse with your breathing.   - Continue using Salonpas patches and Tylenol for your pain.  - Follow with your primary doctor if pain worsens.    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  - You received 1 unit of blood in the emergency room and your hemoglobin remained stable in the ~8-9 range.  - Follow up with Dr. Temple in 1 week following discharge.    Diagnosis: Hypertension  Assessment and Plan of Treatment: - Continue taking losartan 25mg daily and metoprolol 25mg daily.  - Continue taking baby aspirin daily.    Diagnosis: Acid reflux  Assessment and Plan of Treatment: - Continue taking Pepcid 40mg twice daily.    Diagnosis: Anxiety  Assessment and Plan of Treatment: - Continue taking Bupropion 75mg two tablets in AM and one at night and Celexa 40mg daily.    Diagnosis: Hypothyroidism  Assessment and Plan of Treatment: - Continue taking levothyroxine 88mcg daily.    Diagnosis: Hyperlipidemia  Assessment and Plan of Treatment: - Continue taking simvastatin 40mg daily.    Diagnosis: Asthma with COPD with exacerbation  Assessment and Plan of Treatment: - Continue taking your trelegy ellipta inhalation 1 puff daily     PRINCIPAL DISCHARGE DIAGNOSIS  Diagnosis: Pulmonary embolism  Assessment and Plan of Treatment: You came to the hospital with shortness of breath and were found to have a pulmonary embolism on CT scan of your chest.  - You were treated with a blood thinner, heparin and transitioned to an oral blood thinner, Eliquis.  - START taking Eliquis 10mg twice daily for 6 more days (stop date 11/15, last dose in the morning). ________  - THEN, START Eliquis 5mg twice daily starting on 11/15 at night.   - Follow up with your cardiologist, Dr. Harmon, in 1 week following discharge.   - Follow up with your primary doctor in 1 week of discharge.      SECONDARY DISCHARGE DIAGNOSES  Diagnosis: Hypertension  Assessment and Plan of Treatment: - Continue taking losartan 25mg daily and metoprolol 25mg daily.  - _________ aspirin daily. __________    Diagnosis: Anxiety  Assessment and Plan of Treatment: - Continue taking Bupropion 75mg two tablets in AM and one at night and Celexa 40mg daily.    Diagnosis: Hypothyroidism  Assessment and Plan of Treatment: - Continue taking levothyroxine 88mcg daily.    Diagnosis: Hyperlipidemia  Assessment and Plan of Treatment: - Continue taking simvastatin 40mg daily.    Diagnosis: Acid reflux  Assessment and Plan of Treatment: - Continue taking Pepcid 40mg twice daily.    Diagnosis: Back pain  Assessment and Plan of Treatment: You were noted to have back pain that was worse with your breathing.   - Continue using Salonpas patches and Tylenol for your pain.  - Follow with your primary doctor if pain worsens.    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  - You received 1 unit of blood in the emergency room and your hemoglobin remained stable in the ~8-9 range.  -Hb on discharge was _______  - Follow up with Dr. Temple in 1 week following discharge.    Diagnosis: Asthma with COPD with exacerbation  Assessment and Plan of Treatment: - Continue taking your trelegy ellipta inhalation 1 puff daily     PRINCIPAL DISCHARGE DIAGNOSIS  Diagnosis: Pulmonary embolism  Assessment and Plan of Treatment: You came to the hospital with shortness of breath and were found to have a pulmonary embolism on CT scan of your chest.  - You were treated with a blood thinner, heparin and transitioned to an oral blood thinner, Eliquis.  - START taking Eliquis 10mg twice daily for 6 more days (stop date 11/15, last dose in the morning)  - THEN, START Eliquis 5mg twice daily starting on 11/15 at night.   - Follow up with your cardiologist, Dr. Harmon, in 1 week following discharge.   - Follow up with your primary doctor in 1 week of discharge.      SECONDARY DISCHARGE DIAGNOSES  Diagnosis: Hypertension  Assessment and Plan of Treatment: - Continue taking losartan 25mg daily and metoprolol 25mg daily.  - STOP taking aspirin 81mg    Diagnosis: Anxiety  Assessment and Plan of Treatment: - Continue taking Bupropion 75mg two tablets in AM and one at night and Celexa 40mg daily.    Diagnosis: Hypothyroidism  Assessment and Plan of Treatment: - Continue taking levothyroxine 88mcg daily.    Diagnosis: Hyperlipidemia  Assessment and Plan of Treatment: - Continue taking simvastatin 40mg daily.    Diagnosis: Acid reflux  Assessment and Plan of Treatment: - Continue taking Pepcid 40mg twice daily.    Diagnosis: Back pain  Assessment and Plan of Treatment: You were noted to have back pain that was worse with your breathing.   - Continue using Salonpas patches and Tylenol for your pain.  - Lidocaine patches were prescribed and you can continue once daily use, you can also get lidocaine pacthes over the counter in the future.  - Follow with your primary doctor if pain worsens.    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  - You received 1 unit of blood in the emergency room and your hemoglobin remained stable in the ~8-9 range.  -Hb on discharge was 8.4  - Follow up with Dr. Temple in 1 week following discharge.    Diagnosis: Asthma with COPD with exacerbation  Assessment and Plan of Treatment: - Continue taking your trelegy ellipta inhalation 1 puff daily     PRINCIPAL DISCHARGE DIAGNOSIS  Diagnosis: Pulmonary embolism  Assessment and Plan of Treatment: You came to the hospital with shortness of breath and were found to have a pulmonary embolism on CT scan of your chest and a DVT of your right leg   - You were treated with a blood thinner, heparin and transitioned to an oral blood thinner, Eliquis.  - START taking Eliquis 10mg twice daily for 6 more days (stop date 11/15, last dose in the morning)  - THEN, START Eliquis 5mg twice daily starting on 11/15 at night.   - Follow up with your cardiologist, Dr. Harmon, in 1 week following discharge.   - Follow up with your primary doctor in 1 week of discharge.      SECONDARY DISCHARGE DIAGNOSES  Diagnosis: Anemia  Assessment and Plan of Treatment: You have a history of anemia, refractive to your leukemia.  - You received 1 unit of blood in the emergency room and your hemoglobin remained stable in the ~8-9 range.  -Hb on discharge was 8.4  -STOP your home medication Aspirin for now, you will need to follow-up with both your cardiologist (Faustino) and your heme/onc doctor (Lida) to see when/if to restart medication   - Follow up within Dr. Temple in 1 week following discharge, you will need a repeat CBC (blood work) as well as Iron infusions    Diagnosis: Back pain  Assessment and Plan of Treatment: You were noted to have back pain that was worse with your breathing.   - Continue using Salonpas patches and Tylenol for your pain.  - Lidocaine patches were prescribed and you can continue once daily use, you can also get lidocaine pacthes over the counter in the future.  - Follow with your primary doctor if pain worsens.    Diagnosis: Hypertension  Assessment and Plan of Treatment: - Continue taking losartan 25mg daily and metoprolol 25mg daily.  - STOP taking aspirin 81mg    Diagnosis: Anxiety  Assessment and Plan of Treatment: - Continue taking Bupropion 75mg two tablets in AM and one at night and Celexa 40mg daily.    Diagnosis: Hypothyroidism  Assessment and Plan of Treatment: - Continue taking levothyroxine 88mcg daily.    Diagnosis: Hyperlipidemia  Assessment and Plan of Treatment: - Continue taking simvastatin 40mg daily.    Diagnosis: Acid reflux  Assessment and Plan of Treatment: - Continue taking Pepcid 40mg twice daily.    Diagnosis: Asthma with COPD with exacerbation  Assessment and Plan of Treatment: - Continue taking your trelegy ellipta inhalation 1 puff daily     PRINCIPAL DISCHARGE DIAGNOSIS  Diagnosis: Pulmonary embolism  Assessment and Plan of Treatment: You came to the hospital with shortness of breath and were found to have a pulmonary embolism on CT scan of your chest and a DVT of your right leg   - You were treated with a blood thinner, heparin and transitioned to an oral blood thinner, Eliquis.  - START taking Eliquis 10mg twice daily for 6 more days (stop date 11/15, last dose in the morning)  - THEN, START Eliquis 5mg twice daily starting on 11/15 at night.   - Follow up with your cardiologist, Dr. Harmon, in 1 week following discharge.   - Follow up with your primary doctor in 1 week of discharge.      SECONDARY DISCHARGE DIAGNOSES  Diagnosis: Anemia  Assessment and Plan of Treatment: You have a history of anemia, refractive to your leukemia.  - You received 1 unit of blood in the emergency room and your hemoglobin remained stable in the ~8-9 range.  -Hb on discharge was 8.4  -STOP your home medication Aspirin for now, you will need to follow-up with both your cardiologist (Faustino) and your heme/onc doctor (Lida) to see when/if to restart medication   - Follow up within Dr. Temple in 1 week following discharge, you will need a repeat CBC (blood work) as well as Iron infusions    Diagnosis: Back pain  Assessment and Plan of Treatment: You were noted to have back pain that was worse with your breathing.   -You can use Salpona patches or lidocaine patches for your back pain  - Lidocaine patches were prescribed and you can continue once daily use, you can also get lidocaine pacthes over the counter in the future.   -In one day when using a lidocaine patch it can be on for 12 hours and must be off for 12 hours as well   - Follow with your primary doctor if pain worsens.    Diagnosis: Hypertension  Assessment and Plan of Treatment: - Continue taking losartan 25mg daily and metoprolol 25mg daily.  - STOP taking aspirin 81mg    Diagnosis: Anxiety  Assessment and Plan of Treatment: - Continue taking Bupropion 75mg two tablets in AM and one at night and Celexa 40mg daily.    Diagnosis: Hypothyroidism  Assessment and Plan of Treatment: - Continue taking levothyroxine 88mcg daily.    Diagnosis: Hyperlipidemia  Assessment and Plan of Treatment: - Continue taking simvastatin 40mg daily.    Diagnosis: Acid reflux  Assessment and Plan of Treatment: - Continue taking Pepcid 40mg twice daily.    Diagnosis: Asthma with COPD with exacerbation  Assessment and Plan of Treatment: - Continue taking your trelegy ellipta inhalation 1 puff daily

## 2020-11-08 NOTE — PROGRESS NOTE ADULT - ASSESSMENT
81 y/o F PMHx of leukemia (s/p chemo in 2012 now in remission followed by Dr. Temple) with residual chronic anemia requiring iron infusions, MVR and AVR in 2012,, COPD (not on home O2), MARTINA on CPAP (patient admits to being non-compliant with use), hx of R ureteral stone s/p stent in 9/2019 revised in 12/2019, Graves s/p thyroid surgery, HLD, HTN, depression, GERD who presented to the ER complaining of SOB, found to have acute PE and DVT of right posterior tibial vein     PE/DVT  - Improved respiratory status today  - Tolerating RA  - Continue Heparin for now.  Will eventually switch to DOAC  - TTE, though, as difficult study, showed normal LVAF, no SWMA, mild LVH, aortic and mitral gradients appropriate for valve replacements, normal RV size and function, with grade 2 DD  - Heme following.  Follow recs    s/p tissue AVR/MVR  - No evidence of volume overload.  TTE as above  - Continue ASA    HTN  - BP labile at systolic 100's-150  - Continue BB and ARB at same doses  - EKG: significant artifact, however, NSR noted   - Monitor and replete lytes, keep K>4, Mg>2.    - Other cardiovascular workup will depend on clinical course.  Follows with Dr. Harmon as outpatient   - All other workup per primary team.  - Will continue to follow.    Sophy Salcedo DNP, NP-C  Cardiology   Spectra #[phone]          81 y/o F PMHx of leukemia (s/p chemo in 2012 now in remission followed by Dr. Temple) with residual chronic anemia requiring iron infusions, MVR and AVR in 2012,, COPD (not on home O2), MARTINA on CPAP (patient admits to being non-compliant with use), hx of R ureteral stone s/p stent in 9/2019 revised in 12/2019, Graves s/p thyroid surgery, HLD, HTN, depression, GERD who presented to the ER complaining of SOB, found to have acute PE and DVT of right posterior tibial vein     PE/DVT  - Improved respiratory status today  - Tolerating RA  - Continue Heparin for now.  Will eventually switch to DOAC  - TTE, though, as difficult study, showed normal LVAF, no SWMA, mild LVH, aortic gradient appropriate for valve replacement, though mitral gradient elevated (as seen previously in office), normal RV size and function, with grade 2 DD  - Heme following.  Follow recs    s/p tissue AVR/MVR  - No evidence of volume overload.  TTE as above  - Continue ASA    HTN  - BP labile at systolic 100's-150  - Continue BB and ARB at same doses  - EKG: significant artifact, however, NSR noted   - Monitor and replete lytes, keep K>4, Mg>2.    - Other cardiovascular workup will depend on clinical course.  Follows with Dr. Harmon as outpatient   - All other workup per primary team.  - Will continue to follow.    Sophy Salcedo DNP, NP-C  Cardiology   Spectra #[phone]

## 2020-11-08 NOTE — PROGRESS NOTE ADULT - PROBLEM SELECTOR PLAN 3
Acute, pt with h/o chronic IV iron transfusions in the past, follows with Dr. Temple, scheduled to have transfusion in the near future  - Hgb 8 on admission, per chart, baseline Hb 11.7-12.6  - Likely 2/2 leukemia  - S/p 1 unit pRBCs  - Hgb 9.3 today, stable  - Monitor for s/sx of bleeding  - F/u FOBT  - Heme consulted, Dr Bauer agreeing to continue heparin gtt for bleed

## 2020-11-08 NOTE — PROGRESS NOTE ADULT - SUBJECTIVE AND OBJECTIVE BOX
Date/Time Patient Seen:  		  Referring MD:   Data Reviewed	       Patient is a 80y old  Female who presents with a chief complaint of Shortness of breath (07 Nov 2020 11:31)      Subjective/HPI     PAST MEDICAL & SURGICAL HISTORY:  Serum phosphorus decreased  Last level 1.5 at PST 1/13    Calculus of ureter  s/p R ureteral stent 12/20/2020    MARTINA on CPAP  Pt admits to be non-compliant    Leukemia  (APL, s/p chemo in 2012, now in remission, followed by oncologist)    Anemia  Completed iron infusions every 2 months, now Hb stable    Spinal stenosis    Mitral regurgitation    Aortic stenosis    Emphysema    Duodenal ulcer  at age 24    Rotator cuff tear  Right    Diverticulosis of colon    Coronary atherosclerosis of native coronary artery    Carpal tunnel syndrome    Dyslipidemia    Obstructive sleep apnea  cpap    Osteoporosis    Hypertension    Hernia, hiatal    Former cigarette smoker    Depression    Asthma with COPD with exacerbation  Not on home O2    Asthma    Acid reflux    Graves disease  s/p surgery    Elective surgery  Cystoscopy, right ureteroscopy, laser lithotripsy of right ureteral stone, right ureteral stent removal and replacement, right retrograde pyelogram on 12/6/19    H/O dilation and curettage    S/P ureteral stent placement  R in 12/2020    H/O mitral valve repair  2014    H/O aortic valve repair  2014    History of coronary angiogram  1/31/12    h/o  endometrial ablation  1998    H/O cone biopsy of cervix  1974    History of tonsillectomy  childhood    After cataract, bilateral  2010    Carpal tunnel right repair          Medication list         MEDICATIONS  (STANDING):  aspirin enteric coated 81 milliGRAM(s) Oral daily  budesonide  80 MICROgram(s)/formoterol 4.5 MICROgram(s) Inhaler 2 Puff(s) Inhalation two times a day  buPROPion . 150 milliGRAM(s) Oral <User Schedule>  buPROPion . 75 milliGRAM(s) Oral <User Schedule>  citalopram 40 milliGRAM(s) Oral daily  famotidine    Tablet 40 milliGRAM(s) Oral two times a day  heparin  Infusion. 900 Unit(s)/Hr (9 mL/Hr) IV Continuous <Continuous>  levothyroxine 88 MICROGram(s) Oral daily  losartan 25 milliGRAM(s) Oral daily  metoprolol succinate ER 25 milliGRAM(s) Oral daily  simvastatin 40 milliGRAM(s) Oral at bedtime  tiotropium 18 MICROgram(s) Capsule 1 Capsule(s) Inhalation daily    MEDICATIONS  (PRN):  ALBUTerol    90 MICROgram(s) HFA Inhaler 2 Puff(s) Inhalation every 6 hours PRN Shortness of Breath and/or Wheezing  heparin   Injectable 6000 Unit(s) IV Push every 6 hours PRN For aPTT less than 40  heparin   Injectable 3000 Unit(s) IV Push every 6 hours PRN For aPTT between 40 - 57         Vitals log        ICU Vital Signs Last 24 Hrs  T(C): 36.5 (08 Nov 2020 05:25), Max: 36.8 (07 Nov 2020 13:40)  T(F): 97.7 (08 Nov 2020 05:25), Max: 98.2 (07 Nov 2020 13:40)  HR: 72 (08 Nov 2020 05:25) (72 - 87)  BP: 118/77 (08 Nov 2020 05:25) (105/70 - 156/81)  BP(mean): --  ABP: --  ABP(mean): --  RR: 18 (08 Nov 2020 05:25) (18 - 18)  SpO2: 93% (08 Nov 2020 05:25) (92% - 100%)           Input and Output:  I&O's Detail    06 Nov 2020 07:01  -  07 Nov 2020 07:00  --------------------------------------------------------  IN:    Oral Fluid: 120 mL  Total IN: 120 mL    OUT:    Voided (mL): 0 mL  Total OUT: 0 mL    Total NET: 120 mL      07 Nov 2020 07:01  -  08 Nov 2020 05:51  --------------------------------------------------------  IN:    Heparin Infusion: 91 mL  Total IN: 91 mL    OUT:  Total OUT: 0 mL    Total NET: 91 mL          Lab Data                        8.9    6.77  )-----------( 176      ( 08 Nov 2020 01:24 )             26.9     11-07    144  |  113<H>  |  14  ----------------------------<  94  4.3   |  28  |  0.92    Ca    8.0<L>      07 Nov 2020 07:16  Mg     2.3     11-07    TPro  5.7<L>  /  Alb  2.8<L>  /  TBili  0.2  /  DBili  x   /  AST  16  /  ALT  13  /  AlkPhos  64  11-06      CARDIAC MARKERS ( 06 Nov 2020 07:47 )  <.015 ng/mL / x     / x     / x     / x            Review of Systems	      Objective     Physical Examination    heart s1s2  lung dec BS  abd soft  on room air      Pertinent Lab findings & Imaging      Ifeanyi:  NO   Adequate UO     I&O's Detail    06 Nov 2020 07:01  -  07 Nov 2020 07:00  --------------------------------------------------------  IN:    Oral Fluid: 120 mL  Total IN: 120 mL    OUT:    Voided (mL): 0 mL  Total OUT: 0 mL    Total NET: 120 mL      07 Nov 2020 07:01  -  08 Nov 2020 05:51  --------------------------------------------------------  IN:    Heparin Infusion: 91 mL  Total IN: 91 mL    OUT:  Total OUT: 0 mL    Total NET: 91 mL               Discussed with:     Cultures:	        Radiology

## 2020-11-08 NOTE — PROGRESS NOTE ADULT - ATTENDING COMMENTS
Patient seen and examined. NAD. No complaints x L sided back pain  Check CXR  Lidocaine patch  D/C iv heparin today and transition to eliquis loading dose  D/C planning in ESTEPHANIE Burton MD

## 2020-11-08 NOTE — PROGRESS NOTE ADULT - PROBLEM SELECTOR PLAN 5
Chronic, not on home O2  - Takes Trelegy Ellipta at home, but has difficulty affording it  - Continue therapeutic interchange Symbicort and Spiriva  - Satting well on RA, keep O2>88

## 2020-11-08 NOTE — DISCHARGE NOTE PROVIDER - PROVIDER TOKENS
PROVIDER:[TOKEN:[[id number]],FOLLOWUP:[1 week]],PROVIDER:[TOKEN:[[id number]],FOLLOWUP:[1 week]],PROVIDER:[TOKEN:[[id number]],FOLLOWUP:[1 week]]

## 2020-11-08 NOTE — PROGRESS NOTE ADULT - PROBLEM SELECTOR PLAN 1
in bed, seen and examined, on RA, occ pain reported -   81 y/o F PMHx of leukemia (s/p chemo in 2012 now in remission followed by Dr. Temple) with residual chronic anemia requiring iron infusions, MVR and AVR in 2011, COPD (not on home O2), MARTINA on CPAP (patient admits to being non-compliant with use), hx of R ureteral stone s/p stent in 9/2019 revised in 12/2019, Graves s/p thyroid surgery, HLD, HTN, depression, GERD, who presents with worsening shortness of breath  martina - does not use CPAP - sleep hygiene -   copd - ex smoker -   new PE and below the knee DVT - heparin gtt - monitor Hgb and HD  plan for DOAC on DC - agree with HEME assessment   trop and proBNP - no evidence of RV strain  pt is on RA - vss  copd - proventil PRN - Symbicort - Spiriva - VBG...   Heme eval and follow up - known to Dr Temple -   Heme occult -   [facility] rx regimen - known Heart disease - valv heart disease -.

## 2020-11-08 NOTE — DISCHARGE NOTE PROVIDER - NSDCMRMEDTOKEN_GEN_ALL_CORE_FT
aspirin 81 mg oral delayed release tablet: 1 tab(s) orally once a day  buPROPion 75 mg oral tablet: 2 tabs in AM, 1 tab at night  CeleXA 40 mg oral tablet: 1 tab(s) orally once a day  levothyroxine 88 mcg (0.088 mg) oral tablet: 1 tab(s) orally once a day  losartan 25 mg oral tablet: 1 tab(s) orally once a day  metoprolol succinate 25 mg oral tablet, extended release: 1 tab(s) orally once a day  Pepcid 40 mg oral tablet: 1 tab(s) orally 2 times a day  simvastatin 40 mg oral tablet: 1 tab(s) orally once a day (at bedtime)  Trelegy Ellipta inhalation powder: 1 puff(s) inhaled once a day   acetaminophen 325 mg oral tablet: 2 tab(s) orally every 6 hours, As needed, Mild Pain (1 - 3)  aspirin 81 mg oral delayed release tablet: 1 tab(s) orally once a day  buPROPion 75 mg oral tablet: 2 tabs in AM, 1 tab at night  CeleXA 40 mg oral tablet: 1 tab(s) orally once a day  levothyroxine 88 mcg (0.088 mg) oral tablet: 1 tab(s) orally once a day  losartan 25 mg oral tablet: 1 tab(s) orally once a day  metoprolol succinate 25 mg oral tablet, extended release: 1 tab(s) orally once a day  Pepcid 40 mg oral tablet: 1 tab(s) orally 2 times a day  simvastatin 40 mg oral tablet: 1 tab(s) orally once a day (at bedtime)  Trelegy Ellipta inhalation powder: 1 puff(s) inhaled once a day   acetaminophen 325 mg oral tablet: 2 tab(s) orally every 6 hours, As needed, Mild Pain (1 - 3)  buPROPion 75 mg oral tablet: 1 tab(s) orally 3 times a day   CeleXA 40 mg oral tablet: 1 tab(s) orally once a day  Eliquis 5 mg oral tablet: 2 tab(s) orally 2 times a day   levothyroxine 88 mcg (0.088 mg) oral tablet: 1 tab(s) orally once a day  lidocaine 5% topical film: Apply topically to affected area once a day   losartan 25 mg oral tablet: 1 tab(s) orally once a day  metoprolol succinate 25 mg oral tablet, extended release: 1 tab(s) orally once a day  Pepcid 40 mg oral tablet: 1 tab(s) orally 2 times a day  simvastatin 40 mg oral tablet: 1 tab(s) orally once a day (at bedtime)  Trelegy Ellipta inhalation powder: 1 puff(s) inhaled once a day

## 2020-11-08 NOTE — PROGRESS NOTE ADULT - PROBLEM SELECTOR PLAN 10
DVT PPX: Patient was given Pharmacological Prophylaxis with Lovenox 70mg x1 in the ER, recommend ordering Heparin gtt for 2PM as it may be reversible in the event of any bleed  11) Dispo: Social Work needed as patient lives alone and is requesting services of aide  - PT recommending home with home PT  12) HLD: Continue home simvastatin 40mg daily.

## 2020-11-08 NOTE — PROGRESS NOTE ADULT - PROBLEM SELECTOR PLAN 4
MARTINA on CPAP, patient admits to being non-compliant with CPAP at home  - Pulm (Dr. Olmos) consulted, continue on proventil PRN, Symbicort, Spiriva

## 2020-11-08 NOTE — PROGRESS NOTE ADULT - PROBLEM SELECTOR PLAN 1
Patient presented with 3 day history of worsening shortness of breath  - Likely 2/2 PE simultaneously with deconditioning, worsening lung function, and COPD   - PE provoked likely 2/2 decreased mobility with simultaneous hypercoagulability   - CTA with IV cont showing RLL filling defect c/w PE  - Hemodynamically stable, satting well on RA  - Heparin gtt; will transition to DOAC on discharge   - Pulm (Dr. Olmos) consulted, recs appreciated   - Heme (Dr. Temple consulted), agree with heparin gtt with concern for bleed Patient presented with 3 day history of worsening shortness of breath  - Likely 2/2 PE simultaneously with deconditioning, worsening lung function, and COPD   - PE provoked likely 2/2 decreased mobility with simultaneous hypercoagulability   - CTA with IV cont showing RLL filling defect c/w PE  - Hemodynamically stable, satting well on RA  - Heparin gtt; will transition to DOAC on discharge   - TTE showed normal LVAF, no SWMA, mild LVH, aortic and mitral gradients appropriate for valve replacements, normal RV size and function, with grade 2 DD  - Pulm (Dr. Olmos) consulted, recs appreciated   - Heme (Dr. Temple consulted), agree with heparin gtt with concern for bleed  - Cardio (Dr Shah) consulted, recs appreciated Patient presented with 3 day history of worsening shortness of breath  - Likely 2/2 PE simultaneously with deconditioning, worsening lung function, and COPD   - PE provoked likely 2/2 decreased mobility with simultaneous hypercoagulability   - CTA with IV cont showing RLL filling defect c/w PE  - Hemodynamically stable, satting well on RA  - On heparin gtt  - Will start Eliquis 10mg BID tonight at 1800 and stop heparin gtt at 1700  - TTE showed normal LVAF, no SWMA, mild LVH, aortic and mitral gradients appropriate for valve replacements, normal RV size and function, with grade 2 DD  - Pulm (Dr. Olmos) consulted, recs appreciated   - Heme (Dr. Temple consulted), agree with heparin gtt with concern for bleed and transition to DOAC  - Cardio (Dr Shah) consulted, recs appreciated

## 2020-11-08 NOTE — DISCHARGE NOTE PROVIDER - NSDCFUADDINST_GEN_ALL_CORE_FT
Follow up with your primary doctor, Dr. Man in 1 week after discharge.  Follow up with your cardiologist Dr. Harmon in 1 week for follow up of pulmonary embolism.  Follow up with your hematologist/oncologist Dr. Temple to follow up your anemia and pulmonary embolism.

## 2020-11-08 NOTE — PROGRESS NOTE ADULT - SUBJECTIVE AND OBJECTIVE BOX
[facility] -- Brandy Harmon, Ilan Leary, Chico Begum Savella, [place]  Office # [phone]    Follow Up:  PE, DVT    Subjective/Observations: Awake and alert, on RA.  Respiratory status noted to have improved.  Denies chets pain, though, still c/o spasm on left back    REVIEW OF SYSTEMS: All other review of systems is negative unless indicated above  PAST MEDICAL & SURGICAL HISTORY:  Serum phosphorus decreased  Last level 1.5 at PST 1/13    Calculus of ureter  s/p R ureteral stent 12/20/2020    MARTINA on CPAP  Pt admits to be non-compliant    Leukemia  (APL, s/p chemo in 2012, now in remission, followed by oncologist)    Anemia  Completed iron infusions every 2 months, now Hb stable    Aortic stenosis    Emphysema    Rotator cuff tear  Right    Diverticulosis of colon    Coronary atherosclerosis of native coronary artery    Carpal tunnel syndrome    Dyslipidemia    Osteoporosis    Hypertension    Hernia, hiatal    Former cigarette smoker    Depression    Asthma with COPD with exacerbation  Not on home O2    Acid reflux    Graves disease  s/p surgery    Elective surgery  Cystoscopy, right ureteroscopy, laser lithotripsy of right ureteral stone, right ureteral stent removal and replacement, right retrograde pyelogram on 12/6/19    H/O dilation and curettage    S/P ureteral stent placement  R in 12/2020    H/O mitral valve repair  2014    H/O aortic valve repair  2014    History of coronary angiogram  1/31/12    h/o  endometrial ablation  1998    H/O cone biopsy of cervix  1974    History of tonsillectomy  childhood    After cataract, bilateral  2010      MEDICATIONS  (STANDING):  aspirin enteric coated 81 milliGRAM(s) Oral daily  budesonide  80 MICROgram(s)/formoterol 4.5 MICROgram(s) Inhaler 2 Puff(s) Inhalation two times a day  buPROPion . 150 milliGRAM(s) Oral <User Schedule>  buPROPion . 75 milliGRAM(s) Oral <User Schedule>  citalopram 40 milliGRAM(s) Oral daily  famotidine    Tablet 40 milliGRAM(s) Oral two times a day  guaiFENesin  milliGRAM(s) Oral every 12 hours  heparin  Infusion. 900 Unit(s)/Hr (9 mL/Hr) IV Continuous <Continuous>  levothyroxine 88 MICROGram(s) Oral daily  lidocaine   Patch 1 Patch Transdermal daily  losartan 25 milliGRAM(s) Oral daily  metoprolol succinate ER 25 milliGRAM(s) Oral daily  simvastatin 40 milliGRAM(s) Oral at bedtime  tiotropium 18 MICROgram(s) Capsule 1 Capsule(s) Inhalation daily    MEDICATIONS  (PRN):  acetaminophen   Tablet .. 650 milliGRAM(s) Oral every 6 hours PRN Mild Pain (1 - 3)  ALBUTerol    90 MICROgram(s) HFA Inhaler 2 Puff(s) Inhalation every 6 hours PRN Shortness of Breath and/or Wheezing  heparin   Injectable 6000 Unit(s) IV Push every 6 hours PRN For aPTT less than 40  heparin   Injectable 3000 Unit(s) IV Push every 6 hours PRN For aPTT between 40 - 57    Allergies    adhesives (Rash; Other)  Cat dander- wheezing, itchy throat, SOB (Other)  penicillin (Rash)  sulfa drugs (Rash)  tetracycline (Stomach Upset)    Intolerances    Vital Signs Last 24 Hrs  T(C): 36.5 (08 Nov 2020 05:25), Max: 36.8 (07 Nov 2020 13:40)  T(F): 97.7 (08 Nov 2020 05:25), Max: 98.2 (07 Nov 2020 13:40)  HR: 72 (08 Nov 2020 05:25) (72 - 78)  BP: 118/77 (08 Nov 2020 05:25) (105/70 - 156/81)  BP(mean): --  RR: 18 (08 Nov 2020 05:25) (18 - 18)  SpO2: 93% (08 Nov 2020 05:25) (92% - 97%)  I&O's Summary    07 Nov 2020 07:01  -  08 Nov 2020 07:00  --------------------------------------------------------  IN: 91 mL / OUT: 0 mL / NET: 91 mL    PHYSICAL EXAM:  TELE: NSR  Constitutional: NAD, awake and alert, obese  HEENT: Moist Mucous Membranes, Anicteric  Pulmonary: Non-labored, breath sounds are clear bilaterally, No wheezing, rales or rhonchi  Cardiovascular: Regular, S1 and S2, +Murmur no rubs, gallops or clicks  Gastrointestinal: Bowel Sounds present, soft, nontender.   Lymph: + BLE edema R>L. No lymphadenopathy.  Skin: No visible rashes or ulcers.  Psych:  Mood & affect appropriate      LABS: All Labs Reviewed:                        9.3    6.31  )-----------( 197      ( 08 Nov 2020 06:42 )             30.2                         8.9    6.77  )-----------( 176      ( 08 Nov 2020 01:24 )             26.9                         8.8    5.08  )-----------( 186      ( 07 Nov 2020 07:16 )             26.9     08 Nov 2020 06:42    142    |  111    |  20     ----------------------------<  96     4.3     |  26     |  0.96   07 Nov 2020 07:16    144    |  113    |  14     ----------------------------<  94     4.3     |  28     |  0.92   06 Nov 2020 07:13    144    |  114    |  14     ----------------------------<  93     4.0     |  25     |  0.76     Ca    8.3        08 Nov 2020 06:42  Ca    8.0        07 Nov 2020 07:16  Ca    7.5        06 Nov 2020 07:13  Mg     2.3       07 Nov 2020 07:16    TPro  5.7    /  Alb  2.8    /  TBili  0.2    /  DBili  x      /  AST  16     /  ALT  13     /  AlkPhos  64     06 Nov 2020 07:13    PT/INR - ( 08 Nov 2020 06:42 )   PT: 13.2 sec;   INR: 1.14 ratio      PTT - ( 08 Nov 2020 08:40 )  PTT:99.4 sec    EXAM:  CT ANGIO ABD PELV (W)AW IC                          EXAM:  CT ANGIO CHEST (W)AW IC                          PROCEDURE DATE:  11/05/2020      INTERPRETATION:  CT ANGIOGRAPHY OF THE CHEST, ABDOMEN AND PELVIS    INDICATION: Asthma. Hypertension. Flank pain. Evaluate for dissection and pulmonary embolism.    TECHNIQUE: Noncontrast CT of the chest was performed, followed by CT angiography performed of the chest, abdomen and pelvis after administration of intravenous contrast. Postprocessed MIP and 3-D reformatted images were created and reviewed.    90 mL of Omnipaque 350 contrast material was injected IV.    COMPARISON: CT abdomen pelvis 1/16/2020.    FINDINGS:  Vessels: Precontrast images show no evidence of intramural hematoma.  Normal caliber aorta. No intimal flap is seen to suggest aortic dissection. Atherosclerotic disease of the aorta and its branches. Evaluation of the aortic root is limited due to cardiac pulsation.    Celiac axis and SMA are patent. There is plaque ornonocclusive thrombus identified within the proximal celiac vessel on image 73 of series 602 which is otherwise patent. RASHID is patent. Distal SMA and RASHID branches are not well assessed by CT technique.Bilateral renal arteries are patent. Bilateral iliac arteries are normal caliber.    There is filling defect identified segmental to subsegmental branches of the right lower lobe pulmonary artery as best seen on images 72 through 73 of series 4, compatible with pulmonary embolism.    Thorax:  Airways: Central airways patent.  Lungs: Trace bilateral pleural effusions with bibasilar opacities, likely represent atelectasis and/or scarring.  Mediastinum and lymph nodes: No mass or hemorrhage. No bulky adenopathy.  Heart: Mild cardiomegaly. No pericardial effusion.Cardiac valve replacement.  Soft tissues: Median sternotomy.    Liver: Right posterior hepatic lobe cyst. Additional too small to characterize hypodensities.  Biliary: No significant dilatation. Cholelithiasis.  Spleen: No suspicious lesions.  Pancreas: No inflammatory changes or ductal dilatation.  Adrenals: Normal.  Kidneys: Symmetrically enhancing without hydronephrosis. Scattered bilateral nephrolithiasis. Bilateral renal cysts as well as too small to characterize hypodensities.    Peritoneum/retroperitoneum and mesentery: No free air. No organized fluid collection. No adenopathy. Diverticulosis without evidence of acute diverticulitis. Normal appendix.  GI tract: Large paraesophageal hiatal hernia. No significant dilatationor wall thickening though detailed evaluation the GI tract is limited secondary to inadequate distention.    Pelvic organs/Bladder: Globular uterus, likely containing fibroids. No adnexal masses. Bladder is normal.    Abdominal wall: A small fat containing umbilical hernia is noted.  Bones and soft tissues: Multilevel degenerative changes of the spine noted. Interval indeterminate age compression deformity of T12 with mild retropulsion. This results in less than 50% vertebral height loss. Grade 1anterior spondylolisthesis L5 on S1. Chronic parasymphyseal fracture deformity. Chronic bilateral sacral alae insufficiency fractures.    IMPRESSION:    Filling defect identified segmental to subsegmental branches of the right lower lobe pulmonary artery, compatible with pulmonary embolism.    No aortic aneurysm or dissection. Atherosclerotic disease. 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    Scattered bilateral nephrolithiasis without hydronephrosis or obstructive uropathy.    Large paraesophageal hiatal hernia.    Interval indeterminate age compression deformity of T12 with mild retropulsion. This results in less than 50% vertebral height loss.    Additional findings as mentioned above.    These critical results were discussed via telephone at 11/6/2020 1:10 AM by Dr. Bal of radiology with Dr. Marshall, institution read-back verification policy was followed.    FANTA BAL MD; Attending Radiologist  This document has been electronically signed. Nov 6 2020  1:13AM    Ventricular Rate 79 BPM    Atrial Rate 79 BPM    P-R Interval 188 ms    QRS Duration 76 ms    Q-T Interval 422 ms    QTC Calculation(Bazett) 483 ms    P Axis 100 degrees    R Axis 0 degrees    T Axis 69 degrees    Diagnosis Line Normal sinus rhythm  Nonspecific ST and T wave abnormality  Abnormal ECG  Confirmed by kena Woodson ([id number]) on 11/6/2020 3:23:59 PM     EXAM:  ECHO TTE WO CON COMP W DOPP         PROCEDURE DATE:  11/06/2020        INTERPRETATION:  Ordering Physician: DARYL A PERLMAN [id number]    Indication: Dyspnea    Study Quality: Technically difficult  A complete echocardiographic study was performed utilizing standard protocol including spectral and color Doppler in all echocardiographic windows.    Height: 157.5 cm  Weight: 72.6 kg  BSA: 1.74 sq m  Blood Pressure: 124/84    MEASUREMENTS  IVS: 1.2cm  PWT: 1.2cm  LA: 3.0cm  AO: 2.8cm  LVIDd: 4.2cm  LVIDs: 2.7cm  LVOT: 1.5cm    LVEF: 55-60%  RVSP: 23mmHg    FINDINGS  Left Ventricle: The endocardium is not well-visualized. Grossly normal left ventricular systolic function. No segmental wall motion abnormalities are visualized. Mild concentric LVH.  Aortic Valve: Status post bioprosthetic aortic valve. Mild aortic insufficiency. The peak aortic valve gradient is equal to 22 mmHg, with a mean aortic valve gradient of 13 mmHg, which are normal in the setting of a bioprosthetic aortic valve replacement.  Mitral Valve: Mitral annular calcification. Status post bioprosthetic mitral valve. Mean mitral valve gradient is equal to 6 mmHg, which is mildly elevated even in the setting of a mitral valve replacement. Mild mitral regurgitation  Tricuspid Valve: Grossly normal tricuspid valve with mild tricuspid regurgitation  Pulmonic Valve: The pulmonic valve is not well-visualized.  Left Atrium: Moderate left atrial enlargement  Right Ventricle: Grossly normal right ventricular size and systolic function.  Right Atrium: Grossly normal.  Diastolic Function: Doppler evidence of grade 2 diastolic dysfunction  Pericardium/Pleura: No significant pericardial effusion    CONCLUSIONS:  1. Very technically difficult and limited study  2. The endocardium is not well-visualized. Grossly normal left ventricular systolic function. No segmental wall motion abnormalities are visualized. Mild concentric LVH.  3. Status post bioprosthetic aortic valve. Mild aortic insufficiency. The peak aortic valve gradient is equal to 22 mmHg, with a mean aortic valve gradient of 13 mmHg, which are normal in the setting of a bioprosthetic aortic valve replacement.  4. Status post bioprosthetic mitral valve. Mean mitral valve gradient is equal to 6 mmHg, which is mildly elevated even in the setting of a mitral valve replacement. Mild mitral regurgitation. Moderate left atrial enlargement  5. Grossly normal right ventricular size and systolic function.  6. Doppler evidence of grade 2 diastolic dysfunction  7. No significant pericardial effusion      KENA WOODSON MD; Attending Cardiologist  This document has been electronically signed. Nov 7 2020  2:28PM

## 2020-11-08 NOTE — DISCHARGE NOTE PROVIDER - HOSPITAL COURSE
HPI:  81 y/o F PMHx of leukemia (s/p chemo in 2012 now in remission followed by Dr. Temple) with residual chronic anemia requiring iron infusions, MVR and AVR in 2011, COPD (not on home O2), MARTINA on CPAP (patient admits to being non-compliant with use), hx of R ureteral stone s/p stent in 9/2019 revised in 12/2019, Graves s/p thyroid surgery, HLD, HTN, depression, GERD, who presents with worsening shortness of breath and L-sided flank pain. States she has had chronic shortness of breath, but for the past 3 days, her breathing was so bad to the point that she would become short of breath and her heart would be racing after simply taking out the trash. Patient also endorses L-sided flank pain that started about 2 weeks ago, which has also worsened over the past 3 days or so. States that the pain is a 5/10 at its worst and is exacerbated by bending or any kind of movement. The pain is intermittent and occasionally radiates to the groin. Patient also endorses chronic b/l lower extremity edema, which hasn't significantly worsened. However, pt admittedly states she hasn't been ambulating as much as she usually does because of her breathing difficulty and has been sleeping upright on her cough most days. She lives by herself with no aide at home and has been having a lot of difficulty performing a lot of her ADLs, i.e. showering, cleaning, etc. She struggles to cook food for herself and has a local friend that helps drive her to places. Patient is admits to receiving iron infusions and recently had a virtual visit with Dr. Temple last night, where she was told that she would need IV Iron transfusions. Denies any fevers, chills, chest pain, dizziness, dysuria, hematuria, or any bleeding.     Of note, patient was hospitalized in [facility] from 1/16/20-1/20/20 for influenza and was also c/o low back pain and was found to have a T12 compression fracture during the hospital course.     ED Course  Vitals: T 99.6, HR 88, /76, O2 96% on RA  Significant Labs: Hb 8.0, Hct 25.6, aPTT 25.4, D-dimer 708, AG 4, Ca 7.9, eGFR 57  EKG: NSR  CT abd/pelvis/chest w/ IV cont: Filling defect identified segmental to subsegmental branches of the right lower lobe pulmonary artery, compatible with pulmonary embolism. No aortic aneurysm or dissection. Atherosclerotic disease. 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 Scattered bilateral nephrolithiasis without hydronephrosis or obstructive uropathy. Large paraesophageal hiatal hernia. Interval indeterminate age compression deformity of T12 with mild retropulsion. This results in less than 50% vertebral height loss. (06 Nov 2020 03:16)      ---  HOSPITAL COURSE:   Patient was admitted for pulmonary embolism.     ---  CONSULTANTS:     ---  Physical Exam on the day of discharge:   HPI:  81 y/o F PMHx of leukemia (s/p chemo in 2012 now in remission followed by Dr. Temple) with residual chronic anemia requiring iron infusions, MVR and AVR in 2011, COPD (not on home O2), MARTINA on CPAP (patient admits to being non-compliant with use), hx of R ureteral stone s/p stent in 9/2019 revised in 12/2019, Graves s/p thyroid surgery, HLD, HTN, depression, GERD, who presents with worsening shortness of breath and L-sided flank pain. States she has had chronic shortness of breath, but for the past 3 days, her breathing was so bad to the point that she would become short of breath and her heart would be racing after simply taking out the trash. Patient also endorses L-sided flank pain that started about 2 weeks ago, which has also worsened over the past 3 days or so. States that the pain is a 5/10 at its worst and is exacerbated by bending or any kind of movement. The pain is intermittent and occasionally radiates to the groin. Patient also endorses chronic b/l lower extremity edema, which hasn't significantly worsened. However, pt admittedly states she hasn't been ambulating as much as she usually does because of her breathing difficulty and has been sleeping upright on her cough most days. She lives by herself with no aide at home and has been having a lot of difficulty performing a lot of her ADLs, i.e. showering, cleaning, etc. She struggles to cook food for herself and has a local friend that helps drive her to places. Patient is admits to receiving iron infusions and recently had a virtual visit with Dr. Temple last night, where she was told that she would need IV Iron transfusions. Denies any fevers, chills, chest pain, dizziness, dysuria, hematuria, or any bleeding.     Of note, patient was hospitalized in [facility] from 1/16/20-1/20/20 for influenza and was also c/o low back pain and was found to have a T12 compression fracture during the hospital course.     ED Course  Vitals: T 99.6, HR 88, /76, O2 96% on RA  Significant Labs: Hb 8.0, Hct 25.6, aPTT 25.4, D-dimer 708, AG 4, Ca 7.9, eGFR 57  EKG: NSR  CT abd/pelvis/chest w/ IV cont: Filling defect identified segmental to subsegmental branches of the right lower lobe pulmonary artery, compatible with pulmonary embolism. No aortic aneurysm or dissection. Atherosclerotic disease. 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 Scattered bilateral nephrolithiasis without hydronephrosis or obstructive uropathy. Large paraesophageal hiatal hernia. Interval indeterminate age compression deformity of T12 with mild retropulsion. This results in less than 50% vertebral height loss. (06 Nov 2020 03:16)      ---  HOSPITAL COURSE:   Patient was admitted for pulmonary embolism. CT abd/pelvis/chest w/ IV cont showed filling defect identified segmental to subsegmental branches of the right lower lobe pulmonary artery, compatible with pulmonary embolism. Patient was started on heparin drip and was transitioned to Eliquis 10mg BID. Patient will take Eliquis 10mg BID for 7 days and then decrease to Eliquis 5mg BID. Patient was noted to have left-sided back pain during admission. CXR was ordered and showed no active pulmonary disease. Patient received lidoderm patch and Tylenol with improvement in her pain.   Patient was seen by hematology/oncology for history of anemia. Patient has residual anemia from leukemia requiring iron infusions. Patient received 1 unit of pRBCs in the ED. Heme/onc agreed with heparin drip for PE treatment and suggested transition to DOAC on discharge.   Patient was seen by cardiology for dyspnea on exertion. TTE was performed showing normal LVAF, no SWMA, mild LVH, aortic gradient appropriate for valve replacement, though mitral gradient elevated (as seen previously in office), normal RV size and function, with grade 2 DD.   Patient was seen by pulmonology for COPD and MARTINA not on CPAP. Patient was continued on Proventil PRN, Symbicort and Spiriva inpatient.    Patient was seen by PT and was recommended to be discharged home with home PT.    Patient's clinical condition improved throughout hospital course and she is stable to be discharged home with home PT.    ---  CONSULTANTS:   Hematology/oncology: Dr. Bauer  Cardiology: Dr. Shah  Pulmonology: Dr Olmos    ---  Physical Exam on the day of discharge:   HPI:  81 y/o F PMHx of leukemia (s/p chemo in 2012 now in remission followed by Dr. Temple) with residual chronic anemia requiring iron infusions, MVR and AVR in 2011, COPD (not on home O2), MARTINA on CPAP (patient admits to being non-compliant with use), hx of R ureteral stone s/p stent in 9/2019 revised in 12/2019, Graves s/p thyroid surgery, HLD, HTN, depression, GERD, who presents with worsening shortness of breath and L-sided flank pain. States she has had chronic shortness of breath, but for the past 3 days, her breathing was so bad to the point that she would become short of breath and her heart would be racing after simply taking out the trash. Patient also endorses L-sided flank pain that started about 2 weeks ago, which has also worsened over the past 3 days or so. States that the pain is a 5/10 at its worst and is exacerbated by bending or any kind of movement. The pain is intermittent and occasionally radiates to the groin. Patient also endorses chronic b/l lower extremity edema, which hasn't significantly worsened. However, pt admittedly states she hasn't been ambulating as much as she usually does because of her breathing difficulty and has been sleeping upright on her cough most days. She lives by herself with no aide at home and has been having a lot of difficulty performing a lot of her ADLs, i.e. showering, cleaning, etc. She struggles to cook food for herself and has a local friend that helps drive her to places. Patient is admits to receiving iron infusions and recently had a virtual visit with Dr. Temple last night, where she was told that she would need IV Iron transfusions. Denies any fevers, chills, chest pain, dizziness, dysuria, hematuria, or any bleeding.     Of note, patient was hospitalized in [facility] from 1/16/20-1/20/20 for influenza and was also c/o low back pain and was found to have a T12 compression fracture during the hospital course.     ED Course  Vitals: T 99.6, HR 88, /76, O2 96% on RA  Significant Labs: Hb 8.0, Hct 25.6, aPTT 25.4, D-dimer 708, AG 4, Ca 7.9, eGFR 57  EKG: NSR  CT abd/pelvis/chest w/ IV cont: Filling defect identified segmental to subsegmental branches of the right lower lobe pulmonary artery, compatible with pulmonary embolism. No aortic aneurysm or dissection. Atherosclerotic disease. 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 Scattered bilateral nephrolithiasis without hydronephrosis or obstructive uropathy. Large paraesophageal hiatal hernia. Interval indeterminate age compression deformity of T12 with mild retropulsion. This results in less than 50% vertebral height loss. (06 Nov 2020 03:16)      ---  HOSPITAL COURSE:   Patient was admitted for pulmonary embolism. CT abd/pelvis/chest w/ IV cont showed filling defect identified segmental to subsegmental branches of the right lower lobe pulmonary artery, compatible with pulmonary embolism. Patient was started on heparin drip and was transitioned to Eliquis 10mg BID. Patient will take Eliquis 10mg BID for 7 days and then decrease to Eliquis 5mg BID. Patient was noted to have left-sided back pain during admission. CXR was ordered and showed no active pulmonary disease. Patient received lidoderm patch and Tylenol with improvement in her pain.   Patient was seen by hematology/oncology for history of anemia. Patient has residual anemia from leukemia requiring iron infusions. Patient received 1 unit of pRBCs in the ED. Heme/onc agreed with heparin drip for PE treatment and suggested transition to DOAC on discharge. Patient is to see Heme/Onc for regular follow ups and iron infusions.    Patient was seen by cardiology for dyspnea on exertion. TTE was performed showing normal LVAF, no SWMA, mild LVH, aortic gradient appropriate for valve replacement, though mitral gradient elevated (as seen previously in office), normal RV size and function and no right sided heart strain, with grade 2 DD.   Patient was seen by pulmonology for COPD and MARTINA not on CPAP. Patient was continued on Proventil PRN, Symbicort and Spiriva inpatient.    Patient was seen by PT and was recommended to be discharged home with home PT.    Patient's clinical condition improved throughout hospital course and she is stable to be discharged home with home PT.    ---  CONSULTANTS:   Hematology/oncology: Dr. Bauer/Windy   Cardiology: Dr. Shah  Pulmonology: Dr Olmos    CONSTITUTIONAL: denies fever, chills, fatigue  HEENT: denies blurred vision, sore throat  SKIN: denies new lesions, rash  CARDIOVASCULAR: denies chest pain, chest pressure, palpitations  RESPIRATORY: denies shortness of breath, sputum production  GASTROINTESTINAL: denies nausea, vomiting, diarrhea, abdominal pain  GENITOURINARY: denies dysuria, discharge  NEUROLOGICAL: denies numbness, headache, focal weakness  MUSCULOSKELETAL: endorses improvement of left sided back pain denies new joint pain  HEMATOLOGIC: denies gross bleeding, bruising  PSYCHIATRIC: denies recent changes in anxiety, depression    T(C): 36.7 (11-09-20 @ 05:36), Max: 36.9 (11-08-20 @ 22:37)  HR: 66 (11-09-20 @ 05:36) (66 - 75)  BP: 120/79 (11-09-20 @ 05:36) (109/73 - 120/79)  RR: 18 (11-09-20 @ 05:36) (18 - 18)  SpO2: 93% (11-09-20 @ 05:36) (93% - 96%)    GENERAL: patient appears well, no acute distress, appropriate, pleasant  EYES: sclera clear, no exudates, PERRLA  ENMT: oropharynx clear without erythema, no exudates, moist mucous membranes  NECK: supple, soft  LUNGS: good air entry bilaterally, clear to auscultation, symmetric breath sounds, no wheezing or rhonchi appreciated  HEART: soft S1/S2, regular rate and rhythm, no murmurs noted, no lower extremity edema  GASTROINTESTINAL: abdomen is soft, nontender, nondistended, normoactive bowel sounds, no palpable masses  INTEGUMENT: good skin turgor, warm, well perfused  MUSCULOSKELETAL: mild pain to palpation left mid back just lateral to spine, no clubbing or cyanosis, no obvious deformity  NEUROLOGIC: awake, alert, oriented x3, good muscle tone in 4 extremities, no obvious sensory deficits  PSYCHIATRIC: mood is good, affect is congruent, linear and logical thought process  HEME/LYMPH:  no obvious ecchymosis or petechiae

## 2020-11-08 NOTE — DISCHARGE NOTE PROVIDER - CARE PROVIDER_API CALL
Sammy Man  INTERNAL MEDICINE  [address], [address]  [place] [address]  Phone: [phone]  Fax: [phone]  Follow Up Time: 1 week    Kel Harmon)  Cardiovascular Disease; Internal Medicine  [address] [place] [address]  Phone: [phone]  Fax: [phone]  Follow Up Time: 1 week    Mishel Temple  HEMATOLOGY  [address], [address]  [place] [address]  Phone: [phone]  Fax: [phone]  Follow Up Time: 1 week

## 2020-11-08 NOTE — PROGRESS NOTE ADULT - PROBLEM SELECTOR PLAN 2
Patient presenting with reported L sided flank pain since admission  - Worsened today  - Reproducible thoracic back pain, worse with inspiration  - Also with decreased lung sounds in the Left Lower Lobe  - Will order repeat CXR at patient's request, mucinex for cough   - Lidocaine patch to site with pain management Patient presenting with reported L sided flank pain since admission  - Worsened today  - Lidocaine patch to site and Tylenol for pain management  - Reproducible thoracic back pain, worse with inspiration  - Also with decreased lung sounds in the Left Lower Lobe  - Will order repeat CXR at patient's request, mucinex for cough   - CTA showed Trace bilateral pleural effusions with bibasilar opacities, likely represent atelectasis and/or scarring. Patient presenting with reported L sided flank pain since admission  - Worsened today  - Lidocaine patch to site and Tylenol for pain management  - Reproducible thoracic back pain, worse with inspiration  - Also with decreased lung sounds in the Left Lower Lobe  - Will order repeat CXR at patient's request, mucinex for cough   - CXR showing no active pulmonary disease, likely MSK related

## 2020-11-08 NOTE — PROGRESS NOTE ADULT - SUBJECTIVE AND OBJECTIVE BOX
Patient is a 80y old  Female who presents with a chief complaint of Shortness of breath (07 Nov 2020 06:23)    INTERVAL HPI/OVERNIGHT EVENTS:  Patient seen and examined at [facility] this morning. She is complaining of increased pain on the left flank that was present on admission. She states that she uses Tylenol and "patches" which help her pain. Patient is also complaining of increased cough today and states that she is producing some clear mucus. She is very nervous that she has pneumonia. Will order repeat CXR on patient's request and lidocaine patch to help with pain. She denies any chest pain, palpitations, abdominal pain, nausea, vomiting, weakness, fatigue, dysuria. No bowel movements since before admission.     T(C): 36.5 (11-08-20 @ 05:25), Max: 36.8 (11-07-20 @ 13:40)  HR: 72 (11-08-20 @ 05:25) (72 - 78)  BP: 118/77 (11-08-20 @ 05:25) (105/70 - 156/81)  RR: 18 (11-08-20 @ 05:25) (18 - 18)  SpO2: 93% (11-08-20 @ 05:25) (92% - 97%)  Wt(kg): --    I&O's Summary    07 Nov 2020 07:01  -  08 Nov 2020 07:00  --------------------------------------------------------  IN: 91 mL / OUT: 0 mL / NET: 91 mL        LABS:                        9.3    6.31  )-----------( 197      ( 08 Nov 2020 06:42 )             30.2     11-08    142  |  111<H>  |  20  ----------------------------<  96  4.3   |  26  |  0.96    Ca    8.3<L>      08 Nov 2020 06:42  Mg     2.3     11-07      PT/INR - ( 08 Nov 2020 06:42 )   PT: 13.2 sec;   INR: 1.14 ratio         PTT - ( 08 Nov 2020 06:42 )  PTT:101.1 sec  CAPILLARY BLOOD GLUCOSE         MEDICATIONS  (STANDING):  aspirin enteric coated 81 milliGRAM(s) Oral daily  budesonide  80 MICROgram(s)/formoterol 4.5 MICROgram(s) Inhaler 2 Puff(s) Inhalation two times a day  buPROPion . 150 milliGRAM(s) Oral <User Schedule>  buPROPion . 75 milliGRAM(s) Oral <User Schedule>  citalopram 40 milliGRAM(s) Oral daily  famotidine    Tablet 40 milliGRAM(s) Oral two times a day  heparin  Infusion.  Unit(s)/Hr (13 mL/Hr) IV Continuous <Continuous>  levothyroxine 88 MICROGram(s) Oral daily  losartan 25 milliGRAM(s) Oral daily  metoprolol succinate ER 25 milliGRAM(s) Oral daily  simvastatin 40 milliGRAM(s) Oral at bedtime  tiotropium 18 MICROgram(s) Capsule 1 Capsule(s) Inhalation daily    MEDICATIONS  (PRN):  ALBUTerol    90 MICROgram(s) HFA Inhaler 2 Puff(s) Inhalation every 6 hours PRN Shortness of Breath and/or Wheezing  heparin   Injectable 6000 Unit(s) IV Push every 6 hours PRN For aPTT less than 40  heparin   Injectable 3000 Unit(s) IV Push every 6 hours PRN For aPTT between 40 - 57      REVIEW OF SYSTEMS:  CONSTITUTIONAL: No fever, weight loss, or fatigue  EYES: No eye pain, visual disturbances, or discharge  ENMT: No difficulty hearing, tinnitus, vertigo; No sinus or throat pain  NECK: No pain or stiffness  RESPIRATORY: Admits shortness of breath and cough. Denies wheezing, chills or hemoptysis  CARDIOVASCULAR: No chest pain, palpitations, dizziness, or leg swelling  GASTROINTESTINAL: No abdominal or epigastric pain. No nausea, vomiting, or hematemesis; No diarrhea or constipation; No melena or hematochezia  GENITOURINARY: No dysuria, frequency, hematuria, or incontinence  NEUROLOGICAL: No headaches, memory loss, loss of strength, numbness, or tremors  SKIN: No itching, burning, rashes, or lesions   LYMPH NODES: No enlarged glands  ENDOCRINE: No heat or cold intolerance; No hair loss  MUSCULOSKELETAL: L flank pain, worsened. No joint pain or swelling;  PSYCHIATRIC: No depression, anxiety, mood swings, or difficulty sleeping  HEME/LYMPH: No easy bruising, or bleeding gums  ALLERGY AND IMMUNOLOGIC: No hives or eczema      PHYSICAL EXAM:  GENERAL: NAD, well-groomed, well-developed  HEAD: Atraumatic, Normocephalic  EYES: EOMI, PERRL, conjunctiva and sclera clear  ENMT: No tonsillar erythema, exudates, or enlargement; Moist mucous membranes  NECK: Supple, No JVD  NERVOUS SYSTEM: Alert & Oriented X3, Good concentration; Good motor strength in B/L upper and lower extremities  CHEST/LUNG: Diminished breath sounds on the left lower lung base. No rales, rhonchi, wheezing, or rubs  HEART: Regular rate and rhythm; Clicks of prosthetic mitral and aortic valves noted. Grade 2 systolic murmurs noted on LUSB and LLSB  ABDOMEN: Soft, Nontender, Nondistended; Bowel sounds present  MSK: Reproducible left sided flank/back pain; worse with inspiration  EXTREMITIES: 2+ Peripheral Pulses, Trace nonpitting edema B/L  LYMPH: No lymphadenopathy noted  SKIN: Warm, dry, well-perfused      RADIOLOGY & ADDITIONAL TESTS:  < from: CT Angio Chest w/ IV Cont (11.05.20 @ 23:52) >  EXAM:  CT ANGIO ABD PELV (W)AW IC                          EXAM:  CT ANGIO CHEST (W)AW IC                            PROCEDURE DATE:  11/05/2020          INTERPRETATION:  CT ANGIOGRAPHY OF THE CHEST, ABDOMEN AND PELVIS    INDICATION: Asthma. Hypertension. Flank pain. Evaluate for dissection and pulmonary embolism.    TECHNIQUE: Noncontrast CT of the chest was performed, followed by CT angiography performed of the chest, abdomen and pelvis after administration of intravenous contrast. Postprocessed MIP and 3-D reformatted images were created and reviewed.    90 mL of Omnipaque 350 contrast material was injected IV.    COMPARISON: CT abdomen pelvis 1/16/2020.    FINDINGS:  Vessels: Precontrast images show no evidence of intramural hematoma.  Normal caliber aorta. No intimal flap is seen to suggest aortic dissection. Atherosclerotic disease of the aorta and its branches. Evaluation of the aortic root is limited due to cardiac pulsation.    Celiac axis and SMA are patent. There is plaque ornonocclusive thrombus identified within the proximal celiac vessel on image 73 of series 602 which is otherwise patent. RASHID is patent. Distal SMA and RASHID branches are not well assessed by CT technique.Bilateral renal arteries are patent. Bilateral iliac arteries are normal caliber.    There is filling defect identified segmental to subsegmental branches of the right lower lobe pulmonary artery as best seen on images 72 through 73 of series 4, compatible with pulmonary embolism.    Thorax:  Airways: Central airways patent.  Lungs: Trace bilateral pleural effusions with bibasilar opacities, likely represent atelectasis and/or scarring.  Mediastinum and lymph nodes: No mass or hemorrhage. No bulky adenopathy.  Heart: Mild cardiomegaly. No pericardial effusion.Cardiac valve replacement.  Soft tissues: Median sternotomy.    Liver: Right posterior hepatic lobe cyst. Additional too small to characterize hypodensities.  Biliary: No significant dilatation. Cholelithiasis.  Spleen: No suspicious lesions.  Pancreas: No inflammatory changes or ductal dilatation.  Adrenals: Normal.  Kidneys: Symmetrically enhancing without hydronephrosis. Scattered bilateral nephrolithiasis. Bilateral renal cysts as well as too small to characterize hypodensities.    Peritoneum/retroperitoneum and mesentery: No free air. No organized fluid collection. No adenopathy. Diverticulosis without evidence of acute diverticulitis. Normal appendix.  GI tract: Large paraesophageal hiatal hernia. No significant dilatationor wall thickening though detailed evaluation the GI tract is limited secondary to inadequate distention.    Pelvic organs/Bladder: Globular uterus, likely containing fibroids. No adnexal masses. Bladder is normal.    Abdominal wall: A small fat containing umbilical hernia is noted.  Bones and soft tissues: Multilevel degenerative changes of the spine noted. Interval indeterminate age compression deformity of T12 with mild retropulsion. This results in less than 50% vertebral height loss. Grade 1anterior spondylolisthesis L5 on S1. Chronic parasymphyseal fracture deformity. Chronic bilateral sacral alae insufficiency fractures.    IMPRESSION:    Filling defect identified segmental to subsegmental branches of the right lower lobe pulmonary artery, compatible with pulmonary embolism.    No aortic aneurysm or dissection. Atherosclerotic disease. 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    Scattered bilateral nephrolithiasis without hydronephrosis or obstructive uropathy.    Large paraesophageal hiatal hernia.    Interval indeterminate age compression deformity of T12 with mild retropulsion. This results in less than 50% vertebral height loss.    Additional findings as mentioned above.    These critical results were discussed via telephone at 11/6/2020 1:10 AM by Dr. Bal of radiology with Dr. Marshall, institution read-back verification policy was followed.                  FANTA BAL MD; Attending Radiologist  This document has been electronically signed. Nov 6 2020  1:13AM    < end of copied text >      Imaging Personally Reviewed:  [ x ] YES  [ ] NO    Consultant(s) Notes Reviewed:  [ x ] YES  [ ] NO    Care Discussed with Consultants/Other Providers [ x ] YES  [ ] NO Patient is a 80y old  Female who presents with a chief complaint of Shortness of breath (07 Nov 2020 06:23)    INTERVAL HPI/OVERNIGHT EVENTS:  Patient seen and examined at [facility] this morning. She is complaining of increased pain on the left flank that was present on admission. She states that she uses Tylenol and "patches" which help her pain. Patient is also complaining of increased cough today and states that she is producing some clear mucus. She is very nervous that she has pneumonia. Will order repeat CXR on patient's request and lidocaine patch to help with pain. She denies any chest pain, palpitations, abdominal pain, nausea, vomiting, weakness, fatigue, dysuria. No bowel movements since before admission.     T(C): 36.5 (11-08-20 @ 05:25), Max: 36.8 (11-07-20 @ 13:40)  HR: 72 (11-08-20 @ 05:25) (72 - 78)  BP: 118/77 (11-08-20 @ 05:25) (105/70 - 156/81)  RR: 18 (11-08-20 @ 05:25) (18 - 18)  SpO2: 93% (11-08-20 @ 05:25) (92% - 97%)  Wt(kg): --    I&O's Summary    07 Nov 2020 07:01  -  08 Nov 2020 07:00  --------------------------------------------------------  IN: 91 mL / OUT: 0 mL / NET: 91 mL        LABS:                        9.3    6.31  )-----------( 197      ( 08 Nov 2020 06:42 )             30.2     11-08    142  |  111<H>  |  20  ----------------------------<  96  4.3   |  26  |  0.96    Ca    8.3<L>      08 Nov 2020 06:42  Mg     2.3     11-07      PT/INR - ( 08 Nov 2020 06:42 )   PT: 13.2 sec;   INR: 1.14 ratio         PTT - ( 08 Nov 2020 06:42 )  PTT:101.1 sec  CAPILLARY BLOOD GLUCOSE         MEDICATIONS  (STANDING):  aspirin enteric coated 81 milliGRAM(s) Oral daily  budesonide  80 MICROgram(s)/formoterol 4.5 MICROgram(s) Inhaler 2 Puff(s) Inhalation two times a day  buPROPion . 150 milliGRAM(s) Oral <User Schedule>  buPROPion . 75 milliGRAM(s) Oral <User Schedule>  citalopram 40 milliGRAM(s) Oral daily  famotidine    Tablet 40 milliGRAM(s) Oral two times a day  heparin  Infusion.  Unit(s)/Hr (13 mL/Hr) IV Continuous <Continuous>  levothyroxine 88 MICROGram(s) Oral daily  losartan 25 milliGRAM(s) Oral daily  metoprolol succinate ER 25 milliGRAM(s) Oral daily  simvastatin 40 milliGRAM(s) Oral at bedtime  tiotropium 18 MICROgram(s) Capsule 1 Capsule(s) Inhalation daily    MEDICATIONS  (PRN):  ALBUTerol    90 MICROgram(s) HFA Inhaler 2 Puff(s) Inhalation every 6 hours PRN Shortness of Breath and/or Wheezing  heparin   Injectable 6000 Unit(s) IV Push every 6 hours PRN For aPTT less than 40  heparin   Injectable 3000 Unit(s) IV Push every 6 hours PRN For aPTT between 40 - 57      REVIEW OF SYSTEMS:  CONSTITUTIONAL: No fever, weight loss, or fatigue  EYES: No eye pain, visual disturbances, or discharge  ENMT: No difficulty hearing, tinnitus, vertigo; No sinus or throat pain  NECK: No pain or stiffness  RESPIRATORY: Admits shortness of breath and cough. Denies wheezing, chills or hemoptysis  CARDIOVASCULAR: No chest pain, palpitations, dizziness, or leg swelling  GASTROINTESTINAL: No abdominal or epigastric pain. No nausea, vomiting, or hematemesis; No diarrhea or constipation; No melena or hematochezia  GENITOURINARY: No dysuria, frequency, hematuria, or incontinence  NEUROLOGICAL: No headaches, memory loss, loss of strength, numbness, or tremors  SKIN: No itching, burning, rashes, or lesions   LYMPH NODES: No enlarged glands  ENDOCRINE: No heat or cold intolerance; No hair loss  MUSCULOSKELETAL: L flank pain, worsened. No joint pain or swelling;  PSYCHIATRIC: No depression, anxiety, mood swings, or difficulty sleeping  HEME/LYMPH: No easy bruising, or bleeding gums  ALLERGY AND IMMUNOLOGIC: No hives or eczema      PHYSICAL EXAM:  GENERAL: NAD, well-groomed, well-developed  HEAD: Atraumatic, Normocephalic  EYES: EOMI, PERRL, conjunctiva and sclera clear  ENMT: No tonsillar erythema, exudates, or enlargement; Moist mucous membranes  NECK: Supple, No JVD  NERVOUS SYSTEM: Alert & Oriented X3, Good concentration; Good motor strength in B/L upper and lower extremities  CHEST/LUNG: Diminished breath sounds on the left lower lung base. No rales, rhonchi, wheezing, or rubs  HEART: Regular rate and rhythm; Clicks of prosthetic mitral and aortic valves noted. Grade 2 systolic murmurs noted on LUSB and LLSB  ABDOMEN: Soft, Nontender, Nondistended; Bowel sounds present  MSK: Reproducible left sided flank/back pain; worse with inspiration  EXTREMITIES: 2+ Peripheral Pulses, 1+ pitting edema B/L  LYMPH: No lymphadenopathy noted  SKIN: Warm, dry, well-perfused      RADIOLOGY & ADDITIONAL TESTS:  < from: CT Angio Chest w/ IV Cont (11.05.20 @ 23:52) >  EXAM:  CT ANGIO ABD PELV (W)AW IC                          EXAM:  CT ANGIO CHEST (W)AW IC                            PROCEDURE DATE:  11/05/2020          INTERPRETATION:  CT ANGIOGRAPHY OF THE CHEST, ABDOMEN AND PELVIS    INDICATION: Asthma. Hypertension. Flank pain. Evaluate for dissection and pulmonary embolism.    TECHNIQUE: Noncontrast CT of the chest was performed, followed by CT angiography performed of the chest, abdomen and pelvis after administration of intravenous contrast. Postprocessed MIP and 3-D reformatted images were created and reviewed.    90 mL of Omnipaque 350 contrast material was injected IV.    COMPARISON: CT abdomen pelvis 1/16/2020.    FINDINGS:  Vessels: Precontrast images show no evidence of intramural hematoma.  Normal caliber aorta. No intimal flap is seen to suggest aortic dissection. Atherosclerotic disease of the aorta and its branches. Evaluation of the aortic root is limited due to cardiac pulsation.    Celiac axis and SMA are patent. There is plaque ornonocclusive thrombus identified within the proximal celiac vessel on image 73 of series 602 which is otherwise patent. RASHID is patent. Distal SMA and RASHID branches are not well assessed by CT technique.Bilateral renal arteries are patent. Bilateral iliac arteries are normal caliber.    There is filling defect identified segmental to subsegmental branches of the right lower lobe pulmonary artery as best seen on images 72 through 73 of series 4, compatible with pulmonary embolism.    Thorax:  Airways: Central airways patent.  Lungs: Trace bilateral pleural effusions with bibasilar opacities, likely represent atelectasis and/or scarring.  Mediastinum and lymph nodes: No mass or hemorrhage. No bulky adenopathy.  Heart: Mild cardiomegaly. No pericardial effusion.Cardiac valve replacement.  Soft tissues: Median sternotomy.    Liver: Right posterior hepatic lobe cyst. Additional too small to characterize hypodensities.  Biliary: No significant dilatation. Cholelithiasis.  Spleen: No suspicious lesions.  Pancreas: No inflammatory changes or ductal dilatation.  Adrenals: Normal.  Kidneys: Symmetrically enhancing without hydronephrosis. Scattered bilateral nephrolithiasis. Bilateral renal cysts as well as too small to characterize hypodensities.    Peritoneum/retroperitoneum and mesentery: No free air. No organized fluid collection. No adenopathy. Diverticulosis without evidence of acute diverticulitis. Normal appendix.  GI tract: Large paraesophageal hiatal hernia. No significant dilatationor wall thickening though detailed evaluation the GI tract is limited secondary to inadequate distention.    Pelvic organs/Bladder: Globular uterus, likely containing fibroids. No adnexal masses. Bladder is normal.    Abdominal wall: A small fat containing umbilical hernia is noted.  Bones and soft tissues: Multilevel degenerative changes of the spine noted. Interval indeterminate age compression deformity of T12 with mild retropulsion. This results in less than 50% vertebral height loss. Grade 1anterior spondylolisthesis L5 on S1. Chronic parasymphyseal fracture deformity. Chronic bilateral sacral alae insufficiency fractures.    IMPRESSION:    Filling defect identified segmental to subsegmental branches of the right lower lobe pulmonary artery, compatible with pulmonary embolism.    No aortic aneurysm or dissection. Atherosclerotic disease. 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    Scattered bilateral nephrolithiasis without hydronephrosis or obstructive uropathy.    Large paraesophageal hiatal hernia.    Interval indeterminate age compression deformity of T12 with mild retropulsion. This results in less than 50% vertebral height loss.    Additional findings as mentioned above.    These critical results were discussed via telephone at 11/6/2020 1:10 AM by Dr. Bal of radiology with Dr. Marshall, institution read-back verification policy was followed.                  FANTA BAL MD; Attending Radiologist  This document has been electronically signed. Nov 6 2020  1:13AM    < end of copied text >      Imaging Personally Reviewed:  [ x ] YES  [ ] NO    Consultant(s) Notes Reviewed:  [ x ] YES  [ ] NO    Care Discussed with Consultants/Other Providers [ x ] YES  [ ] NO

## 2020-11-09 ENCOUNTER — TRANSCRIPTION ENCOUNTER (OUTPATIENT)
Age: 80
End: 2020-11-09

## 2020-11-09 VITALS
TEMPERATURE: 98 F | DIASTOLIC BLOOD PRESSURE: 79 MMHG | RESPIRATION RATE: 18 BRPM | SYSTOLIC BLOOD PRESSURE: 131 MMHG | HEART RATE: 73 BPM | OXYGEN SATURATION: 91 %

## 2020-11-09 LAB
ANION GAP SERPL CALC-SCNC: 3 MMOL/L — LOW (ref 5–17)
BUN SERPL-MCNC: 20 MG/DL — SIGNIFICANT CHANGE UP (ref 7–23)
CALCIUM SERPL-MCNC: 8.1 MG/DL — LOW (ref 8.5–10.1)
CHLORIDE SERPL-SCNC: 111 MMOL/L — HIGH (ref 96–108)
CO2 SERPL-SCNC: 27 MMOL/L — SIGNIFICANT CHANGE UP (ref 22–31)
CREAT SERPL-MCNC: 0.91 MG/DL — SIGNIFICANT CHANGE UP (ref 0.5–1.3)
GLUCOSE SERPL-MCNC: 87 MG/DL — SIGNIFICANT CHANGE UP (ref 70–99)
HCT VFR BLD CALC: 27.3 % — LOW (ref 34.5–45)
HGB BLD-MCNC: 8.4 G/DL — LOW (ref 11.5–15.5)
INR BLD: 2.45 RATIO — HIGH (ref 0.88–1.16)
MCHC RBC-ENTMCNC: 26.8 PG — LOW (ref 27–34)
MCHC RBC-ENTMCNC: 30.8 GM/DL — LOW (ref 32–36)
MCV RBC AUTO: 86.9 FL — SIGNIFICANT CHANGE UP (ref 80–100)
NRBC # BLD: 0 /100 WBCS — SIGNIFICANT CHANGE UP (ref 0–0)
PLATELET # BLD AUTO: 165 K/UL — SIGNIFICANT CHANGE UP (ref 150–400)
POTASSIUM SERPL-MCNC: 4.9 MMOL/L — SIGNIFICANT CHANGE UP (ref 3.5–5.3)
POTASSIUM SERPL-SCNC: 4.9 MMOL/L — SIGNIFICANT CHANGE UP (ref 3.5–5.3)
PROTHROM AB SERPL-ACNC: 27.5 SEC — HIGH (ref 10.6–13.6)
RBC # BLD: 3.14 M/UL — LOW (ref 3.8–5.2)
RBC # FLD: 17.2 % — HIGH (ref 10.3–14.5)
SODIUM SERPL-SCNC: 141 MMOL/L — SIGNIFICANT CHANGE UP (ref 135–145)
WBC # BLD: 5.46 K/UL — SIGNIFICANT CHANGE UP (ref 3.8–10.5)
WBC # FLD AUTO: 5.46 K/UL — SIGNIFICANT CHANGE UP (ref 3.8–10.5)

## 2020-11-09 PROCEDURE — U0003: CPT

## 2020-11-09 PROCEDURE — 82803 BLOOD GASES ANY COMBINATION: CPT

## 2020-11-09 PROCEDURE — 86769 SARS-COV-2 COVID-19 ANTIBODY: CPT

## 2020-11-09 PROCEDURE — 85730 THROMBOPLASTIN TIME PARTIAL: CPT

## 2020-11-09 PROCEDURE — 74174 CTA ABD&PLVS W/CONTRAST: CPT

## 2020-11-09 PROCEDURE — 99285 EMERGENCY DEPT VISIT HI MDM: CPT

## 2020-11-09 PROCEDURE — 85379 FIBRIN DEGRADATION QUANT: CPT

## 2020-11-09 PROCEDURE — 85610 PROTHROMBIN TIME: CPT

## 2020-11-09 PROCEDURE — 86850 RBC ANTIBODY SCREEN: CPT

## 2020-11-09 PROCEDURE — 80053 COMPREHEN METABOLIC PANEL: CPT

## 2020-11-09 PROCEDURE — 84484 ASSAY OF TROPONIN QUANT: CPT

## 2020-11-09 PROCEDURE — 85027 COMPLETE CBC AUTOMATED: CPT

## 2020-11-09 PROCEDURE — 83880 ASSAY OF NATRIURETIC PEPTIDE: CPT

## 2020-11-09 PROCEDURE — 80048 BASIC METABOLIC PNL TOTAL CA: CPT

## 2020-11-09 PROCEDURE — 81003 URINALYSIS AUTO W/O SCOPE: CPT

## 2020-11-09 PROCEDURE — 86901 BLOOD TYPING SEROLOGIC RH(D): CPT

## 2020-11-09 PROCEDURE — 71045 X-RAY EXAM CHEST 1 VIEW: CPT

## 2020-11-09 PROCEDURE — 99232 SBSQ HOSP IP/OBS MODERATE 35: CPT

## 2020-11-09 PROCEDURE — 93306 TTE W/DOPPLER COMPLETE: CPT

## 2020-11-09 PROCEDURE — 36415 COLL VENOUS BLD VENIPUNCTURE: CPT

## 2020-11-09 PROCEDURE — 36430 TRANSFUSION BLD/BLD COMPNT: CPT

## 2020-11-09 PROCEDURE — 97162 PT EVAL MOD COMPLEX 30 MIN: CPT

## 2020-11-09 PROCEDURE — 83735 ASSAY OF MAGNESIUM: CPT

## 2020-11-09 PROCEDURE — P9016: CPT

## 2020-11-09 PROCEDURE — 86922 COMPATIBILITY TEST ANTIGLOB: CPT

## 2020-11-09 PROCEDURE — 93005 ELECTROCARDIOGRAM TRACING: CPT

## 2020-11-09 PROCEDURE — 71275 CT ANGIOGRAPHY CHEST: CPT

## 2020-11-09 PROCEDURE — 94640 AIRWAY INHALATION TREATMENT: CPT

## 2020-11-09 PROCEDURE — 93970 EXTREMITY STUDY: CPT

## 2020-11-09 PROCEDURE — 84443 ASSAY THYROID STIM HORMONE: CPT

## 2020-11-09 PROCEDURE — 86900 BLOOD TYPING SEROLOGIC ABO: CPT

## 2020-11-09 RX ORDER — ASPIRIN/CALCIUM CARB/MAGNESIUM 324 MG
1 TABLET ORAL
Qty: 0 | Refills: 0 | DISCHARGE

## 2020-11-09 RX ORDER — BUPROPION HYDROCHLORIDE 150 MG/1
0 TABLET, EXTENDED RELEASE ORAL
Qty: 0 | Refills: 0 | DISCHARGE

## 2020-11-09 RX ORDER — POLYETHYLENE GLYCOL 3350 17 G/17G
17 POWDER, FOR SOLUTION ORAL
Qty: 0 | Refills: 0 | DISCHARGE
Start: 2020-11-09

## 2020-11-09 RX ORDER — LIDOCAINE 4 G/100G
1 CREAM TOPICAL
Qty: 30 | Refills: 0
Start: 2020-11-09 | End: 2020-12-08

## 2020-11-09 RX ORDER — APIXABAN 2.5 MG/1
2 TABLET, FILM COATED ORAL
Qty: 28 | Refills: 0
Start: 2020-11-09 | End: 2020-11-15

## 2020-11-09 RX ORDER — BUPROPION HYDROCHLORIDE 150 MG/1
1 TABLET, EXTENDED RELEASE ORAL
Qty: 90 | Refills: 0
Start: 2020-11-09 | End: 2020-12-08

## 2020-11-09 RX ORDER — POLYETHYLENE GLYCOL 3350 17 G/17G
17 POWDER, FOR SOLUTION ORAL ONCE
Refills: 0 | Status: COMPLETED | OUTPATIENT
Start: 2020-11-09 | End: 2020-11-09

## 2020-11-09 RX ADMIN — TIOTROPIUM BROMIDE 1 CAPSULE(S): 18 CAPSULE ORAL; RESPIRATORY (INHALATION) at 05:26

## 2020-11-09 RX ADMIN — Medication 25 MILLIGRAM(S): at 05:24

## 2020-11-09 RX ADMIN — CITALOPRAM 40 MILLIGRAM(S): 10 TABLET, FILM COATED ORAL at 12:15

## 2020-11-09 RX ADMIN — Medication 81 MILLIGRAM(S): at 12:15

## 2020-11-09 RX ADMIN — Medication 88 MICROGRAM(S): at 05:24

## 2020-11-09 RX ADMIN — BUPROPION HYDROCHLORIDE 150 MILLIGRAM(S): 150 TABLET, EXTENDED RELEASE ORAL at 05:24

## 2020-11-09 RX ADMIN — Medication 650 MILLIGRAM(S): at 06:24

## 2020-11-09 RX ADMIN — FAMOTIDINE 40 MILLIGRAM(S): 10 INJECTION INTRAVENOUS at 05:30

## 2020-11-09 RX ADMIN — Medication 600 MILLIGRAM(S): at 05:24

## 2020-11-09 RX ADMIN — BUDESONIDE AND FORMOTEROL FUMARATE DIHYDRATE 2 PUFF(S): 160; 4.5 AEROSOL RESPIRATORY (INHALATION) at 05:26

## 2020-11-09 RX ADMIN — LIDOCAINE 1 PATCH: 4 CREAM TOPICAL at 12:14

## 2020-11-09 RX ADMIN — LOSARTAN POTASSIUM 25 MILLIGRAM(S): 100 TABLET, FILM COATED ORAL at 05:24

## 2020-11-09 RX ADMIN — LIDOCAINE 1 PATCH: 4 CREAM TOPICAL at 00:09

## 2020-11-09 RX ADMIN — Medication 650 MILLIGRAM(S): at 05:24

## 2020-11-09 RX ADMIN — APIXABAN 10 MILLIGRAM(S): 2.5 TABLET, FILM COATED ORAL at 05:23

## 2020-11-09 NOTE — PROGRESS NOTE ADULT - SUBJECTIVE AND OBJECTIVE BOX
Patient seen and examined;  Chart reviewed and events noted;   Continues to have left flank pain which has improved  changed from heparin drip to Eliquis      MEDICATIONS  (STANDING):  apixaban 10 milliGRAM(s) Oral every 12 hours  aspirin enteric coated 81 milliGRAM(s) Oral daily  budesonide  80 MICROgram(s)/formoterol 4.5 MICROgram(s) Inhaler 2 Puff(s) Inhalation two times a day  buPROPion . 150 milliGRAM(s) Oral <User Schedule>  buPROPion . 75 milliGRAM(s) Oral <User Schedule>  citalopram 40 milliGRAM(s) Oral daily  famotidine    Tablet 40 milliGRAM(s) Oral two times a day  guaiFENesin  milliGRAM(s) Oral every 12 hours  levothyroxine 88 MICROGram(s) Oral daily  lidocaine   Patch 1 Patch Transdermal daily  losartan 25 milliGRAM(s) Oral daily  metoprolol succinate ER 25 milliGRAM(s) Oral daily  simvastatin 40 milliGRAM(s) Oral at bedtime  tiotropium 18 MICROgram(s) Capsule 1 Capsule(s) Inhalation daily    MEDICATIONS  (PRN):  acetaminophen   Tablet .. 650 milliGRAM(s) Oral every 6 hours PRN Mild Pain (1 - 3)  ALBUTerol    90 MICROgram(s) HFA Inhaler 2 Puff(s) Inhalation every 6 hours PRN Shortness of Breath and/or Wheezing      Vital Signs Last 24 Hrs  T(C): 36.7 (09 Nov 2020 05:36), Max: 36.9 (08 Nov 2020 22:37)  T(F): 98 (09 Nov 2020 05:36), Max: 98.4 (08 Nov 2020 22:37)  HR: 66 (09 Nov 2020 05:36) (66 - 75)  BP: 120/79 (09 Nov 2020 05:36) (109/73 - 120/79)  BP(mean): --  RR: 18 (09 Nov 2020 05:36) (18 - 18)  SpO2: 93% (09 Nov 2020 05:36) (93% - 96%)    PHYSICAL EXAM  General: adult in NAD  HEENT: clear oropharynx, anicteric sclera, pink conjunctivae  Neck: supple  CV: normal S1S2 with no murmur rubs or gallops  Lungs: clear to auscultation, no wheezes, no rhales  Abdomen: soft non-tender non-distended, no hepato/splenomegaly  Ext: no clubbing cyanosis or edema  Skin: no rashes and no petichiae  Neuro: alert and oriented X3 no focal deficits      LABS:                        8.4    5.46  )-----------( 165      ( 09 Nov 2020 07:32 )             27.3     Hemoglobin: 8.4 g/dL (11-09 @ 07:32)  Hemoglobin: 9.3 g/dL (11-08 @ 06:42)  Hemoglobin: 8.9 g/dL (11-08 @ 01:24)  Hemoglobin: 8.8 g/dL (11-07 @ 07:16)  Hemoglobin: 8.6 g/dL (11-06 @ 21:43)    WBC Count: 5.46 K/uL (11-09 @ 07:32)  WBC Count: 6.31 K/uL (11-08 @ 06:42)  WBC Count: 6.77 K/uL (11-08 @ 01:24)  WBC Count: 5.08 K/uL (11-07 @ 07:16)  WBC Count: 5.46 K/uL (11-06 @ 21:43)    11-09    141  |  111<H>  |  20  ----------------------------<  87  4.9   |  27  |  0.91    Ca    8.1<L>      09 Nov 2020 07:32    PT/INR - ( 09 Nov 2020 07:32 )   PT: 27.5 sec;   INR: 2.45 ratio         PTT - ( 08 Nov 2020 15:14 )  PTT:83.9 sec      RADIOLOGY STUDIES:

## 2020-11-09 NOTE — PROGRESS NOTE ADULT - ASSESSMENT
79 y/o woman known to us from office, hx PML in remission, iron deficiency anemia due to diverticulosis and large para-esophageal hernia with poor iron absorption.  Admitted  with  symptomatic RLL PE, CTA a/p also possible proximal celiac plaque vs thrombus, and right post tibial DVT, started on IV heparin and transitioned to DOAC (Eliquis)    -clinically much improved since on anticoag   -encouraged activity and ambulation to see if symptoms remain improved  -started on Eliquis 10mg BID which she will continue for total of 7 days and then 5mg BID for at least 3 months  - patient to follow up in office later this week for CBC check and IV iron infusion    - discussed w pt

## 2020-11-09 NOTE — PROGRESS NOTE ADULT - ASSESSMENT
79 y/o F PMHx of leukemia (s/p chemo in 2012 now in remission followed by Dr. Temple) with residual chronic anemia requiring iron infusions, MVR and AVR in 2012,, COPD (not on home O2), MARTINA on CPAP (patient admits to being non-compliant with use), hx of R ureteral stone s/p stent in 9/2019 revised in 12/2019, Graves s/p thyroid surgery, HLD, HTN, depression, GERD who presented to the ER complaining of SOB, found to have acute PE and DVT of right posterior tibial vein     PE/DVT  - No BOOTH on ambulation.  Tolerating RA  - Now on Eliquis  - TTE, though, as difficult study, showed normal LVAF, no SWMA, mild LVH, aortic gradient appropriate for valve replacement, though mitral gradient elevated (as seen previously in office), normal RV size and function, with grade 2 DD  - Heme following.  Follow recs    s/p tissue AVR/MVR  - No evidence of volume overload.  TTE as above  - Continue ASA    HTN  - BP more stable and controlled  - Continue BB and ARB at same doses  - Monitor and replete lytes, keep K>4, Mg>2.    - Stable from cardiac standpoint for discharge.  Follows with Dr. Harmon as outpatient   - Will continue to follow as inpatient    Sophy Salcedo DNP, NP-C  Cardiology   Spectra #[phone]

## 2020-11-09 NOTE — PROGRESS NOTE ADULT - SUBJECTIVE AND OBJECTIVE BOX
Date/Time Patient Seen:  		  Referring MD:   Data Reviewed	       Patient is a 80y old  Female who presents with a chief complaint of Shortness of breath (08 Nov 2020 10:56)      Subjective/HPI     PAST MEDICAL & SURGICAL HISTORY:  Serum phosphorus decreased  Last level 1.5 at PST 1/13    Calculus of ureter  s/p R ureteral stent 12/20/2020    MARTINA on CPAP  Pt admits to be non-compliant    Leukemia  (APL, s/p chemo in 2012, now in remission, followed by oncologist)    Anemia  Completed iron infusions every 2 months, now Hb stable    Spinal stenosis    Mitral regurgitation    Aortic stenosis    Emphysema    Duodenal ulcer  at age 24    Rotator cuff tear  Right    Diverticulosis of colon    Coronary atherosclerosis of native coronary artery    Carpal tunnel syndrome    Dyslipidemia    Obstructive sleep apnea  cpap    Osteoporosis    Hypertension    Hernia, hiatal    Former cigarette smoker    Depression    Asthma with COPD with exacerbation  Not on home O2    Asthma    Acid reflux    Graves disease  s/p surgery    Elective surgery  Cystoscopy, right ureteroscopy, laser lithotripsy of right ureteral stone, right ureteral stent removal and replacement, right retrograde pyelogram on 12/6/19    H/O dilation and curettage    S/P ureteral stent placement  R in 12/2020    H/O mitral valve repair  2014    H/O aortic valve repair  2014    History of coronary angiogram  1/31/12    h/o  endometrial ablation  1998    H/O cone biopsy of cervix  1974    History of tonsillectomy  childhood    After cataract, bilateral  2010    Carpal tunnel right repair          Medication list         MEDICATIONS  (STANDING):  apixaban 10 milliGRAM(s) Oral every 12 hours  aspirin enteric coated 81 milliGRAM(s) Oral daily  budesonide  80 MICROgram(s)/formoterol 4.5 MICROgram(s) Inhaler 2 Puff(s) Inhalation two times a day  buPROPion . 150 milliGRAM(s) Oral <User Schedule>  buPROPion . 75 milliGRAM(s) Oral <User Schedule>  citalopram 40 milliGRAM(s) Oral daily  famotidine    Tablet 40 milliGRAM(s) Oral two times a day  guaiFENesin  milliGRAM(s) Oral every 12 hours  levothyroxine 88 MICROGram(s) Oral daily  lidocaine   Patch 1 Patch Transdermal daily  losartan 25 milliGRAM(s) Oral daily  metoprolol succinate ER 25 milliGRAM(s) Oral daily  simvastatin 40 milliGRAM(s) Oral at bedtime  tiotropium 18 MICROgram(s) Capsule 1 Capsule(s) Inhalation daily    MEDICATIONS  (PRN):  acetaminophen   Tablet .. 650 milliGRAM(s) Oral every 6 hours PRN Mild Pain (1 - 3)  ALBUTerol    90 MICROgram(s) HFA Inhaler 2 Puff(s) Inhalation every 6 hours PRN Shortness of Breath and/or Wheezing         Vitals log        ICU Vital Signs Last 24 Hrs  T(C): 36.7 (09 Nov 2020 05:36), Max: 36.9 (08 Nov 2020 22:37)  T(F): 98 (09 Nov 2020 05:36), Max: 98.4 (08 Nov 2020 22:37)  HR: 66 (09 Nov 2020 05:36) (66 - 75)  BP: 120/79 (09 Nov 2020 05:36) (109/73 - 120/79)  BP(mean): --  ABP: --  ABP(mean): --  RR: 18 (09 Nov 2020 05:36) (18 - 18)  SpO2: 93% (09 Nov 2020 05:36) (93% - 96%)           Input and Output:  I&O's Detail    07 Nov 2020 07:01  -  08 Nov 2020 07:00  --------------------------------------------------------  IN:    Heparin Infusion: 91 mL  Total IN: 91 mL    OUT:  Total OUT: 0 mL    Total NET: 91 mL      08 Nov 2020 07:01  -  09 Nov 2020 05:59  --------------------------------------------------------  IN:    Heparin Infusion: 90 mL  Total IN: 90 mL    OUT:  Total OUT: 0 mL    Total NET: 90 mL          Lab Data                        9.3    6.31  )-----------( 197      ( 08 Nov 2020 06:42 )             30.2     11-08    142  |  111<H>  |  20  ----------------------------<  96  4.3   |  26  |  0.96    Ca    8.3<L>      08 Nov 2020 06:42  Mg     2.3     11-07              Review of Systems	      Objective     Physical Examination  heart s1s2  lung dec BS  abd soft  head nc  head at        Pertinent Lab findings & Imaging      Ifeanyi:  NO   Adequate UO     I&O's Detail    07 Nov 2020 07:01  -  08 Nov 2020 07:00  --------------------------------------------------------  IN:    Heparin Infusion: 91 mL  Total IN: 91 mL    OUT:  Total OUT: 0 mL    Total NET: 91 mL      08 Nov 2020 07:01  -  09 Nov 2020 05:59  --------------------------------------------------------  IN:    Heparin Infusion: 90 mL  Total IN: 90 mL    OUT:  Total OUT: 0 mL    Total NET: 90 mL               Discussed with:     Cultures:	        Radiology

## 2020-11-09 NOTE — PROGRESS NOTE ADULT - SUBJECTIVE AND OBJECTIVE BOX
[facility] -- Brandy Harmon, Ilan Leary, Chico Begum Sav[place], [place]  Office # [phone]    Follow Up:  PE and DVT    Subjective/Observations: Sitting on the chair, comfortable on RA.  Still c/o left lower back pain.  However, denies chest or respiratory discomfort.  Denies any form of bleeding    REVIEW OF SYSTEMS: All other review of systems is negative unless indicated above  PAST MEDICAL & SURGICAL HISTORY:  Serum phosphorus decreased  Last level 1.5 at PST 1/13    Calculus of ureter  s/p R ureteral stent 12/20/2020    MARTINA on CPAP  Pt admits to be non-compliant    Leukemia  (APL, s/p chemo in 2012, now in remission, followed by oncologist)    Anemia  Completed iron infusions every 2 months, now Hb stable    Aortic stenosis    Emphysema    Rotator cuff tear  Right    Diverticulosis of colon    Coronary atherosclerosis of native coronary artery    Carpal tunnel syndrome    Dyslipidemia    Osteoporosis    Hypertension    Hernia, hiatal    Former cigarette smoker    Depression    Asthma with COPD with exacerbation  Not on home O2    Acid reflux    Graves disease  s/p surgery    Elective surgery  Cystoscopy, right ureteroscopy, laser lithotripsy of right ureteral stone, right ureteral stent removal and replacement, right retrograde pyelogram on 12/6/19    H/O dilation and curettage    S/P ureteral stent placement  R in 12/2020    H/O mitral valve repair  2014    H/O aortic valve repair  2014    History of coronary angiogram  1/31/12    h/o  endometrial ablation  1998    H/O cone biopsy of cervix  1974    History of tonsillectomy  childhood    After cataract, bilateral  2010    MEDICATIONS  (STANDING):  apixaban 10 milliGRAM(s) Oral every 12 hours  aspirin enteric coated 81 milliGRAM(s) Oral daily  budesonide  80 MICROgram(s)/formoterol 4.5 MICROgram(s) Inhaler 2 Puff(s) Inhalation two times a day  buPROPion . 150 milliGRAM(s) Oral <User Schedule>  buPROPion . 75 milliGRAM(s) Oral <User Schedule>  citalopram 40 milliGRAM(s) Oral daily  famotidine    Tablet 40 milliGRAM(s) Oral two times a day  guaiFENesin  milliGRAM(s) Oral every 12 hours  levothyroxine 88 MICROGram(s) Oral daily  lidocaine   Patch 1 Patch Transdermal daily  losartan 25 milliGRAM(s) Oral daily  metoprolol succinate ER 25 milliGRAM(s) Oral daily  polyethylene glycol 3350 17 Gram(s) Oral once  simvastatin 40 milliGRAM(s) Oral at bedtime  tiotropium 18 MICROgram(s) Capsule 1 Capsule(s) Inhalation daily    MEDICATIONS  (PRN):  acetaminophen   Tablet .. 650 milliGRAM(s) Oral every 6 hours PRN Mild Pain (1 - 3)  ALBUTerol    90 MICROgram(s) HFA Inhaler 2 Puff(s) Inhalation every 6 hours PRN Shortness of Breath and/or Wheezing    Allergies    adhesives (Rash; Other)  Cat dander- wheezing, itchy throat, SOB (Other)  penicillin (Rash)  sulfa drugs (Rash)  tetracycline (Stomach Upset)    Intolerances      Vital Signs Last 24 Hrs  T(C): 36.7 (09 Nov 2020 05:36), Max: 36.9 (08 Nov 2020 22:37)  T(F): 98 (09 Nov 2020 05:36), Max: 98.4 (08 Nov 2020 22:37)  HR: 66 (09 Nov 2020 05:36) (66 - 75)  BP: 120/79 (09 Nov 2020 05:36) (109/73 - 120/79)  BP(mean): --  RR: 18 (09 Nov 2020 05:36) (18 - 18)  SpO2: 93% (09 Nov 2020 05:36) (93% - 96%)  I&O's Summary    08 Nov 2020 07:01  -  09 Nov 2020 07:00  --------------------------------------------------------  IN: 90 mL / OUT: 0 mL / NET: 90 mL    PHYSICAL EXAM:  TELE: NSR  Constitutional: NAD, awake and alert, obese  HEENT: Moist Mucous Membranes, Anicteric  Pulmonary: Non-labored, breath sounds are clear bilaterally, No wheezing, rales or rhonchi  Cardiovascular: Regular, S1 and S2, +Murmur no rubs, gallops or clicks  Gastrointestinal: Bowel Sounds present, soft, nontender.   Lymph: + BLE edema R>L. No lymphadenopathy.  Skin: No visible rashes or ulcers.  Psych:  Mood & affect appropriate    LABS: All Labs Reviewed:                        8.4    5.46  )-----------( 165      ( 09 Nov 2020 07:32 )             27.3                         9.3    6.31  )-----------( 197      ( 08 Nov 2020 06:42 )             30.2                         8.9    6.77  )-----------( 176      ( 08 Nov 2020 01:24 )             26.9     09 Nov 2020 07:32    141    |  111    |  20     ----------------------------<  87     4.9     |  27     |  0.91   08 Nov 2020 06:42    142    |  111    |  20     ----------------------------<  96     4.3     |  26     |  0.96   07 Nov 2020 07:16    144    |  113    |  14     ----------------------------<  94     4.3     |  28     |  0.92     Ca    8.1        09 Nov 2020 07:32  Ca    8.3        08 Nov 2020 06:42  Ca    8.0        07 Nov 2020 07:16  Mg     2.3       07 Nov 2020 07:16    PT/INR - ( 09 Nov 2020 07:32 )   PT: 27.5 sec;   INR: 2.45 ratio      PTT - ( 08 Nov 2020 15:14 )  PTT:83.9 sec      EXAM:  CT ANGIO ABD PELV (W)AW IC                          EXAM:  CT ANGIO CHEST (W)AW IC                          PROCEDURE DATE:  11/05/2020      INTERPRETATION:  CT ANGIOGRAPHY OF THE CHEST, ABDOMEN AND PELVIS    INDICATION: Asthma. Hypertension. Flank pain. Evaluate for dissection and pulmonary embolism.    TECHNIQUE: Noncontrast CT of the chest was performed, followed by CT angiography performed of the chest, abdomen and pelvis after administration of intravenous contrast. Postprocessed MIP and 3-D reformatted images were created and reviewed.    90 mL of Omnipaque 350 contrast material was injected IV.    COMPARISON: CT abdomen pelvis 1/16/2020.    FINDINGS:  Vessels: Precontrast images show no evidence of intramural hematoma.  Normal caliber aorta. No intimal flap is seen to suggest aortic dissection. Atherosclerotic disease of the aorta and its branches. Evaluation of the aortic root is limited due to cardiac pulsation.    Celiac axis and SMA are patent. There is plaque ornonocclusive thrombus identified within the proximal celiac vessel on image 73 of series 602 which is otherwise patent. RASHID is patent. Distal SMA and RASHID branches are not well assessed by CT technique.Bilateral renal arteries are patent. Bilateral iliac arteries are normal caliber.    There is filling defect identified segmental to subsegmental branches of the right lower lobe pulmonary artery as best seen on images 72 through 73 of series 4, compatible with pulmonary embolism.    Thorax:  Airways: Central airways patent.  Lungs: Trace bilateral pleural effusions with bibasilar opacities, likely represent atelectasis and/or scarring.  Mediastinum and lymph nodes: No mass or hemorrhage. No bulky adenopathy.  Heart: Mild cardiomegaly. No pericardial effusion.Cardiac valve replacement.  Soft tissues: Median sternotomy.    Liver: Right posterior hepatic lobe cyst. Additional too small to characterize hypodensities.  Biliary: No significant dilatation. Cholelithiasis.  Spleen: No suspicious lesions.  Pancreas: No inflammatory changes or ductal dilatation.  Adrenals: Normal.  Kidneys: Symmetrically enhancing without hydronephrosis. Scattered bilateral nephrolithiasis. Bilateral renal cysts as well as too small to characterize hypodensities.    Peritoneum/retroperitoneum and mesentery: No free air. No organized fluid collection. No adenopathy. Diverticulosis without evidence of acute diverticulitis. Normal appendix.  GI tract: Large paraesophageal hiatal hernia. No significant dilatationor wall thickening though detailed evaluation the GI tract is limited secondary to inadequate distention.    Pelvic organs/Bladder: Globular uterus, likely containing fibroids. No adnexal masses. Bladder is normal.    Abdominal wall: A small fat containing umbilical hernia is noted.  Bones and soft tissues: Multilevel degenerative changes of the spine noted. Interval indeterminate age compression deformity of T12 with mild retropulsion. This results in less than 50% vertebral height loss. Grade 1anterior spondylolisthesis L5 on S1. Chronic parasymphyseal fracture deformity. Chronic bilateral sacral alae insufficiency fractures.    IMPRESSION:    Filling defect identified segmental to subsegmental branches of the right lower lobe pulmonary artery, compatible with pulmonary embolism.    No aortic aneurysm or dissection. Atherosclerotic disease. 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    Scattered bilateral nephrolithiasis without hydronephrosis or obstructive uropathy.    Large paraesophageal hiatal hernia.    Interval indeterminate age compression deformity of T12 with mild retropulsion. This results in less than 50% vertebral height loss.    Additional findings as mentioned above.    These critical results were discussed via telephone at 11/6/2020 1:10 AM by Dr. Bal of radiology with Dr. Marshall, institution read-back verification policy was followed.    FANTA BAL MD; Attending Radiologist  This document has been electronically signed. Nov 6 2020  1:13AM    Ventricular Rate 79 BPM    Atrial Rate 79 BPM    P-R Interval 188 ms    QRS Duration 76 ms    Q-T Interval 422 ms    QTC Calculation(Bazett) 483 ms    P Axis 100 degrees    R Axis 0 degrees    T Axis 69 degrees    Diagnosis Line Normal sinus rhythm  Nonspecific ST and T wave abnormality  Abnormal ECG  Confirmed by kena Woodson ([id number]) on 11/6/2020 3:23:59 PM     EXAM:  ECHO TTE WO CON COMP W DOPP         PROCEDURE DATE:  11/06/2020        INTERPRETATION:  Ordering Physician: DARYL A PERLMAN [id number]    Indication: Dyspnea    Study Quality: Technically difficult  A complete echocardiographic study was performed utilizing standard protocol including spectral and color Doppler in all echocardiographic windows.    Height: 157.5 cm  Weight: 72.6 kg  BSA: 1.74 sq m  Blood Pressure: 124/84    MEASUREMENTS  IVS: 1.2cm  PWT: 1.2cm  LA: 3.0cm  AO: 2.8cm  LVIDd: 4.2cm  LVIDs: 2.7cm  LVOT: 1.5cm    LVEF: 55-60%  RVSP: 23mmHg    FINDINGS  Left Ventricle: The endocardium is not well-visualized. Grossly normal left ventricular systolic function. No segmental wall motion abnormalities are visualized. Mild concentric LVH.  Aortic Valve: Status post bioprosthetic aortic valve. Mild aortic insufficiency. The peak aortic valve gradient is equal to 22 mmHg, with a mean aortic valve gradient of 13 mmHg, which are normal in the setting of a bioprosthetic aortic valve replacement.  Mitral Valve: Mitral annular calcification. Status post bioprosthetic mitral valve. Mean mitral valve gradient is equal to 6 mmHg, which is mildly elevated even in the setting of a mitral valve replacement. Mild mitral regurgitation  Tricuspid Valve: Grossly normal tricuspid valve with mild tricuspid regurgitation  Pulmonic Valve: The pulmonic valve is not well-visualized.  Left Atrium: Moderate left atrial enlargement  Right Ventricle: Grossly normal right ventricular size and systolic function.  Right Atrium: Grossly normal.  Diastolic Function: Doppler evidence of grade 2 diastolic dysfunction  Pericardium/Pleura: No significant pericardial effusion    CONCLUSIONS:  1. Very technically difficult and limited study  2. The endocardium is not well-visualized. Grossly normal left ventricular systolic function. No segmental wall motion abnormalities are visualized. Mild concentric LVH.  3. Status post bioprosthetic aortic valve. Mild aortic insufficiency. The peak aortic valve gradient is equal to 22 mmHg, with a mean aortic valve gradient of 13 mmHg, which are normal in the setting of a bioprosthetic aortic valve replacement.  4. Status post bioprosthetic mitral valve. Mean mitral valve gradient is equal to 6 mmHg, which is mildly elevated even in the setting of a mitral valve replacement. Mild mitral regurgitation. Moderate left atrial enlargement  5. Grossly normal right ventricular size and systolic function.  6. Doppler evidence of grade 2 diastolic dysfunction  7. No significant pericardial effusion      KENA WOODSON MD; Attending Cardiologist  This document has been electronically signed. Nov 7 2020  2:28PM

## 2020-11-09 NOTE — PROGRESS NOTE ADULT - PROVIDER SPECIALTY LIST ADULT
Cardiology
Heme/Onc
Internal Medicine
Internal Medicine
Pulmonology
Heme/Onc

## 2020-11-09 NOTE — PROGRESS NOTE ADULT - PROBLEM SELECTOR PLAN 1
81 y/o F PMHx of leukemia (s/p chemo in 2012 now in remission followed by Dr. Temple) with residual chronic anemia requiring iron infusions, MVR and AVR in 2011, COPD (not on home O2), MARTINA on CPAP (patient admits to being non-compliant with use), hx of R ureteral stone s/p stent in 9/2019 revised in 12/2019, Graves s/p thyroid surgery, HLD, HTN, depression, GERD, who presents with worsening shortness of breath  martina - does not use CPAP - sleep hygiene -   copd - ex smoker -   new PE and below the knee DVT - on ELIQUIS - BID  trop and proBNP - no evidence of RV strain  pt is on RA - vss  copd - proventil PRN - Symbicort - Spiriva - VBG...   Heme eval and follow up - known to Dr Temple -

## 2020-11-09 NOTE — PROGRESS NOTE ADULT - PROBLEM SELECTOR PROBLEM 1
Pulmonary embolism
Pulmonary thromboembolism
Pulmonary thromboembolism
Pulmonary embolism
Pulmonary embolism

## 2020-11-09 NOTE — DISCHARGE NOTE NURSING/CASE MANAGEMENT/SOCIAL WORK - PATIENT PORTAL LINK FT
You can access the FollowMyHealth Patient Portal offered by [facility] by registering at the following website: [email or web address]. By joining [employer]’s FollowMyHealth portal, you will also be able to view your health information using other applications (apps) compatible with our system.

## 2020-11-18 DIAGNOSIS — Z72.3 LACK OF PHYSICAL EXERCISE: ICD-10-CM

## 2020-11-18 DIAGNOSIS — Z82.5 FAMILY HISTORY OF ASTHMA AND OTHER CHRONIC LOWER RESPIRATORY DISEASES: ICD-10-CM

## 2020-11-18 DIAGNOSIS — Z82.49 FAMILY HISTORY OF ISCHEMIC HEART DISEASE AND OTHER DISEASES OF THE CIRCULATORY SYSTEM: ICD-10-CM

## 2020-11-18 DIAGNOSIS — Z78.9 OTHER SPECIFIED HEALTH STATUS: ICD-10-CM

## 2020-11-18 DIAGNOSIS — Z63.4 DISAPPEARANCE AND DEATH OF FAMILY MEMBER: ICD-10-CM

## 2020-11-18 RX ORDER — FAMOTIDINE 40 MG/1
40 TABLET, FILM COATED ORAL TWICE DAILY
Refills: 0 | Status: ACTIVE | COMMUNITY
Start: 2017-03-13

## 2020-11-18 RX ORDER — ASPIRIN 81 MG
81 TABLET, DELAYED RELEASE (ENTERIC COATED) ORAL
Refills: 0 | Status: DISCONTINUED | COMMUNITY
End: 2020-11-18

## 2020-11-18 SDOH — SOCIAL STABILITY - SOCIAL INSECURITY: DISSAPEARANCE AND DEATH OF FAMILY MEMBER: Z63.4

## 2020-11-19 ENCOUNTER — APPOINTMENT (OUTPATIENT)
Dept: CARDIOLOGY | Facility: CLINIC | Age: 80
End: 2020-11-19
Payer: MEDICARE

## 2020-11-19 ENCOUNTER — NON-APPOINTMENT (OUTPATIENT)
Age: 80
End: 2020-11-19

## 2020-11-19 VITALS
SYSTOLIC BLOOD PRESSURE: 122 MMHG | DIASTOLIC BLOOD PRESSURE: 80 MMHG | OXYGEN SATURATION: 97 % | BODY MASS INDEX: 29.08 KG/M2 | HEART RATE: 68 BPM | HEIGHT: 62 IN | WEIGHT: 158 LBS

## 2020-11-19 PROCEDURE — 99215 OFFICE O/P EST HI 40 MIN: CPT

## 2020-11-19 NOTE — DISCUSSION/SUMMARY
[FreeTextEntry1] : The patient's cardiac status is stable. Blood pressure and  heart rate are well controlled and by echo her LV function is still normal and her valves are unchanged.\par \par She'll stay on her present medication. We did  go over her hospitalization in detail including the tests and I answered her questions. We also went over her medications and her blood work. We discussed the various reasons for shortness of breath and we discussed the natural her pulmonary emboli.\par \par She is going to be starting physical therapy. If she has any problems or symptoms she will call me. Otherwise I will see her in 2 months.

## 2020-11-19 NOTE — HISTORY OF PRESENT ILLNESS
[FreeTextEntry1] : I saw Keisha Marcum in the office today for a followup visit, She is an 80 y-0 female who is status post aortic and mitral valve replacement for aortic stenosis and mitral insufficiency respectively on March 2012 at [facility]. It was performed by Dr. Wood. Echo 7/20 demonstrated an ejection fraction of 55%. There was a peak gradient across the mitral prosthesis of 16 mm mercury. Normally functioning aortic valve prosthesis. LVH. Mild to moderate TR with a PA pressure of 35..\par \par She is being treated for hypertension, and hyperlipidemia. Fortunately she had no coronary disease. She also has a history of depression, anxiety, and some degree of chronic obstructive pulmonary disease with MARTINA. She is using her mask.. She also is hypothyroid.\par \par She has been diagnosed with promyelocytic leukemia and is undergoing chemotherapy at [facility]. The first treatment 5/13 was complicated by an episode of torsades with cardiac arrest, in the setting of MSSA bacteremia. QT prolongation by Arsenic. Echo showed normal LV systolic function. She subsequently underwent a second and third treatment treatment 10/13 without any trouble. Finished chemotherapy 11/13.  She's been in remission for approximately one year..\par \par The patient is physically limited by shortness of breath. Everything she does is a great effort. She has not seen a pulmonologist for some time.  She is receiving iron infusions for anemia. She is no longer taking the Lasix. Her leg edema has improved and she has no volume overload.She is wearing support stockings.\par \par The patient has been anemic and has some blood in her stool and had an endoscopy and colonoscopy at [place] on 1/5/17. This showed acid reflux. Cauterized bleeding.With the iron infusions her hemoglobin has been above 9 and she is feeling much better. She is able to walk and hopefully now and start losing weight.She has been off of low-dose aspirin for several years.\par \par She has recurrent kidney stones and had a stent placed in September. She is now scheduled for cystoscopy with laser therapy to treat her stones. She had stents placed for the kidney stones that have been removed.\par \par She does have significant COPD and has been complaining of increasing dyspnea on exertion.\par \par 11/20 the patient went to the emergency room with shortness of breath. Workup demonstrated a PE in the right lower lobe with a left below-knee DVT. She was treated with heparin and discharged on Eliquis. Echocardiogram showed ejection fraction of 55-60%. There was a bio aortic and mitral valve. There was mild MS, and mild AI. There was stage II diastolic dysfunction with LVH.\par \par Patient is feeling better. She still is short of breath but does have significant anemia and COPD. She has no pleuritic chest pain and no increasing swelling of her legs. O2 sat on room air is 97%. She is tolerating medicines well. She sees Dr. Escalera about the anemia and will see D Dr. Mcmahon, Pulmonologist.\par \par \par

## 2020-11-20 ENCOUNTER — RX RENEWAL (OUTPATIENT)
Age: 80
End: 2020-11-20

## 2020-11-28 NOTE — H&P PST ADULT - NSALCOHOLFREQ_GEN_A_CORE_SD
Pt states his defibrillator fired tonight while he was sitting on the couch. Pt states no chest pain prior to it firing but does have some after. NAD noted, respirations even and easy, skin warm and dry.
monthly or less

## 2020-12-18 ENCOUNTER — EMERGENCY (EMERGENCY)
Facility: HOSPITAL | Age: 80
LOS: 1 days | Discharge: HOME | End: 2020-12-18
Attending: EMERGENCY MEDICINE | Admitting: EMERGENCY MEDICINE
Payer: MEDICARE

## 2020-12-18 VITALS
SYSTOLIC BLOOD PRESSURE: 144 MMHG | HEART RATE: 75 BPM | DIASTOLIC BLOOD PRESSURE: 80 MMHG | RESPIRATION RATE: 16 BRPM | OXYGEN SATURATION: 100 % | TEMPERATURE: 98 F

## 2020-12-18 VITALS
HEIGHT: 62 IN | SYSTOLIC BLOOD PRESSURE: 126 MMHG | RESPIRATION RATE: 18 BRPM | DIASTOLIC BLOOD PRESSURE: 75 MMHG | OXYGEN SATURATION: 95 % | WEIGHT: 158.07 LBS | HEART RATE: 92 BPM | TEMPERATURE: 98 F

## 2020-12-18 DIAGNOSIS — Z98.890 OTHER SPECIFIED POSTPROCEDURAL STATES: Chronic | ICD-10-CM

## 2020-12-18 DIAGNOSIS — Z96.0 PRESENCE OF UROGENITAL IMPLANTS: Chronic | ICD-10-CM

## 2020-12-18 DIAGNOSIS — Z41.9 ENCOUNTER FOR PROCEDURE FOR PURPOSES OTHER THAN REMEDYING HEALTH STATE, UNSPECIFIED: Chronic | ICD-10-CM

## 2020-12-18 LAB
ALBUMIN SERPL ELPH-MCNC: 3.3 G/DL — SIGNIFICANT CHANGE UP (ref 3.3–5)
ALP SERPL-CCNC: 73 U/L — SIGNIFICANT CHANGE UP (ref 40–120)
ALT FLD-CCNC: 14 U/L — SIGNIFICANT CHANGE UP (ref 12–78)
ANION GAP SERPL CALC-SCNC: 7 MMOL/L — SIGNIFICANT CHANGE UP (ref 5–17)
APTT BLD: 30.2 SEC — SIGNIFICANT CHANGE UP (ref 27.5–35.5)
AST SERPL-CCNC: 18 U/L — SIGNIFICANT CHANGE UP (ref 15–37)
BASOPHILS # BLD AUTO: 0.03 K/UL — SIGNIFICANT CHANGE UP (ref 0–0.2)
BASOPHILS NFR BLD AUTO: 0.4 % — SIGNIFICANT CHANGE UP (ref 0–2)
BILIRUB SERPL-MCNC: 0.3 MG/DL — SIGNIFICANT CHANGE UP (ref 0.2–1.2)
BUN SERPL-MCNC: 21 MG/DL — SIGNIFICANT CHANGE UP (ref 7–23)
CALCIUM SERPL-MCNC: 8.6 MG/DL — SIGNIFICANT CHANGE UP (ref 8.5–10.1)
CHLORIDE SERPL-SCNC: 111 MMOL/L — HIGH (ref 96–108)
CO2 SERPL-SCNC: 25 MMOL/L — SIGNIFICANT CHANGE UP (ref 22–31)
CREAT SERPL-MCNC: 1 MG/DL — SIGNIFICANT CHANGE UP (ref 0.5–1.3)
EOSINOPHIL # BLD AUTO: 0.23 K/UL — SIGNIFICANT CHANGE UP (ref 0–0.5)
EOSINOPHIL NFR BLD AUTO: 3.4 % — SIGNIFICANT CHANGE UP (ref 0–6)
GLUCOSE SERPL-MCNC: 90 MG/DL — SIGNIFICANT CHANGE UP (ref 70–99)
HCT VFR BLD CALC: 24.5 % — LOW (ref 34.5–45)
HGB BLD-MCNC: 7.1 G/DL — LOW (ref 11.5–15.5)
IMM GRANULOCYTES NFR BLD AUTO: 1.3 % — SIGNIFICANT CHANGE UP (ref 0–1.5)
INR BLD: 1.4 RATIO — HIGH (ref 0.88–1.16)
LYMPHOCYTES # BLD AUTO: 1.13 K/UL — SIGNIFICANT CHANGE UP (ref 1–3.3)
LYMPHOCYTES # BLD AUTO: 16.8 % — SIGNIFICANT CHANGE UP (ref 13–44)
MCHC RBC-ENTMCNC: 24.1 PG — LOW (ref 27–34)
MCHC RBC-ENTMCNC: 29 GM/DL — LOW (ref 32–36)
MCV RBC AUTO: 83.1 FL — SIGNIFICANT CHANGE UP (ref 80–100)
MONOCYTES # BLD AUTO: 0.78 K/UL — SIGNIFICANT CHANGE UP (ref 0–0.9)
MONOCYTES NFR BLD AUTO: 11.6 % — SIGNIFICANT CHANGE UP (ref 2–14)
NEUTROPHILS # BLD AUTO: 4.45 K/UL — SIGNIFICANT CHANGE UP (ref 1.8–7.4)
NEUTROPHILS NFR BLD AUTO: 66.5 % — SIGNIFICANT CHANGE UP (ref 43–77)
NRBC # BLD: 0 /100 WBCS — SIGNIFICANT CHANGE UP (ref 0–0)
PLATELET # BLD AUTO: 208 K/UL — SIGNIFICANT CHANGE UP (ref 150–400)
POTASSIUM SERPL-MCNC: 4.1 MMOL/L — SIGNIFICANT CHANGE UP (ref 3.5–5.3)
POTASSIUM SERPL-SCNC: 4.1 MMOL/L — SIGNIFICANT CHANGE UP (ref 3.5–5.3)
PROT SERPL-MCNC: 6.7 G/DL — SIGNIFICANT CHANGE UP (ref 6–8.3)
PROTHROM AB SERPL-ACNC: 16.1 SEC — HIGH (ref 10.6–13.6)
RBC # BLD: 2.95 M/UL — LOW (ref 3.8–5.2)
RBC # FLD: 18.6 % — HIGH (ref 10.3–14.5)
SODIUM SERPL-SCNC: 143 MMOL/L — SIGNIFICANT CHANGE UP (ref 135–145)
TROPONIN I SERPL-MCNC: <.015 NG/ML — SIGNIFICANT CHANGE UP (ref 0.01–0.04)
WBC # BLD: 6.71 K/UL — SIGNIFICANT CHANGE UP (ref 3.8–10.5)
WBC # FLD AUTO: 6.71 K/UL — SIGNIFICANT CHANGE UP (ref 3.8–10.5)

## 2020-12-18 PROCEDURE — 93010 ELECTROCARDIOGRAM REPORT: CPT

## 2020-12-18 PROCEDURE — 86901 BLOOD TYPING SEROLOGIC RH(D): CPT

## 2020-12-18 PROCEDURE — 85730 THROMBOPLASTIN TIME PARTIAL: CPT

## 2020-12-18 PROCEDURE — 86850 RBC ANTIBODY SCREEN: CPT

## 2020-12-18 PROCEDURE — 85025 COMPLETE CBC W/AUTO DIFF WBC: CPT

## 2020-12-18 PROCEDURE — 93005 ELECTROCARDIOGRAM TRACING: CPT

## 2020-12-18 PROCEDURE — 71046 X-RAY EXAM CHEST 2 VIEWS: CPT

## 2020-12-18 PROCEDURE — 86900 BLOOD TYPING SEROLOGIC ABO: CPT

## 2020-12-18 PROCEDURE — 36415 COLL VENOUS BLD VENIPUNCTURE: CPT

## 2020-12-18 PROCEDURE — 36430 TRANSFUSION BLD/BLD COMPNT: CPT

## 2020-12-18 PROCEDURE — 71046 X-RAY EXAM CHEST 2 VIEWS: CPT | Mod: 26

## 2020-12-18 PROCEDURE — 84484 ASSAY OF TROPONIN QUANT: CPT

## 2020-12-18 PROCEDURE — 80053 COMPREHEN METABOLIC PANEL: CPT

## 2020-12-18 PROCEDURE — 86922 COMPATIBILITY TEST ANTIGLOB: CPT

## 2020-12-18 PROCEDURE — 99285 EMERGENCY DEPT VISIT HI MDM: CPT | Mod: 25

## 2020-12-18 PROCEDURE — 85610 PROTHROMBIN TIME: CPT

## 2020-12-18 PROCEDURE — 99285 EMERGENCY DEPT VISIT HI MDM: CPT

## 2020-12-18 PROCEDURE — P9016: CPT

## 2020-12-18 NOTE — ED PROVIDER NOTE - OBJECTIVE STATEMENT
81 y/o female with PMHx HTN, HLD, COPD, Leukemia, and PE (on eliquis) sent in by Oncologist Suman due to anemia. pt reports hx of iron deficiency anemia in which hgb was checked this past Wednesday in which Hgb was 7.5 and was recommended to get transfusion. pt reports hx of Leukemia but not currently under chemo/radiation. Pt reports he levels was improved when she was getting iron transfusions once every other week but has declined since switching iron transfusions to once a month. pt admits to feeling generally weak and SOB. pt denies chest pain, fever, cough, leg swelling, hemoptysis, abd pain, N/V, bloody stool, nose bleeds, or any other complaints.

## 2020-12-18 NOTE — ED ADULT NURSE NOTE - OBJECTIVE STATEMENT
Received pt in bed alert and oriented x4.  C/O fatigue and SOB.  Pt has low blood counts.  Pt went to oncologist office and had blood work done.  Office called patient with 7.5 hemoglobin and was told to go to the hospital for blood transfusion. Pt hx of PE, on Eliquis.  Denies chest pain or abdominal pain. Ongoing nursing care and safety maintained.

## 2020-12-18 NOTE — ED PROVIDER NOTE - ST/T WAVE
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 Patient has PAD of lower extremities. Chronic neuropathic pain in all 4 extremities. Palpable DP and PT  with normal cap refill b/l.     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 Patient has PAD of lower extremities. Chronic neuropathic pain in all 4 extremities. Palpable DP and PT  with normal cap refill b/l. Art duplex shows mod to severe disease R SFA and mildto mod disease L tibial.    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 Patient has PAD of lower extremities. Chronic neuropathic pain in all 4 extremities. Palpable DP and PT  with normal cap refill b/l. Art duplex shows mod to severe disease R SFA and mildto mod disease L tibial.    Plan:  - no acute vascular intervention needed at this time, this patient will follow up outpatient   - Please recall PRN non-specific st/t wave findings

## 2020-12-18 NOTE — ED PROVIDER NOTE - PROGRESS NOTE DETAILS
Discussed with Dr. Will, advised 2 PRBC and dc. Advised hx of iron deficiency anemia and no further work up indicated. Pt tolerated transfusion well. advised acute follow up.

## 2020-12-18 NOTE — ED PROVIDER NOTE - CLINICAL SUMMARY MEDICAL DECISION MAKING FREE TEXT BOX
sent in by Oncologist Suman due to anemia. pt reports hx of Leukemia but not currently under chemo/radiation. pt admits to feeling generally weak and SOB. Reports recent change in iron infusion times. Plan includes labs r/o anemia, T&S anticipating transfusion, EKG/troponin r/o CAD, CXR, re-assess

## 2020-12-18 NOTE — ED PROVIDER NOTE - PHYSICAL EXAMINATION
Constitutional: Awake, Alert, non-toxic. NAD. Well appearing, well nourished.   HEAD: Normocephalic, atraumatic.   EYES: EOM intact, conjunctiva and sclera are clear bilaterally.   ENT: No rhinorrhea, patent, mucous membranes pink/moist, no drooling or stridor.   NECK: Supple, non-tender  CARDIOVASCULAR: Normal S1, S2; regular rate and rhythm.  RESPIRATORY: Normal respiratory effort; breath sounds CTAB, no wheezes, rhonchi, or rales. Speaking in full sentences. No accessory muscle use.   ABDOMEN: Soft; non-tender, non-distended.   EXTREMITIES: Full passive and active ROM in all extremities; non-tender to palpation; distal pulses palpable and symmetric, no LE edema.   SKIN: Warm, dry; good skin turgor, no apparent lesions or rashes, no ecchymosis, brisk capillary refill.  NEURO: A&O x3. Sensory and motor functions are grossly intact. Speech is normal. Appearance and judgement seem appropriate for gender and age.

## 2020-12-18 NOTE — ED PROVIDER NOTE - ATTENDING CONTRIBUTION TO CARE
79 yo f hx of leukemia, copd gets regular fe infusions and is anemic sent by oncology dr guerrero for blood transfusion as she is symptomatic ontiveros feels like she has lukemai agaiain no fever no chills no covid sx she was admitted to hosp 1 month ago for dvt pe on eliquis.  no bleedin no cp no other   on eval  pale femal no distress speaks full sentences   heent no g f r pale   cor rrr  chest cta  abd soft nt nd no guard rebound no blood or melena per pt  ext normal no edema or calf pain  neuro normal  skin pale  plan ro anemia transfuse and dc

## 2020-12-18 NOTE — ED PROVIDER NOTE - NSFOLLOWUPINSTRUCTIONS_ED_ALL_ED_FT
Follow up with your oncologist. Return to ED For any worsening condition.     Anemia    WHAT YOU NEED TO KNOW:    What is anemia? Anemia is a low number of red blood cells or a low amount of hemoglobin in your red blood cells. Hemoglobin is a protein that helps carry oxygen throughout your body. Red blood cells use iron to create hemoglobin. Anemia may develop if your body does not have enough iron. It may also develop if your body does not make enough red blood cells or they die faster than your body can make them.     What increases my risk for anemia?   •Trauma or surgery that causes massive blood loss      •A gastrointestinal bleed      •A woman's monthly period      •A family history of blood disease or anemia      •Liver or kidney disease, cancer, rheumatoid arthritis, or hyperthyroidism      •Alcohol abuse      •Lack of foods that contain iron, folic acid, or vitamin B12      What are the signs and symptoms of anemia?   •Chest pain or a fast heartbeat      •Lightheadedness, dizziness, or shortness of breath      •Cold or pale skin      •Tiredness, weakness, or confusion      How is anemia diagnosed? Blood tests will show if you have anemia.    How is anemia treated? Treatment depends on the type of anemia you have. You may need any of the following:   •Iron or folic acid supplements help increase your red blood cell and hemoglobin levels.       •Vitamin B12 injections may help boost your red blood cell count and decrease your symptoms.      •A blood transfusion may be needed if your body cannot replace the blood you have lost.      •Surgery may be needed to stop bleeding, or if your anemia is severe.      How can I prevent anemia? Eat healthy foods rich in iron and vitamin C. Nuts, meat, dark leafy green vegetables, and beans are high in iron and protein. Vitamin C helps your body absorb iron. Foods rich in vitamin C include oranges and other citrus fruits. Ask your healthcare provider for a list of other foods that are high in iron or vitamin C. Ask if you need to be on a special diet.     Call 911 or have someone call 911 for any of the following:   •You lose consciousness.      •You have severe chest pain.      When should I seek immediate care?   •You have dark or bloody bowel movements.          When should I contact my healthcare provider?   •Your symptoms are worse, even after treatment.      •You have questions or concerns about your condition or care.      CARE AGREEMENT:    You have the right to help plan your care. Learn about your health condition and how it may be treated. Discuss treatment options with your healthcare providers to decide what care you want to receive. You always have the right to refuse treatment.

## 2020-12-18 NOTE — ED PROVIDER NOTE - PATIENT PORTAL LINK FT
You can access the FollowMyHealth Patient Portal offered by [facility] by registering at the following website: [email or web address]. By joining [facility]’s FollowMyHealth portal, you will also be able to view your health information using other applications (apps) compatible with our system.

## 2020-12-18 NOTE — ED ADULT NURSE NOTE - NSIMPLEMENTINTERV_GEN_ALL_ED
Implemented All Fall with Harm Risk Interventions:  [place] to call system. Call bell, personal items and telephone within reach. Instruct patient to call for assistance. Room bathroom lighting operational. Non-slip footwear when patient is off stretcher. Physically safe environment: no spills, clutter or unnecessary equipment. Stretcher in lowest position, wheels locked, appropriate side rails in place. Provide visual cue, wrist band, yellow gown, etc. Monitor gait and stability. Monitor for mental status changes and reorient to person, place, and time. Review medications for side effects contributing to fall risk. Reinforce activity limits and safety measures with patient and family. Provide visual clues: red socks.

## 2020-12-18 NOTE — ED PROVIDER NOTE - CARE PROVIDER_API CALL
Mishel Temple  HEMATOLOGY  [address] [facility], [address] [place] [address]  Phone: [phone]  Fax: [phone]  Follow Up Time: 1-3 Days

## 2021-01-20 ENCOUNTER — APPOINTMENT (OUTPATIENT)
Dept: CARDIOLOGY | Facility: CLINIC | Age: 81
End: 2021-01-20

## 2021-02-18 ENCOUNTER — APPOINTMENT (OUTPATIENT)
Dept: CARDIOLOGY | Facility: CLINIC | Age: 81
End: 2021-02-18

## 2021-02-25 ENCOUNTER — APPOINTMENT (OUTPATIENT)
Dept: CARDIOLOGY | Facility: CLINIC | Age: 81
End: 2021-02-25
Payer: MEDICARE

## 2021-02-25 VITALS
OXYGEN SATURATION: 97 % | SYSTOLIC BLOOD PRESSURE: 156 MMHG | BODY MASS INDEX: 31.72 KG/M2 | DIASTOLIC BLOOD PRESSURE: 84 MMHG | HEART RATE: 78 BPM | HEIGHT: 61 IN | WEIGHT: 168 LBS

## 2021-02-25 PROCEDURE — 99214 OFFICE O/P EST MOD 30 MIN: CPT

## 2021-02-25 PROCEDURE — 99072 ADDL SUPL MATRL&STAF TM PHE: CPT

## 2021-02-25 NOTE — DISCUSSION/SUMMARY
[FreeTextEntry1] : The patient's cardiac status is stable. She is wearing support stockings her leg edema is better. She is having no significant increase in her shortness of breath. By echo her prosthetic valves are working well and she has good left ventricular systolic function\par \par She'll stay on her present medication. She is seeing Dr. Temple in today for iron infusion. I asked her to have her blood work forwarded to my office. In addition she is seeing Dr. Mcmahon tomorrow and will check with him about stopping the Eliquis is for her GI procedures.\par \par If she has any problems or questions she will call me. If all is well with you again in about 2 months.

## 2021-02-25 NOTE — HISTORY OF PRESENT ILLNESS
[FreeTextEntry1] : I saw Keisha Marcum in the office today for a followup visit, She is an 81 y-0 female who is status post aortic and mitral valve replacement for aortic stenosis and mitral insufficiency respectively on March 2012 at [facility]. It was performed by Dr. Wood. Echo 7/20 demonstrated an ejection fraction of 55%. There was a peak gradient across the mitral prosthesis of 16 mm mercury. Normally functioning aortic valve prosthesis. LVH. Mild to moderate TR with a PA pressure of 35..\par \par She is being treated for hypertension, and hyperlipidemia. Fortunately she had no coronary disease. She also has a history of depression, anxiety, and some degree of chronic obstructive pulmonary disease with MARTINA. She is using her mask.. She also is hypothyroid.\par \par She has been diagnosed with promyelocytic leukemia and is undergoing chemotherapy at [facility] [place]. The first treatment 5/13 was complicated by an episode of torsades with cardiac arrest, in the setting of MSSA bacteremia. QT prolongation by Arsenic. Echo showed normal LV systolic function. She subsequently underwent a second and third treatment treatment 10/13 without any trouble. Finished chemotherapy 11/13.  She's been in remission for approximately one year..\par \par The patient is physically limited by shortness of breath. Everything she does is a great effort. She has not seen a pulmonologist for some time.  She is receiving iron infusions for anemia. She is no longer taking the Lasix. Her leg edema has improved and she has no volume overload.She is wearing support stockings.\par \par The patient has been anemic and has some blood in her stool and had an endoscopy and colonoscopy at [facility] on 1/5/17. This showed acid reflux. Cauterized bleeding.With the iron infusions her hemoglobin has been above 9 and she is feeling much better. She is able to walk and hopefully now and start losing weight.She has been off of low-dose aspirin for several years.\par \par She has recurrent kidney stones and had a stent placed in September. She is now scheduled for cystoscopy with laser therapy to treat her stones. She had stents placed for the kidney stones that have been removed.\par \par She does have significant COPD and has been complaining of increasing dyspnea on exertion.\par \par 11/20 the patient went to the emergency room with shortness of breath. Workup demonstrated a PE in the right lower lobe with a left below-knee DVT. She was treated with heparin and discharged on Eliquis. Echocardiogram showed ejection fraction of 55-60%. There was a bio aortic and mitral valve. There was mild MS, and mild AI. There was stage II diastolic dysfunction with LVH.\par \par Patient is feeling better. She still is short of breath but does have significant anemia and COPD. She has no pleuritic chest pain and no increasing swelling of her legs. O2 sat on room air is 97%. She is tolerating medicines well. She sees Dr. Escalera about the anemia and is getting IV Iron and is seeing  Dr. Mcmahon, Pulmonologist.\par \par Hemoglobin is still low and she is to be scheduled for an endoscopy/colonoscopy with Dr. Colunga. She will check with Dr. Mcmahon about when it would be safe to temporarily stop the anticoagulation. He is now more than 3 months on the Eliquis. \par \par \par

## 2021-03-01 ENCOUNTER — NON-APPOINTMENT (OUTPATIENT)
Age: 81
End: 2021-03-01

## 2021-03-07 ENCOUNTER — INPATIENT (INPATIENT)
Facility: HOSPITAL | Age: 81
LOS: 6 days | Discharge: HOME | DRG: 813 | End: 2021-03-14
Attending: INTERNAL MEDICINE | Admitting: INTERNAL MEDICINE
Payer: MEDICARE

## 2021-03-07 VITALS
OXYGEN SATURATION: 94 % | WEIGHT: 167.99 LBS | HEIGHT: 62 IN | SYSTOLIC BLOOD PRESSURE: 170 MMHG | HEART RATE: 82 BPM | DIASTOLIC BLOOD PRESSURE: 76 MMHG | TEMPERATURE: 98 F | RESPIRATION RATE: 14 BRPM

## 2021-03-07 DIAGNOSIS — R04.2 HEMOPTYSIS: ICD-10-CM

## 2021-03-07 DIAGNOSIS — Z96.0 PRESENCE OF UROGENITAL IMPLANTS: Chronic | ICD-10-CM

## 2021-03-07 DIAGNOSIS — Z41.9 ENCOUNTER FOR PROCEDURE FOR PURPOSES OTHER THAN REMEDYING HEALTH STATE, UNSPECIFIED: Chronic | ICD-10-CM

## 2021-03-07 DIAGNOSIS — Z98.890 OTHER SPECIFIED POSTPROCEDURAL STATES: Chronic | ICD-10-CM

## 2021-03-07 DIAGNOSIS — K92.2 GASTROINTESTINAL HEMORRHAGE, UNSPECIFIED: ICD-10-CM

## 2021-03-07 DIAGNOSIS — I10 ESSENTIAL (PRIMARY) HYPERTENSION: ICD-10-CM

## 2021-03-07 DIAGNOSIS — J43.9 EMPHYSEMA, UNSPECIFIED: ICD-10-CM

## 2021-03-07 LAB
ALBUMIN SERPL ELPH-MCNC: 3.3 G/DL — SIGNIFICANT CHANGE UP (ref 3.3–5)
ALP SERPL-CCNC: 69 U/L — SIGNIFICANT CHANGE UP (ref 40–120)
ALT FLD-CCNC: 16 U/L — SIGNIFICANT CHANGE UP (ref 12–78)
ANION GAP SERPL CALC-SCNC: 3 MMOL/L — LOW (ref 5–17)
ANISOCYTOSIS BLD QL: SLIGHT — SIGNIFICANT CHANGE UP
APTT BLD: 29.2 SEC — SIGNIFICANT CHANGE UP (ref 27.5–35.5)
AST SERPL-CCNC: 25 U/L — SIGNIFICANT CHANGE UP (ref 15–37)
BASE EXCESS BLDV CALC-SCNC: 0.8 MMOL/L — SIGNIFICANT CHANGE UP (ref -2–2)
BASOPHILS # BLD AUTO: 0.02 K/UL — SIGNIFICANT CHANGE UP (ref 0–0.2)
BASOPHILS NFR BLD AUTO: 0.5 % — SIGNIFICANT CHANGE UP (ref 0–2)
BILIRUB SERPL-MCNC: 0.3 MG/DL — SIGNIFICANT CHANGE UP (ref 0.2–1.2)
BLD GP AB SCN SERPL QL: SIGNIFICANT CHANGE UP
BLOOD GAS COMMENTS, VENOUS: SIGNIFICANT CHANGE UP
BUN SERPL-MCNC: 19 MG/DL — SIGNIFICANT CHANGE UP (ref 7–23)
CALCIUM SERPL-MCNC: 8 MG/DL — LOW (ref 8.5–10.1)
CHLORIDE SERPL-SCNC: 112 MMOL/L — HIGH (ref 96–108)
CO2 SERPL-SCNC: 27 MMOL/L — SIGNIFICANT CHANGE UP (ref 22–31)
CREAT SERPL-MCNC: 0.98 MG/DL — SIGNIFICANT CHANGE UP (ref 0.5–1.3)
CRP SERPL-MCNC: <3 MG/L — SIGNIFICANT CHANGE UP
EOSINOPHIL # BLD AUTO: 0.15 K/UL — SIGNIFICANT CHANGE UP (ref 0–0.5)
EOSINOPHIL NFR BLD AUTO: 3.4 % — SIGNIFICANT CHANGE UP (ref 0–6)
GLUCOSE SERPL-MCNC: 104 MG/DL — HIGH (ref 70–99)
HCO3 BLDV-SCNC: 24 MMOL/L — SIGNIFICANT CHANGE UP (ref 21–29)
HCT VFR BLD CALC: 33 % — LOW (ref 34.5–45)
HGB BLD-MCNC: 10.1 G/DL — LOW (ref 11.5–15.5)
HOROWITZ INDEX BLDV+IHG-RTO: 21 — SIGNIFICANT CHANGE UP
IMM GRANULOCYTES NFR BLD AUTO: 0.9 % — SIGNIFICANT CHANGE UP (ref 0–1.5)
INR BLD: 1.5 RATIO — HIGH (ref 0.88–1.16)
LACTATE SERPL-SCNC: 1.6 MMOL/L — SIGNIFICANT CHANGE UP (ref 0.7–2)
LYMPHOCYTES # BLD AUTO: 0.6 K/UL — LOW (ref 1–3.3)
LYMPHOCYTES # BLD AUTO: 13.5 % — SIGNIFICANT CHANGE UP (ref 13–44)
MACROCYTES BLD QL: SLIGHT — SIGNIFICANT CHANGE UP
MANUAL SMEAR VERIFICATION: SIGNIFICANT CHANGE UP
MCHC RBC-ENTMCNC: 27.9 PG — SIGNIFICANT CHANGE UP (ref 27–34)
MCHC RBC-ENTMCNC: 30.6 GM/DL — LOW (ref 32–36)
MCV RBC AUTO: 91.2 FL — SIGNIFICANT CHANGE UP (ref 80–100)
MICROCYTES BLD QL: SLIGHT — SIGNIFICANT CHANGE UP
MONOCYTES # BLD AUTO: 0.53 K/UL — SIGNIFICANT CHANGE UP (ref 0–0.9)
MONOCYTES NFR BLD AUTO: 12 % — SIGNIFICANT CHANGE UP (ref 2–14)
NEUTROPHILS # BLD AUTO: 3.09 K/UL — SIGNIFICANT CHANGE UP (ref 1.8–7.4)
NEUTROPHILS NFR BLD AUTO: 69.7 % — SIGNIFICANT CHANGE UP (ref 43–77)
NRBC # BLD: 0 /100 WBCS — SIGNIFICANT CHANGE UP (ref 0–0)
OVALOCYTES BLD QL SMEAR: SLIGHT — SIGNIFICANT CHANGE UP
PCO2 BLDV: 52 MMHG — HIGH (ref 35–50)
PH BLDV: 7.32 — LOW (ref 7.35–7.45)
PLAT MORPH BLD: NORMAL — SIGNIFICANT CHANGE UP
PLATELET # BLD AUTO: 171 K/UL — SIGNIFICANT CHANGE UP (ref 150–400)
PLATELET CLUMP BLD QL SMEAR: ABNORMAL
PO2 BLDV: <44 MMHG — SIGNIFICANT CHANGE UP (ref 25–45)
POIKILOCYTOSIS BLD QL AUTO: SLIGHT — SIGNIFICANT CHANGE UP
POLYCHROMASIA BLD QL SMEAR: SLIGHT — SIGNIFICANT CHANGE UP
POTASSIUM SERPL-MCNC: 4.3 MMOL/L — SIGNIFICANT CHANGE UP (ref 3.5–5.3)
POTASSIUM SERPL-SCNC: 4.3 MMOL/L — SIGNIFICANT CHANGE UP (ref 3.5–5.3)
PROCALCITONIN SERPL-MCNC: <0.05 — SIGNIFICANT CHANGE UP (ref 0–0.04)
PROT SERPL-MCNC: 6.2 G/DL — SIGNIFICANT CHANGE UP (ref 6–8.3)
PROTHROM AB SERPL-ACNC: 17.2 SEC — HIGH (ref 10.6–13.6)
RAPID RVP RESULT: SIGNIFICANT CHANGE UP
RBC # BLD: 3.62 M/UL — LOW (ref 3.8–5.2)
RBC # FLD: 20.4 % — HIGH (ref 10.3–14.5)
RBC BLD AUTO: ABNORMAL
SAO2 % BLDV: 70 % — SIGNIFICANT CHANGE UP (ref 67–88)
SARS-COV-2 IGG SERPL QL IA: NEGATIVE — SIGNIFICANT CHANGE UP
SARS-COV-2 IGM SERPL IA-ACNC: 0.07 INDEX — SIGNIFICANT CHANGE UP
SARS-COV-2 RNA SPEC QL NAA+PROBE: SIGNIFICANT CHANGE UP
SODIUM SERPL-SCNC: 142 MMOL/L — SIGNIFICANT CHANGE UP (ref 135–145)
WBC # BLD: 4.43 K/UL — SIGNIFICANT CHANGE UP (ref 3.8–10.5)
WBC # FLD AUTO: 4.43 K/UL — SIGNIFICANT CHANGE UP (ref 3.8–10.5)

## 2021-03-07 PROCEDURE — 71275 CT ANGIOGRAPHY CHEST: CPT | Mod: 26,MA

## 2021-03-07 PROCEDURE — 99285 EMERGENCY DEPT VISIT HI MDM: CPT

## 2021-03-07 PROCEDURE — 71045 X-RAY EXAM CHEST 1 VIEW: CPT | Mod: 26

## 2021-03-07 RX ORDER — BUPROPION HYDROCHLORIDE 150 MG/1
150 TABLET, EXTENDED RELEASE ORAL EVERY 24 HOURS
Refills: 0 | Status: DISCONTINUED | OUTPATIENT
Start: 2021-03-07 | End: 2021-03-14

## 2021-03-07 RX ORDER — TIOTROPIUM BROMIDE 18 UG/1
1 CAPSULE ORAL; RESPIRATORY (INHALATION) DAILY
Refills: 0 | Status: DISCONTINUED | OUTPATIENT
Start: 2021-03-07 | End: 2021-03-14

## 2021-03-07 RX ORDER — BUPROPION HYDROCHLORIDE 150 MG/1
75 TABLET, EXTENDED RELEASE ORAL DAILY
Refills: 0 | Status: DISCONTINUED | OUTPATIENT
Start: 2021-03-07 | End: 2021-03-07

## 2021-03-07 RX ORDER — LEVOTHYROXINE SODIUM 125 MCG
88 TABLET ORAL DAILY
Refills: 0 | Status: DISCONTINUED | OUTPATIENT
Start: 2021-03-07 | End: 2021-03-14

## 2021-03-07 RX ORDER — SIMVASTATIN 20 MG/1
40 TABLET, FILM COATED ORAL AT BEDTIME
Refills: 0 | Status: DISCONTINUED | OUTPATIENT
Start: 2021-03-07 | End: 2021-03-14

## 2021-03-07 RX ORDER — IPRATROPIUM/ALBUTEROL SULFATE 18-103MCG
3 AEROSOL WITH ADAPTER (GRAM) INHALATION ONCE
Refills: 0 | Status: COMPLETED | OUTPATIENT
Start: 2021-03-07 | End: 2021-03-07

## 2021-03-07 RX ORDER — BUPROPION HYDROCHLORIDE 150 MG/1
75 TABLET, EXTENDED RELEASE ORAL AT BEDTIME
Refills: 0 | Status: DISCONTINUED | OUTPATIENT
Start: 2021-03-07 | End: 2021-03-14

## 2021-03-07 RX ORDER — SODIUM CHLORIDE 9 MG/ML
3 INJECTION INTRAMUSCULAR; INTRAVENOUS; SUBCUTANEOUS EVERY 8 HOURS
Refills: 0 | Status: DISCONTINUED | OUTPATIENT
Start: 2021-03-07 | End: 2021-03-14

## 2021-03-07 RX ORDER — ALBUTEROL 90 UG/1
2 AEROSOL, METERED ORAL EVERY 6 HOURS
Refills: 0 | Status: DISCONTINUED | OUTPATIENT
Start: 2021-03-07 | End: 2021-03-14

## 2021-03-07 RX ORDER — METOPROLOL TARTRATE 50 MG
25 TABLET ORAL DAILY
Refills: 0 | Status: DISCONTINUED | OUTPATIENT
Start: 2021-03-07 | End: 2021-03-14

## 2021-03-07 RX ORDER — LOSARTAN POTASSIUM 100 MG/1
25 TABLET, FILM COATED ORAL DAILY
Refills: 0 | Status: DISCONTINUED | OUTPATIENT
Start: 2021-03-07 | End: 2021-03-14

## 2021-03-07 RX ADMIN — SODIUM CHLORIDE 3 MILLILITER(S): 9 INJECTION INTRAMUSCULAR; INTRAVENOUS; SUBCUTANEOUS at 14:18

## 2021-03-07 RX ADMIN — SIMVASTATIN 40 MILLIGRAM(S): 20 TABLET, FILM COATED ORAL at 21:52

## 2021-03-07 RX ADMIN — BUPROPION HYDROCHLORIDE 75 MILLIGRAM(S): 150 TABLET, EXTENDED RELEASE ORAL at 21:52

## 2021-03-07 RX ADMIN — Medication 3 MILLILITER(S): at 09:17

## 2021-03-07 RX ADMIN — BUPROPION HYDROCHLORIDE 150 MILLIGRAM(S): 150 TABLET, EXTENDED RELEASE ORAL at 13:06

## 2021-03-07 RX ADMIN — SODIUM CHLORIDE 3 MILLILITER(S): 9 INJECTION INTRAMUSCULAR; INTRAVENOUS; SUBCUTANEOUS at 21:42

## 2021-03-07 NOTE — ED PROVIDER NOTE - MUSCULOSKELETAL, MLM
Spine appears normal, range of motion is not limited, no muscle or joint tenderness no calf pain or swelling

## 2021-03-07 NOTE — ED PROVIDER NOTE - OBJECTIVE STATEMENT
Pt is an 80 yo f who is currently on eliquis for dvt pe has ongoing gib but needs ivc filter placment before discontinuing doac, MARTINA, Leukemia emphysema, cad graves dz. pmd dr Man, Cardiology dr Harmon, GI dr sharpe, HEMe dr Escalera former smoker  she was at home this am and noticed Pt is an 80 yo f who is currently on eliquis for dvt pe has ongoing gib but needs ivc filter placment before discontinuing doac, MARTINA, Leukemia emphysema, cad graves dz. pmd dr Man, Cardiology dr Harmon, GI dr sharpe, HEMe dr Escalera former smoker  she was at home this am and noticed she was spitting up blood. she reports that last week she had rust colored sputum.  no fever denies other covid sx no travel no ill contacts. no abd pain or chagnes in diet no nose bleed Pt is an 82 yo f who is currently on eliquis for dvt pe has ongoing gib but needs ivc filter placment before discontinuing doac, MARTINA, Leukemia emphysema, cad graves dz. pmd dr Man, Cardiology dr Harmon, GI dr sharpe, HEMe dr Escalera former smoker  she was at home this am and noticed she was spitting up blood. she reports that last week she had rust colored sputum.  no fever denies other covid sx no travel no ill contacts. no abd pain or changes in diet no nose bleed

## 2021-03-07 NOTE — ED PROVIDER NOTE - ENMT, MLM
Airway patent, Nasal mucosa clear. Mouth with pale mucosa. Throat has no vesicles, no oropharyngeal exudates and uvula is midline. no g fr  no cyanosis no nose bleed no blood in post op

## 2021-03-07 NOTE — H&P ADULT - ASSESSMENT
82 yo f who is currently on eliquis for dvt pe has ongoing gib but needs ivc filter placment before discontinuing doac, MARTINA, Leukemia emphysema, cad graves, former smoker. she was at home this am and noticed she was spitting up blood. she reports that last week she had rust colored sputum.  no fever denies other covid sx no travel no ill contacts. no abd pain or changes in diet no nose bleed

## 2021-03-07 NOTE — H&P ADULT - HISTORY OF PRESENT ILLNESS
82 yo f who is currently on eliquis for dvt pe has ongoing gib but needs ivc filter placment before discontinuing doac, MARTINA, Leukemia emphysema, cad graves dz. pmd dr Man, Cardiology dr Harmon, GI dr sharpe, HEMe dr Escalera former smoker  	she was at home this am and noticed she was spitting up blood. she reports that last week she had rust colored sputum.  no fever denies other covid sx no travel no ill contacts. no abd pain or changes in diet no nose bleed 80 yo f who is currently on eliquis for dvt pe has ongoing gib but needs ivc filter placment before discontinuing doac, MARTINA, Leukemia emphysema, cad graves, former smoker. she was at home this am and noticed she was spitting up blood. she reports that last week she had rust colored sputum.  no fever denies other covid sx no travel no ill contacts. no abd pain or changes in diet no nose bleed

## 2021-03-07 NOTE — H&P ADULT - NSHPPHYSICALEXAM_GEN_ALL_CORE
General: WN/WD NAD  PERRLA  Neurology: A&Ox3, nonfocal, BALLARD x 4  Respiratory: CTA B/L  CV: RRR, S1S2, no murmurs, rubs or gallops  Abdominal: Soft, NT, ND +BS, Last BM  Extremities: No edema, + peripheral pulses  Skin Normal

## 2021-03-07 NOTE — ED PROVIDER NOTE - PROGRESS NOTE DETAILS
dw radiology no evidence of infiltrate or effusion no changes from previou.s copies of results provided to pt she feels better after neb no further coughing up blood. sh eis to have ivc filter placed out pt by dr Vega.\  I offered admissioln her for furthjer eval of the hemoptysis 9to see if bleeding from gi or pulm- pt agress Dr. Perlman cov by dr ANNIE Chaves=- paged aware accepts

## 2021-03-07 NOTE — ED ADULT NURSE NOTE - NSIMPLEMENTINTERV_GEN_ALL_ED
Implemented All Fall Risk Interventions:  [place] to call system. Call bell, personal items and telephone within reach. Instruct patient to call for assistance. Room bathroom lighting operational. Non-slip footwear when patient is off stretcher. Physically safe environment: no spills, clutter or unnecessary equipment. Stretcher in lowest position, wheels locked, appropriate side rails in place. Provide visual cue, wrist band, yellow gown, etc. Monitor gait and stability. Monitor for mental status changes and reorient to person, place, and time. Review medications for side effects contributing to fall risk. Reinforce activity limits and safety measures with patient and family.

## 2021-03-07 NOTE — ED PROVIDER NOTE - CLINICAL SUMMARY MEDICAL DECISION MAKING FREE TEXT BOX
hemoptysis on eliquis hx copd ro pn ro other causes inc gi low platelets, pt hx of dvt pe, may need admission for ivc filter, blood cultrures lactae xray ekg  abx  nebs for copd cardiac monitor

## 2021-03-07 NOTE — H&P ADULT - NSHPLABSRESULTS_GEN_ALL_CORE
Lab Results:  CBC  CBC Full  -  ( 07 Mar 2021 09:13 )  WBC Count : 4.43 K/uL  RBC Count : 3.62 M/uL  Hemoglobin : 10.1 g/dL  Hematocrit : 33.0 %  Platelet Count - Automated : 171 K/uL  Mean Cell Volume : 91.2 fl  Mean Cell Hemoglobin : 27.9 pg  Mean Cell Hemoglobin Concentration : 30.6 gm/dL  Auto Neutrophil # : 3.09 K/uL  Auto Lymphocyte # : 0.60 K/uL  Auto Monocyte # : 0.53 K/uL  Auto Eosinophil # : 0.15 K/uL  Auto Basophil # : 0.02 K/uL  Auto Neutrophil % : 69.7 %  Auto Lymphocyte % : 13.5 %  Auto Monocyte % : 12.0 %  Auto Eosinophil % : 3.4 %  Auto Basophil % : 0.5 %    .		Differential:	[] Automated		[] Manual  Chemistry                        10.1   4.43  )-----------( 171      ( 07 Mar 2021 09:13 )             33.0     03-07    142  |  112<H>  |  19  ----------------------------<  104<H>  4.3   |  27  |  0.98    Ca    8.0<L>      07 Mar 2021 09:13    TPro  6.2  /  Alb  3.3  /  TBili  0.3  /  DBili  x   /  AST  25  /  ALT  16  /  AlkPhos  69  03-07    LIVER FUNCTIONS - ( 07 Mar 2021 09:13 )  Alb: 3.3 g/dL / Pro: 6.2 g/dL / ALK PHOS: 69 U/L / ALT: 16 U/L / AST: 25 U/L / GGT: x           PT/INR - ( 07 Mar 2021 09:13 )   PT: 17.2 sec;   INR: 1.50 ratio         PTT - ( 07 Mar 2021 09:13 )  PTT:29.2 sec          MICROBIOLOGY/CULTURES:      RADIOLOGY RESULTS: reviewed

## 2021-03-07 NOTE — ED PROVIDER NOTE - CARE PLAN
Principal Discharge DX:	Hemoptysis   Principal Discharge DX:	Hemoptysis  Secondary Diagnosis:	Anemia  Secondary Diagnosis:	Emphysema of lung  Secondary Diagnosis:	MARTINA on CPAP

## 2021-03-07 NOTE — CONSULT NOTE ADULT - PROBLEM SELECTOR RECOMMENDATION 9
82 yo f who is currently on eliquis for dvt pe has ongoing gib but needs ivc filter placement before discontinuing doac, MARTINA, Leukemia emphysema, cad graves dz. pmd dr Man  copd - inhaler regimen - monitor sat - VBG - follows with Dr Mcmahon  MARTINA - cpap night time  hx of dvt pe - follows with Heme Onc - no PE on curr CT - discussion about IVC filter as outpatient - as per patient was planned to see Vascular - curr on Eliquis at home for AC -   CT does not show PNA - no evidence of LRTI -   Vascular - vs IR eval for IVC filter placement  serial HGB  cough rx regimen

## 2021-03-08 ENCOUNTER — TRANSCRIPTION ENCOUNTER (OUTPATIENT)
Age: 81
End: 2021-03-08

## 2021-03-08 DIAGNOSIS — G47.33 OBSTRUCTIVE SLEEP APNEA (ADULT) (PEDIATRIC): ICD-10-CM

## 2021-03-08 LAB
FERRITIN SERPL-MCNC: 332 NG/ML — HIGH (ref 15–150)
IRON SATN MFR SERPL: 20 % — SIGNIFICANT CHANGE UP (ref 14–50)
IRON SATN MFR SERPL: 59 UG/DL — SIGNIFICANT CHANGE UP (ref 30–160)
TIBC SERPL-MCNC: 290 UG/DL — SIGNIFICANT CHANGE UP (ref 220–430)
UIBC SERPL-MCNC: 231 UG/DL — SIGNIFICANT CHANGE UP (ref 110–370)

## 2021-03-08 PROCEDURE — 99222 1ST HOSP IP/OBS MODERATE 55: CPT

## 2021-03-08 PROCEDURE — 99223 1ST HOSP IP/OBS HIGH 75: CPT

## 2021-03-08 RX ADMIN — SODIUM CHLORIDE 3 MILLILITER(S): 9 INJECTION INTRAMUSCULAR; INTRAVENOUS; SUBCUTANEOUS at 06:29

## 2021-03-08 RX ADMIN — LOSARTAN POTASSIUM 25 MILLIGRAM(S): 100 TABLET, FILM COATED ORAL at 06:29

## 2021-03-08 RX ADMIN — Medication 88 MICROGRAM(S): at 06:29

## 2021-03-08 RX ADMIN — SODIUM CHLORIDE 3 MILLILITER(S): 9 INJECTION INTRAMUSCULAR; INTRAVENOUS; SUBCUTANEOUS at 23:06

## 2021-03-08 RX ADMIN — Medication 25 MILLIGRAM(S): at 06:29

## 2021-03-08 RX ADMIN — BUPROPION HYDROCHLORIDE 150 MILLIGRAM(S): 150 TABLET, EXTENDED RELEASE ORAL at 08:18

## 2021-03-08 RX ADMIN — SODIUM CHLORIDE 3 MILLILITER(S): 9 INJECTION INTRAMUSCULAR; INTRAVENOUS; SUBCUTANEOUS at 21:31

## 2021-03-08 NOTE — CONSULT NOTE ADULT - ASSESSMENT
81 y/o F PMHx of leukemia (s/p chemo in 2012 now in remission with residual chronic anemia requiring iron infusions, MVR and AVR in 2012, COPD (not on home O2), MARTINA on CPAP (patient admits to being non-compliant with use), hx of R ureteral stone s/p stent in 9/2019 revised in 12/2019, Graves s/p thyroid surgery, HLD, HTN, depression, GERD.  She recently had a DVT and PE, and has been having issue with hemoptysis. She presents today for evaluation for ivc filter She has a hiatal hernia and esophageal ulcers with recurrent GI blood loss requiring IV iron.    PE/DVT  - no issues respiratory wise and tolerating RA  - repeat CTA with no PE, though with large hiatal hernia  - Has not been able to tolerate eliquis, and agree with proceeding with IVC filter. No cardiac contraindication to proceeding.  - she will remain off anticoagulation; would not resume anticoagulation as patient likely had provoked event and has completed >3 months of anticoagulation. She has a hiatal hernia and esophageal ulcers with recurrent GI blood loss requiring IV iron.  - Hematology following.  Follow recommendations  - GI evaluation appreciated, with plan to hold off on egd at this time.    s/p tissue AVR/MVR  - No evidence of volume overload.   - asa held in setting of blood loss.  - TTE showed normal LVEF, no SWMA, mild LVH, aortic gradient appropriate for valve replacement, though mitral gradient elevated (as seen previously in office), normal RV size and function, with grade 2 DD    HTN  - BP acceptable  - Continue BB and ARB at same doses  - Monitor and replete lytes, keep K>4, Mg>2.    - Other cardiovascular workup will depend on clinical course.  - All other workup per primary team.  - Will continue to follow.
82 yo female with H/o DVT, PE was on Eliquis,  presented to the hospital due to hemoptysis will require IVC filter placement to stop DOACs   will speak with GI re any indication for scoping the patient prior to placement of the IVC filter   if patient cleared then the procedure will be scheduled for tomorrow.   
82 yo woman with history of Acute Promyelocytic Leukemia in 2013 s/p ATRA and Arsenic TriOxide with complete remission; last PML-VIVIANA transcript in 7/2020 negative; also with hiatal hernia and esophageal ulcers with recurrent GI blood loss requiring IV iron; most recently exacerbated by initiation of anticoagulation (Eliquis) as a result of incidental right subsegmental pulmonary embolism in 11/2020    - agree to hold further anticoagulation  - patient planned for IVC filter on 3/9/21; this was being arranged as outpatient previously given recurrent GI bleed  - would not resume anticoagulation as patient likely had provoked event and has completed >3 months of anticoagulation  - will check iron studies   - Pulmonary consult appreciated  - GI consult appreciated - PPI/Carafate; no plan for EGD at this point and will defer for surgical eval for hiatal hernia repair  - Cardiology consult pending  - discussed with Dr. Shah   - continue to monitor CBC  - will follow with you  
dvt/pe  intrathoracic stomach  hemoptysis    cont regular diet  suspect intrathoracic stomach with sliding causing gastric erosions  proton pump inhibitor bid  carafate 1g four times a day  agree with plan for ivc filter  only solution for hiatal hernia is surgical, therefore getting her of anticoagulation would be favorable   no gi objection for ivc filter   d/w patient

## 2021-03-08 NOTE — PROGRESS NOTE ADULT - ASSESSMENT
82 yo f who is currently on eliquis for dvt pe has ongoing gib but needs ivc filter placment before discontinuing doac, MARTINA, Leukemia emphysema, cad graves, former smoker. she was at home this am and noticed she was spitting up blood. she reports that last week she had rust colored sputum.  no fever denies other covid sx no travel no ill contacts. no abd pain or changes in diet no nose bleed     Problem/Plan - 1:  ·  Problem: Hemoptysis.  Plan: hold AC  pulmonary following  vascular fu appreciAted   IVC filter in am    Problem/Plan - 2:  ·  Problem: GIB (gastrointestinal bleeding).  Plan: monitor cbc  gi called   hold AC.     Problem/Plan - 3:  ·  Problem: Hypertension.  Plan: DASH diet  cw home meds.

## 2021-03-08 NOTE — CONSULT NOTE ADULT - ATTENDING COMMENTS
Patient was evaluated at the bedside. Patient has recent hx of RLE DVT and PE and has been on PO anticoagulation. Patient states she has been having black stool since anticoagulation started. She has also has had episodes of blood in sputum. Patient was supposed to have filter placed by Dr Gandhi who is away. I was asked to place it during this admission. She is on the schedule for tomorrow. Details discussed. GI to evaluate patient.,

## 2021-03-08 NOTE — CONSULT NOTE ADULT - SUBJECTIVE AND OBJECTIVE BOX
VASCULAR SURGERY CONSULT NOTE    consulted for IVC filter placement .     HPI:  80 yo f who was on Eliquis for DVT, PE in   presented to the hospital after "spitting up Blood", IVC filter placement recommended  prior discontinuing doac, Patient reports that last week she had rust colored sputum.  no fever denies other covid sx no travel no ill contacts. no abd pain or changes in diet no nose bleed. patient has been followed by  dr Harmon cardiology , GI dr sharpe, HEMe dr Escalera      PAST MEDICAL & SURGICAL HISTORY:    Calculus of ureter  s/p R ureteral stent 2020    MARTINA on CPAP  Pt admits to be non-compliant    Leukemia  (APL, s/p chemo in , now in remission, followed by oncologist)    Anemia  Completed iron infusions every 2 months, now Hb stable    Aortic stenosis    Emphysema    Rotator cuff tear  Right    Diverticulosis of colon    Coronary atherosclerosis of native coronary artery    Carpal tunnel syndrome    Dyslipidemia    Osteoporosis    Hypertension    Hernia, hiatal    Former cigarette smoker    Depression    Asthma with COPD with exacerbation  Not on home O2    Acid reflux    Graves disease  s/p surgery    Elective surgery  Cystoscopy, right ureteroscopy, laser lithotripsy of right ureteral stone, right ureteral stent removal and replacement, right retrograde pyelogram on 19    H/O dilation and curettage    S/P ureteral stent placement  R in 2020    H/O mitral valve repair      H/O aortic valve repair      History of coronary angiogram  12    h/o  endometrial ablation      H/O cone biopsy of cervix  1974    History of tonsillectomy  childhood    After cataract, bilateral          Review of Systems:    I have reviewed 9 systems with the patient and the only positive findings were as above     MEDICATIONS  (STANDING):  buPROPion . 75 milliGRAM(s) Oral at bedtime  buPROPion XL . 150 milliGRAM(s) Oral every 24 hours  levothyroxine 88 MICROGram(s) Oral daily  losartan 25 milliGRAM(s) Oral daily  metoprolol succinate ER 25 milliGRAM(s) Oral daily  simvastatin 40 milliGRAM(s) Oral at bedtime  sodium chloride 0.9% lock flush 3 milliLiter(s) IV Push every 8 hours  tiotropium 18 MICROgram(s) Capsule 1 Capsule(s) Inhalation daily    MEDICATIONS  (PRN):  ALBUTerol    90 MICROgram(s) HFA Inhaler 2 Puff(s) Inhalation every 6 hours PRN Shortness of Breath and/or Wheezing  guaiFENesin   Syrup  (Sugar-Free) 200 milliGRAM(s) Oral every 6 hours PRN Cough      Allergies:    adhesives (Rash; Other)  Cat dander- wheezing, itchy throat, SOB (Other)  penicillin (Rash)  sulfa drugs (Rash)  tetracycline (Stomach Upset)    Intolerances        SOCIAL HISTORY          Smoking: former smoker           ETOH  Yes [ ]  No [ ]  Social [ ]          DRUGS:  Yes [ ]  No [ ]  if so what______________    FAMILY HISTORY:  FH: myocardial infarction  (Father )        Vital Signs Last 24 Hrs  T(C): 36.5 (08 Mar 2021 04:59), Max: 36.9 (07 Mar 2021 15:26)  T(F): 97.7 (08 Mar 2021 04:59), Max: 98.4 (07 Mar 2021 15:26)  HR: 77 (08 Mar 2021 04:59) (69 - 77)  BP: 143/84 (08 Mar 2021 04:59) (126/64 - 143/84)  BP(mean): --  RR: 17 (08 Mar 2021 04:59) (16 - 17)  SpO2: 92% (08 Mar 2021 04:59) (92% - 96%)    Physical Exam:    General:  Appears stated age, well-groomed, well-nourished, no distress  Eyes : ALEXANDRA  HENT:  WNL, no JVD  Chest:  clear breath sounds good inspiratory effort   Cardiovascular:  Regular rate & rhythm  Abdomen: soft NT ND  Extremities:  no edema , good capillary refill             LABS:                        10.1   4.43  )-----------( 171      ( 07 Mar 2021 09:13 )             33.0     03-07    142  |  112<H>  |  19  ----------------------------<  104<H>  4.3   |  27  |  0.98    Ca    8.0<L>      07 Mar 2021 09:13    TPro  6.2  /  Alb  3.3  /  TBili  0.3  /  DBili  x   /  AST  25  /  ALT  16  /  AlkPhos  69  03-07    PT/INR - ( 07 Mar 2021 09:13 )   PT: 17.2 sec;   INR: 1.50 ratio         PTT - ( 07 Mar 2021 09:13 )  PTT:29.2 sec      RADIOLOGY & ADDITIONAL STUDIES:    Risks, benefits, and alternatives to treatment discussed. All questions answered with understanding. Patient is a 81y old  Female who presents with a chief complaint of     < from: CT Angio Chest w/ IV Cont (21 @ 11:40) >  UNGS, AIRWAYS, PLEURA: Patent central airways. No endobronchial lesion, bronchial wall thickening or bronchiectasis. Bandlike atelectasis within the basilar lower lobes are unchanged. The lungs are relatively clear. Trace pleural effusions. No pneumothorax..    MEDIASTINUM: No mass or lymphadenopathy.    UPPER ABDOMEN: Large hiatal hernia containing the stomach. Cholelithiasis. Right liver cyst incompletely evaluated. Numerous 2-3 mm nonobstructing renal stones.    BONES AND SOFT TISSUES: No aggressive osseous lesion. Old fracture deformity of posterior left ribs. Mild compression fracture deformity of T12 vertebral body is unchanged.    LOWER NECK: Within normal limits.    IMPRESSION:    No pulmonary embolus.    Bandlike atelectasis within the basilar lower lobes are unchanged. The lungs are relatively clear.          
[facility]  Richard Maxwell PA-C  [address] [place] [address]  [phone]      Chief Complaint:  Patient is a 81y old  Female who presents with a chief complaint of     HPI: 81F with history of dvt/pe on a/c who presents with hemoptysis  she was in er in dec 2020 at that time noted to be anemic  was transfused and sent home  she has some dyspepsia  noticed stools are dark but not black  CT shows intra-thoracic stomach      Allergies:  adhesives (Rash; Other)  Cat dander- wheezing, itchy throat, SOB (Other)  penicillin (Rash)  sulfa drugs (Rash)  tetracycline (Stomach Upset)      Medications:  ALBUTerol    90 MICROgram(s) HFA Inhaler 2 Puff(s) Inhalation every 6 hours PRN  buPROPion . 75 milliGRAM(s) Oral at bedtime  buPROPion XL . 150 milliGRAM(s) Oral every 24 hours  guaiFENesin   Syrup  (Sugar-Free) 200 milliGRAM(s) Oral every 6 hours PRN  levothyroxine 88 MICROGram(s) Oral daily  losartan 25 milliGRAM(s) Oral daily  metoprolol succinate ER 25 milliGRAM(s) Oral daily  simvastatin 40 milliGRAM(s) Oral at bedtime  sodium chloride 0.9% lock flush 3 milliLiter(s) IV Push every 8 hours  tiotropium 18 MICROgram(s) Capsule 1 Capsule(s) Inhalation daily      PMHX/PSHX:  Serum phosphorus decreased    Calculus of ureter    MARTINA on CPAP    Leukemia    Anemia    Spinal stenosis    Mitral regurgitation    Aortic stenosis    Emphysema    Duodenal ulcer    Rotator cuff tear    Diverticulosis of colon    Coronary atherosclerosis of native coronary artery    Carpal tunnel syndrome    Dyslipidemia    Obstructive sleep apnea    Osteoporosis    Hypertension    Hernia, hiatal    Former cigarette smoker    Depression    Asthma with COPD with exacerbation    Asthma    Acid reflux    Graves disease    Elective surgery    H/O dilation and curettage    S/P ureteral stent placement    H/O mitral valve repair    H/O aortic valve repair    History of coronary angiogram    h/o  endometrial ablation    H/O cone biopsy of cervix    History of tonsillectomy    After cataract, bilateral    Carpal tunnel right repair        Family history:  FH: myocardial infarction    No pertinent family history in first degree relatives        Social History:     ROS:     General:  No wt loss, fevers, chills, night sweats, fatigue,   Eyes:  Good vision, no reported pain  ENT:  No sore throat, pain, runny nose, dysphagia  CV:  No pain, palpitations, hypo/hypertension  Resp:  No dyspnea, cough, tachypnea, wheezing  GI:  No pain, No nausea, No vomiting, No diarrhea, No constipation, No weight loss, No fever, No pruritis, No rectal bleeding, No tarry stools, No dysphagia,  :  No pain, bleeding, incontinence, nocturia  Muscle:  No pain, weakness  Neuro:  No weakness, tingling, memory problems  Psych:  No fatigue, insomnia, mood problems, depression  Endocrine:  No polyuria, polydipsia, cold/heat intolerance  Heme:  No petechiae, ecchymosis, easy bruisability  Skin:  No rash, tattoos, scars, edema      PHYSICAL EXAM:   Vital Signs:  Vital Signs Last 24 Hrs  T(C): 36.5 (08 Mar 2021 04:59), Max: 36.9 (07 Mar 2021 15:26)  T(F): 97.7 (08 Mar 2021 04:59), Max: 98.4 (07 Mar 2021 15:26)  HR: 77 (08 Mar 2021 04:59) (69 - 77)  BP: 143/84 (08 Mar 2021 04:59) (126/64 - 143/84)  BP(mean): --  RR: 17 (08 Mar 2021 04:59) (16 - 17)  SpO2: 92% (08 Mar 2021 04:59) (92% - 96%)  Daily     Daily     GENERAL:  Appears stated age, well-groomed, well-nourished, no distress  HEENT:  NC/AT,  conjunctivae clear and pink, no thyromegaly, nodules, adenopathy, no JVD, sclera -anicteric  CHEST:  Full & symmetric excursion, no increased effort, breath sounds clear  HEART:  Regular rhythm, S1, S2, no murmur/rub/S3/S4, no abdominal bruit, no edema  ABDOMEN:  Soft, non-tender, non-distended, normoactive bowel sounds,  no masses ,no hepato-splenomegaly, no signs of chronic liver disease  EXTEREMITIES:  no cyanosis,clubbing or edema  SKIN:  No rash/erythema/ecchymoses/petechiae/wounds/abscess/warm/dry  NEURO:  Alert, oriented, no asterixis, no tremor, no encephalopathy    LABS:                        10.1   4.43  )-----------( 171      ( 07 Mar 2021 09:13 )             33.0     03-07    142  |  112<H>  |  19  ----------------------------<  104<H>  4.3   |  27  |  0.98    Ca    8.0<L>      07 Mar 2021 09:13    TPro  6.2  /  Alb  3.3  /  TBili  0.3  /  DBili  x   /  AST  25  /  ALT  16  /  AlkPhos  69  03-07    LIVER FUNCTIONS - ( 07 Mar 2021 09:13 )  Alb: 3.3 g/dL / Pro: 6.2 g/dL / ALK PHOS: 69 U/L / ALT: 16 U/L / AST: 25 U/L / GGT: x           PT/INR - ( 07 Mar 2021 09:13 )   PT: 17.2 sec;   INR: 1.50 ratio         PTT - ( 07 Mar 2021 09:13 )  PTT:29.2 sec        Imaging:          
[facility] - Brandy Harmon, Ilan Leary, Chico Begum Savella  Office Number: [phone]    Initial Consult Note    CHIEF COMPLAINT: Patient is a 81y old  Female who presents with a chief complaint of     HPI:  80 yo f who is currently on eliquis for dvt pe has ongoing gib but needs ivc filter placment before discontinuing doac, MARTINA, Leukemia emphysema, cad graves, former smoker. she was at home this am and noticed she was spitting up blood. she reports that last week she had rust colored sputum.  no fever denies other covid sx no travel no ill contacts. no abd pain or changes in diet no nose bleed (07 Mar 2021 12:43)      PAST MEDICAL & SURGICAL HISTORY:  Serum phosphorus decreased  Last level 1.5 at PST     Calculus of ureter  s/p R ureteral stent 2020    MARTINA on CPAP  Pt admits to be non-compliant    Leukemia  (APL, s/p chemo in , now in remission, followed by oncologist)    Anemia  Completed iron infusions every 2 months, now Hb stable    Aortic stenosis    Emphysema    Rotator cuff tear  Right    Diverticulosis of colon    Coronary atherosclerosis of native coronary artery    Carpal tunnel syndrome    Dyslipidemia    Osteoporosis    Hypertension    Hernia, hiatal    Former cigarette smoker    Depression    Asthma with COPD with exacerbation  Not on home O2    Acid reflux    Graves disease  s/p surgery    Elective surgery  Cystoscopy, right ureteroscopy, laser lithotripsy of right ureteral stone, right ureteral stent removal and replacement, right retrograde pyelogram on 19    H/O dilation and curettage    S/P ureteral stent placement  R in 2020    H/O mitral valve repair      H/O aortic valve repair      History of coronary angiogram  12    h/o  endometrial ablation      H/O cone biopsy of cervix  1974    History of tonsillectomy  childhood    After cataract, bilateral          SOCIAL HISTORY:  No tobacco, ethanol, or drug abuse.    FAMILY HISTORY:  FH: myocardial infarction  (Father )      No family history of acute MI or sudden cardiac death.    MEDICATIONS  (STANDING):  buPROPion . 75 milliGRAM(s) Oral at bedtime  buPROPion XL . 150 milliGRAM(s) Oral every 24 hours  levothyroxine 88 MICROGram(s) Oral daily  losartan 25 milliGRAM(s) Oral daily  metoprolol succinate ER 25 milliGRAM(s) Oral daily  simvastatin 40 milliGRAM(s) Oral at bedtime  sodium chloride 0.9% lock flush 3 milliLiter(s) IV Push every 8 hours  tiotropium 18 MICROgram(s) Capsule 1 Capsule(s) Inhalation daily    MEDICATIONS  (PRN):  ALBUTerol    90 MICROgram(s) HFA Inhaler 2 Puff(s) Inhalation every 6 hours PRN Shortness of Breath and/or Wheezing  guaiFENesin   Syrup  (Sugar-Free) 200 milliGRAM(s) Oral every 6 hours PRN Cough      Allergies    adhesives (Rash; Other)  Cat dander- wheezing, itchy throat, SOB (Other)  penicillin (Rash)  sulfa drugs (Rash)  tetracycline (Stomach Upset)    Intolerances        REVIEW OF SYSTEMS:    CONSTITUTIONAL: No weakness, fevers or chills  EYES/ENT: No visual changes;  No vertigo or throat pain   NECK: No pain or stiffness  RESPIRATORY: No cough, wheezing, hemoptysis; No shortness of breath  CARDIOVASCULAR: No chest pain or palpitations  GASTROINTESTINAL: No abdominal pain. No nausea, vomiting, or hematemesis; No diarrhea or constipation. reports possible melena, no hematochezia; + coughing up blood  GENITOURINARY: No dysuria, frequency or hematuria  NEUROLOGICAL: No numbness or weakness  SKIN: No itching or rash  All other review of systems is negative unless indicated above    VITAL SIGNS:   Vital Signs Last 24 Hrs  T(C): 36.5 (08 Mar 2021 04:59), Max: 36.9 (07 Mar 2021 15:26)  T(F): 97.7 (08 Mar 2021 04:59), Max: 98.4 (07 Mar 2021 15:26)  HR: 77 (08 Mar 2021 04:59) (70 - 77)  BP: 143/84 (08 Mar 2021 04:59) (126/64 - 143/84)  BP(mean): --  RR: 17 (08 Mar 2021 04:59) (16 - 17)  SpO2: 92% (08 Mar 2021 04:59) (92% - 95%)    I&O's Summary    08 Mar 2021 07:01  -  08 Mar 2021 11:26  --------------------------------------------------------  IN: 120 mL / OUT: 0 mL / NET: 120 mL        On Exam:    Constitutional: NAD, alert and oriented x 3  Lungs:  Non-labored, breath sounds are clear bilaterally, No wheezing, rales or rhonchi  Cardiovascular: RRR.  S1 and S2 positive.  No murmurs, rubs, gallops or clicks  Gastrointestinal: Bowel Sounds present, soft, nontender.   Lymph: No peripheral edema. No cervical lymphadenopathy.  Neurological: Alert, no focal deficits  Skin: No rashes or ulcers   Psych:  Mood & affect appropriate.    LABS: All Labs Reviewed:                        10.   4.43  )-----------( 171      ( 07 Mar 2021 09:13 )             33.0     07 Mar 2021 09:13    142    |  112    |  19     ----------------------------<  104    4.3     |  27     |  0.98     Ca    8.0        07 Mar 2021 09:13    TPro  6.2    /  Alb  3.3    /  TBili  0.3    /  DBili  x      /  AST  25     /  ALT  16     /  AlkPhos  69     07 Mar 2021 09:13    PT/INR - ( 07 Mar 2021 09:13 )   PT: 17.2 sec;   INR: 1.50 ratio         PTT - ( 07 Mar 2021 09:13 )  PTT:29.2 sec      Blood Culture:         RADIOLOGY:    EKG: sr  
Patient is a 81y old  Female who presents with a chief complaint of hemoptysis    HPI:  80 yo woman with history of Acute Promyelocytic Leukemia in 2013 s/p treatment at [facility] under care of Dr. Aubrie Villegas including ATRA and Arsenic Trioxide with complication of Torsades and staph endocarditis. She acieved complete remission and completed consolidation therapy by 2013. She has not had any recurrences. She has however had recurrent episodes of Gi blood loss due to hiatal hernia and recurrent esophageal/gastric ulcers as well as colon polyps. She previously followed with Dr. Armenta who retired and has more recently been seen by Dr. Sandoval. She was hospitalized in 2020 due to flank pain and was noted incidentally to have right subsegmental pulmonary embolism. She was started at that time on anticoagulation with Eliquis at that time. Her requirements for IV iron had increased since starting on Eliquis. She presented to the ER on 3/7/21 with hemptysis after reporting that last week she also had rust colored sputum.    Asked to evaluate       ROS:  Negative except for: hemoptysis, chronic fatigue, dypsnea intermittently     PAST MEDICAL & SURGICAL HISTORY:  Serum phosphorus decreased - as a result of IV injectafer (high dose iron)  Calculus of ureter - s/p R ureteral stent 2020  MARTINA on CPAP  Leukemia (APL, s/p chemo in , now in remission, followed by oncologist)  Anemia - as per HPI  Aortic stenosis - followed by cardiology  Emphysema    Rotator cuff tear - Right  Diverticulosis of colon  Coronary atherosclerosis of native coronary artery  Carpal tunnel syndrome  Dyslipidemia  Osteoporosis  Hypertension  Hernia, hiatal  Former cigarette smoker  Depression  Asthma with COPD with exacerbation - Not on home O2  Acid reflux  Graves disease - s/p surgery    Elective surgery  Cystoscopy, right ureteroscopy, laser lithotripsy of right ureteral stone, right ureteral stent removal and replacement, right retrograde pyelogram on 19  H/O dilation and curettage  S/P ureteral stent placement - R in 2020  H/O mitral valve repair -   H/O aortic valve repair -   h/o  endometrial ablation -   H/O cone biopsy of cervix -   History of tonsillectomy - childhood  After cataract, bilateral -     SOCIAL HISTORY:  Former tobacco use  Denies ETOH  Denies Illicit drug use  Lives alone    FAMILY HISTORY:  FH: myocardial infarction  (Father )        MEDICATIONS  (STANDING):  buPROPion . 75 milliGRAM(s) Oral at bedtime  buPROPion XL . 150 milliGRAM(s) Oral every 24 hours  levothyroxine 88 MICROGram(s) Oral daily  losartan 25 milliGRAM(s) Oral daily  metoprolol succinate ER 25 milliGRAM(s) Oral daily  simvastatin 40 milliGRAM(s) Oral at bedtime  sodium chloride 0.9% lock flush 3 milliLiter(s) IV Push every 8 hours  tiotropium 18 MICROgram(s) Capsule 1 Capsule(s) Inhalation daily    MEDICATIONS  (PRN):  ALBUTerol    90 MICROgram(s) HFA Inhaler 2 Puff(s) Inhalation every 6 hours PRN Shortness of Breath and/or Wheezing  guaiFENesin   Syrup  (Sugar-Free) 200 milliGRAM(s) Oral every 6 hours PRN Cough      Allergies    adhesives (Rash; Other)  Cat dander- wheezing, itchy throat, SOB (Other)  penicillin (Rash)  sulfa drugs (Rash)  tetracycline (Stomach Upset)    Vital Signs Last 24 Hrs  T(C): 36.5 (08 Mar 2021 04:59), Max: 36.9 (07 Mar 2021 15:26)  T(F): 97.7 (08 Mar 2021 04:59), Max: 98.4 (07 Mar 2021 15:26)  HR: 77 (08 Mar 2021 04:59) (69 - 77)  BP: 143/84 (08 Mar 2021 04:59) (126/64 - 143/84)  RR: 17 (08 Mar 2021 04:59) (16 - 17)  SpO2: 92% (08 Mar 2021 04:59) (92% - 96%)    PHYSICAL EXAM  General: adult in NAD  HEENT: clear oropharynx, anicteric sclera, pink conjunctivae  Neck: supple  CV: normal S1S2 with no murmur rubs or gallops  Lungs: clear to auscultation, no wheezes, no rhales  Abdomen: soft non-tender non-distended, no hepato/splenomegaly  Ext: no clubbing cyanosis or edema  Skin: no rashes and no petichiae  Neuro: alert and oriented X3 no focal deficits      LABS:    CBC Full  -  ( 07 Mar 2021 09:13 )  WBC Count : 4.43 K/uL  RBC Count : 3.62 M/uL  Hemoglobin : 10.1 g/dL  Hematocrit : 33.0 %  Platelet Count - Automated : 171 K/uL  Mean Cell Volume : 91.2 fl  Mean Cell Hemoglobin : 27.9 pg  Mean Cell Hemoglobin Concentration : 30.6 gm/dL  Auto Neutrophil # : 3.09 K/uL  Auto Lymphocyte # : 0.60 K/uL  Auto Monocyte # : 0.53 K/uL  Auto Eosinophil # : 0.15 K/uL  Auto Basophil # : 0.02 K/uL  Auto Neutrophil % : 69.7 %  Auto Lymphocyte % : 13.5 %  Auto Monocyte % : 12.0 %  Auto Eosinophil % : 3.4 %  Auto Basophil % : 0.5 %        142  |  112<H>  |  19  ----------------------------<  104<H>  4.3   |  27  |  0.98    Ca    8.0<L>      07 Mar 2021 09:13    TPro  6.2  /  Alb  3.3  /  TBili  0.3  /  DBili  x   /  AST  25  /  ALT  16  /  AlkPhos  69  03-07    PT/INR - ( 07 Mar 2021 09:13 )   PT: 17.2 sec;   INR: 1.50 ratio    PTT - ( 07 Mar 2021 09:13 )  PTT:29.2 sec    LABS FROM OFFICE 20  Iron 23, TIBC 311, Ferritin 35, Iron sat 7%      RADIOLOGY :  CT angio 3/7/21 - no evidence of pulmonary embolism; bandline atelectasis
Date/Time Patient Seen:  		  Referring MD:   Data Reviewed	       Patient is a 81y old  Female who presents with a chief complaint of     Subjective/HPI  on eliquis  follows with dr rosario for pulm   pt has known COPD  lives alone  walks with walker  has children  does not drive     · Chief Complaint: The patient is a 81y Female complaining of spitting up blood  · HPI Objective Statement: Pt is an 82 yo f who is currently on eliquis for dvt pe has ongoing gib but needs ivc filter placment before discontinuing doac, MARTINA, Leukemia emphysema, cad graves dz. pmd dr Man, Cardiology dr Harmon, GI dr sharpe, HEMe dr Escalera former smoker  	she was at home this am and noticed she was spitting up blood. she reports that last week she had rust colored sputum.  no fever denies other covid sx no travel no ill contacts. no abd pain or changes in diet no nose bleed  · Presenting Symptoms: hemoptysis  · Negative Findings: no body aches, no chest pain, no chills, no diaphoresis, no edema  · Timing: sudden onset  · Duration: today  · Quality: anxiety producing  · Severity: PAIN SCALE 5 OF 10.  · Recent Exposure To: none known  · Aggravated Factors: none  · Relieving Factors: none    ros - ontiveros  cough  family hx  ashd  allergies documented      PAST MEDICAL & SURGICAL HISTORY:  Serum phosphorus decreased  Last level 1.5 at PST 1/13    Calculus of ureter  s/p R ureteral stent 12/20/2020    MARTINA on CPAP  Pt admits to be non-compliant    Leukemia  (APL, s/p chemo in 2012, now in remission, followed by oncologist)    Anemia  Completed iron infusions every 2 months, now Hb stable    Spinal stenosis    Mitral regurgitation    Aortic stenosis    Emphysema    Duodenal ulcer  at age 24    Rotator cuff tear  Right    Diverticulosis of colon    Coronary atherosclerosis of native coronary artery    Carpal tunnel syndrome    Dyslipidemia    Obstructive sleep apnea  cpap    Osteoporosis    Hypertension    Hernia, hiatal    Former cigarette smoker    Depression    Asthma with COPD with exacerbation  Not on home O2    Asthma    Acid reflux    Graves disease  s/p surgery    Elective surgery  Cystoscopy, right ureteroscopy, laser lithotripsy of right ureteral stone, right ureteral stent removal and replacement, right retrograde pyelogram on 12/6/19    H/O dilation and curettage    S/P ureteral stent placement  R in 12/2020    H/O mitral valve repair  2014    H/O aortic valve repair  2014    History of coronary angiogram  1/31/12    h/o  endometrial ablation  1998    H/O cone biopsy of cervix  1974    History of tonsillectomy  childhood    After cataract, bilateral  2010    Carpal tunnel right repair          Medication list         MEDICATIONS  (STANDING):  levoFLOXacin IVPB 500 milliGRAM(s) IV Intermittent every 24 hours  sodium chloride 0.9% lock flush 3 milliLiter(s) IV Push every 8 hours    MEDICATIONS  (PRN):         Vitals log        ICU Vital Signs Last 24 Hrs  T(C): 36.7 (07 Mar 2021 11:21), Max: 36.7 (07 Mar 2021 08:14)  T(F): 98 (07 Mar 2021 11:21), Max: 98 (07 Mar 2021 08:14)  HR: 69 (07 Mar 2021 11:21) (69 - 82)  BP: 141/73 (07 Mar 2021 11:21) (141/73 - 170/76)  BP(mean): --  ABP: --  ABP(mean): --  RR: 16 (07 Mar 2021 11:21) (14 - 16)  SpO2: 96% (07 Mar 2021 11:21) (94% - 96%)           Input and Output:  I&O's Detail      Lab Data                        10.1   4.43  )-----------( 171      ( 07 Mar 2021 09:13 )             33.0     03-07    142  |  112<H>  |  19  ----------------------------<  104<H>  4.3   |  27  |  0.98    Ca    8.0<L>      07 Mar 2021 09:13    TPro  6.2  /  Alb  3.3  /  TBili  0.3  /  DBili  x   /  AST  25  /  ALT  16  /  AlkPhos  69  03-07            Review of Systems	  cough  ontiveros  sob      Objective     Physical Examination    heart s1s2  lung dec BS  abd soft  alert  verbal    Pertinent Lab findings & Imaging      Suggs:  NO   Adequate UO     I&O's Detail           Discussed with:     Cultures:	        Radiology              PULMONARY VESSELS: No pulmonary embolus. Main pulmonary artery normal in diameter.    HEART AND VASCULATURE: Heart size is normal. Status post mitral and aortic valve replacement. No pericardial effusion. No aortic aneurysm or dissection.    LUNGS, AIRWAYS, PLEURA: Patent central airways. No endobronchial lesion, bronchial wall thickening or bronchiectasis. Bandlike atelectasis within the basilar lower lobes are unchanged. The lungs are relatively clear. Trace pleural effusions. No pneumothorax..    MEDIASTINUM: No mass or lymphadenopathy.    UPPER ABDOMEN: Large hiatal hernia containing the stomach. Cholelithiasis. Right liver cyst incompletely evaluated. Numerous 2-3 mm nonobstructing renal stones.    BONES AND SOFT TISSUES: No aggressive osseous lesion. Old fracture deformity of posterior left ribs. Mild compression fracture deformity of T12 vertebral body is unchanged.    LOWER NECK: Within normal limits.

## 2021-03-09 DIAGNOSIS — I82.411 ACUTE EMBOLISM AND THROMBOSIS OF RIGHT FEMORAL VEIN: ICD-10-CM

## 2021-03-09 LAB
ALBUMIN SERPL ELPH-MCNC: 3.1 G/DL — LOW (ref 3.3–5)
ALP SERPL-CCNC: 66 U/L — SIGNIFICANT CHANGE UP (ref 40–120)
ALT FLD-CCNC: 13 U/L — SIGNIFICANT CHANGE UP (ref 12–78)
ANION GAP SERPL CALC-SCNC: 4 MMOL/L — LOW (ref 5–17)
AST SERPL-CCNC: 12 U/L — LOW (ref 15–37)
BILIRUB SERPL-MCNC: 0.3 MG/DL — SIGNIFICANT CHANGE UP (ref 0.2–1.2)
BUN SERPL-MCNC: 22 MG/DL — SIGNIFICANT CHANGE UP (ref 7–23)
CALCIUM SERPL-MCNC: 8.2 MG/DL — LOW (ref 8.5–10.1)
CHLORIDE SERPL-SCNC: 110 MMOL/L — HIGH (ref 96–108)
CO2 SERPL-SCNC: 27 MMOL/L — SIGNIFICANT CHANGE UP (ref 22–31)
CREAT SERPL-MCNC: 0.85 MG/DL — SIGNIFICANT CHANGE UP (ref 0.5–1.3)
GLUCOSE SERPL-MCNC: 97 MG/DL — SIGNIFICANT CHANGE UP (ref 70–99)
HCT VFR BLD CALC: 33.5 % — LOW (ref 34.5–45)
HGB BLD-MCNC: 10.2 G/DL — LOW (ref 11.5–15.5)
MCHC RBC-ENTMCNC: 28 PG — SIGNIFICANT CHANGE UP (ref 27–34)
MCHC RBC-ENTMCNC: 30.4 GM/DL — LOW (ref 32–36)
MCV RBC AUTO: 92 FL — SIGNIFICANT CHANGE UP (ref 80–100)
NRBC # BLD: 0 /100 WBCS — SIGNIFICANT CHANGE UP (ref 0–0)
PLATELET # BLD AUTO: 168 K/UL — SIGNIFICANT CHANGE UP (ref 150–400)
POTASSIUM SERPL-MCNC: 4.3 MMOL/L — SIGNIFICANT CHANGE UP (ref 3.5–5.3)
POTASSIUM SERPL-SCNC: 4.3 MMOL/L — SIGNIFICANT CHANGE UP (ref 3.5–5.3)
PROT SERPL-MCNC: 6 G/DL — SIGNIFICANT CHANGE UP (ref 6–8.3)
RBC # BLD: 3.64 M/UL — LOW (ref 3.8–5.2)
RBC # FLD: 20.8 % — HIGH (ref 10.3–14.5)
SODIUM SERPL-SCNC: 141 MMOL/L — SIGNIFICANT CHANGE UP (ref 135–145)
WBC # BLD: 5.33 K/UL — SIGNIFICANT CHANGE UP (ref 3.8–10.5)
WBC # FLD AUTO: 5.33 K/UL — SIGNIFICANT CHANGE UP (ref 3.8–10.5)

## 2021-03-09 PROCEDURE — 76937 US GUIDE VASCULAR ACCESS: CPT | Mod: 26,59

## 2021-03-09 PROCEDURE — 37191 INS ENDOVAS VENA CAVA FILTR: CPT

## 2021-03-09 PROCEDURE — 99232 SBSQ HOSP IP/OBS MODERATE 35: CPT

## 2021-03-09 RX ADMIN — SODIUM CHLORIDE 3 MILLILITER(S): 9 INJECTION INTRAMUSCULAR; INTRAVENOUS; SUBCUTANEOUS at 06:17

## 2021-03-09 RX ADMIN — Medication 100 MILLIGRAM(S): at 17:33

## 2021-03-09 RX ADMIN — BUPROPION HYDROCHLORIDE 150 MILLIGRAM(S): 150 TABLET, EXTENDED RELEASE ORAL at 10:34

## 2021-03-09 RX ADMIN — SODIUM CHLORIDE 3 MILLILITER(S): 9 INJECTION INTRAMUSCULAR; INTRAVENOUS; SUBCUTANEOUS at 12:48

## 2021-03-09 RX ADMIN — BUPROPION HYDROCHLORIDE 75 MILLIGRAM(S): 150 TABLET, EXTENDED RELEASE ORAL at 23:06

## 2021-03-09 RX ADMIN — SIMVASTATIN 40 MILLIGRAM(S): 20 TABLET, FILM COATED ORAL at 23:06

## 2021-03-09 RX ADMIN — SODIUM CHLORIDE 3 MILLILITER(S): 9 INJECTION INTRAMUSCULAR; INTRAVENOUS; SUBCUTANEOUS at 23:04

## 2021-03-09 RX ADMIN — TIOTROPIUM BROMIDE 1 CAPSULE(S): 18 CAPSULE ORAL; RESPIRATORY (INHALATION) at 10:34

## 2021-03-09 RX ADMIN — Medication 88 MICROGRAM(S): at 06:19

## 2021-03-09 RX ADMIN — SIMVASTATIN 40 MILLIGRAM(S): 20 TABLET, FILM COATED ORAL at 06:17

## 2021-03-09 RX ADMIN — LOSARTAN POTASSIUM 25 MILLIGRAM(S): 100 TABLET, FILM COATED ORAL at 06:19

## 2021-03-09 RX ADMIN — Medication 25 MILLIGRAM(S): at 06:19

## 2021-03-09 NOTE — PROGRESS NOTE ADULT - ASSESSMENT
82 yo f who is currently on eliquis for dvt pe has ongoing gib but needs ivc filter placment before discontinuing doac, MARTINA, Leukemia emphysema, cad graves, former smoker. she was at home this am and noticed she was spitting up blood. she reports that last week she had rust colored sputum.  no fever denies other covid sx no travel no ill contacts. no abd pain or changes in diet no nose bleed     Problem/Plan - 1:  ·  Problem: Hemoptysis.  Plan: hold AC  pulmonary following  vascular fu appreciAted   IVC filter today     Problem/Plan - 2:  ·  Problem: GIB (gastrointestinal bleeding).  Plan: monitor cbc  gi called   hold AC.     Problem/Plan - 3:  ·  Problem: Hypertension.  Plan: DASH diet  cw home meds.

## 2021-03-09 NOTE — PROGRESS NOTE ADULT - ASSESSMENT
81 y/o F PMHx of leukemia (s/p chemo in 2012 now in remission with residual chronic anemia requiring iron infusions, MVR and AVR in 2012, COPD (not on home O2), MARTINA on CPAP (patient admits to being non-compliant with use), hx of R ureteral stone s/p stent in 9/2019 revised in 12/2019, Graves s/p thyroid surgery, HLD, HTN, depression, GERD.  She recently had a DVT and PE, and has been having issue with hemoptysis. She presents today for evaluation for ivc filter She has a hiatal hernia and esophageal ulcers with recurrent GI blood loss requiring IV iron.    PE/DVT  - no issues respiratory wise and tolerating RA  - repeat CTA with no PE, though with large hiatal hernia  - Has not been able to tolerate eliquis, and agree with proceeding with IVC filter. No cardiac contraindication to proceeding.  - Hematology following.  Follow recommendations    s/p tissue AVR/MVR  - No evidence of volume overload.   - asa held in setting of blood loss.  - TTE showed normal LVEF, no SWMA, mild LVH, aortic gradient appropriate for valve replacement, though mitral gradient elevated (as seen previously in office), normal RV size and function, with grade 2 DD    HTN  - /89  - Continue BB and ARB for now , repeat bp after am medication administration if remains elevated systolic > 160 post procedure will need to increase losartan   - Monitor and replete lytes, keep K>4, Mg>2.    - Other cardiovascular workup will depend on clinical course.  - All other workup per primary team.  - Will continue to follow.  Brooklyn Carmona FNP-C  Cardiology NP  SPECTRA [phone]

## 2021-03-09 NOTE — PROGRESS NOTE ADULT - ASSESSMENT
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

## 2021-03-09 NOTE — PROGRESS NOTE ADULT - ASSESSMENT
dvt/pe  intrathoracic stomach  hemoptysis    cont regular diet  suspect intrathoracic stomach with sliding causing gastric erosions  proton pump inhibitor bid  carafate 1g four times a day  agree with plan for ivc filter  only solution for hiatal hernia is surgical, therefore getting her of anticoagulation would be favorable   no gi objection for ivc filter   d/w patient

## 2021-03-09 NOTE — BRIEF OPERATIVE NOTE - NSICDXBRIEFPOSTOP_GEN_ALL_CORE_FT
POST-OP DIAGNOSIS:  Deep vein thrombosis 09-Mar-2021 18:04:47  Cathy Steele  Pulmonary emboli 09-Mar-2021 18:04:40  Cathy Steele

## 2021-03-09 NOTE — PHYSICAL THERAPY INITIAL EVALUATION ADULT - ADDITIONAL COMMENTS
Pt lives alone in a 1st floor studio apt, no steps. Pt ambulates independently with SC and is mostly independent with ADLs. Pt also has RW to use as needed

## 2021-03-09 NOTE — BRIEF OPERATIVE NOTE - COMMENTS
Bed rest x 4 hours,   R groin sand bag for 1 hour  Can ambulate after bed rest  Stitch removal in 48 hours

## 2021-03-09 NOTE — BRIEF OPERATIVE NOTE - NSICDXBRIEFPREOP_GEN_ALL_CORE_FT
PRE-OP DIAGNOSIS:  DVT of deep femoral vein, right 09-Mar-2021 18:04:12  Cathy Steele  Pulmonary emboli 09-Mar-2021 18:03:53  Cathy Steele  GI bleed 09-Mar-2021 18:03:41  Cathy Steele

## 2021-03-09 NOTE — BRIEF OPERATIVE NOTE - NSICDXBRIEFPROCEDURE_GEN_ALL_CORE_FT
PROCEDURES:  Vena cava filter insertion 09-Mar-2021 18:03:24  Cathy Steele  Ultrasound guided venous access 09-Mar-2021 18:03:13  Cathy Steele

## 2021-03-10 LAB
ALBUMIN SERPL ELPH-MCNC: 3.1 G/DL — LOW (ref 3.3–5)
ALP SERPL-CCNC: 67 U/L — SIGNIFICANT CHANGE UP (ref 40–120)
ALT FLD-CCNC: 13 U/L — SIGNIFICANT CHANGE UP (ref 12–78)
ANION GAP SERPL CALC-SCNC: 4 MMOL/L — LOW (ref 5–17)
AST SERPL-CCNC: 17 U/L — SIGNIFICANT CHANGE UP (ref 15–37)
BILIRUB SERPL-MCNC: 0.3 MG/DL — SIGNIFICANT CHANGE UP (ref 0.2–1.2)
BUN SERPL-MCNC: 26 MG/DL — HIGH (ref 7–23)
CALCIUM SERPL-MCNC: 7.9 MG/DL — LOW (ref 8.5–10.1)
CHLORIDE SERPL-SCNC: 111 MMOL/L — HIGH (ref 96–108)
CO2 SERPL-SCNC: 27 MMOL/L — SIGNIFICANT CHANGE UP (ref 22–31)
CREAT SERPL-MCNC: 1.1 MG/DL — SIGNIFICANT CHANGE UP (ref 0.5–1.3)
GLUCOSE SERPL-MCNC: 97 MG/DL — SIGNIFICANT CHANGE UP (ref 70–99)
HCT VFR BLD CALC: 34 % — LOW (ref 34.5–45)
HGB BLD-MCNC: 10.4 G/DL — LOW (ref 11.5–15.5)
MCHC RBC-ENTMCNC: 28.6 PG — SIGNIFICANT CHANGE UP (ref 27–34)
MCHC RBC-ENTMCNC: 30.6 GM/DL — LOW (ref 32–36)
MCV RBC AUTO: 93.4 FL — SIGNIFICANT CHANGE UP (ref 80–100)
NRBC # BLD: 0 /100 WBCS — SIGNIFICANT CHANGE UP (ref 0–0)
PLATELET # BLD AUTO: 160 K/UL — SIGNIFICANT CHANGE UP (ref 150–400)
POTASSIUM SERPL-MCNC: 4.6 MMOL/L — SIGNIFICANT CHANGE UP (ref 3.5–5.3)
POTASSIUM SERPL-SCNC: 4.6 MMOL/L — SIGNIFICANT CHANGE UP (ref 3.5–5.3)
PROT SERPL-MCNC: 5.9 G/DL — LOW (ref 6–8.3)
RBC # BLD: 3.64 M/UL — LOW (ref 3.8–5.2)
RBC # FLD: 21.1 % — HIGH (ref 10.3–14.5)
SODIUM SERPL-SCNC: 142 MMOL/L — SIGNIFICANT CHANGE UP (ref 135–145)
WBC # BLD: 5.26 K/UL — SIGNIFICANT CHANGE UP (ref 3.8–10.5)
WBC # FLD AUTO: 5.26 K/UL — SIGNIFICANT CHANGE UP (ref 3.8–10.5)

## 2021-03-10 PROCEDURE — 99232 SBSQ HOSP IP/OBS MODERATE 35: CPT

## 2021-03-10 RX ORDER — FAMOTIDINE 10 MG/ML
20 INJECTION INTRAVENOUS
Refills: 0 | Status: DISCONTINUED | OUTPATIENT
Start: 2021-03-10 | End: 2021-03-14

## 2021-03-10 RX ORDER — LANOLIN ALCOHOL/MO/W.PET/CERES
5 CREAM (GRAM) TOPICAL AT BEDTIME
Refills: 0 | Status: DISCONTINUED | OUTPATIENT
Start: 2021-03-10 | End: 2021-03-14

## 2021-03-10 RX ADMIN — TIOTROPIUM BROMIDE 1 CAPSULE(S): 18 CAPSULE ORAL; RESPIRATORY (INHALATION) at 11:42

## 2021-03-10 RX ADMIN — SODIUM CHLORIDE 3 MILLILITER(S): 9 INJECTION INTRAMUSCULAR; INTRAVENOUS; SUBCUTANEOUS at 13:53

## 2021-03-10 RX ADMIN — BUPROPION HYDROCHLORIDE 150 MILLIGRAM(S): 150 TABLET, EXTENDED RELEASE ORAL at 08:32

## 2021-03-10 RX ADMIN — SIMVASTATIN 40 MILLIGRAM(S): 20 TABLET, FILM COATED ORAL at 21:08

## 2021-03-10 RX ADMIN — LOSARTAN POTASSIUM 25 MILLIGRAM(S): 100 TABLET, FILM COATED ORAL at 05:44

## 2021-03-10 RX ADMIN — BUPROPION HYDROCHLORIDE 75 MILLIGRAM(S): 150 TABLET, EXTENDED RELEASE ORAL at 21:08

## 2021-03-10 RX ADMIN — SODIUM CHLORIDE 3 MILLILITER(S): 9 INJECTION INTRAMUSCULAR; INTRAVENOUS; SUBCUTANEOUS at 21:00

## 2021-03-10 RX ADMIN — Medication 5 MILLIGRAM(S): at 22:16

## 2021-03-10 RX ADMIN — FAMOTIDINE 20 MILLIGRAM(S): 10 INJECTION INTRAVENOUS at 21:08

## 2021-03-10 RX ADMIN — Medication 88 MICROGRAM(S): at 05:44

## 2021-03-10 RX ADMIN — Medication 25 MILLIGRAM(S): at 05:44

## 2021-03-10 RX ADMIN — SODIUM CHLORIDE 3 MILLILITER(S): 9 INJECTION INTRAMUSCULAR; INTRAVENOUS; SUBCUTANEOUS at 05:41

## 2021-03-10 NOTE — PROGRESS NOTE ADULT - ASSESSMENT
dvt/pe  intrathoracic stomach  hemoptysis    cont regular diet  suspect intrathoracic stomach with sliding causing gastric erosions  proton pump inhibitor bid  carafate 1g four times a day  s/p  ivc filter  only solution for hiatal hernia is surgical, therefore getting her of anticoagulation would be favorable   egd/colonoscopy discussed given history of anemia  she will think about it and follow up as outpatient in our office  d/w patient  no gi objection to dc

## 2021-03-10 NOTE — PROGRESS NOTE ADULT - ASSESSMENT
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       RECOMMEND:   CBC stable  continue hold anti-coagulation in view of bleeds.   Planned EGD and colonoscopy. Friday    discussed w pt  OOB as tolerated

## 2021-03-10 NOTE — PROGRESS NOTE ADULT - ASSESSMENT
79 y/o F PMHx of leukemia (s/p chemo in 2012 now in remission with residual chronic anemia requiring iron infusions, MVR and AVR in 2012, COPD (not on home O2), MARTINA on CPAP (patient admits to being non-compliant with use), hx of R ureteral stone s/p stent in 9/2019 revised in 12/2019, Graves s/p thyroid surgery, HLD, HTN, depression, GERD.  She recently had a DVT and PE, and has been having issue with hemoptysis. She presents today for evaluation for ivc filter She has a hiatal hernia and esophageal ulcers with recurrent GI blood loss requiring IV iron. 3/10 S/P IVC filter placement    PE/DVT  - no issues respiratory wise and tolerating RA  - repeat CTA with no PE, though with large hiatal hernia  - Has not been able to tolerate eliquis, now S/P Right  IVC filter. Tolerated procedure well  - Hematology and GI following.  Follow recommendations      s/p tissue AVR/MVR  - No evidence of volume overload.   - asa held in setting of blood loss.  - TTE showed normal LVEF, no SWMA, mild LVH, aortic gradient appropriate for valve replacement, though mitral gradient elevated (as seen previously in office), normal RV size and function, with grade 2 DD    HTN  - BP stable: 113/71 (03-10-21 @ 05:00) (103/68 - 156/84)  - Continue BB and ARB for now , repeat bp after AM medications    - Monitor and replete lytes, keep K>4, Mg>2.  - All other medical needs as per primary team.  - Other cardiovascular workup will depend on clinical course.  - Will continue to follow.    Dina Ray, MS ANP, [facility]  Nurse Practitioner- Cardiology   Spectra #[phone] 81 y/o F PMHx of leukemia (s/p chemo in 2012 now in remission with residual chronic anemia requiring iron infusions, MVR and AVR in 2012, COPD (not on home O2), MARTINA on CPAP (patient admits to being non-compliant with use), hx of R ureteral stone s/p stent in 9/2019 revised in 12/2019, Graves s/p thyroid surgery, HLD, HTN, depression, GERD.  She recently had a DVT and PE, and has been having issue with hemoptysis. She presents today for evaluation for ivc filter She has a hiatal hernia and esophageal ulcers with recurrent GI blood loss requiring IV iron. 3/10 S/P IVC filter placement    PE/DVT  - no issues respiratory wise and tolerating RA  - repeat CTA with no PE, though with large hiatal hernia  - Has not been able to tolerate eliquis, now S/P Right  IVC filter. Tolerated procedure well  - Hematology and GI following.  Follow recommendations    s/p tissue AVR/MVR  - No evidence of volume overload.   - asa held in setting of blood loss.  - TTE showed normal LVEF, no SWMA, mild LVH, aortic gradient appropriate for valve replacement, though mitral gradient elevated (as seen previously in office), normal RV size and function, with grade 2 DD    HTN  - BP stable: 113/71 (03-10-21 @ 05:00) (103/68 - 156/84)  - Continue BB and ARB for now , repeat bp after AM medications    - Monitor and replete lytes, keep K>4, Mg>2.  - All other medical needs as per primary team.  - Other cardiovascular workup will depend on clinical course.  - Will continue to follow.    Dina Ray, MS ANP, [facility]  Nurse Practitioner- Cardiology   Spectra #[phone]

## 2021-03-10 NOTE — PROGRESS NOTE ADULT - ASSESSMENT
80 yo f who is currently on eliquis for dvt pe has ongoing gib but needs ivc filter placment before discontinuing doac, MARTINA, Leukemia emphysema, cad graves, former smoker. she was at home this am and noticed she was spitting up blood. she reports that last week she had rust colored sputum.  no fever denies other covid sx no travel no ill contacts. no abd pain or changes in diet no nose bleed     Problem/Plan - 1:  ·  Problem: Hemoptysis.  Plan: hold AC  pulmonary following  vascular fu appreciAted   sp IVC filter     Problem/Plan - 2:  ·  Problem: GIB (gastrointestinal bleeding).  Plan: monitor cbc  gi called   hold AC.   ? EGD, COLONOSCOPY     Problem/Plan - 3:  ·  Problem: Hypertension.  Plan: DASH diet  cw home meds.     likely dc planning in am

## 2021-03-11 ENCOUNTER — TRANSCRIPTION ENCOUNTER (OUTPATIENT)
Age: 81
End: 2021-03-11

## 2021-03-11 LAB
ALBUMIN SERPL ELPH-MCNC: 3.1 G/DL — LOW (ref 3.3–5)
ALP SERPL-CCNC: 63 U/L — SIGNIFICANT CHANGE UP (ref 40–120)
ALT FLD-CCNC: 13 U/L — SIGNIFICANT CHANGE UP (ref 12–78)
ANION GAP SERPL CALC-SCNC: 3 MMOL/L — LOW (ref 5–17)
AST SERPL-CCNC: 12 U/L — LOW (ref 15–37)
BILIRUB SERPL-MCNC: 0.3 MG/DL — SIGNIFICANT CHANGE UP (ref 0.2–1.2)
BUN SERPL-MCNC: 28 MG/DL — HIGH (ref 7–23)
CALCIUM SERPL-MCNC: 8.2 MG/DL — LOW (ref 8.5–10.1)
CHLORIDE SERPL-SCNC: 112 MMOL/L — HIGH (ref 96–108)
CO2 SERPL-SCNC: 28 MMOL/L — SIGNIFICANT CHANGE UP (ref 22–31)
CREAT SERPL-MCNC: 0.88 MG/DL — SIGNIFICANT CHANGE UP (ref 0.5–1.3)
GLUCOSE SERPL-MCNC: 93 MG/DL — SIGNIFICANT CHANGE UP (ref 70–99)
HCT VFR BLD CALC: 34.2 % — LOW (ref 34.5–45)
HGB BLD-MCNC: 10.2 G/DL — LOW (ref 11.5–15.5)
MCHC RBC-ENTMCNC: 27.6 PG — SIGNIFICANT CHANGE UP (ref 27–34)
MCHC RBC-ENTMCNC: 29.8 GM/DL — LOW (ref 32–36)
MCV RBC AUTO: 92.4 FL — SIGNIFICANT CHANGE UP (ref 80–100)
NRBC # BLD: 0 /100 WBCS — SIGNIFICANT CHANGE UP (ref 0–0)
PLATELET # BLD AUTO: 159 K/UL — SIGNIFICANT CHANGE UP (ref 150–400)
POTASSIUM SERPL-MCNC: 4.6 MMOL/L — SIGNIFICANT CHANGE UP (ref 3.5–5.3)
POTASSIUM SERPL-SCNC: 4.6 MMOL/L — SIGNIFICANT CHANGE UP (ref 3.5–5.3)
PROT SERPL-MCNC: 5.9 G/DL — LOW (ref 6–8.3)
RBC # BLD: 3.7 M/UL — LOW (ref 3.8–5.2)
RBC # FLD: 21.1 % — HIGH (ref 10.3–14.5)
SODIUM SERPL-SCNC: 143 MMOL/L — SIGNIFICANT CHANGE UP (ref 135–145)
WBC # BLD: 4.16 K/UL — SIGNIFICANT CHANGE UP (ref 3.8–10.5)
WBC # FLD AUTO: 4.16 K/UL — SIGNIFICANT CHANGE UP (ref 3.8–10.5)

## 2021-03-11 PROCEDURE — 99232 SBSQ HOSP IP/OBS MODERATE 35: CPT

## 2021-03-11 RX ORDER — IRON SUCROSE 20 MG/ML
100 INJECTION, SOLUTION INTRAVENOUS EVERY 24 HOURS
Refills: 0 | Status: COMPLETED | OUTPATIENT
Start: 2021-03-11 | End: 2021-03-13

## 2021-03-11 RX ORDER — SOD SULF/SODIUM/NAHCO3/KCL/PEG
1 SOLUTION, RECONSTITUTED, ORAL ORAL EVERY 4 HOURS
Refills: 0 | Status: COMPLETED | OUTPATIENT
Start: 2021-03-11 | End: 2021-03-11

## 2021-03-11 RX ADMIN — FAMOTIDINE 20 MILLIGRAM(S): 10 INJECTION INTRAVENOUS at 16:54

## 2021-03-11 RX ADMIN — FAMOTIDINE 20 MILLIGRAM(S): 10 INJECTION INTRAVENOUS at 06:47

## 2021-03-11 RX ADMIN — SODIUM CHLORIDE 3 MILLILITER(S): 9 INJECTION INTRAMUSCULAR; INTRAVENOUS; SUBCUTANEOUS at 13:34

## 2021-03-11 RX ADMIN — Medication 10 MILLIGRAM(S): at 21:48

## 2021-03-11 RX ADMIN — TIOTROPIUM BROMIDE 1 CAPSULE(S): 18 CAPSULE ORAL; RESPIRATORY (INHALATION) at 06:48

## 2021-03-11 RX ADMIN — Medication 88 MICROGRAM(S): at 06:48

## 2021-03-11 RX ADMIN — SODIUM CHLORIDE 3 MILLILITER(S): 9 INJECTION INTRAMUSCULAR; INTRAVENOUS; SUBCUTANEOUS at 21:49

## 2021-03-11 RX ADMIN — Medication 1 LITER(S): at 17:05

## 2021-03-11 RX ADMIN — BUPROPION HYDROCHLORIDE 150 MILLIGRAM(S): 150 TABLET, EXTENDED RELEASE ORAL at 07:46

## 2021-03-11 RX ADMIN — LOSARTAN POTASSIUM 25 MILLIGRAM(S): 100 TABLET, FILM COATED ORAL at 06:47

## 2021-03-11 RX ADMIN — IRON SUCROSE 100 MILLIGRAM(S): 20 INJECTION, SOLUTION INTRAVENOUS at 16:54

## 2021-03-11 RX ADMIN — Medication 10 MILLIGRAM(S): at 16:54

## 2021-03-11 RX ADMIN — SODIUM CHLORIDE 3 MILLILITER(S): 9 INJECTION INTRAMUSCULAR; INTRAVENOUS; SUBCUTANEOUS at 06:44

## 2021-03-11 RX ADMIN — Medication 25 MILLIGRAM(S): at 06:48

## 2021-03-11 RX ADMIN — BUPROPION HYDROCHLORIDE 75 MILLIGRAM(S): 150 TABLET, EXTENDED RELEASE ORAL at 21:48

## 2021-03-11 RX ADMIN — SIMVASTATIN 40 MILLIGRAM(S): 20 TABLET, FILM COATED ORAL at 21:48

## 2021-03-11 RX ADMIN — Medication 1 LITER(S): at 21:49

## 2021-03-11 NOTE — PROGRESS NOTE ADULT - ASSESSMENT
dvt/pe, s/p ivc filter  intrathoracic stomach  hemoptysis    suspect intrathoracic stomach with sliding causing gastric erosions  no s/s active gib, gerd/asp precautions  rec proton pump inhibitor bid  rec carafate 1g four times a day  will plan for egd/colon tomorrow, pt agreeable  clears/prep today as ordered, npo p mn, pls medically optimize

## 2021-03-11 NOTE — PROGRESS NOTE ADULT - ASSESSMENT
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       RECOMMEND:   CBC stable  continue hold anti-coagulation in view of bleeds.   Planned EGD and colonoscopy. Friday    discussed w fabrizio

## 2021-03-11 NOTE — PROGRESS NOTE ADULT - ASSESSMENT
79 y/o F PMHx of leukemia (s/p chemo in 2012 now in remission with residual chronic anemia requiring iron infusions, MVR and AVR in 2012, COPD (not on home O2), MARTINA on CPAP (patient admits to being non-compliant with use), hx of R ureteral stone s/p stent in 9/2019 revised in 12/2019, Graves s/p thyroid surgery, HLD, HTN, depression, GERD.  She recently had a DVT and PE, and has been having issue with hemoptysis. She presents today for evaluation for ivc filter She has a hiatal hernia and esophageal ulcers with recurrent GI blood loss requiring IV iron. 3/10 S/P IVC filter placement    PE/DVT  - no issues respiratory wise and tolerating RA  - repeat CTA with no PE, though with large hiatal hernia  -now S/P Right  IVC filter. Tolerated procedure well  - Hematology and GI following.  Follow recommendations    s/p tissue AVR/MVR  - No evidence of volume overload.   - asa held in setting of anemia   - TTE showed normal LVEF, mild LVH, aortic gradient appropriate for valve replacement, though mitral gradient elevated (as seen previously in office), normal RV size and function, with grade 2 DD    HTN  - BP stable: 111/77  - Continue BB and ARB for now     GI, anemia, hiatal hernia   -tentative plan for egd/ colonoscopy tomorrow  -no cardiac objection to proceeding with low risk procedure  -fu gi recs     - Monitor and replete lytes, keep K>4, Mg>2.  - All other medical needs as per primary team.  - Other cardiovascular workup will depend on clinical course.  Brooklyn Carmona FNP-C  Cardiology NP  S[facility] [phone]

## 2021-03-11 NOTE — PROGRESS NOTE ADULT - ASSESSMENT
82 yo f who is currently on eliquis for dvt pe has ongoing gib but needs ivc filter placment before discontinuing doac, MARTINA, Leukemia emphysema, cad graves, former smoker. she was at home this am and noticed she was spitting up blood. she reports that last week she had rust colored sputum.  no fever denies other covid sx no travel no ill contacts. no abd pain or changes in diet no nose bleed     Problem/Plan - 1:  ·  Problem: Hemoptysis.  Plan: hold AC  pulmonary following  vascular fu appreciAted   sp IVC filter     Problem/Plan - 2:  ·  Problem: GIB (gastrointestinal bleeding).  Plan: monitor cbc  gi called   hold AC.    EGD, COLONOSCOPY in am     Problem/Plan - 3:  ·  Problem: Hypertension.  Plan: DASH diet  cw home meds.     likely dc planning in am

## 2021-03-12 ENCOUNTER — RESULT REVIEW (OUTPATIENT)
Age: 81
End: 2021-03-12

## 2021-03-12 LAB
ALBUMIN SERPL ELPH-MCNC: 3.3 G/DL — SIGNIFICANT CHANGE UP (ref 3.3–5)
ALP SERPL-CCNC: 69 U/L — SIGNIFICANT CHANGE UP (ref 40–120)
ALT FLD-CCNC: 19 U/L — SIGNIFICANT CHANGE UP (ref 12–78)
ANION GAP SERPL CALC-SCNC: 8 MMOL/L — SIGNIFICANT CHANGE UP (ref 5–17)
AST SERPL-CCNC: 21 U/L — SIGNIFICANT CHANGE UP (ref 15–37)
BILIRUB SERPL-MCNC: 0.3 MG/DL — SIGNIFICANT CHANGE UP (ref 0.2–1.2)
BUN SERPL-MCNC: 21 MG/DL — SIGNIFICANT CHANGE UP (ref 7–23)
CALCIUM SERPL-MCNC: 8.2 MG/DL — LOW (ref 8.5–10.1)
CHLORIDE SERPL-SCNC: 112 MMOL/L — HIGH (ref 96–108)
CO2 SERPL-SCNC: 25 MMOL/L — SIGNIFICANT CHANGE UP (ref 22–31)
CREAT SERPL-MCNC: 0.89 MG/DL — SIGNIFICANT CHANGE UP (ref 0.5–1.3)
CULTURE RESULTS: SIGNIFICANT CHANGE UP
CULTURE RESULTS: SIGNIFICANT CHANGE UP
GLUCOSE SERPL-MCNC: 86 MG/DL — SIGNIFICANT CHANGE UP (ref 70–99)
HCT VFR BLD CALC: 34.7 % — SIGNIFICANT CHANGE UP (ref 34.5–45)
HGB BLD-MCNC: 10.5 G/DL — LOW (ref 11.5–15.5)
MCHC RBC-ENTMCNC: 28.1 PG — SIGNIFICANT CHANGE UP (ref 27–34)
MCHC RBC-ENTMCNC: 30.3 GM/DL — LOW (ref 32–36)
MCV RBC AUTO: 92.8 FL — SIGNIFICANT CHANGE UP (ref 80–100)
NRBC # BLD: 0 /100 WBCS — SIGNIFICANT CHANGE UP (ref 0–0)
PLATELET # BLD AUTO: 161 K/UL — SIGNIFICANT CHANGE UP (ref 150–400)
POTASSIUM SERPL-MCNC: 4.1 MMOL/L — SIGNIFICANT CHANGE UP (ref 3.5–5.3)
POTASSIUM SERPL-SCNC: 4.1 MMOL/L — SIGNIFICANT CHANGE UP (ref 3.5–5.3)
PROT SERPL-MCNC: 6.4 G/DL — SIGNIFICANT CHANGE UP (ref 6–8.3)
RBC # BLD: 3.74 M/UL — LOW (ref 3.8–5.2)
RBC # FLD: 20.5 % — HIGH (ref 10.3–14.5)
SODIUM SERPL-SCNC: 145 MMOL/L — SIGNIFICANT CHANGE UP (ref 135–145)
SPECIMEN SOURCE: SIGNIFICANT CHANGE UP
SPECIMEN SOURCE: SIGNIFICANT CHANGE UP
WBC # BLD: 4.69 K/UL — SIGNIFICANT CHANGE UP (ref 3.8–10.5)
WBC # FLD AUTO: 4.69 K/UL — SIGNIFICANT CHANGE UP (ref 3.8–10.5)

## 2021-03-12 PROCEDURE — 99232 SBSQ HOSP IP/OBS MODERATE 35: CPT

## 2021-03-12 PROCEDURE — 88312 SPECIAL STAINS GROUP 1: CPT | Mod: 26

## 2021-03-12 PROCEDURE — 88305 TISSUE EXAM BY PATHOLOGIST: CPT | Mod: 26

## 2021-03-12 RX ORDER — ACETAMINOPHEN 500 MG
650 TABLET ORAL EVERY 6 HOURS
Refills: 0 | Status: DISCONTINUED | OUTPATIENT
Start: 2021-03-12 | End: 2021-03-14

## 2021-03-12 RX ADMIN — LOSARTAN POTASSIUM 25 MILLIGRAM(S): 100 TABLET, FILM COATED ORAL at 05:23

## 2021-03-12 RX ADMIN — FAMOTIDINE 20 MILLIGRAM(S): 10 INJECTION INTRAVENOUS at 05:22

## 2021-03-12 RX ADMIN — Medication 650 MILLIGRAM(S): at 09:17

## 2021-03-12 RX ADMIN — SODIUM CHLORIDE 3 MILLILITER(S): 9 INJECTION INTRAMUSCULAR; INTRAVENOUS; SUBCUTANEOUS at 21:30

## 2021-03-12 RX ADMIN — TIOTROPIUM BROMIDE 1 CAPSULE(S): 18 CAPSULE ORAL; RESPIRATORY (INHALATION) at 08:51

## 2021-03-12 RX ADMIN — SIMVASTATIN 40 MILLIGRAM(S): 20 TABLET, FILM COATED ORAL at 21:29

## 2021-03-12 RX ADMIN — Medication 650 MILLIGRAM(S): at 18:38

## 2021-03-12 RX ADMIN — Medication 650 MILLIGRAM(S): at 21:28

## 2021-03-12 RX ADMIN — BUPROPION HYDROCHLORIDE 150 MILLIGRAM(S): 150 TABLET, EXTENDED RELEASE ORAL at 12:51

## 2021-03-12 RX ADMIN — BUPROPION HYDROCHLORIDE 75 MILLIGRAM(S): 150 TABLET, EXTENDED RELEASE ORAL at 21:30

## 2021-03-12 RX ADMIN — SODIUM CHLORIDE 3 MILLILITER(S): 9 INJECTION INTRAMUSCULAR; INTRAVENOUS; SUBCUTANEOUS at 13:50

## 2021-03-12 RX ADMIN — Medication 88 MICROGRAM(S): at 05:23

## 2021-03-12 RX ADMIN — Medication 25 MILLIGRAM(S): at 05:23

## 2021-03-12 RX ADMIN — FAMOTIDINE 20 MILLIGRAM(S): 10 INJECTION INTRAVENOUS at 18:36

## 2021-03-12 RX ADMIN — TIOTROPIUM BROMIDE 1 CAPSULE(S): 18 CAPSULE ORAL; RESPIRATORY (INHALATION) at 05:23

## 2021-03-12 RX ADMIN — IRON SUCROSE 100 MILLIGRAM(S): 20 INJECTION, SOLUTION INTRAVENOUS at 16:11

## 2021-03-12 RX ADMIN — Medication 650 MILLIGRAM(S): at 10:00

## 2021-03-12 NOTE — PROGRESS NOTE ADULT - ASSESSMENT
Problem/Plan - 1:  ·  Problem: Hemoptysis.  Plan: hold AC  pulmonary following  vascular fu appreciAted   sp IVC filter     Problem/Plan - 2:  ·  Problem: GIB (gastrointestinal bleeding).  Plan: monitor cbc  gi called   hold AC.    EGD, COLONOSCOPY today     Problem/Plan - 3:  ·  Problem: Hypertension.  Plan: DASH diet  cw home meds.

## 2021-03-12 NOTE — PROGRESS NOTE ADULT - ASSESSMENT
79 y/o F PMH of leukemia (s/p chemo in 2012 now in remission with residual chronic anemia requiring iron infusions, MVR and AVR in 2012, COPD (not on home O2), MARTINA on CPAP (patient admits to being non-compliant with use), hx of R ureteral stone s/p stent in 9/2019 revised in 12/2019, Graves s/p thyroid surgery, HLD, HTN, depression, GERD.  She recently had a DVT and PE, and has been having issue with hemoptysis. She presents today for evaluation for ivc filter She has a hiatal hernia and esophageal ulcers with recurrent GI blood loss requiring IV iron. 3/10 S/P IVC filter placement    PE/ DVT  - Patient has no c/o SOB, tolerating RA  - Repeat CTA with no PE, though with large hiatal hernia  - Pt now S/P Right  IVC filter. Tolerated procedure well  - Hematology and GI following.  Follow recommendations    s/p tissue AVR/MVR/ Diastolic dysfunction   - TTE showed normal LVEF, mild LVH, aortic gradient appropriate for valve replacement, though mitral gradient elevated (as seen previously in office), normal RV size and function, with grade 2 DD  - No evidence of volume overload.   - Asa held in setting of anemia, resume per GI     HTN  - BP: 159/85 (03-12-21 @ 05:08) (102/68 - 159/85)  - Continue BB and ARB for now     GI, anemia, hiatal hernia   - Tentative plan for EGD/ Colonoscopy tomorrow  - Follow GI recommendations.   - Pt has no active ischemia, decompensated heart failure, unstable arrythmia, or severe stenotic valvular disease. Patient is optimized from cardiovascular standpoint to proceed with planned procedure with routine hemodynamic monitoring.     - Monitor and replete lytes, keep K>4, Mg>2.  - All other medical needs as per primary team.  - Other cardiovascular workup will depend on clinical course.  - Will continue to follow.    Almaz Patterson, MS FNP, [facility]  Nurse Practitioner- Cardiology   Spectra #[phone]

## 2021-03-12 NOTE — PROGRESS NOTE ADULT - ASSESSMENT
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   s/p EGD and colonocscopy  large hiatal hernia with camerons ulceration  otherwise normal, biopsied r/o h pylori  colonic diverticulosis    RECOMMEND:   CBC stable  continue hold anti-coagulation in view of bleeds.   Supportive measures.     regular diet  DC planning  PPI daily.     Follow in office post DC.   DC planning

## 2021-03-13 PROCEDURE — 99232 SBSQ HOSP IP/OBS MODERATE 35: CPT

## 2021-03-13 RX ORDER — ENOXAPARIN SODIUM 100 MG/ML
40 INJECTION SUBCUTANEOUS DAILY
Refills: 0 | Status: DISCONTINUED | OUTPATIENT
Start: 2021-03-13 | End: 2021-03-14

## 2021-03-13 RX ADMIN — SODIUM CHLORIDE 3 MILLILITER(S): 9 INJECTION INTRAMUSCULAR; INTRAVENOUS; SUBCUTANEOUS at 16:06

## 2021-03-13 RX ADMIN — ENOXAPARIN SODIUM 40 MILLIGRAM(S): 100 INJECTION SUBCUTANEOUS at 17:03

## 2021-03-13 RX ADMIN — TIOTROPIUM BROMIDE 1 CAPSULE(S): 18 CAPSULE ORAL; RESPIRATORY (INHALATION) at 05:21

## 2021-03-13 RX ADMIN — FAMOTIDINE 20 MILLIGRAM(S): 10 INJECTION INTRAVENOUS at 17:03

## 2021-03-13 RX ADMIN — SIMVASTATIN 40 MILLIGRAM(S): 20 TABLET, FILM COATED ORAL at 21:24

## 2021-03-13 RX ADMIN — LOSARTAN POTASSIUM 25 MILLIGRAM(S): 100 TABLET, FILM COATED ORAL at 05:18

## 2021-03-13 RX ADMIN — Medication 25 MILLIGRAM(S): at 05:18

## 2021-03-13 RX ADMIN — BUPROPION HYDROCHLORIDE 150 MILLIGRAM(S): 150 TABLET, EXTENDED RELEASE ORAL at 07:49

## 2021-03-13 RX ADMIN — BUPROPION HYDROCHLORIDE 75 MILLIGRAM(S): 150 TABLET, EXTENDED RELEASE ORAL at 21:24

## 2021-03-13 RX ADMIN — Medication 650 MILLIGRAM(S): at 11:57

## 2021-03-13 RX ADMIN — SODIUM CHLORIDE 3 MILLILITER(S): 9 INJECTION INTRAMUSCULAR; INTRAVENOUS; SUBCUTANEOUS at 21:25

## 2021-03-13 RX ADMIN — Medication 88 MICROGRAM(S): at 05:18

## 2021-03-13 RX ADMIN — IRON SUCROSE 100 MILLIGRAM(S): 20 INJECTION, SOLUTION INTRAVENOUS at 17:03

## 2021-03-13 RX ADMIN — SODIUM CHLORIDE 3 MILLILITER(S): 9 INJECTION INTRAMUSCULAR; INTRAVENOUS; SUBCUTANEOUS at 05:18

## 2021-03-13 RX ADMIN — Medication 650 MILLIGRAM(S): at 09:43

## 2021-03-13 RX ADMIN — FAMOTIDINE 20 MILLIGRAM(S): 10 INJECTION INTRAVENOUS at 05:18

## 2021-03-13 NOTE — PROGRESS NOTE ADULT - ASSESSMENT
dvt/pe, s/p ivc filter  intrathoracic stomach  hemoptysis    suspect intrathoracic stomach with sliding causing gastric erosions  no s/s active gib, gerd/asp precautions  rec proton pump inhibitor daily   rec carafate 1g four times a day  s/p egd/colon- Hiatal hernia/ camerons ulceration, bx taken  regular diet   d/c planning

## 2021-03-13 NOTE — PROGRESS NOTE ADULT - ASSESSMENT
Problem/Plan - 1:  ·  Problem: Hemoptysis.  Plan: hold AC  pulmonary following  vascular fu appreciAted   sp IVC filter     Problem/Plan - 2:  ·  Problem: GIB (gastrointestinal bleeding).  Plan: monitor cbc  gi called   hold AC.    sp EGD, COLONOSCOPY     Problem/Plan - 3:  ·  Problem: Hypertension.  Plan: DASH diet  cw home meds.       dc planning in am if leg pain resolves

## 2021-03-13 NOTE — PROGRESS NOTE ADULT - ASSESSMENT
81 y/o F PMH of leukemia (s/p chemo in 2012 now in remission with residual chronic anemia requiring iron infusions, MVR and AVR in 2012, COPD (not on home O2), MARTINA on CPAP (patient admits to being non-compliant with use), hx of R ureteral stone s/p stent in 9/2019 revised in 12/2019, Graves s/p thyroid surgery, HLD, HTN, depression, GERD.  She recently had a DVT and PE, and has been having issue with hemoptysis. She presents today for evaluation for ivc filter She has a hiatal hernia and esophageal ulcers with recurrent GI blood loss requiring IV iron. 3/10 S/P IVC filter placement    PE/ DVT  - Patient has no c/o SOB, tolerating RA  - Repeat CTA with no PE, though with large hiatal hernia  - Pt now S/P Right  IVC filter. Tolerated procedure well  - Hematology and GI following.  Follow recommendations    S/p tissue AVR/MVR/ Diastolic dysfunction   - TTE showed normal LVEF, mild LVH, aortic gradient appropriate for valve replacement, though mitral gradient elevated (as seen previously in office), normal RV size and function, with grade 2 DD  - No evidence of volume overload.   - Asa held in setting of anemia, resume per GI     HTN  - BP: 126/76 (03-13-21 @ 05:12) (107/61 - 157/91)  - Continue BB and ARB for now     GI, anemia, hiatal hernia   - S/p EGD/ Colonoscopy: Large hiatal hernia with camerons ulceration, otherwise normal, biopsied r/o h pylori, colonic diverticulosis  - Follow GI recommendations.     - Monitor and replete lytes, keep K>4, Mg>2.  - All other medical needs as per primary team.  - Other cardiovascular workup will depend on clinical course.  - Will continue to follow.    Almaz Patterson, MS FNP, [facility]  Nurse Practitioner- Cardiology   Spectra #[phone]

## 2021-03-13 NOTE — PROGRESS NOTE ADULT - ASSESSMENT
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   s/p EGD and colonocscopy  large hiatal hernia with camerons ulceration  otherwise normal, biopsied r/o h pylori  colonic diverticulosis    RECOMMEND:   CBC stable  continue hold anti-coagulation in view of bleeds.   Supportive measures.     regular diet  DC planning  PPI daily.   discussed with cardiology and GI. to be discharged without anticoagulation and will have outpatient GI evaluation and repeat EGD to further decide on need and risk of anticoagulation.  has a IVC filter placed which should decrease her risk of recurrent PE  Follow in office post DC.   DC planning

## 2021-03-13 NOTE — ANESTHESIA FOLLOW-UP NOTE - NSEVALATIONFT_GEN_ALL_CORE
Patient not evaluated in person due to hospital COVID precautions. Patient's chart reviewed, including vital signs and labs/imaging. Patient maintaining SPO2 saturations on room air. No apparent anesthesia-related complications.

## 2021-03-14 ENCOUNTER — TRANSCRIPTION ENCOUNTER (OUTPATIENT)
Age: 81
End: 2021-03-14

## 2021-03-14 VITALS
OXYGEN SATURATION: 92 % | DIASTOLIC BLOOD PRESSURE: 72 MMHG | TEMPERATURE: 98 F | SYSTOLIC BLOOD PRESSURE: 124 MMHG | RESPIRATION RATE: 17 BRPM | HEART RATE: 73 BPM

## 2021-03-14 PROCEDURE — 94640 AIRWAY INHALATION TREATMENT: CPT

## 2021-03-14 PROCEDURE — 87040 BLOOD CULTURE FOR BACTERIA: CPT

## 2021-03-14 PROCEDURE — 85730 THROMBOPLASTIN TIME PARTIAL: CPT

## 2021-03-14 PROCEDURE — 85025 COMPLETE CBC W/AUTO DIFF WBC: CPT

## 2021-03-14 PROCEDURE — 83540 ASSAY OF IRON: CPT

## 2021-03-14 PROCEDURE — 82728 ASSAY OF FERRITIN: CPT

## 2021-03-14 PROCEDURE — C1894: CPT

## 2021-03-14 PROCEDURE — 97161 PT EVAL LOW COMPLEX 20 MIN: CPT

## 2021-03-14 PROCEDURE — 88312 SPECIAL STAINS GROUP 1: CPT

## 2021-03-14 PROCEDURE — 84145 PROCALCITONIN (PCT): CPT

## 2021-03-14 PROCEDURE — 97116 GAIT TRAINING THERAPY: CPT

## 2021-03-14 PROCEDURE — 97530 THERAPEUTIC ACTIVITIES: CPT

## 2021-03-14 PROCEDURE — 86140 C-REACTIVE PROTEIN: CPT

## 2021-03-14 PROCEDURE — 99232 SBSQ HOSP IP/OBS MODERATE 35: CPT

## 2021-03-14 PROCEDURE — 88305 TISSUE EXAM BY PATHOLOGIST: CPT

## 2021-03-14 PROCEDURE — C1880: CPT

## 2021-03-14 PROCEDURE — 37191 INS ENDOVAS VENA CAVA FILTR: CPT

## 2021-03-14 PROCEDURE — C1887: CPT

## 2021-03-14 PROCEDURE — 76000 FLUOROSCOPY <1 HR PHYS/QHP: CPT

## 2021-03-14 PROCEDURE — 83605 ASSAY OF LACTIC ACID: CPT

## 2021-03-14 PROCEDURE — 86850 RBC ANTIBODY SCREEN: CPT

## 2021-03-14 PROCEDURE — 71275 CT ANGIOGRAPHY CHEST: CPT

## 2021-03-14 PROCEDURE — 99285 EMERGENCY DEPT VISIT HI MDM: CPT | Mod: 25

## 2021-03-14 PROCEDURE — 0225U NFCT DS DNA&RNA 21 SARSCOV2: CPT

## 2021-03-14 PROCEDURE — 36415 COLL VENOUS BLD VENIPUNCTURE: CPT

## 2021-03-14 PROCEDURE — 80053 COMPREHEN METABOLIC PANEL: CPT

## 2021-03-14 PROCEDURE — 82803 BLOOD GASES ANY COMBINATION: CPT

## 2021-03-14 PROCEDURE — 96374 THER/PROPH/DIAG INJ IV PUSH: CPT

## 2021-03-14 PROCEDURE — 93005 ELECTROCARDIOGRAM TRACING: CPT

## 2021-03-14 PROCEDURE — C1769: CPT

## 2021-03-14 PROCEDURE — 71045 X-RAY EXAM CHEST 1 VIEW: CPT

## 2021-03-14 PROCEDURE — 86901 BLOOD TYPING SEROLOGIC RH(D): CPT

## 2021-03-14 PROCEDURE — 76937 US GUIDE VASCULAR ACCESS: CPT

## 2021-03-14 PROCEDURE — 85610 PROTHROMBIN TIME: CPT

## 2021-03-14 PROCEDURE — 86900 BLOOD TYPING SEROLOGIC ABO: CPT

## 2021-03-14 PROCEDURE — 86769 SARS-COV-2 COVID-19 ANTIBODY: CPT

## 2021-03-14 PROCEDURE — 83550 IRON BINDING TEST: CPT

## 2021-03-14 PROCEDURE — 85027 COMPLETE CBC AUTOMATED: CPT

## 2021-03-14 RX ORDER — APIXABAN 2.5 MG/1
1 TABLET, FILM COATED ORAL
Qty: 0 | Refills: 0 | DISCHARGE

## 2021-03-14 RX ADMIN — Medication 25 MILLIGRAM(S): at 05:28

## 2021-03-14 RX ADMIN — TIOTROPIUM BROMIDE 1 CAPSULE(S): 18 CAPSULE ORAL; RESPIRATORY (INHALATION) at 05:30

## 2021-03-14 RX ADMIN — SODIUM CHLORIDE 3 MILLILITER(S): 9 INJECTION INTRAMUSCULAR; INTRAVENOUS; SUBCUTANEOUS at 06:44

## 2021-03-14 RX ADMIN — FAMOTIDINE 20 MILLIGRAM(S): 10 INJECTION INTRAVENOUS at 05:28

## 2021-03-14 RX ADMIN — ENOXAPARIN SODIUM 40 MILLIGRAM(S): 100 INJECTION SUBCUTANEOUS at 12:18

## 2021-03-14 RX ADMIN — Medication 88 MICROGRAM(S): at 05:28

## 2021-03-14 RX ADMIN — LOSARTAN POTASSIUM 25 MILLIGRAM(S): 100 TABLET, FILM COATED ORAL at 05:28

## 2021-03-14 RX ADMIN — BUPROPION HYDROCHLORIDE 150 MILLIGRAM(S): 150 TABLET, EXTENDED RELEASE ORAL at 07:17

## 2021-03-14 NOTE — PROGRESS NOTE ADULT - ASSESSMENT
dvt/pe, s/p ivc filter  intrathoracic stomach  hemoptysis    suspect intrathoracic stomach with sliding causing gastric erosions  no s/s active gib, gerd/asp precautions  rec proton pump inhibitor daily   rec carafate 1g four times a day  s/p egd/colon- Hiatal hernia/ camerons ulceration, bx taken  regular diet   d/c planning   ok to start ASA in setting of bioprosthetic valves

## 2021-03-14 NOTE — PROGRESS NOTE ADULT - PROVIDER SPECIALTY LIST ADULT
Cardiology
Heme/Onc
Hospitalist
Vascular Surgery
Cardiology
Cardiology
Gastroenterology
Heme/Onc
Hospitalist
Vascular Surgery
Cardiology
Gastroenterology
Gastroenterology
Heme/Onc
Pulmonology
Pulmonology
Gastroenterology
Hospitalist
Pulmonology
Heme/Onc
Pulmonology
Pulmonology

## 2021-03-14 NOTE — PROGRESS NOTE ADULT - ATTENDING COMMENTS
Advanced care planning was discussed with patient and family.  Advanced care planning forms were reviewed and discussed.  Risks, benefits and alternatives of gastroenterologic procedures were discussed in detail and all questions were answered.    30 minutes spent.
I saw and examined the patient personally. Spoke with above provider regarding this case. I reviewed the above findings completely.  I agree with the above history, physical, and plan which I have edited where appropriate.
I personally saw and examined the patient in detail.  I have spoken to the above provider regarding the assessment and plan of care.  I reviewed the above assessment and plan of care, and agree.  I have made changes in the body of the note where appropriate.
The patient was personally seen and examined, in addition to being examined and evaluated by NP.  All elements of the note were edited where appropriate.
Statement Selected

## 2021-03-14 NOTE — PROGRESS NOTE ADULT - PROBLEM SELECTOR PROBLEM 1
Emphysema of lung
Emphysema of lung
MARTINA on CPAP
MARTINA on CPAP
Emphysema of lung

## 2021-03-14 NOTE — PROGRESS NOTE ADULT - SUBJECTIVE AND OBJECTIVE BOX
patient seen, sitting in a chair, in NAD, came back from her morning walk, no overnight events , no complaints         T(F): 97.6 (03-10-21 @ 05:00), Max: 98.9 (03-09-21 @ 19:10)  HR: 80 (03-10-21 @ 05:00) (69 - 80)  BP: 113/71 (03-10-21 @ 05:00) (103/68 - 156/84)  RR: 18 (03-10-21 @ 05:00) (17 - 18)  SpO2: 95% (03-10-21 @ 05:00) (90% - 98%)  Wt(kg): --  CAPILLARY BLOOD GLUCOSE          PHYSICAL EXAM:  General: NAD, WDWN.   Neuro:  Alert & oriented x 3  CV: +S1+S2 regular rate and rhythm  Lung: clear to ausculation bilaterally, respirations nonlabored, good inspiratory effort  Abdomen: soft, NTND. Normactive BS  Extremities: no pedal edema or calf tenderness noted , groin site clean , no hematoma,       LABS:                        10.4   5.26  )-----------( 160      ( 10 Mar 2021 06:36 )             34.0     03-10    142  |  111<H>  |  26<H>  ----------------------------<  97  4.6   |  27  |  1.10    Ca    7.9<L>      10 Mar 2021 06:36    TPro  5.9<L>  /  Alb  3.1<L>  /  TBili  0.3  /  DBili  x   /  AST  17  /  ALT  13  /  AlkPhos  67  03-10      I&O's Detail      Impression: 81y Female admitted with Hemoptysis      PMH Serum phosphorus decreased    Calculus of ureter    MARTINA on CPAP    Leukemia    Anemia    Spinal stenosis    Mitral regurgitation    Aortic stenosis    Emphysema    Duodenal ulcer    Rotator cuff tear    Diverticulosis of colon    Coronary atherosclerosis of native coronary artery    Carpal tunnel syndrome    Dyslipidemia    Obstructive sleep apnea    Osteoporosis    Hypertension    Hernia, hiatal    Former cigarette smoker    Depression    Asthma with COPD with exacerbation    Asthma    Acid reflux    Graves disease        Plan:  -doing well   - entry site suture to be d/ed dav   
TATIANA RAMIREZ  [id number] 81y    VASCULAR SURGERY POST OP CHECK / DR. GRIER    NO COMPLAINTS   NO ABDOMINAL/ LEG PAIN     MEDICATIONS  (STANDING):  buPROPion . 75 milliGRAM(s) Oral at bedtime  buPROPion XL . 150 milliGRAM(s) Oral every 24 hours  levothyroxine 88 MICROGram(s) Oral daily  losartan 25 milliGRAM(s) Oral daily  metoprolol succinate ER 25 milliGRAM(s) Oral daily  simvastatin 40 milliGRAM(s) Oral at bedtime  sodium chloride 0.9% lock flush 3 milliLiter(s) IV Push every 8 hours  tiotropium 18 MICROgram(s) Capsule 1 Capsule(s) Inhalation daily    MEDICATIONS  (PRN):  ALBUTerol    90 MICROgram(s) HFA Inhaler 2 Puff(s) Inhalation every 6 hours PRN Shortness of Breath and/or Wheezing  guaiFENesin   Syrup  (Sugar-Free) 200 milliGRAM(s) Oral every 6 hours PRN Cough    Vital Signs Last 24 Hrs  T(C): 36.6 (09 Mar 2021 20:49), Max: 37.2 (09 Mar 2021 19:10)  T(F): 97.8 (09 Mar 2021 20:49), Max: 98.9 (09 Mar 2021 19:10)  HR: 75 (09 Mar 2021 22:46) (69 - 76)  BP: 103/68 (09 Mar 2021 20:49) (103/68 - 168/89)  RR: 18 (09 Mar 2021 20:49) (17 - 18)  SpO2: 90% (09 Mar 2021 22:46) (90% - 98%)    03-08-21 @ 07:01  -  03-09-21 @ 07:00  --------------------------------------------------------  IN: 240 mL / OUT: 0 mL / NET: 240 mL    ABDOMEN: + BS, SOFT, NOT DISTENDED, NON TENDER    RIGHT GROIN: DRESSING DRY AND INTACT. SOFT  NO BLEEDING, HEMATOMA, PULSATILE MASS                                  ASSESSMENT &  PLAN:  S/P INSERTION VENA CAVA FILTER     STABLE  SURGICAL TEAM WILL FOLLOW AND REMOVE RIGHT GROIN SUTURE IN 48 HOURIS      
VASCULAR SURGERY PA NOTE:    S: Patient seen and examined at bedside.  Resting comfortably supine in bed, about to eat breakfast.  Tolerating diet.  Currently without complaints.       MEDICATIONS:  ALBUTerol    90 MICROgram(s) HFA Inhaler 2 Puff(s) Inhalation every 6 hours PRN  buPROPion . 75 milliGRAM(s) Oral at bedtime  buPROPion XL . 150 milliGRAM(s) Oral every 24 hours  famotidine    Tablet 20 milliGRAM(s) Oral two times a day  guaiFENesin   Syrup  (Sugar-Free) 200 milliGRAM(s) Oral every 6 hours PRN  levothyroxine 88 MICROGram(s) Oral daily  losartan 25 milliGRAM(s) Oral daily  melatonin 5 milliGRAM(s) Oral at bedtime PRN  metoprolol succinate ER 25 milliGRAM(s) Oral daily  simvastatin 40 milliGRAM(s) Oral at bedtime  sodium chloride 0.9% lock flush 3 milliLiter(s) IV Push every 8 hours  tiotropium 18 MICROgram(s) Capsule 1 Capsule(s) Inhalation daily      O:  Vital Signs Last 24 Hrs  T(C): 36.4 (11 Mar 2021 04:39), Max: 36.9 (10 Mar 2021 20:52)  T(F): 97.6 (11 Mar 2021 04:39), Max: 98.4 (10 Mar 2021 20:52)  HR: 67 (11 Mar 2021 04:39) (64 - 79)  BP: 111/70 (11 Mar 2021 04:39) (111/70 - 134/76)  BP(mean): --  RR: 18 (11 Mar 2021 04:39) (16 - 18)  SpO2: 92% (11 Mar 2021 04:39) (92% - 95%)    PHYSICAL EXAM:  Lungs: CTA bilat without W/R/R  Card: S1S2  Abd: Soft, NT, ND.  +BS x 4.  No rebound/guarding.    Right groin: Skin with good color and turgor.  No hematoma or palpable masses/collections.  Prolene suture intact, removed and site dressed with sterile 4x4 and tape - tolerated well.  Femoral pulse 2+.  Ext: Calves soft, NT, without edema bilat    LABS:                        10.2   4.16  )-----------( 159      ( 11 Mar 2021 06:51 )             34.2     03-11    143  |  112<H>  |  28<H>  ----------------------------<  93  4.6   |  28  |  0.88    Ca    8.2<L>      11 Mar 2021 06:51    TPro  5.9<L>  /  Alb  3.1<L>  /  TBili  0.3  /  DBili  x   /  AST  12<L>  /  ALT  13  /  AlkPhos  63  03-11    A: POD #2 s/p IVC filter placement with entry through right groin    P: Clinically stable with regard to filter placement      Continue care per primary team      May remove dressing in 24h      Will sign-off care and reconsult on PRN basis      
s/p  upper gastrointestinal endoscopy and colonoscopy    large hiatal hernia with camerons ulceration  otherwise normal  biopsied r/o h pylori    colonic diverticulosis    rec  reg diet  dc planning   proton pump inhibitor daily  
Date/Time Patient Seen:  		  Referring MD:   Data Reviewed	       Patient is a 81y old  Female who presents with a chief complaint of     Subjective/HPI     PAST MEDICAL & SURGICAL HISTORY:  Serum phosphorus decreased  Last level 1.5 at PST 1/13    Calculus of ureter  s/p R ureteral stent 12/20/2020    AMRTINA on CPAP  Pt admits to be non-compliant    Leukemia  (APL, s/p chemo in 2012, now in remission, followed by oncologist)    Anemia  Completed iron infusions every 2 months, now Hb stable    Spinal stenosis    Mitral regurgitation    Aortic stenosis    Emphysema    Duodenal ulcer  at age 24    Rotator cuff tear  Right    Diverticulosis of colon    Coronary atherosclerosis of native coronary artery    Carpal tunnel syndrome    Dyslipidemia    Obstructive sleep apnea  cpap    Osteoporosis    Hypertension    Hernia, hiatal    Former cigarette smoker    Depression    Asthma with COPD with exacerbation  Not on home O2    Asthma    Acid reflux    Graves disease  s/p surgery    Elective surgery  Cystoscopy, right ureteroscopy, laser lithotripsy of right ureteral stone, right ureteral stent removal and replacement, right retrograde pyelogram on 12/6/19    H/O dilation and curettage    S/P ureteral stent placement  R in 12/2020    H/O mitral valve repair  2014    H/O aortic valve repair  2014    History of coronary angiogram  1/31/12    h/o  endometrial ablation  1998    H/O cone biopsy of cervix  1974    History of tonsillectomy  childhood    After cataract, bilateral  2010    Carpal tunnel right repair          Medication list         MEDICATIONS  (STANDING):  buPROPion . 75 milliGRAM(s) Oral at bedtime  buPROPion XL . 150 milliGRAM(s) Oral every 24 hours  levoFLOXacin IVPB 500 milliGRAM(s) IV Intermittent every 24 hours  levothyroxine 88 MICROGram(s) Oral daily  losartan 25 milliGRAM(s) Oral daily  metoprolol succinate ER 25 milliGRAM(s) Oral daily  simvastatin 40 milliGRAM(s) Oral at bedtime  sodium chloride 0.9% lock flush 3 milliLiter(s) IV Push every 8 hours  tiotropium 18 MICROgram(s) Capsule 1 Capsule(s) Inhalation daily    MEDICATIONS  (PRN):  ALBUTerol    90 MICROgram(s) HFA Inhaler 2 Puff(s) Inhalation every 6 hours PRN Shortness of Breath and/or Wheezing  guaiFENesin   Syrup  (Sugar-Free) 200 milliGRAM(s) Oral every 6 hours PRN Cough         Vitals log        ICU Vital Signs Last 24 Hrs  T(C): 36.5 (08 Mar 2021 04:59), Max: 36.9 (07 Mar 2021 15:26)  T(F): 97.7 (08 Mar 2021 04:59), Max: 98.4 (07 Mar 2021 15:26)  HR: 77 (08 Mar 2021 04:59) (69 - 82)  BP: 143/84 (08 Mar 2021 04:59) (126/64 - 170/76)  BP(mean): --  ABP: --  ABP(mean): --  RR: 17 (08 Mar 2021 04:59) (14 - 17)  SpO2: 92% (08 Mar 2021 04:59) (92% - 96%)           Input and Output:  I&O's Detail      Lab Data                        10.1   4.43  )-----------( 171      ( 07 Mar 2021 09:13 )             33.0     03-07    142  |  112<H>  |  19  ----------------------------<  104<H>  4.3   |  27  |  0.98    Ca    8.0<L>      07 Mar 2021 09:13    TPro  6.2  /  Alb  3.3  /  TBili  0.3  /  DBili  x   /  AST  25  /  ALT  16  /  AlkPhos  69  03-07            Review of Systems	      Objective     Physical Examination    heart s1s2  lung dec BS      Pertinent Lab findings & Imaging      Ifeanyi:  NO   Adequate UO     I&O's Detail           Discussed with:     Cultures:	        Radiology                            
Date/Time Patient Seen:  		  Referring MD:   Data Reviewed	       Patient is a 81y old  Female who presents with a chief complaint of hemoptysis (09 Mar 2021 11:06)      Subjective/HPI     PAST MEDICAL & SURGICAL HISTORY:  Serum phosphorus decreased  Last level 1.5 at PST 1/13    Calculus of ureter  s/p R ureteral stent 12/20/2020    MARTINA on CPAP  Pt admits to be non-compliant    Leukemia  (APL, s/p chemo in 2012, now in remission, followed by oncologist)    Anemia  Completed iron infusions every 2 months, now Hb stable    Spinal stenosis    Mitral regurgitation    Aortic stenosis    Emphysema    Duodenal ulcer  at age 24    Rotator cuff tear  Right    Diverticulosis of colon    Coronary atherosclerosis of native coronary artery    Carpal tunnel syndrome    Dyslipidemia    Obstructive sleep apnea  cpap    Osteoporosis    Hypertension    Hernia, hiatal    Former cigarette smoker    Depression    Asthma with COPD with exacerbation  Not on home O2    Asthma    Acid reflux    Graves disease  s/p surgery    Elective surgery  Cystoscopy, right ureteroscopy, laser lithotripsy of right ureteral stone, right ureteral stent removal and replacement, right retrograde pyelogram on 12/6/19    H/O dilation and curettage    S/P ureteral stent placement  R in 12/2020    H/O mitral valve repair  2014    H/O aortic valve repair  2014    History of coronary angiogram  1/31/12    h/o  endometrial ablation  1998    H/O cone biopsy of cervix  1974    History of tonsillectomy  childhood    After cataract, bilateral  2010    Carpal tunnel right repair          Medication list         MEDICATIONS  (STANDING):  buPROPion . 75 milliGRAM(s) Oral at bedtime  buPROPion XL . 150 milliGRAM(s) Oral every 24 hours  levothyroxine 88 MICROGram(s) Oral daily  losartan 25 milliGRAM(s) Oral daily  metoprolol succinate ER 25 milliGRAM(s) Oral daily  simvastatin 40 milliGRAM(s) Oral at bedtime  sodium chloride 0.9% lock flush 3 milliLiter(s) IV Push every 8 hours  tiotropium 18 MICROgram(s) Capsule 1 Capsule(s) Inhalation daily    MEDICATIONS  (PRN):  ALBUTerol    90 MICROgram(s) HFA Inhaler 2 Puff(s) Inhalation every 6 hours PRN Shortness of Breath and/or Wheezing  guaiFENesin   Syrup  (Sugar-Free) 200 milliGRAM(s) Oral every 6 hours PRN Cough         Vitals log        ICU Vital Signs Last 24 Hrs  T(C): 36.4 (10 Mar 2021 05:00), Max: 37.2 (09 Mar 2021 19:10)  T(F): 97.6 (10 Mar 2021 05:00), Max: 98.9 (09 Mar 2021 19:10)  HR: 80 (10 Mar 2021 05:00) (69 - 80)  BP: 113/71 (10 Mar 2021 05:00) (103/68 - 156/84)  BP(mean): --  ABP: --  ABP(mean): --  RR: 18 (10 Mar 2021 05:00) (17 - 18)  SpO2: 95% (10 Mar 2021 05:00) (90% - 98%)           Input and Output:  I&O's Detail    08 Mar 2021 07:01  -  09 Mar 2021 07:00  --------------------------------------------------------  IN:    Oral Fluid: 240 mL  Total IN: 240 mL    OUT:  Total OUT: 0 mL    Total NET: 240 mL          Lab Data                        10.2   5.33  )-----------( 168      ( 09 Mar 2021 06:22 )             33.5     03-09    141  |  110<H>  |  22  ----------------------------<  97  4.3   |  27  |  0.85    Ca    8.2<L>      09 Mar 2021 06:22    TPro  6.0  /  Alb  3.1<L>  /  TBili  0.3  /  DBili  x   /  AST  12<L>  /  ALT  13  /  AlkPhos  66  03-09            Review of Systems	      Objective     Physical Examination  on RA  heart s1s2  lung dec BS  abd soft  head nc        Pertinent Lab findings & Imaging      Ifeanyi:  NO   Adequate UO     I&O's Detail    08 Mar 2021 07:01  -  09 Mar 2021 07:00  --------------------------------------------------------  IN:    Oral Fluid: 240 mL  Total IN: 240 mL    OUT:  Total OUT: 0 mL    Total NET: 240 mL               Discussed with:     Cultures:	        Radiology                            
INTERVAL HPI/OVERNIGHT EVENTS:  pt seen and examined earlier this am  denies n/v/abd pain/gi bleeding  +bm  diane diet    MEDICATIONS  (STANDING):  buPROPion . 75 milliGRAM(s) Oral at bedtime  buPROPion XL . 150 milliGRAM(s) Oral every 24 hours  famotidine    Tablet 20 milliGRAM(s) Oral two times a day  levothyroxine 88 MICROGram(s) Oral daily  losartan 25 milliGRAM(s) Oral daily  metoprolol succinate ER 25 milliGRAM(s) Oral daily  simvastatin 40 milliGRAM(s) Oral at bedtime  sodium chloride 0.9% lock flush 3 milliLiter(s) IV Push every 8 hours  tiotropium 18 MICROgram(s) Capsule 1 Capsule(s) Inhalation daily    MEDICATIONS  (PRN):  ALBUTerol    90 MICROgram(s) HFA Inhaler 2 Puff(s) Inhalation every 6 hours PRN Shortness of Breath and/or Wheezing  guaiFENesin   Syrup  (Sugar-Free) 200 milliGRAM(s) Oral every 6 hours PRN Cough  melatonin 5 milliGRAM(s) Oral at bedtime PRN Insomnia      Allergies    adhesives (Rash; Other)  Cat dander- wheezing, itchy throat, SOB (Other)  penicillin (Rash)  sulfa drugs (Rash)  tetracycline (Stomach Upset)    Intolerances        Review of Systems:    General:  No wt loss, fevers, chills, night sweats, fatigue   Eyes:  Good vision, no reported pain  ENT:  No sore throat, pain, runny nose, dysphagia  CV:  No pain, palpitations, hypo/hypertension  Resp:  No dyspnea, cough, tachypnea, wheezing  GI:  No pain, No nausea, No vomiting, No diarrhea, No constipation, No weight loss, No fever, No pruritis, No rectal bleeding, No melena, No dysphagia  :  No pain, bleeding, incontinence, nocturia  Muscle:  No pain, weakness  Neuro:  No weakness, tingling, memory problems  Psych:  No fatigue, insomnia, mood problems, depression  Endocrine:  No polyuria, polydypsia, cold/heat intolerance  Heme:  No petechiae, ecchymosis, easy bruisability  Skin:  No rash, tattoos, scars, edema      Vital Signs Last 24 Hrs  T(C): 36.4 (11 Mar 2021 04:39), Max: 36.9 (10 Mar 2021 20:52)  T(F): 97.6 (11 Mar 2021 04:39), Max: 98.4 (10 Mar 2021 20:52)  HR: 67 (11 Mar 2021 04:39) (64 - 79)  BP: 111/70 (11 Mar 2021 04:39) (111/70 - 134/76)  BP(mean): --  RR: 18 (11 Mar 2021 04:39) (16 - 18)  SpO2: 92% (11 Mar 2021 04:39) (92% - 95%)    PHYSICAL EXAM:    Constitutional: lying in bed  HEENT: ncat  Gastrointestinal: soft nt nd  Extremities: No peripheral edema  Vascular: + peripheral pulses  Neurological: Awake alert appropriate      LABS:                        10.2   4.16  )-----------( 159      ( 11 Mar 2021 06:51 )             34.2     03-11    143  |  112<H>  |  28<H>  ----------------------------<  93  4.6   |  28  |  0.88    Ca    8.2<L>      11 Mar 2021 06:51    TPro  5.9<L>  /  Alb  3.1<L>  /  TBili  0.3  /  DBili  x   /  AST  12<L>  /  ALT  13  /  AlkPhos  63  03-11          RADIOLOGY & ADDITIONAL TESTS:  
Patient is a 81y old  Female who presents with a chief complaint of   Date of servie : 03-08-21 @ 14:59  INTERVAL HPI/OVERNIGHT EVENTS:  T(C): 36.4 (03-08-21 @ 14:41), Max: 36.9 (03-07-21 @ 15:26)  HR: 68 (03-08-21 @ 14:41) (68 - 77)  BP: 112/65 (03-08-21 @ 14:41) (112/65 - 143/84)  RR: 21 (03-08-21 @ 14:41) (16 - 21)  SpO2: 94% (03-08-21 @ 14:41) (92% - 94%)  Wt(kg): --  I&O's Summary    08 Mar 2021 07:01  -  08 Mar 2021 14:59  --------------------------------------------------------  IN: 240 mL / OUT: 0 mL / NET: 240 mL        LABS:                        10.1   4.43  )-----------( 171      ( 07 Mar 2021 09:13 )             33.0     03-07    142  |  112<H>  |  19  ----------------------------<  104<H>  4.3   |  27  |  0.98    Ca    8.0<L>      07 Mar 2021 09:13    TPro  6.2  /  Alb  3.3  /  TBili  0.3  /  DBili  x   /  AST  25  /  ALT  16  /  AlkPhos  69  03-07    PT/INR - ( 07 Mar 2021 09:13 )   PT: 17.2 sec;   INR: 1.50 ratio         PTT - ( 07 Mar 2021 09:13 )  PTT:29.2 sec    CAPILLARY BLOOD GLUCOSE                MEDICATIONS  (STANDING):  buPROPion . 75 milliGRAM(s) Oral at bedtime  buPROPion XL . 150 milliGRAM(s) Oral every 24 hours  levothyroxine 88 MICROGram(s) Oral daily  losartan 25 milliGRAM(s) Oral daily  metoprolol succinate ER 25 milliGRAM(s) Oral daily  simvastatin 40 milliGRAM(s) Oral at bedtime  sodium chloride 0.9% lock flush 3 milliLiter(s) IV Push every 8 hours  tiotropium 18 MICROgram(s) Capsule 1 Capsule(s) Inhalation daily    MEDICATIONS  (PRN):  ALBUTerol    90 MICROgram(s) HFA Inhaler 2 Puff(s) Inhalation every 6 hours PRN Shortness of Breath and/or Wheezing  guaiFENesin   Syrup  (Sugar-Free) 200 milliGRAM(s) Oral every 6 hours PRN Cough          PHYSICAL EXAM:  GENERAL: frail  CHEST/LUNG: Clear to percussion bilaterally; No rales, rhonchi, wheezing, or rubs  HEART: Regular rate and rhythm; No murmurs, rubs, or gallops  ABDOMEN: Soft, Nontender, Nondistended; Bowel sounds present  EXTREMITIES: edema+    Care Discussed with Consultants/Other Providers [x ] YES  [ ] NO
Patient is a 81y old  Female who presents with a chief complaint of hemoptysis (09 Mar 2021 11:06)    Date of servie : 03-10-21 @ 15:46  INTERVAL HPI/OVERNIGHT EVENTS:  T(C): 36.6 (03-10-21 @ 12:21), Max: 37.2 (03-09-21 @ 19:10)  HR: 64 (03-10-21 @ 12:21) (64 - 80)  BP: 118/73 (03-10-21 @ 12:21) (103/68 - 156/84)  RR: 16 (03-10-21 @ 12:21) (16 - 18)  SpO2: 95% (03-10-21 @ 12:21) (90% - 98%)  Wt(kg): --  I&O's Summary      LABS:                        10.4   5.26  )-----------( 160      ( 10 Mar 2021 06:36 )             34.0     03-10    142  |  111<H>  |  26<H>  ----------------------------<  97  4.6   |  27  |  1.10    Ca    7.9<L>      10 Mar 2021 06:36    TPro  5.9<L>  /  Alb  3.1<L>  /  TBili  0.3  /  DBili  x   /  AST  17  /  ALT  13  /  AlkPhos  67  03-10        CAPILLARY BLOOD GLUCOSE                MEDICATIONS  (STANDING):  buPROPion . 75 milliGRAM(s) Oral at bedtime  buPROPion XL . 150 milliGRAM(s) Oral every 24 hours  levothyroxine 88 MICROGram(s) Oral daily  losartan 25 milliGRAM(s) Oral daily  metoprolol succinate ER 25 milliGRAM(s) Oral daily  simvastatin 40 milliGRAM(s) Oral at bedtime  sodium chloride 0.9% lock flush 3 milliLiter(s) IV Push every 8 hours  tiotropium 18 MICROgram(s) Capsule 1 Capsule(s) Inhalation daily    MEDICATIONS  (PRN):  ALBUTerol    90 MICROgram(s) HFA Inhaler 2 Puff(s) Inhalation every 6 hours PRN Shortness of Breath and/or Wheezing  guaiFENesin   Syrup  (Sugar-Free) 200 milliGRAM(s) Oral every 6 hours PRN Cough          PHYSICAL EXAM:  GENERAL: frail  HEAD:  Atraumatic, Normocephalic  CHEST/LUNG: Clear to percussion bilaterally; No rales, rhonchi, wheezing, or rubs  HEART: Regular rate and rhythm; No murmurs, rubs, or gallops  ABDOMEN: Soft, Nontender, Nondistended; Bowel sounds present  EXTREMITIES:  edema+    Care Discussed with Consultants/Other Providers [x ] YES  [ ] NO
SURGERY  Spectralink [id number]    Pt seen and examined. Pt denies any overnight events. Reports continued melanotic stools. Denies abdominal pain, nausea or vomiting. Pt with episode of hematochezia prior to admission- now resolved.    T(C): 36.4 (03-09-21 @ 13:44), Max: 36.8 (03-08-21 @ 20:15)  HR: 76 (03-09-21 @ 16:02) (69 - 76)  BP: 156/84 (03-09-21 @ 16:02) (106/66 - 168/89)  RR: 17 (03-09-21 @ 16:02) (17 - 18)  SpO2: 96% (03-09-21 @ 16:02) (90% - 96%)  Wt(kg): --  ICU Vital Signs Last 24 Hrs  T(C): 36.4 (09 Mar 2021 13:44), Max: 36.8 (08 Mar 2021 20:15)  T(F): 97.6 (09 Mar 2021 13:44), Max: 98.3 (08 Mar 2021 20:15)  HR: 76 (09 Mar 2021 16:02) (69 - 76)  BP: 156/84 (09 Mar 2021 16:02) (106/66 - 168/89)  BP(mean): --  ABP: --  ABP(mean): --  RR: 17 (09 Mar 2021 16:02) (17 - 18)  SpO2: 96% (09 Mar 2021 16:02) (90% - 96%)    CAPILLARY BLOOD GLUCOS    I&O's Detail    08 Mar 2021 07:01  -  09 Mar 2021 07:00  --------------------------------------------------------  IN:    Oral Fluid: 240 mL  Total IN: 240 mL    OUT:  Total OUT: 0 mL    Total NET: 240 mL    Physical exam: Pt sitting comfortably in bed in NAD  Chest- CTA bilaterally   CV- S1 & S2, RRR  Abdomen- Soft, non-tender, non-distended.  NSLT- L2-S1- B/L   5/5- TA/GS/EHL B/L  Calves soft bilaterally- mild ttp to RLE  2+ Femoral pulse bilaterally, distally 2+ DP pulses    LABS:                        10.2   5.33  )-----------( 168      ( 09 Mar 2021 06:22 )             33.5     03-09    141  |  110<H>  |  22  ----------------------------<  97  4.3   |  27  |  0.85    Ca    8.2<L>      09 Mar 2021 06:22    TPro  6.0  /  Alb  3.1<L>  /  TBili  0.3  /  DBili  x   /  AST  12<L>  /  ALT  13  /  AlkPhos  66  03-09    Culture - Blood (collected 07 Mar 2021 14:29)  Source: .Blood Blood  Preliminary Report (08 Mar 2021 15:02):    No growth to date.    Culture - Blood (collected 07 Mar 2021 14:29)  Source: .Blood Blood  Preliminary Report (08 Mar 2021 15:01):    No growth to date.    RADIOLOGY & ADDITIONAL STUDIES:      INTERPRETATION:  INDICATION, CLINICAL INFORMATION: Hemoptysis, history of pulmonary embolus, evaluate for infarction    TECHNIQUE: CT pulmonary angiogramof the chest was performed. Coronal and sagittal images were reconstructed. Maximum intensity projection images were generated. Images were obtained after the uneventful administration of the 1 cc nonionic intravenous Omnipaque 350. 49 cc of Omnipaque 350 was discarded.      COMPARISON: CT chest 11/5/2020.    FINDINGS:    PULMONARY VESSELS: No pulmonary embolus. Main pulmonary artery normal in diameter.    HEART AND VASCULATURE: Heart size is normal. Status post mitral and aortic valve replacement. No pericardial effusion. No aortic aneurysm or dissection.    LUNGS, AIRWAYS, PLEURA: Patent central airways. No endobronchial lesion, bronchial wall thickening or bronchiectasis. Bandlike atelectasis within the basilar lower lobes are unchanged. The lungs are relatively clear. Trace pleural effusions. No pneumothorax..    MEDIASTINUM: No mass or lymphadenopathy.    UPPER ABDOMEN: Large hiatal hernia containing the stomach. Cholelithiasis. Right liver cyst incompletely evaluated. Numerous 2-3 mm nonobstructing renal stones.    BONES AND SOFT TISSUES: No aggressive osseous lesion. Old fracture deformity of posterior left ribs. Mild compression fracture deformity of T12 vertebral body is unchanged.    LOWER NECK: Within normal limits.    IMPRESSION:    No pulmonary embolus.    Bandlike atelectasis within the basilar lower lobes are unchanged. The lungs are relatively clear.      82 yo f who is currently DVT/PE  Eliquis complicated by GIB, MARTINA, Leukemia emphysema, CAD, Graves, former smoker for IVC filter    -NPO w/ IVF for IVC filter today  -Continue DVT Prophylaxis with SCD's  -Tolerating regular diet- Per GI- suspect intrathoracic stomach with sliding causing gastric erosions  -Continue Incentive Spirometry  -Continue analgesia  -Encourage OOB/ambulation with assistance  -Above discussed with Dr. Patrick  
[facility] -- Brandy Harmon [place], [place], [place], [place]: Office # [phone]    Follow Up:  MVVR AVR HTN HLD    Subjective/Observations: Patient seen and examined. Patient awake, alert, resting in bed. No complaints of chest pain, dyspnea, palpitations or dizziness. No signs of orthopnea or PND. Tolerating room air. Plan for colonoscopy today.     REVIEW OF SYSTEMS: All review of systems is negative for eye, ENT, GI, , allergic, dermatologic, musculoskeletal and neurologic except as described above    PAST MEDICAL & SURGICAL HISTORY:  Serum phosphorus decreased  Last level 1.5 at PST 1/13    Calculus of ureter  s/p R ureteral stent 12/20/2020    MARTINA on CPAP  Pt admits to be non-compliant    Leukemia  (APL, s/p chemo in 2012, now in remission, followed by oncologist)    Anemia  Completed iron infusions every 2 months, now Hb stable    Anemia  Completed iron infusions every 2 months, now Hb stable    Aortic stenosis    Emphysema    Rotator cuff tear  Right    Diverticulosis of colon    Coronary atherosclerosis of native coronary artery    Carpal tunnel syndrome    Dyslipidemia    Osteoporosis    Hypertension    Hernia, hiatal    Former cigarette smoker    Depression    Asthma with COPD with exacerbation  Not on home O2    Acid reflux    Graves disease  s/p surgery    Elective surgery  Cystoscopy, right ureteroscopy, laser lithotripsy of right ureteral stone, right ureteral stent removal and replacement, right retrograde pyelogram on 12/6/19    H/O dilation and curettage    S/P ureteral stent placement  R in 12/2020    H/O mitral valve repair  2014    H/O aortic valve repair  2014    History of coronary angiogram  1/31/12    h/o  endometrial ablation  1998    H/O cone biopsy of cervix  1974    History of tonsillectomy  childhood    After cataract, bilateral  2010      MEDICATIONS  (STANDING):  buPROPion . 75 milliGRAM(s) Oral at bedtime  buPROPion XL . 150 milliGRAM(s) Oral every 24 hours  famotidine    Tablet 20 milliGRAM(s) Oral two times a day  iron sucrose Injectable 100 milliGRAM(s) IV Push every 24 hours  levothyroxine 88 MICROGram(s) Oral daily  losartan 25 milliGRAM(s) Oral daily  metoprolol succinate ER 25 milliGRAM(s) Oral daily  simvastatin 40 milliGRAM(s) Oral at bedtime  sodium chloride 0.9% lock flush 3 milliLiter(s) IV Push every 8 hours  tiotropium 18 MICROgram(s) Capsule 1 Capsule(s) Inhalation daily    MEDICATIONS  (PRN):  acetaminophen   Tablet .. 650 milliGRAM(s) Oral every 6 hours PRN Temp greater or equal to 38C (100.4F), Mild Pain (1 - 3)  ALBUTerol    90 MICROgram(s) HFA Inhaler 2 Puff(s) Inhalation every 6 hours PRN Shortness of Breath and/or Wheezing  guaiFENesin   Syrup  (Sugar-Free) 200 milliGRAM(s) Oral every 6 hours PRN Cough  melatonin 5 milliGRAM(s) Oral at bedtime PRN Insomnia    Allergies  adhesives (Rash; Other)  Cat dander- wheezing, itchy throat, SOB (Other)  penicillin (Rash)  sulfa drugs (Rash)  tetracycline (Stomach Upset)    Vital Signs Last 24 Hrs  T(C): 36.4 (12 Mar 2021 05:08), Max: 36.7 (11 Mar 2021 13:05)  T(F): 97.5 (12 Mar 2021 05:08), Max: 98 (11 Mar 2021 13:05)  HR: 88 (12 Mar 2021 05:08) (65 - 88)  BP: 159/85 (12 Mar 2021 05:08) (102/68 - 159/85)  BP(mean): --  RR: 17 (12 Mar 2021 05:08) (17 - 18)  SpO2: 92% (12 Mar 2021 05:08) (92% - 94%)  I&O's Summary    11 Mar 2021 07:01  -  12 Mar 2021 07:00  --------------------------------------------------------  IN: 0 mL / OUT: 250 mL / NET: -250 mL    TELE: Not on telemetry   PHYSICAL EXAM:  Appearance: NAD, no distress, alert, Well developed   HEENT: Moist Mucous Membranes, Anicteric  Cardiovascular: Regular rate and rhythm, Normal S1 S2, No JVD, No murmurs, No rubs, gallops or clicks  Respiratory: Non-labored, Clear to auscultation, No rales, No rhonchi, No wheezing.   Gastrointestinal:  Soft, Non-tender, + BS  Neurologic: Non-focal  Skin: Warm and dry, No visible rashes or ulcers, No ecchymosis, No cyanosis  Musculoskeletal: No clubbing, No cyanosis, No joint swelling/tenderness  Psychiatry: Mood & affect appropriate  Lymph: No peripheral edema.     LABS: All Labs Reviewed:                        10.5   4.69  )-----------( 161      ( 12 Mar 2021 06:43 )             34.7                         10.2   4.16  )-----------( 159      ( 11 Mar 2021 06:51 )             34.2                         10.4   5.26  )-----------( 160      ( 10 Mar 2021 06:36 )             34.0     12 Mar 2021 06:43    145    |  112    |  21     ----------------------------<  86     4.1     |  25     |  0.89   11 Mar 2021 06:51    143    |  112    |  28     ----------------------------<  93     4.6     |  28     |  0.88   10 Mar 2021 06:36    142    |  111    |  26     ----------------------------<  97     4.6     |  27     |  1.10     Ca    8.2        12 Mar 2021 06:43  Ca    8.2        11 Mar 2021 06:51  Ca    7.9        10 Mar 2021 06:36    TPro  6.4    /  Alb  3.3    /  TBili  0.3    /  DBili  x      /  AST  21     /  ALT  19     /  AlkPhos  69     12 Mar 2021 06:43  TPro  5.9    /  Alb  3.1    /  TBili  0.3    /  DBili  x      /  AST  12     /  ALT  13     /  AlkPhos  63     11 Mar 2021 06:51  TPro  5.9    /  Alb  3.1    /  TBili  0.3    /  DBili  x      /  AST  17     /  ALT  13     /  AlkPhos  67     10 Mar 2021 06:36    12 Lead ECG:   Ventricular Rate 68 BPM  Atrial Rate 68 BPM  P-R Interval 172 ms  QRS Duration 76 ms  Q-T Interval 428 ms  QTC Calculation(Bazett) 455 ms  P Axis 104 degrees  R Axis -22 degrees  T Axis 72 degrees  Diagnosis Line Normal sinus rhythm  ST & T wave abnormality, consider lateral ischemia  Abnormal ECG  When compared with ECG of 18-DEC-2020 10:35,  Nonspecific T wave abnormality now evident in Inferior leads  T wave inversion now evident in Anterior leads  Confirmed by Shefali Fields ([id number]) on 3/7/2021 11:21:33 AM (03-07-21 @ 08:41)    < from: TTE Echo Complete w/o Contrast w/ Doppler (11.06.20 @ 21:21) >  MEASUREMENTS  IVS: 1.2cm  PWT: 1.2cm  LA: 3.0cm  AO: 2.8cm  LVIDd: 4.2cm  LVIDs: 2.7cm  LVOT: 1.5cm  LVEF: 55-60%  RVSP: 23mmHg    FINDINGS  Left Ventricle: The endocardium is not well-visualized. Grossly normal left ventricular systolic function. No segmental wall motion abnormalities are visualized. Mild concentric LVH.  Aortic Valve: Status post bioprosthetic aortic valve. Mild aortic insufficiency. The peak aortic valve gradient is equal to 22 mmHg, with a mean aortic valve gradient of 13 mmHg, which are normal in the setting of a bioprosthetic aortic valve replacement.  Mitral Valve: Mitral annular calcification. Status post bioprosthetic mitral valve. Mean mitral valve gradient is equal to 6 mmHg, which is mildly elevated even in the setting of a mitral valve replacement. Mild mitral regurgitation  Tricuspid Valve: Grossly normal tricuspid valve with mild tricuspid regurgitation  Pulmonic Valve: The pulmonic valve is not well-visualized.  Left Atrium: Moderate left atrial enlargement  Right Ventricle: Grossly normal right ventricular size and systolic function.  Right Atrium: Grossly normal.  Diastolic Function: Doppler evidence of grade 2 diastolic dysfunction  Pericardium/Pleura: No significant pericardial effusion    CONCLUSIONS:  1. Very technically difficult and limited study  2. Theendocardium is not well-visualized. Grossly normal left ventricular systolic function. No segmental wall motion abnormalities are visualized. Mild concentric LVH.  3. Status post bioprosthetic aortic valve. Mild aortic insufficiency. The peak aortic valve gradient is equal to 22 mmHg, with a mean aortic valve gradient of 13 mmHg, which are normal in the setting of a bioprosthetic aortic valve replacement.  4. Status post bioprosthetic mitral valve. Mean mitral valve gradient is equal to 6 mmHg, which is mildly elevated even in the setting of a mitral valve replacement. Mild mitral regurgitation. Moderate left atrial enlargement  5. Grossly normal right ventricular size and systolic function.  6. Doppler evidence of grade 2 diastolic dysfunction  7. No significant pericardial effusion  < end of copied text >  
[facility] -- Brandy Harmon, Gibran, Ilan, [place] Begum Savella, Goodger: Office # [phone]    Follow Up:  DVT/PE  Anemia    Subjective/Observations: Patient seen and examined. Patient awake, alert, resting comfortably in bed. No complaints of chest pain, dyspnea, palpitations or dizziness. No signs of orthopnea or PND. Tolerating room air.     REVIEW OF SYSTEMS: All review of systems is negative for eye, ENT, GI, , allergic, dermatologic, musculoskeletal and neurologic except as described above    PAST MEDICAL & SURGICAL HISTORY:  Serum phosphorus decreased  Last level 1.5 at PST 1/13    Calculus of ureter  s/p R ureteral stent 12/20/2020    MARTINA on CPAP  Pt admits to be non-compliant    Leukemia  (APL, s/p chemo in 2012, now in remission, followed by oncologist)    Anemia  Completed iron infusions every 2 months, now Hb stable    Aortic stenosis    Emphysema    Rotator cuff tear  Right    Diverticulosis of colon    Coronary atherosclerosis of native coronary artery    Carpal tunnel syndrome    Dyslipidemia    Osteoporosis    Hypertension    Hernia, hiatal    Former cigarette smoker    Depression    Asthma with COPD with exacerbation  Not on home O2    Acid reflux    Graves disease  s/p surgery    Elective surgery  Cystoscopy, right ureteroscopy, laser lithotripsy of right ureteral stone, right ureteral stent removal and replacement, right retrograde pyelogram on 12/6/19    H/O dilation and curettage    S/P ureteral stent placement  R in 12/2020    H/O mitral valve repair  2014    H/O aortic valve repair  2014    History of coronary angiogram  1/31/12    h/o  endometrial ablation  1998    H/O cone biopsy of cervix  1974    History of tonsillectomy  childhood    After cataract, bilateral  2010        MEDICATIONS  (STANDING):  buPROPion . 75 milliGRAM(s) Oral at bedtime  buPROPion XL . 150 milliGRAM(s) Oral every 24 hours  levothyroxine 88 MICROGram(s) Oral daily  losartan 25 milliGRAM(s) Oral daily  metoprolol succinate ER 25 milliGRAM(s) Oral daily  simvastatin 40 milliGRAM(s) Oral at bedtime  sodium chloride 0.9% lock flush 3 milliLiter(s) IV Push every 8 hours  tiotropium 18 MICROgram(s) Capsule 1 Capsule(s) Inhalation daily    MEDICATIONS  (PRN):  ALBUTerol    90 MICROgram(s) HFA Inhaler 2 Puff(s) Inhalation every 6 hours PRN Shortness of Breath and/or Wheezing  guaiFENesin   Syrup  (Sugar-Free) 200 milliGRAM(s) Oral every 6 hours PRN Cough    Allergies    adhesives (Rash; Other)  Cat dander- wheezing, itchy throat, SOB (Other)  penicillin (Rash)  sulfa drugs (Rash)  tetracycline (Stomach Upset)    Intolerances      Vital Signs Last 24 Hrs  T(C): 36.4 (10 Mar 2021 05:00), Max: 37.2 (09 Mar 2021 19:10)  T(F): 97.6 (10 Mar 2021 05:00), Max: 98.9 (09 Mar 2021 19:10)  HR: 80 (10 Mar 2021 05:00) (69 - 80)  BP: 113/71 (10 Mar 2021 05:00) (103/68 - 156/84)  BP(mean): --  RR: 18 (10 Mar 2021 05:00) (17 - 18)  SpO2: 95% (10 Mar 2021 05:00) (90% - 98%)  I&O's Summary        TELE: bt   PHYSICAL EXAM:  Appearance: NAD, no distress, alert, Well developed   HEENT: Moist Mucous Membranes, Anicteric  Cardiovascular: Regular rate and rhythm, Normal S1 S2, No JVD, No murmurs, No rubs, gallops or clicks  Respiratory: Non-labored, Diminished at bases No rales, No rhonchi, No wheezing.   Gastrointestinal:  Soft, Non-tender, + BS  Neurologic: Non-focal  Skin: Warm and dry, No visible rashes or ulcers, No ecchymosis, No cyanosis  Musculoskeletal: No clubbing, No cyanosis, No joint swelling/tenderness  Psychiatry: Mood & affect appropriate  Lymph: No peripheral edema.  Right groin intact     LABS: All Labs Reviewed:                        10.4 5.26  )-----------( 160      ( 10 Mar 2021 06:36 )             34.0                         10.2   5.33  )-----------( 168      ( 09 Mar 2021 06:22 )             33.5     10 Mar 2021 06:36    142    |  111    |  26     ----------------------------<  97     4.6     |  27     |  1.10   09 Mar 2021 06:22    141    |  110    |  22     ----------------------------<  97     4.3     |  27     |  0.85     Ca    7.9        10 Mar 2021 06:36  Ca    8.2        09 Mar 2021 06:22    TPro  5.9    /  Alb  3.1    /  TBili  0.3    /  DBili  x      /  AST  17     /  ALT  13     /  AlkPhos  67     10 Mar 2021 06:36  TPro  6.0    /  Alb  3.1    /  TBili  0.3    /  DBili  x      /  AST  12     /  ALT  13     /  AlkPhos  66     09 Mar 2021 06:22                    12 Lead ECG:   Ventricular Rate 68 BPM    Atrial Rate 68 BPM    P-R Interval 172 ms    QRS Duration 76 ms    Q-T Interval 428 ms    QTC Calculation(Bazett) 455 ms    P Axis 104 degrees    R Axis -22 degrees    T Axis 72 degrees    Diagnosis Line Normal sinus rhythm  ST & T wave abnormality, consider lateral ischemia  Abnormal ECG  When compared with ECG of 18-DEC-2020 10:35,  Nonspecific T wave abnormality now evident in Inferior leads  T wave inversion now evident in Anterior leads  Confirmed by Shefali Fields ([id number]) on 3/7/2021 11:21:33 AM (03-07-21 @ 08:41)    < from: Cardiac Cath Lab (01.30.12 @ 09:52) >  Ventricles: There were no left ventricular global or regional wall motion  abnormalities. Global left ventricular function was normal. EF estimated  by contrast ventriculography was 60 %.  Valves: Aortic valve: There was moderate aortic stenosis. Mitral valve:  There wasmild mitral stenosis.  Coronary vessels: The coronary circulation is right dominant.  LM:      LM: Normal.  LAD:      LAD: Normal.  CX:      Circumflex: Normal.  RCA:      RCA: Normal.  Complications: There were no complications.  Recommendations:  Medical management is recommended. Dyspnea likely multifactorial, in  setting of lung disease and mitral/aortic disease. It appears that surgery  can be deferred.  Impressions: Mild mitral stenosis, moderate aortic stenosis. The coronary  anatomy is normal. Left ventricular function is normal    < end of copied text >  < from: CT Angio Chest w/ IV Cont (03.07.21 @ 11:40) >  IMPRESSION:    No pulmonary embolus.  Bandlike atelectasis within the basilar lower lobes are unchanged. The lungs are relatively clear.      < end of copied text >    
All interim records and events noted.    ambulating in hallway, feeling well for IVC filter today. No further active bleeds      MEDICATIONS  (STANDING):  buPROPion . 75 milliGRAM(s) Oral at bedtime  buPROPion XL . 150 milliGRAM(s) Oral every 24 hours  levothyroxine 88 MICROGram(s) Oral daily  losartan 25 milliGRAM(s) Oral daily  metoprolol succinate ER 25 milliGRAM(s) Oral daily  simvastatin 40 milliGRAM(s) Oral at bedtime  sodium chloride 0.9% lock flush 3 milliLiter(s) IV Push every 8 hours  tiotropium 18 MICROgram(s) Capsule 1 Capsule(s) Inhalation daily    MEDICATIONS  (PRN):  ALBUTerol    90 MICROgram(s) HFA Inhaler 2 Puff(s) Inhalation every 6 hours PRN Shortness of Breath and/or Wheezing  guaiFENesin   Syrup  (Sugar-Free) 200 milliGRAM(s) Oral every 6 hours PRN Cough      Vital Signs Last 24 Hrs  T(C): 36.6 (09 Mar 2021 04:54), Max: 36.8 (08 Mar 2021 20:15)  T(F): 97.9 (09 Mar 2021 04:54), Max: 98.3 (08 Mar 2021 20:15)  HR: 71 (09 Mar 2021 04:54) (68 - 75)  BP: 168/89 (09 Mar 2021 04:54) (112/65 - 168/89)  BP(mean): --  RR: 18 (09 Mar 2021 04:54) (18 - 21)  SpO2: 95% (09 Mar 2021 04:54) (93% - 95%)    PHYSICAL EXAM  General: well developed  well nourished, in no acute distress  Head: atraumatic, normocephalic  ENT: sclera anicteric  Neck: supple, trachea midline  CV: S1 S2, regular rate and rhythm  Lungs: clear to auscultation, no wheezes/rhonchi  Abdomen: soft, nontender, bowel sounds present, no palpable masses, pouchy  Extrem: no clubbing/cyanosis/edema  Skin: no significant increased ecchymosis/petechiae  Neuro: alert and oriented X3,  no focal deficits      LABS:             10.2   5.33  )-----------( 168      ( 03-09 @ 06:22 )             33.5                10.1   4.43  )-----------( 171      ( 03-07 @ 09:13 )             33.0       03-09    141  |  110<H>  |  22  ----------------------------<  97  4.3   |  27  |  0.85    Ca    8.2<L>      09 Mar 2021 06:22    TPro  6.0  /  Alb  3.1<L>  /  TBili  0.3  /  DBili  x   /  AST  12<L>  /  ALT  13  /  AlkPhos  66  03-09 03-07 @ 09:13  PT17.2 INR1.50  PTT29.2      RADIOLOGY & ADDITIONAL STUDIES:    IMPRESSION/RECOMMENDATIONS:
[facility] -- Brandy Harmon, Ilan Leary, [place] Begum, Donnie Shha: Office # [phone]    Follow Up: MVR, AVR, HTN      Subjective/Observations: Patient seen and examined. Patient awake, alert, resting in bed. No complaints of chest pain, dyspnea, palpitations or dizziness. No signs of orthopnea or PND. Tolerating room air. Rt hip pain intermittent     REVIEW OF SYSTEMS: All review of systems is negative for eye, ENT, GI, , allergic, dermatologic, musculoskeletal and neurologic except as described above    PAST MEDICAL & SURGICAL HISTORY:  Serum phosphorus decreased  Last level 1.5 at PST 1/13    Calculus of ureter  s/p R ureteral stent 12/20/2020    MARTINA on CPAP  Pt admits to be non-compliant    Leukemia  (APL, s/p chemo in 2012, now in remission, followed by oncologist)    Anemia  Completed iron infusions every 2 months, now Hb stable    Anemia  Completed iron infusions every 2 months, now Hb stable    Aortic stenosis    Emphysema    Rotator cuff tear  Right    Diverticulosis of colon    Coronary atherosclerosis of native coronary artery    Carpal tunnel syndrome    Dyslipidemia    Osteoporosis    Hypertension    Hernia, hiatal    Former cigarette smoker    Depression    Asthma with COPD with exacerbation  Not on home O2    Acid reflux    Graves disease  s/p surgery    Elective surgery  Cystoscopy, right ureteroscopy, laser lithotripsy of right ureteral stone, right ureteral stent removal and replacement, right retrograde pyelogram on 12/6/19    H/O dilation and curettage    S/P ureteral stent placement  R in 12/2020    H/O mitral valve repair  2014    H/O aortic valve repair  2014    History of coronary angiogram  1/31/12    h/o  endometrial ablation  1998    H/O cone biopsy of cervix  1974    History of tonsillectomy  childhood    After cataract, bilateral  2010    MEDICATIONS  (STANDING):  buPROPion . 75 milliGRAM(s) Oral at bedtime  buPROPion XL . 150 milliGRAM(s) Oral every 24 hours  enoxaparin Injectable 40 milliGRAM(s) SubCutaneous daily  famotidine    Tablet 20 milliGRAM(s) Oral two times a day  levothyroxine 88 MICROGram(s) Oral daily  losartan 25 milliGRAM(s) Oral daily  metoprolol succinate ER 25 milliGRAM(s) Oral daily  simvastatin 40 milliGRAM(s) Oral at bedtime  sodium chloride 0.9% lock flush 3 milliLiter(s) IV Push every 8 hours  tiotropium 18 MICROgram(s) Capsule 1 Capsule(s) Inhalation daily    MEDICATIONS  (PRN):  acetaminophen   Tablet .. 650 milliGRAM(s) Oral every 6 hours PRN Temp greater or equal to 38C (100.4F), Mild Pain (1 - 3)  ALBUTerol    90 MICROgram(s) HFA Inhaler 2 Puff(s) Inhalation every 6 hours PRN Shortness of Breath and/or Wheezing  guaiFENesin   Syrup  (Sugar-Free) 200 milliGRAM(s) Oral every 6 hours PRN Cough  melatonin 5 milliGRAM(s) Oral at bedtime PRN Insomnia    Allergies  adhesives (Rash; Other)  Cat dander- wheezing, itchy throat, SOB (Other)  penicillin (Rash)  sulfa drugs (Rash)  tetracycline (Stomach Upset)    Vital Signs Last 24 Hrs  T(C): 36.7 (14 Mar 2021 04:51), Max: 36.7 (14 Mar 2021 04:51)  T(F): 98 (14 Mar 2021 04:51), Max: 98 (14 Mar 2021 04:51)  HR: 73 (14 Mar 2021 04:51) (66 - 80)  BP: 124/72 (14 Mar 2021 04:51) (124/72 - 129/85)  BP(mean): --  RR: 17 (14 Mar 2021 04:51) (16 - 17)  SpO2: 92% (14 Mar 2021 04:51) (91% - 95%)  I&O's Summary    13 Mar 2021 06:01  -  14 Mar 2021 07:00  --------------------------------------------------------  IN: 0 mL / OUT: 0 mL / NET: 0 mL    TELE: Not on telemetry   PHYSICAL EXAM:  Appearance: NAD, no distress, alert, Well developed   HEENT: Moist Mucous Membranes, Anicteric  Cardiovascular: Regular rate and rhythm, Normal S1 S2, No JVD, No murmurs, No rubs, gallops or clicks  Respiratory: Non-labored, Clear to auscultation, No rales, No rhonchi, No wheezing.   Gastrointestinal:  Soft, Non-tender, + BS  Neurologic: Non-focal  Skin: Warm and dry, No visible rashes or ulcers, No ecchymosis, No cyanosis  Musculoskeletal: No clubbing, No cyanosis, No joint swelling/tenderness  Psychiatry: Mood & affect appropriate  Lymph: No peripheral edema.     LABS: All Labs Reviewed:                        10.5   4.69  )-----------( 161      ( 12 Mar 2021 06:43 )             34.7     12 Mar 2021 06:43    145    |  112    |  21     ----------------------------<  86     4.1     |  25     |  0.89     Ca    8.2        12 Mar 2021 06:43    TPro  6.4    /  Alb  3.3    /  TBili  0.3    /  DBili  x      /  AST  21     /  ALT  19     /  AlkPhos  69     12 Mar 2021 06:43    12 Lead ECG:   Ventricular Rate 68 BPM  Atrial Rate 68 BPM  P-R Interval 172 ms  QRS Duration 76 ms  Q-T Interval 428 ms  QTC Calculation(Bazett) 455 ms  P Axis 104 degrees  R Axis -22 degrees  T Axis 72 degrees  Diagnosis Line Normal sinus rhythm  ST & T wave abnormality, consider lateral ischemia  Abnormal ECG  When compared with ECG of 18-DEC-2020 10:35,  Nonspecific T wave abnormality now evident in Inferior leads  T wave inversion now evident in Anterior leads  Confirmed by Shefali Fields ([id number]) on 3/7/2021 11:21:33 AM (03-07-21 @ 08:41)    < from: TTE Echo Complete w/o Contrast w/ Doppler (11.06.20 @ 21:21) >  MEASUREMENTS  IVS: 1.2cm  PWT: 1.2cm  LA: 3.0cm  AO: 2.8cm  LVIDd: 4.2cm  LVIDs: 2.7cm  LVOT: 1.5cm  LVEF: 55-60%  RVSP: 23mmHg    FINDINGS  Left Ventricle: The endocardium is not well-visualized. Grossly normal left ventricular systolic function. No segmental wall motion abnormalities are visualized. Mild concentric LVH.  Aortic Valve: Status post bioprosthetic aortic valve. Mild aortic insufficiency. The peak aortic valve gradient is equal to 22 mmHg, with a mean aortic valve gradient of 13 mmHg, which are normal in the setting of a bioprosthetic aortic valve replacement.  Mitral Valve: Mitral annular calcification. Status post bioprosthetic mitral valve. Mean mitral valve gradient is equal to 6 mmHg, which is mildly elevated even in the setting of a mitral valve replacement. Mild mitral regurgitation  Tricuspid Valve: Grossly normal tricuspid valve with mild tricuspid regurgitation  Pulmonic Valve: The pulmonic valve is not well-visualized.  Left Atrium: Moderate left atrial enlargement  Right Ventricle: Grossly normal right ventricular size and systolic function.  Right Atrium: Grossly normal.  Diastolic Function: Doppler evidence of grade 2 diastolic dysfunction  Pericardium/Pleura: No significant pericardial effusion    CONCLUSIONS:  1. Very technically difficult and limited study  2. Theendocardium is not well-visualized. Grossly normal left ventricular systolic function. No segmental wall motion abnormalities are visualized. Mild concentric LVH.  3. Status post bioprosthetic aortic valve. Mild aortic insufficiency. The peak aortic valve gradient is equal to 22 mmHg, with a mean aortic valve gradient of 13 mmHg, which are normal in the setting of a bioprosthetic aortic valve replacement.  4. Status post bioprosthetic mitral valve. Mean mitral valve gradient is equal to 6 mmHg, which is mildly elevated even in the setting of a mitral valve replacement. Mild mitral regurgitation. Moderate left atrial enlargement  5. Grossly normal right ventricular size and systolic function.  6. Doppler evidence of grade 2 diastolic dysfunction  7. No significant pericardial effusion  < end of copied text >
INTERVAL HPI/OVERNIGHT EVENTS:  pt seen and examined   s/p egd/ colon- no active bleeding - large Hiatal hernia and camerons ulceration   feels wells denies n/v/abd pain/gi bleeding  diane diet        MEDICATIONS  (STANDING):  buPROPion . 75 milliGRAM(s) Oral at bedtime  buPROPion XL . 150 milliGRAM(s) Oral every 24 hours  enoxaparin Injectable 40 milliGRAM(s) SubCutaneous daily  famotidine    Tablet 20 milliGRAM(s) Oral two times a day  levothyroxine 88 MICROGram(s) Oral daily  losartan 25 milliGRAM(s) Oral daily  metoprolol succinate ER 25 milliGRAM(s) Oral daily  simvastatin 40 milliGRAM(s) Oral at bedtime  sodium chloride 0.9% lock flush 3 milliLiter(s) IV Push every 8 hours  tiotropium 18 MICROgram(s) Capsule 1 Capsule(s) Inhalation daily    MEDICATIONS  (PRN):  acetaminophen   Tablet .. 650 milliGRAM(s) Oral every 6 hours PRN Temp greater or equal to 38C (100.4F), Mild Pain (1 - 3)  ALBUTerol    90 MICROgram(s) HFA Inhaler 2 Puff(s) Inhalation every 6 hours PRN Shortness of Breath and/or Wheezing  guaiFENesin   Syrup  (Sugar-Free) 200 milliGRAM(s) Oral every 6 hours PRN Cough  melatonin 5 milliGRAM(s) Oral at bedtime PRN Insomnia      Allergies    adhesives (Rash; Other)  Cat dander- wheezing, itchy throat, SOB (Other)  penicillin (Rash)  sulfa drugs (Rash)  tetracycline (Stomach Upset)    Intolerances                03-13-21 @ 06:01  -  03-14-21 @ 07:00  --------------------------------------------------------  IN: 0 mL / OUT: 0 mL / NET: 0 mL                          Review of Systems:    General:  No wt loss, fevers, chills, night sweats, fatigue   Eyes:  Good vision, no reported pain  ENT:  No sore throat, pain, runny nose, dysphagia  CV:  No pain, palpitations, hypo/hypertension  Resp:  No dyspnea, cough, tachypnea, wheezing  GI:  No pain, No nausea, No vomiting, No diarrhea, No constipation, No weight loss, No fever, No pruritis, No rectal bleeding, No melena, No dysphagia  :  No pain, bleeding, incontinence, nocturia  Muscle:  No pain, weakness  Neuro:  No weakness, tingling, memory problems  Psych:  No fatigue, insomnia, mood problems, depression  Endocrine:  No polyuria, polydypsia, cold/heat intolerance  Heme:  No petechiae, ecchymosis, easy bruisability  Skin:  No rash, tattoos, scars, edema        Vital Signs Last 24 Hrs  T(C): 36.7 (14 Mar 2021 04:51), Max: 36.7 (14 Mar 2021 04:51)  T(F): 98 (14 Mar 2021 04:51), Max: 98 (14 Mar 2021 04:51)  HR: 73 (14 Mar 2021 04:51) (66 - 80)  BP: 124/72 (14 Mar 2021 04:51) (124/72 - 129/85)  BP(mean): --  RR: 17 (14 Mar 2021 04:51) (16 - 17)  SpO2: 92% (14 Mar 2021 04:51) (91% - 95%)            PHYSICAL EXAM:    Constitutional: lying in bed  HEENT: ncat  Gastrointestinal: soft nt nd  Extremities: No peripheral edema  Vascular: + peripheral pulses  Neurological: Awake alert appropriate        LABS:                        10.5   4.69  )-----------( 161      ( 12 Mar 2021 06:43 )             34.7     03-12    145  |  112<H>  |  21  ----------------------------<  86  4.1   |  25  |  0.89    Ca    8.2<L>      12 Mar 2021 06:43    TPro  6.4  /  Alb  3.3  /  TBili  0.3  /  DBili  x   /  AST  21  /  ALT  19  /  AlkPhos  69  03-12              RADIOLOGY & ADDITIONAL TESTS:  
INTERVAL HPI/OVERNIGHT EVENTS:  pt seen and examined   s/p egd/ colon- no active bleeding - large Hiatal hernia and camerons ulceration   feels wells denies n/v/abd pain/gi bleeding  diane diet        MEDICATIONS  (STANDING):  buPROPion . 75 milliGRAM(s) Oral at bedtime  buPROPion XL . 150 milliGRAM(s) Oral every 24 hours  famotidine    Tablet 20 milliGRAM(s) Oral two times a day  iron sucrose Injectable 100 milliGRAM(s) IV Push every 24 hours  levothyroxine 88 MICROGram(s) Oral daily  losartan 25 milliGRAM(s) Oral daily  metoprolol succinate ER 25 milliGRAM(s) Oral daily  simvastatin 40 milliGRAM(s) Oral at bedtime  sodium chloride 0.9% lock flush 3 milliLiter(s) IV Push every 8 hours  tiotropium 18 MICROgram(s) Capsule 1 Capsule(s) Inhalation daily    MEDICATIONS  (PRN):  acetaminophen   Tablet .. 650 milliGRAM(s) Oral every 6 hours PRN Temp greater or equal to 38C (100.4F), Mild Pain (1 - 3)  ALBUTerol    90 MICROgram(s) HFA Inhaler 2 Puff(s) Inhalation every 6 hours PRN Shortness of Breath and/or Wheezing  guaiFENesin   Syrup  (Sugar-Free) 200 milliGRAM(s) Oral every 6 hours PRN Cough  melatonin 5 milliGRAM(s) Oral at bedtime PRN Insomnia      Allergies    adhesives (Rash; Other)  Cat dander- wheezing, itchy throat, SOB (Other)  penicillin (Rash)  sulfa drugs (Rash)  tetracycline (Stomach Upset)    Intolerances            Review of Systems:    General:  No wt loss, fevers, chills, night sweats, fatigue   Eyes:  Good vision, no reported pain  ENT:  No sore throat, pain, runny nose, dysphagia  CV:  No pain, palpitations, hypo/hypertension  Resp:  No dyspnea, cough, tachypnea, wheezing  GI:  No pain, No nausea, No vomiting, No diarrhea, No constipation, No weight loss, No fever, No pruritis, No rectal bleeding, No melena, No dysphagia  :  No pain, bleeding, incontinence, nocturia  Muscle:  No pain, weakness  Neuro:  No weakness, tingling, memory problems  Psych:  No fatigue, insomnia, mood problems, depression  Endocrine:  No polyuria, polydypsia, cold/heat intolerance  Heme:  No petechiae, ecchymosis, easy bruisability  Skin:  No rash, tattoos, scars, edema        Vital Signs Last 24 Hrs  T(C): 36.6 (13 Mar 2021 05:12), Max: 36.6 (12 Mar 2021 12:47)  T(F): 97.8 (13 Mar 2021 05:12), Max: 97.8 (12 Mar 2021 12:47)  HR: 70 (13 Mar 2021 05:12) (70 - 81)  BP: 126/76 (13 Mar 2021 05:12) (107/61 - 154/85)  BP(mean): --  RR: 16 (13 Mar 2021 05:12) (15 - 16)  SpO2: 93% (13 Mar 2021 05:12) (92% - 99%)      PHYSICAL EXAM:    Constitutional: lying in bed  HEENT: ncat  Gastrointestinal: soft nt nd  Extremities: No peripheral edema  Vascular: + peripheral pulses  Neurological: Awake alert appropriate        LABS:                        10.5   4.69  )-----------( 161      ( 12 Mar 2021 06:43 )             34.7     03-12    145  |  112<H>  |  21  ----------------------------<  86  4.1   |  25  |  0.89    Ca    8.2<L>      12 Mar 2021 06:43    TPro  6.4  /  Alb  3.3  /  TBili  0.3  /  DBili  x   /  AST  21  /  ALT  19  /  AlkPhos  69  03-12              RADIOLOGY & ADDITIONAL TESTS:  
Interval History:  complains of some right side groin pain. .thinks it is from spinal stenosis  s/p IVC filter  Chart reviewed and events noted;   Overnight events:    MEDICATIONS  (STANDING):  buPROPion . 75 milliGRAM(s) Oral at bedtime  buPROPion XL . 150 milliGRAM(s) Oral every 24 hours  famotidine    Tablet 20 milliGRAM(s) Oral two times a day  iron sucrose Injectable 100 milliGRAM(s) IV Push every 24 hours  levothyroxine 88 MICROGram(s) Oral daily  losartan 25 milliGRAM(s) Oral daily  metoprolol succinate ER 25 milliGRAM(s) Oral daily  simvastatin 40 milliGRAM(s) Oral at bedtime  sodium chloride 0.9% lock flush 3 milliLiter(s) IV Push every 8 hours  tiotropium 18 MICROgram(s) Capsule 1 Capsule(s) Inhalation daily    MEDICATIONS  (PRN):  acetaminophen   Tablet .. 650 milliGRAM(s) Oral every 6 hours PRN Temp greater or equal to 38C (100.4F), Mild Pain (1 - 3)  ALBUTerol    90 MICROgram(s) HFA Inhaler 2 Puff(s) Inhalation every 6 hours PRN Shortness of Breath and/or Wheezing  guaiFENesin   Syrup  (Sugar-Free) 200 milliGRAM(s) Oral every 6 hours PRN Cough  melatonin 5 milliGRAM(s) Oral at bedtime PRN Insomnia      Vital Signs Last 24 Hrs  T(C): 36.6 (13 Mar 2021 05:12), Max: 36.6 (12 Mar 2021 12:47)  T(F): 97.8 (13 Mar 2021 05:12), Max: 97.8 (12 Mar 2021 12:47)  HR: 70 (13 Mar 2021 05:12) (70 - 81)  BP: 126/76 (13 Mar 2021 05:12) (107/61 - 154/85)  BP(mean): --  RR: 16 (13 Mar 2021 05:12) (15 - 16)  SpO2: 93% (13 Mar 2021 05:12) (92% - 99%)    PHYSICAL EXAM  General: adult in NAD  HEENT: clear oropharynx, anicteric sclera, pink conjunctivae  Neck: supple  CV: normal S1S2 with no murmur rubs or gallops  Lungs: clear to auscultation, no wheezes, no rhales  Abdomen: soft non-tender non-distended, no hepato/splenomegaly  Ext: no clubbing cyanosis or edema  Skin: no rashes and no petichiae  Neuro: alert and oriented X3 no focal deficits      LABS:  CBC Full  -  ( 12 Mar 2021 06:43 )  WBC Count : 4.69 K/uL  RBC Count : 3.74 M/uL  Hemoglobin : 10.5 g/dL  Hematocrit : 34.7 %  Platelet Count - Automated : 161 K/uL  Mean Cell Volume : 92.8 fl  Mean Cell Hemoglobin : 28.1 pg  Mean Cell Hemoglobin Concentration : 30.3 gm/dL  Auto Neutrophil # : x  Auto Lymphocyte # : x  Auto Monocyte # : x  Auto Eosinophil # : x  Auto Basophil # : x  Auto Neutrophil % : x  Auto Lymphocyte % : x  Auto Monocyte % : x  Auto Eosinophil % : x  Auto Basophil % : x    03-12    145  |  112<H>  |  21  ----------------------------<  86  4.1   |  25  |  0.89    Ca    8.2<L>      12 Mar 2021 06:43    TPro  6.4  /  Alb  3.3  /  TBili  0.3  /  DBili  x   /  AST  21  /  ALT  19  /  AlkPhos  69  03-12        fe studies  Ferritin, Serum: 332 ng/mL (03-08 @ 19:26)  Iron - Total Binding Capacity.: 290 ug/dL (03-08 @ 19:24)      WBC trend  4.69 K/uL (03-12-21 @ 06:43)  4.16 K/uL (03-11-21 @ 06:51)      Hgb trend  10.5 g/dL (03-12-21 @ 06:43)  10.2 g/dL (03-11-21 @ 06:51)      plt trend  161 K/uL (03-12-21 @ 06:43)  159 K/uL (03-11-21 @ 06:51)        RADIOLOGY & ADDITIONAL STUDIES:    
Interval History:  feels better. is being discharged  Chart reviewed and events noted;   Overnight events:    MEDICATIONS  (STANDING):  buPROPion . 75 milliGRAM(s) Oral at bedtime  buPROPion XL . 150 milliGRAM(s) Oral every 24 hours  enoxaparin Injectable 40 milliGRAM(s) SubCutaneous daily  famotidine    Tablet 20 milliGRAM(s) Oral two times a day  levothyroxine 88 MICROGram(s) Oral daily  losartan 25 milliGRAM(s) Oral daily  metoprolol succinate ER 25 milliGRAM(s) Oral daily  simvastatin 40 milliGRAM(s) Oral at bedtime  sodium chloride 0.9% lock flush 3 milliLiter(s) IV Push every 8 hours  tiotropium 18 MICROgram(s) Capsule 1 Capsule(s) Inhalation daily    MEDICATIONS  (PRN):  acetaminophen   Tablet .. 650 milliGRAM(s) Oral every 6 hours PRN Temp greater or equal to 38C (100.4F), Mild Pain (1 - 3)  ALBUTerol    90 MICROgram(s) HFA Inhaler 2 Puff(s) Inhalation every 6 hours PRN Shortness of Breath and/or Wheezing  guaiFENesin   Syrup  (Sugar-Free) 200 milliGRAM(s) Oral every 6 hours PRN Cough  melatonin 5 milliGRAM(s) Oral at bedtime PRN Insomnia      Vital Signs Last 24 Hrs  T(C): 36.7 (14 Mar 2021 04:51), Max: 36.7 (14 Mar 2021 04:51)  T(F): 98 (14 Mar 2021 04:51), Max: 98 (14 Mar 2021 04:51)  HR: 73 (14 Mar 2021 04:51) (66 - 80)  BP: 124/72 (14 Mar 2021 04:51) (124/72 - 129/85)  BP(mean): --  RR: 17 (14 Mar 2021 04:51) (16 - 17)  SpO2: 92% (14 Mar 2021 04:51) (91% - 95%)    PHYSICAL EXAM  General: adult in NAD  HEENT: clear oropharynx, anicteric sclera, pink conjunctivae  Neck: supple  CV: normal S1S2 with no murmur rubs or gallops  Lungs: clear to auscultation, no wheezes, no rhales  Abdomen: soft non-tender non-distended, no hepato/splenomegaly  Ext: no clubbing cyanosis or edema  Skin: no rashes and no petichiae  Neuro: alert and oriented X3 no focal deficits      LABS:              fe studies      WBC trend  4.69 K/uL (03-12-21 @ 06:43)      Hgb trend  10.5 g/dL (03-12-21 @ 06:43)      plt trend  161 K/uL (03-12-21 @ 06:43)        RADIOLOGY & ADDITIONAL STUDIES:    
Patient is a 81y old  Female who presents with a chief complaint of GIB (12 Mar 2021 18:43)    Date of servie : 03-13-21 @ 15:45  INTERVAL HPI/OVERNIGHT EVENTS:  T(C): 36.3 (03-13-21 @ 13:25), Max: 36.6 (03-13-21 @ 05:12)  HR: 66 (03-13-21 @ 13:25) (66 - 75)  BP: 129/85 (03-13-21 @ 13:25) (107/61 - 129/85)  RR: 16 (03-13-21 @ 13:25) (16 - 16)  SpO2: 95% (03-13-21 @ 13:25) (92% - 96%)  Wt(kg): --  I&O's Summary      LABS:                        10.5   4.69  )-----------( 161      ( 12 Mar 2021 06:43 )             34.7     03-12    145  |  112<H>  |  21  ----------------------------<  86  4.1   |  25  |  0.89    Ca    8.2<L>      12 Mar 2021 06:43    TPro  6.4  /  Alb  3.3  /  TBili  0.3  /  DBili  x   /  AST  21  /  ALT  19  /  AlkPhos  69  03-12        CAPILLARY BLOOD GLUCOSE                MEDICATIONS  (STANDING):  buPROPion . 75 milliGRAM(s) Oral at bedtime  buPROPion XL . 150 milliGRAM(s) Oral every 24 hours  enoxaparin Injectable 40 milliGRAM(s) SubCutaneous daily  famotidine    Tablet 20 milliGRAM(s) Oral two times a day  iron sucrose Injectable 100 milliGRAM(s) IV Push every 24 hours  levothyroxine 88 MICROGram(s) Oral daily  losartan 25 milliGRAM(s) Oral daily  metoprolol succinate ER 25 milliGRAM(s) Oral daily  simvastatin 40 milliGRAM(s) Oral at bedtime  sodium chloride 0.9% lock flush 3 milliLiter(s) IV Push every 8 hours  tiotropium 18 MICROgram(s) Capsule 1 Capsule(s) Inhalation daily    MEDICATIONS  (PRN):  acetaminophen   Tablet .. 650 milliGRAM(s) Oral every 6 hours PRN Temp greater or equal to 38C (100.4F), Mild Pain (1 - 3)  ALBUTerol    90 MICROgram(s) HFA Inhaler 2 Puff(s) Inhalation every 6 hours PRN Shortness of Breath and/or Wheezing  guaiFENesin   Syrup  (Sugar-Free) 200 milliGRAM(s) Oral every 6 hours PRN Cough  melatonin 5 milliGRAM(s) Oral at bedtime PRN Insomnia          PHYSICAL EXAM:  GENERAL: NAD, well-groomed, well-developed  HEAD:  Atraumatic, Normocephalic  CHEST/LUNG: Clear to percussion bilaterally; No rales, rhonchi, wheezing, or rubs  HEART: Regular rate and rhythm; No murmurs, rubs, or gallops  ABDOMEN: Soft, Nontender, Nondistended; Bowel sounds present  EXTREMITIES:  2+ Peripheral Pulses, No clubbing, cyanosis, or edema  LYMPH: No lymphadenopathy noted  SKIN: No rashes or lesions    Care Discussed with Consultants/Other Providers [ ] YES  [ ] NO
Patient is a 81y old  Female who presents with a chief complaint of hemoptysis (09 Mar 2021 11:06)    Date of servie : 03-09-21 @ 13:58  INTERVAL HPI/OVERNIGHT EVENTS:  T(C): 36.4 (03-09-21 @ 13:44), Max: 36.8 (03-08-21 @ 20:15)  HR: 69 (03-09-21 @ 13:44) (68 - 75)  BP: 106/66 (03-09-21 @ 13:44) (106/66 - 168/89)  RR: 18 (03-09-21 @ 13:44) (18 - 21)  SpO2: 90% (03-09-21 @ 13:44) (90% - 95%)  Wt(kg): --  I&O's Summary    08 Mar 2021 07:01  -  09 Mar 2021 07:00  --------------------------------------------------------  IN: 240 mL / OUT: 0 mL / NET: 240 mL        LABS:                        10.2   5.33  )-----------( 168      ( 09 Mar 2021 06:22 )             33.5     03-09    141  |  110<H>  |  22  ----------------------------<  97  4.3   |  27  |  0.85    Ca    8.2<L>      09 Mar 2021 06:22    TPro  6.0  /  Alb  3.1<L>  /  TBili  0.3  /  DBili  x   /  AST  12<L>  /  ALT  13  /  AlkPhos  66  03-09        CAPILLARY BLOOD GLUCOSE                MEDICATIONS  (STANDING):  buPROPion . 75 milliGRAM(s) Oral at bedtime  buPROPion XL . 150 milliGRAM(s) Oral every 24 hours  levothyroxine 88 MICROGram(s) Oral daily  losartan 25 milliGRAM(s) Oral daily  metoprolol succinate ER 25 milliGRAM(s) Oral daily  simvastatin 40 milliGRAM(s) Oral at bedtime  sodium chloride 0.9% lock flush 3 milliLiter(s) IV Push every 8 hours  tiotropium 18 MICROgram(s) Capsule 1 Capsule(s) Inhalation daily    MEDICATIONS  (PRN):  ALBUTerol    90 MICROgram(s) HFA Inhaler 2 Puff(s) Inhalation every 6 hours PRN Shortness of Breath and/or Wheezing  guaiFENesin   Syrup  (Sugar-Free) 200 milliGRAM(s) Oral every 6 hours PRN Cough          PHYSICAL EXAM:  GENERAL: NAD, well-groomed, well-developed  HEAD:  Atraumatic, Normocephalic  CHEST/LUNG: Clear to percussion bilaterally; No rales, rhonchi, wheezing, or rubs  HEART: Regular rate and rhythm; No murmurs, rubs, or gallops  ABDOMEN: Soft, Nontender, Nondistended; Bowel sounds present  EXTREMITIES:  2+ Peripheral Pulses, No clubbing, cyanosis, or edema  LYMPH: No lymphadenopathy noted  SKIN: No rashes or lesions    Care Discussed with Consultants/Other Providers [ ] YES  [ ] NO
Patient is a 81y old  Female who presents with a chief complaint of hemoptysis (11 Mar 2021 08:49)    Date of servie : 03-11-21 @ 17:46  INTERVAL HPI/OVERNIGHT EVENTS:  T(C): 36.7 (03-11-21 @ 13:05), Max: 36.9 (03-10-21 @ 20:52)  HR: 65 (03-11-21 @ 13:05) (65 - 79)  BP: 102/68 (03-11-21 @ 13:05) (102/68 - 134/76)  RR: 18 (03-11-21 @ 13:05) (18 - 18)  SpO2: 92% (03-11-21 @ 13:05) (92% - 93%)  Wt(kg): --  I&O's Summary      LABS:                        10.2   4.16  )-----------( 159      ( 11 Mar 2021 06:51 )             34.2     03-11    143  |  112<H>  |  28<H>  ----------------------------<  93  4.6   |  28  |  0.88    Ca    8.2<L>      11 Mar 2021 06:51    TPro  5.9<L>  /  Alb  3.1<L>  /  TBili  0.3  /  DBili  x   /  AST  12<L>  /  ALT  13  /  AlkPhos  63  03-11        CAPILLARY BLOOD GLUCOSE                MEDICATIONS  (STANDING):  bisacodyl 10 milliGRAM(s) Oral every 4 hours  buPROPion . 75 milliGRAM(s) Oral at bedtime  buPROPion XL . 150 milliGRAM(s) Oral every 24 hours  famotidine    Tablet 20 milliGRAM(s) Oral two times a day  iron sucrose Injectable 100 milliGRAM(s) IV Push every 24 hours  levothyroxine 88 MICROGram(s) Oral daily  losartan 25 milliGRAM(s) Oral daily  metoprolol succinate ER 25 milliGRAM(s) Oral daily  polyethylene glycol/electrolyte Solution 1 Liter(s) Oral every 4 hours  simvastatin 40 milliGRAM(s) Oral at bedtime  sodium chloride 0.9% lock flush 3 milliLiter(s) IV Push every 8 hours  tiotropium 18 MICROgram(s) Capsule 1 Capsule(s) Inhalation daily    MEDICATIONS  (PRN):  ALBUTerol    90 MICROgram(s) HFA Inhaler 2 Puff(s) Inhalation every 6 hours PRN Shortness of Breath and/or Wheezing  guaiFENesin   Syrup  (Sugar-Free) 200 milliGRAM(s) Oral every 6 hours PRN Cough  melatonin 5 milliGRAM(s) Oral at bedtime PRN Insomnia          PHYSICAL EXAM:  GENERAL: NAD, well-groomed, well-developed  HEAD:  Atraumatic, Normocephalic  CHEST/LUNG: Clear to percussion bilaterally; No rales, rhonchi, wheezing, or rubs  HEART: Regular rate and rhythm; No murmurs, rubs, or gallops  ABDOMEN: Soft, Nontender, Nondistended; Bowel sounds present  EXTREMITIES:  2+ Peripheral Pulses, No clubbing, cyanosis, or edema  LYMPH: No lymphadenopathy noted  SKIN: No rashes or lesions    Care Discussed with Consultants/Other Providers [ ] YES  [ ] NO
Patient is a 81y old  Female who presents with a chief complaint of hemoptysis (11 Mar 2021 08:49)    Date of servie : 03-12-21 @ 15:51  INTERVAL HPI/OVERNIGHT EVENTS:  T(C): 36.6 (03-12-21 @ 12:47), Max: 36.6 (03-11-21 @ 20:44)  HR: 79 (03-12-21 @ 12:47) (67 - 89)  BP: 154/85 (03-12-21 @ 12:47) (128/81 - 159/85)  RR: 16 (03-12-21 @ 12:47) (15 - 18)  SpO2: 93% (03-12-21 @ 12:47) (92% - 99%)  Wt(kg): --  I&O's Summary    11 Mar 2021 07:01  -  12 Mar 2021 07:00  --------------------------------------------------------  IN: 0 mL / OUT: 250 mL / NET: -250 mL        LABS:                        10.5   4.69  )-----------( 161      ( 12 Mar 2021 06:43 )             34.7     03-12    145  |  112<H>  |  21  ----------------------------<  86  4.1   |  25  |  0.89    Ca    8.2<L>      12 Mar 2021 06:43    TPro  6.4  /  Alb  3.3  /  TBili  0.3  /  DBili  x   /  AST  21  /  ALT  19  /  AlkPhos  69  03-12        CAPILLARY BLOOD GLUCOSE                MEDICATIONS  (STANDING):  buPROPion . 75 milliGRAM(s) Oral at bedtime  buPROPion XL . 150 milliGRAM(s) Oral every 24 hours  famotidine    Tablet 20 milliGRAM(s) Oral two times a day  iron sucrose Injectable 100 milliGRAM(s) IV Push every 24 hours  levothyroxine 88 MICROGram(s) Oral daily  losartan 25 milliGRAM(s) Oral daily  metoprolol succinate ER 25 milliGRAM(s) Oral daily  simvastatin 40 milliGRAM(s) Oral at bedtime  sodium chloride 0.9% lock flush 3 milliLiter(s) IV Push every 8 hours  tiotropium 18 MICROgram(s) Capsule 1 Capsule(s) Inhalation daily    MEDICATIONS  (PRN):  acetaminophen   Tablet .. 650 milliGRAM(s) Oral every 6 hours PRN Temp greater or equal to 38C (100.4F), Mild Pain (1 - 3)  ALBUTerol    90 MICROgram(s) HFA Inhaler 2 Puff(s) Inhalation every 6 hours PRN Shortness of Breath and/or Wheezing  guaiFENesin   Syrup  (Sugar-Free) 200 milliGRAM(s) Oral every 6 hours PRN Cough  melatonin 5 milliGRAM(s) Oral at bedtime PRN Insomnia          PHYSICAL EXAM:  GENERAL: NAD  CHEST/LUNG: Clear to percussion bilaterally; No rales, rhonchi, wheezing, or rubs  HEART: Regular rate and rhythm; No murmurs, rubs, or gallops  ABDOMEN: Soft, Nontender, Nondistended; Bowel sounds present  EXTREMITIES:  2+ Peripheral Pulses, No clubbing, cyanosis, or edema  LYMPH: No lymphadenopathy noted  SKIN: No rashes or lesions    Care Discussed with Consultants/Other Providers [ x] YES  [ ] NO
[facility] -- Brandy Harmon, Ilan Leary, Chico Begum, Donnie Shah: Office # [phone]    Follow Up: MVR, AVR, HTN      Subjective/Observations:  Patient seen and examined. Patient awake, alert, resting in bed. No complaints of chest pain, dyspnea, palpitations or dizziness. No signs of orthopnea or PND. Tolerating room air.    REVIEW OF SYSTEMS: All review of systems is negative for eye, ENT, GI, , allergic, dermatologic, musculoskeletal and neurologic except as described above    PAST MEDICAL & SURGICAL HISTORY:  Serum phosphorus decreased  Last level 1.5 at PST 1/13    Calculus of ureter  s/p R ureteral stent 12/20/2020    MARTINA on CPAP  Pt admits to be non-compliant    Leukemia  (APL, s/p chemo in 2012, now in remission, followed by oncologist)    Anemia  Completed iron infusions every 2 months, now Hb stable    Anemia  Completed iron infusions every 2 months, now Hb stable    Aortic stenosis    Emphysema    Rotator cuff tear  Right    Diverticulosis of colon    Coronary atherosclerosis of native coronary artery    Carpal tunnel syndrome    Dyslipidemia    Osteoporosis    Hypertension    Hernia, hiatal    Former cigarette smoker    Depression    Asthma with COPD with exacerbation  Not on home O2    Acid reflux    Graves disease  s/p surgery    Elective surgery  Cystoscopy, right ureteroscopy, laser lithotripsy of right ureteral stone, right ureteral stent removal and replacement, right retrograde pyelogram on 12/6/19    H/O dilation and curettage    S/P ureteral stent placement  R in 12/2020    H/O mitral valve repair  2014    H/O aortic valve repair  2014    History of coronary angiogram  1/31/12    h/o  endometrial ablation  1998    H/O cone biopsy of cervix  1974    History of tonsillectomy  childhood    After cataract, bilateral  2010    MEDICATIONS  (STANDING):  buPROPion . 75 milliGRAM(s) Oral at bedtime  buPROPion XL . 150 milliGRAM(s) Oral every 24 hours  famotidine    Tablet 20 milliGRAM(s) Oral two times a day  iron sucrose Injectable 100 milliGRAM(s) IV Push every 24 hours  levothyroxine 88 MICROGram(s) Oral daily  losartan 25 milliGRAM(s) Oral daily  metoprolol succinate ER 25 milliGRAM(s) Oral daily  simvastatin 40 milliGRAM(s) Oral at bedtime  sodium chloride 0.9% lock flush 3 milliLiter(s) IV Push every 8 hours  tiotropium 18 MICROgram(s) Capsule 1 Capsule(s) Inhalation daily    MEDICATIONS  (PRN):  acetaminophen   Tablet .. 650 milliGRAM(s) Oral every 6 hours PRN Temp greater or equal to 38C (100.4F), Mild Pain (1 - 3)  ALBUTerol    90 MICROgram(s) HFA Inhaler 2 Puff(s) Inhalation every 6 hours PRN Shortness of Breath and/or Wheezing  guaiFENesin   Syrup  (Sugar-Free) 200 milliGRAM(s) Oral every 6 hours PRN Cough  melatonin 5 milliGRAM(s) Oral at bedtime PRN Insomnia    Allergies    adhesives (Rash; Other)  Cat dander- wheezing, itchy throat, SOB (Other)  penicillin (Rash)  sulfa drugs (Rash)  tetracycline (Stomach Upset)    Vital Signs Last 24 Hrs  T(C): 36.6 (13 Mar 2021 05:12), Max: 36.6 (12 Mar 2021 12:47)  T(F): 97.8 (13 Mar 2021 05:12), Max: 97.8 (12 Mar 2021 12:47)  HR: 70 (13 Mar 2021 05:12) (70 - 89)  BP: 126/76 (13 Mar 2021 05:12) (107/61 - 157/91)  BP(mean): --  RR: 16 (13 Mar 2021 05:12) (15 - 16)  SpO2: 93% (13 Mar 2021 05:12) (92% - 99%)  I&O's Summary        TELE: Not on telemetry   PHYSICAL EXAM:  Appearance: NAD, no distress, alert, Well developed   HEENT: Moist Mucous Membranes, Anicteric  Cardiovascular: Regular rate and rhythm, Normal S1 S2, No JVD, No murmurs, No rubs, gallops or clicks  Respiratory: Non-labored, Clear to auscultation, No rales, No rhonchi, No wheezing.   Gastrointestinal:  Soft, Non-tender, + BS  Neurologic: Non-focal  Skin: Warm and dry, No visible rashes or ulcers, No ecchymosis, No cyanosis  Musculoskeletal: No clubbing, No cyanosis, No joint swelling/tenderness  Psychiatry: Mood & affect appropriate  Lymph: No peripheral edema.     LABS: All Labs Reviewed:                        10.5   4.69  )-----------( 161      ( 12 Mar 2021 06:43 )             34.7                         10.2   4.16  )-----------( 159      ( 11 Mar 2021 06:51 )             34.2     12 Mar 2021 06:43    145    |  112    |  21     ----------------------------<  86     4.1     |  25     |  0.89   11 Mar 2021 06:51    143    |  112    |  28     ----------------------------<  93     4.6     |  28     |  0.88     Ca    8.2        12 Mar 2021 06:43  Ca    8.2        11 Mar 2021 06:51    TPro  6.4    /  Alb  3.3    /  TBili  0.3    /  DBili  x      /  AST  21     /  ALT  19     /  AlkPhos  69     12 Mar 2021 06:43  TPro  5.9    /  Alb  3.1    /  TBili  0.3    /  DBili  x      /  AST  12     /  ALT  13     /  AlkPhos  63     11 Mar 2021 06:51    12 Lead ECG:   Ventricular Rate 68 BPM  Atrial Rate 68 BPM  P-R Interval 172 ms  QRS Duration 76 ms  Q-T Interval 428 ms  QTC Calculation(Bazett) 455 ms  P Axis 104 degrees  R Axis -22 degrees  T Axis 72 degrees  Diagnosis Line Normal sinus rhythm  ST & T wave abnormality, consider lateral ischemia  Abnormal ECG  When compared with ECG of 18-DEC-2020 10:35,  Nonspecific T wave abnormality now evident in Inferior leads  T wave inversion now evident in Anterior leads  Confirmed by Shefali Fields ([id number]) on 3/7/2021 11:21:33 AM (03-07-21 @ 08:41)    < from: TTE Echo Complete w/o Contrast w/ Doppler (11.06.20 @ 21:21) >  MEASUREMENTS  IVS: 1.2cm  PWT: 1.2cm  LA: 3.0cm  AO: 2.8cm  LVIDd: 4.2cm  LVIDs: 2.7cm  LVOT: 1.5cm  LVEF: 55-60%  RVSP: 23mmHg    FINDINGS  Left Ventricle: The endocardium is not well-visualized. Grossly normal left ventricular systolic function. No segmental wall motion abnormalities are visualized. Mild concentric LVH.  Aortic Valve: Status post bioprosthetic aortic valve. Mild aortic insufficiency. The peak aortic valve gradient is equal to 22 mmHg, with a mean aortic valve gradient of 13 mmHg, which are normal in the setting of a bioprosthetic aortic valve replacement.  Mitral Valve: Mitral annular calcification. Status post bioprosthetic mitral valve. Mean mitral valve gradient is equal to 6 mmHg, which is mildly elevated even in the setting of a mitral valve replacement. Mild mitral regurgitation  Tricuspid Valve: Grossly normal tricuspid valve with mild tricuspid regurgitation  Pulmonic Valve: The pulmonic valve is not well-visualized.  Left Atrium: Moderate left atrial enlargement  Right Ventricle: Grossly normal right ventricular size and systolic function.  Right Atrium: Grossly normal.  Diastolic Function: Doppler evidence of grade 2 diastolic dysfunction  Pericardium/Pleura: No significant pericardial effusion    CONCLUSIONS:  1. Very technically difficult and limited study  2. Theendocardium is not well-visualized. Grossly normal left ventricular systolic function. No segmental wall motion abnormalities are visualized. Mild concentric LVH.  3. Status post bioprosthetic aortic valve. Mild aortic insufficiency. The peak aortic valve gradient is equal to 22 mmHg, with a mean aortic valve gradient of 13 mmHg, which are normal in the setting of a bioprosthetic aortic valve replacement.  4. Status post bioprosthetic mitral valve. Mean mitral valve gradient is equal to 6 mmHg, which is mildly elevated even in the setting of a mitral valve replacement. Mild mitral regurgitation. Moderate left atrial enlargement  5. Grossly normal right ventricular size and systolic function.  6. Doppler evidence of grade 2 diastolic dysfunction  7. No significant pericardial effusion  < end of copied text >
[INTERVAL HX: ]  Patient seen and examined;  Chart reviewed and events noted;   agreed to colonoscopy later today, prior hesitant this AM      Patient is a 81y Female with a known history of :  Hemoptysis [R04.2]  Serum phosphorus decreased [E83.39]  Calculus of ureter [N20.1]  MARTINA on CPAP [G47.33]  Leukemia [C95.90]  Anemia [D64.9]  Spinal stenosis [724.00]  Mitral regurgitation [424.0]  Aortic stenosis [424.1]  Emphysema [492.8]  Duodenal ulcer [532.90]  Rotator cuff tear [840.4]  Diverticulosis of colon [562.10]  Coronary atherosclerosis of native coronary artery [414.01]  Carpal tunnel syndrome [354.0]  Dyslipidemia [272.4]  Obstructive sleep apnea [327.23]  Osteoporosis [733.00]  Hypertension [401.9]  Hernia, hiatal [553.3]  Former cigarette smoker [V15.82]  Depression [311]  Asthma with COPD with exacerbation [493.22]  Asthma [493.90]  Acid reflux [530.81]  Graves disease [242.00]  Elective surgery [Z41.9]  H/O dilation and curettage [Z98.890]  S/P ureteral stent placement [Z96.0]  H/O mitral valve repair [Z98.890]  H/O aortic valve repair [Z98.890]  History of coronary angiogram [V15.1]  h/o  endometrial ablation [V45.89]  H/O cone biopsy of cervix [V45.89]  History of tonsillectomy [V45.89]  After cataract, bilateral [366.50]  Carpal tunnel right repair [354.0]      HPI:  82 yo f who is currently on eliquis for dvt pe has ongoing gib but needs ivc filter placment before discontinuing doac, MARTINA, Leukemia emphysema, cad graves, former smoker. she was at home this am and noticed she was spitting up blood. she reports that last week she had rust colored sputum.  no fever denies other covid sx no travel no ill contacts. no abd pain or changes in diet no nose bleed (07 Mar 2021 12:43)        MEDICATIONS  (STANDING):  buPROPion . 75 milliGRAM(s) Oral at bedtime  buPROPion XL . 150 milliGRAM(s) Oral every 24 hours  famotidine    Tablet 20 milliGRAM(s) Oral two times a day  levothyroxine 88 MICROGram(s) Oral daily  losartan 25 milliGRAM(s) Oral daily  metoprolol succinate ER 25 milliGRAM(s) Oral daily  simvastatin 40 milliGRAM(s) Oral at bedtime  sodium chloride 0.9% lock flush 3 milliLiter(s) IV Push every 8 hours  tiotropium 18 MICROgram(s) Capsule 1 Capsule(s) Inhalation daily    MEDICATIONS  (PRN):  ALBUTerol    90 MICROgram(s) HFA Inhaler 2 Puff(s) Inhalation every 6 hours PRN Shortness of Breath and/or Wheezing  guaiFENesin   Syrup  (Sugar-Free) 200 milliGRAM(s) Oral every 6 hours PRN Cough      Vital Signs Last 24 Hrs  T(C): 36.6 (10 Mar 2021 12:21), Max: 36.6 (09 Mar 2021 20:49)  T(F): 97.9 (10 Mar 2021 12:21), Max: 97.9 (10 Mar 2021 12:21)  HR: 64 (10 Mar 2021 12:21) (52 - 80)  BP: 118/73 (10 Mar 2021 12:21) (103/68 - 118/73)  BP(mean): --  RR: 16 (10 Mar 2021 12:21) (16 - 18)  SpO2: 95% (10 Mar 2021 12:21) (90% - 95%)      [PHYSICAL EXAM]  General: adult in NAD,  WN,  WD.  HEENT: clear oropharynx, anicteric sclera, pink conjunctivae.  Neck: supple, no masses.  CV: normal S1S2, no murmur, no rubs, no gallops.  Lungs: clear to auscultation, no wheezes, no rales, no rhonchi.  Abdomen: soft, non-tender, non-distended, no hepatosplenomegaly, normal BS, no guarding.  Ext: no clubbing, no cyanosis, no edema.  Skin: no rashes,  no petechiae, no venous stasis changes.  Neuro: alert and oriented X3  , no focal motor deficits.  LN: no SC JUAN.      [LABS:]                        10.4   5.26  )-----------( 160      ( 10 Mar 2021 06:36 )             34.0     03-10    142  |  111<H>  |  26<H>  ----------------------------<  97  4.6   |  27  |  1.10    Ca    7.9<L>      10 Mar 2021 06:36    TPro  5.9<L>  /  Alb  3.1<L>  /  TBili  0.3  /  DBili  x   /  AST  17  /  ALT  13  /  AlkPhos  67  03-10                [RADIOLOGY STUDIES:]  
[facility]  Richard Maxwell [place] [address]  [phone]      INTERVAL HPI/OVERNIGHT EVENTS:    hgb stable off a/c  no melena  for IVC filter     MEDICATIONS  (STANDING):  buPROPion . 75 milliGRAM(s) Oral at bedtime  buPROPion XL . 150 milliGRAM(s) Oral every 24 hours  levothyroxine 88 MICROGram(s) Oral daily  losartan 25 milliGRAM(s) Oral daily  metoprolol succinate ER 25 milliGRAM(s) Oral daily  simvastatin 40 milliGRAM(s) Oral at bedtime  sodium chloride 0.9% lock flush 3 milliLiter(s) IV Push every 8 hours  tiotropium 18 MICROgram(s) Capsule 1 Capsule(s) Inhalation daily    MEDICATIONS  (PRN):  ALBUTerol    90 MICROgram(s) HFA Inhaler 2 Puff(s) Inhalation every 6 hours PRN Shortness of Breath and/or Wheezing  guaiFENesin   Syrup  (Sugar-Free) 200 milliGRAM(s) Oral every 6 hours PRN Cough      Allergies    adhesives (Rash; Other)  Cat dander- wheezing, itchy throat, SOB (Other)  penicillin (Rash)  sulfa drugs (Rash)  tetracycline (Stomach Upset)    Intolerances        ROS:   General:  No wt loss, fevers, chills, night sweats, fatigue,   Eyes:  Good vision, no reported pain  ENT:  No sore throat, pain, runny nose, dysphagia  CV:  No pain, palpitations, hypo/hypertension  Resp:  No dyspnea, cough, tachypnea, wheezing  GI:  No pain, No nausea, No vomiting, No diarrhea, No constipation, No weight loss, No fever, No pruritis, No rectal bleeding, No tarry stools, No dysphagia,  :  No pain, bleeding, incontinence, nocturia  Muscle:  No pain, weakness  Neuro:  No weakness, tingling, memory problems  Psych:  No fatigue, insomnia, mood problems, depression  Endocrine:  No polyuria, polydipsia, cold/heat intolerance  Heme:  No petechiae, ecchymosis, easy bruisability  Skin:  No rash, tattoos, scars, edema      PHYSICAL EXAM:   Vital Signs:  Vital Signs Last 24 Hrs  T(C): 36.4 (09 Mar 2021 13:44), Max: 36.8 (08 Mar 2021 20:15)  T(F): 97.6 (09 Mar 2021 13:44), Max: 98.3 (08 Mar 2021 20:15)  HR: 69 (09 Mar 2021 13:44) (69 - 75)  BP: 106/66 (09 Mar 2021 13:44) (106/66 - 168/89)  BP(mean): --  RR: 18 (09 Mar 2021 13:44) (18 - 18)  SpO2: 90% (09 Mar 2021 13:44) (90% - 95%)  Daily     Daily     GENERAL:  Appears stated age, well-groomed, well-nourished, no distress  HEENT:  NC/AT,  conjunctivae clear and pink, no thyromegaly, nodules, adenopathy, no JVD, sclera -anicteric  CHEST:  Full & symmetric excursion, no increased effort, breath sounds clear  HEART:  Regular rhythm, S1, S2, no murmur/rub/S3/S4, no abdominal bruit, no edema  ABDOMEN:  Soft, non-tender, non-distended, normoactive bowel sounds,  no masses ,no hepato-splenomegaly, no signs of chronic liver disease  EXTEREMITIES:  no cyanosis,clubbing or edema  SKIN:  No rash/erythema/ecchymoses/petechiae/wounds/abscess/warm/dry  NEURO:  Alert, oriented, no asterixis, no tremor, no encephalopathy      LABS:                        10.2   5.33  )-----------( 168      ( 09 Mar 2021 06:22 )             33.5     03-09    141  |  110<H>  |  22  ----------------------------<  97  4.3   |  27  |  0.85    Ca    8.2<L>      09 Mar 2021 06:22    TPro  6.0  /  Alb  3.1<L>  /  TBili  0.3  /  DBili  x   /  AST  12<L>  /  ALT  13  /  AlkPhos  66  03-09          RADIOLOGY & ADDITIONAL TESTS:  
Date/Time Patient Seen:  		  Referring MD:   Data Reviewed	       Patient is a 81y old  Female who presents with a chief complaint of hemoptysis (13 Mar 2021 15:43)      Subjective/HPI     PAST MEDICAL & SURGICAL HISTORY:  Serum phosphorus decreased  Last level 1.5 at PST 1/13    Calculus of ureter  s/p R ureteral stent 12/20/2020    MARTINA on CPAP  Pt admits to be non-compliant    Leukemia  (APL, s/p chemo in 2012, now in remission, followed by oncologist)    Anemia  Completed iron infusions every 2 months, now Hb stable    Spinal stenosis    Mitral regurgitation    Aortic stenosis    Emphysema    Duodenal ulcer  at age 24    Rotator cuff tear  Right    Diverticulosis of colon    Coronary atherosclerosis of native coronary artery    Carpal tunnel syndrome    Dyslipidemia    Obstructive sleep apnea  cpap    Osteoporosis    Hypertension    Hernia, hiatal    Former cigarette smoker    Depression    Asthma with COPD with exacerbation  Not on home O2    Asthma    Acid reflux    Graves disease  s/p surgery    Elective surgery  Cystoscopy, right ureteroscopy, laser lithotripsy of right ureteral stone, right ureteral stent removal and replacement, right retrograde pyelogram on 12/6/19    H/O dilation and curettage    S/P ureteral stent placement  R in 12/2020    H/O mitral valve repair  2014    H/O aortic valve repair  2014    History of coronary angiogram  1/31/12    h/o  endometrial ablation  1998    H/O cone biopsy of cervix  1974    History of tonsillectomy  childhood    After cataract, bilateral  2010    Carpal tunnel right repair          Medication list         MEDICATIONS  (STANDING):  buPROPion . 75 milliGRAM(s) Oral at bedtime  buPROPion XL . 150 milliGRAM(s) Oral every 24 hours  enoxaparin Injectable 40 milliGRAM(s) SubCutaneous daily  famotidine    Tablet 20 milliGRAM(s) Oral two times a day  levothyroxine 88 MICROGram(s) Oral daily  losartan 25 milliGRAM(s) Oral daily  metoprolol succinate ER 25 milliGRAM(s) Oral daily  simvastatin 40 milliGRAM(s) Oral at bedtime  sodium chloride 0.9% lock flush 3 milliLiter(s) IV Push every 8 hours  tiotropium 18 MICROgram(s) Capsule 1 Capsule(s) Inhalation daily    MEDICATIONS  (PRN):  acetaminophen   Tablet .. 650 milliGRAM(s) Oral every 6 hours PRN Temp greater or equal to 38C (100.4F), Mild Pain (1 - 3)  ALBUTerol    90 MICROgram(s) HFA Inhaler 2 Puff(s) Inhalation every 6 hours PRN Shortness of Breath and/or Wheezing  guaiFENesin   Syrup  (Sugar-Free) 200 milliGRAM(s) Oral every 6 hours PRN Cough  melatonin 5 milliGRAM(s) Oral at bedtime PRN Insomnia         Vitals log        ICU Vital Signs Last 24 Hrs  T(C): 36.7 (14 Mar 2021 04:51), Max: 36.7 (14 Mar 2021 04:51)  T(F): 98 (14 Mar 2021 04:51), Max: 98 (14 Mar 2021 04:51)  HR: 73 (14 Mar 2021 04:51) (66 - 80)  BP: 124/72 (14 Mar 2021 04:51) (124/72 - 129/85)  BP(mean): --  ABP: --  ABP(mean): --  RR: 17 (14 Mar 2021 04:51) (16 - 17)  SpO2: 92% (14 Mar 2021 04:51) (91% - 95%)           Input and Output:  I&O's Detail    13 Mar 2021 06:01  -  14 Mar 2021 06:34  --------------------------------------------------------  IN:  Total IN: 0 mL    OUT:    Voided (mL): 0 mL  Total OUT: 0 mL    Total NET: 0 mL          Lab Data                        10.5   4.69  )-----------( 161      ( 12 Mar 2021 06:43 )             34.7     03-12    145  |  112<H>  |  21  ----------------------------<  86  4.1   |  25  |  0.89    Ca    8.2<L>      12 Mar 2021 06:43    TPro  6.4  /  Alb  3.3  /  TBili  0.3  /  DBili  x   /  AST  21  /  ALT  19  /  AlkPhos  69  03-12            Review of Systems	      Objective     Physical Examination    heart s1s2  lung dec BS  abd soft      Pertinent Lab findings & Imaging      Ifeanyi:  NO   Adequate UO     I&O's Detail    13 Mar 2021 06:01  -  14 Mar 2021 06:34  --------------------------------------------------------  IN:  Total IN: 0 mL    OUT:    Voided (mL): 0 mL  Total OUT: 0 mL    Total NET: 0 mL               Discussed with:     Cultures:	        Radiology                            
[facility]  Richard Maxwell PA-C  [place] [address]  [phone]      INTERVAL HPI/OVERNIGHT EVENTS:    hgb stable off a/c  no melena  sp IVC filter     MEDICATIONS  (STANDING):  buPROPion . 75 milliGRAM(s) Oral at bedtime  buPROPion XL . 150 milliGRAM(s) Oral every 24 hours  levothyroxine 88 MICROGram(s) Oral daily  losartan 25 milliGRAM(s) Oral daily  metoprolol succinate ER 25 milliGRAM(s) Oral daily  simvastatin 40 milliGRAM(s) Oral at bedtime  sodium chloride 0.9% lock flush 3 milliLiter(s) IV Push every 8 hours  tiotropium 18 MICROgram(s) Capsule 1 Capsule(s) Inhalation daily    MEDICATIONS  (PRN):  ALBUTerol    90 MICROgram(s) HFA Inhaler 2 Puff(s) Inhalation every 6 hours PRN Shortness of Breath and/or Wheezing  guaiFENesin   Syrup  (Sugar-Free) 200 milliGRAM(s) Oral every 6 hours PRN Cough      Allergies    adhesives (Rash; Other)  Cat dander- wheezing, itchy throat, SOB (Other)  penicillin (Rash)  sulfa drugs (Rash)  tetracycline (Stomach Upset)    Intolerances        ROS:   General:  No wt loss, fevers, chills, night sweats, fatigue,   Eyes:  Good vision, no reported pain  ENT:  No sore throat, pain, runny nose, dysphagia  CV:  No pain, palpitations, hypo/hypertension  Resp:  No dyspnea, cough, tachypnea, wheezing  GI:  No pain, No nausea, No vomiting, No diarrhea, No constipation, No weight loss, No fever, No pruritis, No rectal bleeding, No tarry stools, No dysphagia,  :  No pain, bleeding, incontinence, nocturia  Muscle:  No pain, weakness  Neuro:  No weakness, tingling, memory problems  Psych:  No fatigue, insomnia, mood problems, depression  Endocrine:  No polyuria, polydipsia, cold/heat intolerance  Heme:  No petechiae, ecchymosis, easy bruisability  Skin:  No rash, tattoos, scars, edema      PHYSICAL EXAM:   Vital Signs:  Vital Signs Last 24 Hrs  T(C): 36.4 (09 Mar 2021 13:44), Max: 36.8 (08 Mar 2021 20:15)  T(F): 97.6 (09 Mar 2021 13:44), Max: 98.3 (08 Mar 2021 20:15)  HR: 69 (09 Mar 2021 13:44) (69 - 75)  BP: 106/66 (09 Mar 2021 13:44) (106/66 - 168/89)  BP(mean): --  RR: 18 (09 Mar 2021 13:44) (18 - 18)  SpO2: 90% (09 Mar 2021 13:44) (90% - 95%)  Daily     Daily     GENERAL:  Appears stated age, well-groomed, well-nourished, no distress  HEENT:  NC/AT,  conjunctivae clear and pink, no thyromegaly, nodules, adenopathy, no JVD, sclera -anicteric  CHEST:  Full & symmetric excursion, no increased effort, breath sounds clear  HEART:  Regular rhythm, S1, S2, no murmur/rub/S3/S4, no abdominal bruit, no edema  ABDOMEN:  Soft, non-tender, non-distended, normoactive bowel sounds,  no masses ,no hepato-splenomegaly, no signs of chronic liver disease  EXTEREMITIES:  no cyanosis,clubbing or edema  SKIN:  No rash/erythema/ecchymoses/petechiae/wounds/abscess/warm/dry  NEURO:  Alert, oriented, no asterixis, no tremor, no encephalopathy      LABS:                        10.2   5.33  )-----------( 168      ( 09 Mar 2021 06:22 )             33.5     03-09    141  |  110<H>  |  22  ----------------------------<  97  4.3   |  27  |  0.85    Ca    8.2<L>      09 Mar 2021 06:22    TPro  6.0  /  Alb  3.1<L>  /  TBili  0.3  /  DBili  x   /  AST  12<L>  /  ALT  13  /  AlkPhos  66  03-09          RADIOLOGY & ADDITIONAL TESTS:  
[facility] -- Brandy Harmon, Gibran, Ilan, Chico Begum Savella  Office # [phone]      Follow Up:      Subjective/Observations:       REVIEW OF SYSTEMS: All other review of systems is negative unless indicated above    PAST MEDICAL & SURGICAL HISTORY:  Serum phosphorus decreased  Last level 1.5 at PST 1/13    Calculus of ureter  s/p R ureteral stent 12/20/2020    MARTINA on CPAP  Pt admits to be non-compliant    Leukemia  (APL, s/p chemo in 2012, now in remission, followed by oncologist)    Anemia  Completed iron infusions every 2 months, now Hb stable    Aortic stenosis    Emphysema    Rotator cuff tear  Right    Diverticulosis of colon    Coronary atherosclerosis of native coronary artery    Carpal tunnel syndrome    Dyslipidemia    Osteoporosis    Hypertension    Hernia, hiatal    Former cigarette smoker    Depression    Asthma with COPD with exacerbation  Not on home O2    Acid reflux    Graves disease  s/p surgery    Elective surgery  Cystoscopy, right ureteroscopy, laser lithotripsy of right ureteral stone, right ureteral stent removal and replacement, right retrograde pyelogram on 12/6/19    H/O dilation and curettage    S/P ureteral stent placement  R in 12/2020    H/O mitral valve repair  2014    H/O aortic valve repair  2014    History of coronary angiogram  1/31/12    h/o  endometrial ablation  1998    H/O cone biopsy of cervix  1974    History of tonsillectomy  childhood    After cataract, bilateral  2010        MEDICATIONS  (STANDING):  buPROPion . 75 milliGRAM(s) Oral at bedtime  buPROPion XL . 150 milliGRAM(s) Oral every 24 hours  levothyroxine 88 MICROGram(s) Oral daily  losartan 25 milliGRAM(s) Oral daily  metoprolol succinate ER 25 milliGRAM(s) Oral daily  simvastatin 40 milliGRAM(s) Oral at bedtime  sodium chloride 0.9% lock flush 3 milliLiter(s) IV Push every 8 hours  tiotropium 18 MICROgram(s) Capsule 1 Capsule(s) Inhalation daily    MEDICATIONS  (PRN):  ALBUTerol    90 MICROgram(s) HFA Inhaler 2 Puff(s) Inhalation every 6 hours PRN Shortness of Breath and/or Wheezing  guaiFENesin   Syrup  (Sugar-Free) 200 milliGRAM(s) Oral every 6 hours PRN Cough      Allergies    adhesives (Rash; Other)  Cat dander- wheezing, itchy throat, SOB (Other)  penicillin (Rash)  sulfa drugs (Rash)  tetracycline (Stomach Upset)    Intolerances        Vital Signs Last 24 Hrs  T(C): 36.6 (09 Mar 2021 04:54), Max: 36.8 (08 Mar 2021 20:15)  T(F): 97.9 (09 Mar 2021 04:54), Max: 98.3 (08 Mar 2021 20:15)  HR: 71 (09 Mar 2021 04:54) (68 - 75)  BP: 168/89 (09 Mar 2021 04:54) (112/65 - 168/89)  BP(mean): --  RR: 18 (09 Mar 2021 04:54) (18 - 21)  SpO2: 95% (09 Mar 2021 04:54) (93% - 95%)    I&O's Summary    08 Mar 2021 07:01  -  09 Mar 2021 07:00  --------------------------------------------------------  IN: 240 mL / OUT: 0 mL / NET: 240 mL          PHYSICAL EXAM:  TELE:   Constitutional: NAD, awake and alert, well-developed  HEENT: Moist Mucous Membranes, Anicteric  Pulmonary: Non-labored, breath sounds are clear bilaterally, No wheezing, crackles or rhonchi  Cardiovascular: Regular, S1 and S2 nl, No murmurs, rubs, gallops or clicks  Gastrointestinal: Bowel Sounds present, soft, nontender.   Lymph: trace bl le edema   Skin: No visible rashes or ulcers.  Psych:  Mood & affect appropriate    LABS: All Labs Reviewed:                        10.2   5.33  )-----------( 168      ( 09 Mar 2021 06:22 )             33.5                         10.1   4.43  )-----------( 171      ( 07 Mar 2021 09:13 )             33.0     09 Mar 2021 06:22    141    |  110    |  22     ----------------------------<  97     4.3     |  27     |  0.85   07 Mar 2021 09:13    142    |  112    |  19     ----------------------------<  104    4.3     |  27     |  0.98     Ca    8.2        09 Mar 2021 06:22  Ca    8.0        07 Mar 2021 09:13    TPro  6.0    /  Alb  3.1    /  TBili  0.3    /  DBili  x      /  AST  12     /  ALT  13     /  AlkPhos  66     09 Mar 2021 06:22  TPro  6.2    /  Alb  3.3    /  TBili  0.3    /  DBili  x      /  AST  25     /  ALT  16     /  AlkPhos  69     07 Mar 2021 09:13             ECG< from: 12 Lead ECG (03.07.21 @ 08:41) >    Ventricular Rate 68 BPM    Atrial Rate 68 BPM    P-R Interval 172 ms    QRS Duration 76 ms    Q-T Interval 428 ms    QTC Calculation(Bazett) 455 ms    P Axis 104 degrees    R Axis -22 degrees    T Axis 72 degrees    Diagnosis Line Normal sinus rhythm  ST & T wave abnormality, consider lateral ischemia  Abnormal ECG  When compared with ECG of 18-DEC-2020 10:35,  Nonspecific T wave abnormality now evident in Inferior leads  T wave inversion now evident in Anterior leads            
Interval History:  has agreed to EGD and colonoscopy   set for tomorrow  Chart reviewed and events noted;   Overnight events:    MEDICATIONS  (STANDING):  bisacodyl 10 milliGRAM(s) Oral every 4 hours  buPROPion . 75 milliGRAM(s) Oral at bedtime  buPROPion XL . 150 milliGRAM(s) Oral every 24 hours  famotidine    Tablet 20 milliGRAM(s) Oral two times a day  iron sucrose Injectable 100 milliGRAM(s) IV Push every 24 hours  levothyroxine 88 MICROGram(s) Oral daily  losartan 25 milliGRAM(s) Oral daily  metoprolol succinate ER 25 milliGRAM(s) Oral daily  polyethylene glycol/electrolyte Solution 1 Liter(s) Oral every 4 hours  simvastatin 40 milliGRAM(s) Oral at bedtime  sodium chloride 0.9% lock flush 3 milliLiter(s) IV Push every 8 hours  tiotropium 18 MICROgram(s) Capsule 1 Capsule(s) Inhalation daily    MEDICATIONS  (PRN):  ALBUTerol    90 MICROgram(s) HFA Inhaler 2 Puff(s) Inhalation every 6 hours PRN Shortness of Breath and/or Wheezing  guaiFENesin   Syrup  (Sugar-Free) 200 milliGRAM(s) Oral every 6 hours PRN Cough  melatonin 5 milliGRAM(s) Oral at bedtime PRN Insomnia      Vital Signs Last 24 Hrs  T(C): 36.7 (11 Mar 2021 13:05), Max: 36.9 (10 Mar 2021 20:52)  T(F): 98 (11 Mar 2021 13:05), Max: 98.4 (10 Mar 2021 20:52)  HR: 65 (11 Mar 2021 13:05) (65 - 79)  BP: 102/68 (11 Mar 2021 13:05) (102/68 - 134/76)  BP(mean): --  RR: 18 (11 Mar 2021 13:05) (18 - 18)  SpO2: 92% (11 Mar 2021 13:05) (92% - 93%)    PHYSICAL EXAM  General: adult in NAD  HEENT: clear oropharynx, anicteric sclera, pink conjunctivae  Neck: supple  CV: normal S1S2 with no murmur rubs or gallops  Lungs: clear to auscultation, no wheezes, no rhales  Abdomen: soft non-tender non-distended, no hepato/splenomegaly  Ext: no clubbing cyanosis or edema  Skin: no rashes and no petichiae  Neuro: alert and oriented X3 no focal deficits      LABS:  CBC Full  -  ( 11 Mar 2021 06:51 )  WBC Count : 4.16 K/uL  RBC Count : 3.70 M/uL  Hemoglobin : 10.2 g/dL  Hematocrit : 34.2 %  Platelet Count - Automated : 159 K/uL  Mean Cell Volume : 92.4 fl  Mean Cell Hemoglobin : 27.6 pg  Mean Cell Hemoglobin Concentration : 29.8 gm/dL  Auto Neutrophil # : x  Auto Lymphocyte # : x  Auto Monocyte # : x  Auto Eosinophil # : x  Auto Basophil # : x  Auto Neutrophil % : x  Auto Lymphocyte % : x  Auto Monocyte % : x  Auto Eosinophil % : x  Auto Basophil % : x    03-11    143  |  112<H>  |  28<H>  ----------------------------<  93  4.6   |  28  |  0.88    Ca    8.2<L>      11 Mar 2021 06:51    TPro  5.9<L>  /  Alb  3.1<L>  /  TBili  0.3  /  DBili  x   /  AST  12<L>  /  ALT  13  /  AlkPhos  63  03-11        fe studies  Ferritin, Serum: 332 ng/mL (03-08 @ 19:26)  Iron - Total Binding Capacity.: 290 ug/dL (03-08 @ 19:24)      WBC trend  4.16 K/uL (03-11-21 @ 06:51)  5.26 K/uL (03-10-21 @ 06:36)  5.33 K/uL (03-09-21 @ 06:22)      Hgb trend  10.2 g/dL (03-11-21 @ 06:51)  10.4 g/dL (03-10-21 @ 06:36)  10.2 g/dL (03-09-21 @ 06:22)      plt trend  159 K/uL (03-11-21 @ 06:51)  160 K/uL (03-10-21 @ 06:36)  168 K/uL (03-09-21 @ 06:22)        RADIOLOGY & ADDITIONAL STUDIES:    
[facility] -- Brandy Harmon, Gibran, Ilan, Chico Begum Savella  Office # [phone]      Follow Up:    MVVR AVR HTN HLD  Subjective/Observations:     No events overnight resting comfortably in bed.  No complaints of chest pain, dyspnea, or palpitations reported. No signs of orthopnea or PND.    REVIEW OF SYSTEMS: All other review of systems is negative unless indicated above    PAST MEDICAL & SURGICAL HISTORY:  Serum phosphorus decreased  Last level 1.5 at PST 1/13    Calculus of ureter  s/p R ureteral stent 12/20/2020    MARTINA on CPAP  Pt admits to be non-compliant    Leukemia  (APL, s/p chemo in 2012, now in remission, followed by oncologist)    Anemia  Completed iron infusions every 2 months, now Hb stable    Aortic stenosis    Emphysema    Rotator cuff tear  Right    Diverticulosis of colon    Coronary atherosclerosis of native coronary artery    Carpal tunnel syndrome    Dyslipidemia    Osteoporosis    Hypertension    Hernia, hiatal    Former cigarette smoker    Depression    Asthma with COPD with exacerbation  Not on home O2    Acid reflux    Graves disease  s/p surgery    Elective surgery  Cystoscopy, right ureteroscopy, laser lithotripsy of right ureteral stone, right ureteral stent removal and replacement, right retrograde pyelogram on 12/6/19    H/O dilation and curettage    S/P ureteral stent placement  R in 12/2020    H/O mitral valve repair  2014    H/O aortic valve repair  2014    History of coronary angiogram  1/31/12    h/o  endometrial ablation  1998    H/O cone biopsy of cervix  1974    History of tonsillectomy  childhood    After cataract, bilateral  2010        MEDICATIONS  (STANDING):  buPROPion . 75 milliGRAM(s) Oral at bedtime  buPROPion XL . 150 milliGRAM(s) Oral every 24 hours  famotidine    Tablet 20 milliGRAM(s) Oral two times a day  levothyroxine 88 MICROGram(s) Oral daily  losartan 25 milliGRAM(s) Oral daily  metoprolol succinate ER 25 milliGRAM(s) Oral daily  simvastatin 40 milliGRAM(s) Oral at bedtime  sodium chloride 0.9% lock flush 3 milliLiter(s) IV Push every 8 hours  tiotropium 18 MICROgram(s) Capsule 1 Capsule(s) Inhalation daily    MEDICATIONS  (PRN):  ALBUTerol    90 MICROgram(s) HFA Inhaler 2 Puff(s) Inhalation every 6 hours PRN Shortness of Breath and/or Wheezing  guaiFENesin   Syrup  (Sugar-Free) 200 milliGRAM(s) Oral every 6 hours PRN Cough  melatonin 5 milliGRAM(s) Oral at bedtime PRN Insomnia      Allergies    adhesives (Rash; Other)  Cat dander- wheezing, itchy throat, SOB (Other)  penicillin (Rash)  sulfa drugs (Rash)  tetracycline (Stomach Upset)    Intolerances        Vital Signs Last 24 Hrs  T(C): 36.4 (11 Mar 2021 04:39), Max: 36.9 (10 Mar 2021 20:52)  T(F): 97.6 (11 Mar 2021 04:39), Max: 98.4 (10 Mar 2021 20:52)  HR: 67 (11 Mar 2021 04:39) (64 - 79)  BP: 111/70 (11 Mar 2021 04:39) (111/70 - 134/76)  BP(mean): --  RR: 18 (11 Mar 2021 04:39) (16 - 18)  SpO2: 92% (11 Mar 2021 04:39) (92% - 95%)    I&O's Summary        PHYSICAL EXAM:  TELE: not on tele   Constitutional: NAD, awake and alert, well-developed  HEENT: Moist Mucous Membranes, Anicteric  Pulmonary: Non-labored, breath sounds are clear bilaterally, No wheezing, crackles or rhonchi  Cardiovascular: Regular, S1 and S2 nl, No murmurs, rubs, gallops or clicks  Gastrointestinal: Bowel Sounds present, soft, nontender.   Lymph: No lymphadenopathy. No peripheral edema.  Skin: No visible rashes or ulcers.  Psych:  Mood & affect appropriate    LABS: All Labs Reviewed:                        10.2   4.16  )-----------( 159      ( 11 Mar 2021 06:51 )             34.2                         10.4   5.26  )-----------( 160      ( 10 Mar 2021 06:36 )             34.0                         10.2   5.33  )-----------( 168      ( 09 Mar 2021 06:22 )             33.5     11 Mar 2021 06:51    143    |  112    |  28     ----------------------------<  93     4.6     |  28     |  0.88   10 Mar 2021 06:36    142    |  111    |  26     ----------------------------<  97     4.6     |  27     |  1.10   09 Mar 2021 06:22    141    |  110    |  22     ----------------------------<  97     4.3     |  27     |  0.85     Ca    8.2        11 Mar 2021 06:51  Ca    7.9        10 Mar 2021 06:36  Ca    8.2        09 Mar 2021 06:22    TPro  5.9    /  Alb  3.1    /  TBili  0.3    /  DBili  x      /  AST  12     /  ALT  13     /  AlkPhos  63     11 Mar 2021 06:51  TPro  5.9    /  Alb  3.1    /  TBili  0.3    /  DBili  x      /  AST  17     /  ALT  13     /  AlkPhos  67     10 Mar 2021 06:36  TPro  6.0    /  Alb  3.1    /  TBili  0.3    /  DBili  x      /  AST  12     /  ALT  13     /  AlkPhos  66     09 Mar 2021 06:22             ECG:  < from: 12 Lead ECG (03.07.21 @ 08:41) >    Ventricular Rate 68 BPM    Atrial Rate 68 BPM    P-R Interval 172 ms    QRS Duration 76 ms    Q-T Interval 428 ms    QTC Calculation(Bazett) 455 ms    P Axis 104 degrees    R Axis -22 degrees    T Axis 72 degrees    Diagnosis Line Normal sinus rhythm  ST & T wave abnormality, consider lateral ischemia  Abnormal ECG  When compared with ECG of 18-DEC-2020 10:35,  Nonspecific T wave abnormality now evident in Inferior leads  T wave inversion now evident in Anterior leads      
[INTERVAL HX: ]  Patient seen and examined;  Chart reviewed and events noted;     Patient is a 81y Female with a known history of :  Hemoptysis [R04.2]  Serum phosphorus decreased [E83.39]  Calculus of ureter [N20.1]  MARTINA on CPAP [G47.33]  Leukemia [C95.90]  Anemia [D64.9]  Spinal stenosis [724.00]  Mitral regurgitation [424.0]  Aortic stenosis [424.1]  Emphysema [492.8]  Duodenal ulcer [532.90]  Rotator cuff tear [840.4]  Diverticulosis of colon [562.10]  Coronary atherosclerosis of native coronary artery [414.01]  Carpal tunnel syndrome [354.0]  Dyslipidemia [272.4]  Obstructive sleep apnea [327.23]  Osteoporosis [733.00]  Hypertension [401.9]  Hernia, hiatal [553.3]  Former cigarette smoker [V15.82]  Depression [311]  Asthma with COPD with exacerbation [493.22]  Asthma [493.90]  Acid reflux [530.81]  Graves disease [242.00]  Elective surgery [Z41.9]  H/O dilation and curettage [Z98.890]  S/P ureteral stent placement [Z96.0]  H/O mitral valve repair [Z98.890]  H/O aortic valve repair [Z98.890]  History of coronary angiogram [V15.1]  H/o  endometrial ablation [V45.89]  H/O cone biopsy of cervix [V45.89]  History of tonsillectomy [V45.89]  After cataract, bilateral [366.50]  Carpal tunnel right repair [354.0]    HPI:  80 yo f who is currently on eliquis for dvt pe has ongoing gib but needs ivc filter placment before discontinuing doac, MARTINA, Leukemia emphysema, cad graves, former smoker. she was at home this am and noticed she was spitting up blood. she reports that last week she had rust colored sputum.  no fever denies other covid sx no travel no ill contacts. no abd pain or changes in diet no nose bleed (07 Mar 2021 12:43)        MEDICATIONS  (STANDING):  buPROPion . 75 milliGRAM(s) Oral at bedtime  buPROPion XL . 150 milliGRAM(s) Oral every 24 hours  famotidine    Tablet 20 milliGRAM(s) Oral two times a day  iron sucrose Injectable 100 milliGRAM(s) IV Push every 24 hours  levothyroxine 88 MICROGram(s) Oral daily  losartan 25 milliGRAM(s) Oral daily  metoprolol succinate ER 25 milliGRAM(s) Oral daily  simvastatin 40 milliGRAM(s) Oral at bedtime  sodium chloride 0.9% lock flush 3 milliLiter(s) IV Push every 8 hours  tiotropium 18 MICROgram(s) Capsule 1 Capsule(s) Inhalation daily    MEDICATIONS  (PRN):  acetaminophen   Tablet .. 650 milliGRAM(s) Oral every 6 hours PRN Temp greater or equal to 38C (100.4F), Mild Pain (1 - 3)  ALBUTerol    90 MICROgram(s) HFA Inhaler 2 Puff(s) Inhalation every 6 hours PRN Shortness of Breath and/or Wheezing  guaiFENesin   Syrup  (Sugar-Free) 200 milliGRAM(s) Oral every 6 hours PRN Cough  melatonin 5 milliGRAM(s) Oral at bedtime PRN Insomnia      Vital Signs Last 24 Hrs  T(C): 36.6 (12 Mar 2021 12:47), Max: 36.6 (11 Mar 2021 20:44)  T(F): 97.8 (12 Mar 2021 12:47), Max: 97.8 (11 Mar 2021 20:44)  HR: 79 (12 Mar 2021 12:47) (67 - 89)  BP: 154/85 (12 Mar 2021 12:47) (128/81 - 159/85)  BP(mean): --  RR: 16 (12 Mar 2021 12:47) (15 - 18)  SpO2: 93% (12 Mar 2021 12:47) (92% - 99%)      [PHYSICAL EXAM]  General: adult in NAD,  WN,  WD.  HEENT: clear oropharynx, anicteric sclera, pink conjunctivae.  Neck: supple, no masses.  CV: normal S1S2, no murmur, no rubs, no gallops.  Lungs: clear to auscultation, no wheezes, no rales, no rhonchi.  Abdomen: soft, non-tender, non-distended, no hepatosplenomegaly, normal BS, no guarding.  Ext: no clubbing, no cyanosis, no edema.  Skin: no rashes,  no petechiae, no venous stasis changes.  Neuro: alert and oriented X  , no focal motor deficits.  LN: no SC JUAN.      [LABS:]                        10.5   4.69  )-----------( 161      ( 12 Mar 2021 06:43 )             34.7     03-12    145  |  112<H>  |  21  ----------------------------<  86  4.1   |  25  |  0.89    Ca    8.2<L>      12 Mar 2021 06:43    TPro  6.4  /  Alb  3.3  /  TBili  0.3  /  DBili  x   /  AST  21  /  ALT  19  /  AlkPhos  69  03-12                [RADIOLOGY STUDIES:]  
Date/Time Patient Seen:  		  Referring MD:   Data Reviewed	       Patient is a 81y old  Female who presents with a chief complaint of GIB (12 Mar 2021 18:43)      Subjective/HPI     PAST MEDICAL & SURGICAL HISTORY:  Serum phosphorus decreased  Last level 1.5 at PST 1/13    Calculus of ureter  s/p R ureteral stent 12/20/2020    MARTINA on CPAP  Pt admits to be non-compliant    Leukemia  (APL, s/p chemo in 2012, now in remission, followed by oncologist)    Anemia  Completed iron infusions every 2 months, now Hb stable    Spinal stenosis    Mitral regurgitation    Aortic stenosis    Emphysema    Duodenal ulcer  at age 24    Rotator cuff tear  Right    Diverticulosis of colon    Coronary atherosclerosis of native coronary artery    Carpal tunnel syndrome    Dyslipidemia    Obstructive sleep apnea  cpap    Osteoporosis    Hypertension    Hernia, hiatal    Former cigarette smoker    Depression    Asthma with COPD with exacerbation  Not on home O2    Asthma    Acid reflux    Graves disease  s/p surgery    Elective surgery  Cystoscopy, right ureteroscopy, laser lithotripsy of right ureteral stone, right ureteral stent removal and replacement, right retrograde pyelogram on 12/6/19    H/O dilation and curettage    S/P ureteral stent placement  R in 12/2020    H/O mitral valve repair  2014    H/O aortic valve repair  2014    History of coronary angiogram  1/31/12    h/o  endometrial ablation  1998    H/O cone biopsy of cervix  1974    History of tonsillectomy  childhood    After cataract, bilateral  2010    Carpal tunnel right repair          Medication list         MEDICATIONS  (STANDING):  buPROPion . 75 milliGRAM(s) Oral at bedtime  buPROPion XL . 150 milliGRAM(s) Oral every 24 hours  famotidine    Tablet 20 milliGRAM(s) Oral two times a day  iron sucrose Injectable 100 milliGRAM(s) IV Push every 24 hours  levothyroxine 88 MICROGram(s) Oral daily  losartan 25 milliGRAM(s) Oral daily  metoprolol succinate ER 25 milliGRAM(s) Oral daily  simvastatin 40 milliGRAM(s) Oral at bedtime  sodium chloride 0.9% lock flush 3 milliLiter(s) IV Push every 8 hours  tiotropium 18 MICROgram(s) Capsule 1 Capsule(s) Inhalation daily    MEDICATIONS  (PRN):  acetaminophen   Tablet .. 650 milliGRAM(s) Oral every 6 hours PRN Temp greater or equal to 38C (100.4F), Mild Pain (1 - 3)  ALBUTerol    90 MICROgram(s) HFA Inhaler 2 Puff(s) Inhalation every 6 hours PRN Shortness of Breath and/or Wheezing  guaiFENesin   Syrup  (Sugar-Free) 200 milliGRAM(s) Oral every 6 hours PRN Cough  melatonin 5 milliGRAM(s) Oral at bedtime PRN Insomnia         Vitals log        ICU Vital Signs Last 24 Hrs  T(C): 36.6 (13 Mar 2021 05:12), Max: 36.6 (12 Mar 2021 12:47)  T(F): 97.8 (13 Mar 2021 05:12), Max: 97.8 (12 Mar 2021 12:47)  HR: 70 (13 Mar 2021 05:12) (70 - 89)  BP: 126/76 (13 Mar 2021 05:12) (107/61 - 157/91)  BP(mean): --  ABP: --  ABP(mean): --  RR: 16 (13 Mar 2021 05:12) (15 - 16)  SpO2: 93% (13 Mar 2021 05:12) (92% - 99%)           Input and Output:  I&O's Detail    11 Mar 2021 07:01  -  12 Mar 2021 07:00  --------------------------------------------------------  IN:  Total IN: 0 mL    OUT:    Voided (mL): 250 mL  Total OUT: 250 mL    Total NET: -250 mL          Lab Data                        10.5   4.69  )-----------( 161      ( 12 Mar 2021 06:43 )             34.7     03-12    145  |  112<H>  |  21  ----------------------------<  86  4.1   |  25  |  0.89    Ca    8.2<L>      12 Mar 2021 06:43    TPro  6.4  /  Alb  3.3  /  TBili  0.3  /  DBili  x   /  AST  21  /  ALT  19  /  AlkPhos  69  03-12            Review of Systems	      Objective     Physical Examination    heart s1s2  lung dc BS  abd soft  head nc  on RA    Pertinent Lab findings & Imaging      Ifeanyi:  NO   Adequate UO     I&O's Detail    11 Mar 2021 07:01  -  12 Mar 2021 07:00  --------------------------------------------------------  IN:  Total IN: 0 mL    OUT:    Voided (mL): 250 mL  Total OUT: 250 mL    Total NET: -250 mL               Discussed with:     Cultures:	        Radiology                            
Date/Time Patient Seen:  		  Referring MD:   Data Reviewed	       Patient is a 81y old  Female who presents with a chief complaint of hemoptyisis (08 Mar 2021 14:58)      Subjective/HPI     PAST MEDICAL & SURGICAL HISTORY:  Serum phosphorus decreased  Last level 1.5 at PST 1/13    Calculus of ureter  s/p R ureteral stent 12/20/2020    MARTINA on CPAP  Pt admits to be non-compliant    Leukemia  (APL, s/p chemo in 2012, now in remission, followed by oncologist)    Anemia  Completed iron infusions every 2 months, now Hb stable    Spinal stenosis    Mitral regurgitation    Aortic stenosis    Emphysema    Duodenal ulcer  at age 24    Rotator cuff tear  Right    Diverticulosis of colon    Coronary atherosclerosis of native coronary artery    Carpal tunnel syndrome    Dyslipidemia    Obstructive sleep apnea  cpap    Osteoporosis    Hypertension    Hernia, hiatal    Former cigarette smoker    Depression    Asthma with COPD with exacerbation  Not on home O2    Asthma    Acid reflux    Graves disease  s/p surgery    Elective surgery  Cystoscopy, right ureteroscopy, laser lithotripsy of right ureteral stone, right ureteral stent removal and replacement, right retrograde pyelogram on 12/6/19    H/O dilation and curettage    S/P ureteral stent placement  R in 12/2020    H/O mitral valve repair  2014    H/O aortic valve repair  2014    History of coronary angiogram  1/31/12    h/o  endometrial ablation  1998    H/O cone biopsy of cervix  1974    History of tonsillectomy  childhood    After cataract, bilateral  2010    Carpal tunnel right repair          Medication list         MEDICATIONS  (STANDING):  buPROPion . 75 milliGRAM(s) Oral at bedtime  buPROPion XL . 150 milliGRAM(s) Oral every 24 hours  levothyroxine 88 MICROGram(s) Oral daily  losartan 25 milliGRAM(s) Oral daily  metoprolol succinate ER 25 milliGRAM(s) Oral daily  simvastatin 40 milliGRAM(s) Oral at bedtime  sodium chloride 0.9% lock flush 3 milliLiter(s) IV Push every 8 hours  tiotropium 18 MICROgram(s) Capsule 1 Capsule(s) Inhalation daily    MEDICATIONS  (PRN):  ALBUTerol    90 MICROgram(s) HFA Inhaler 2 Puff(s) Inhalation every 6 hours PRN Shortness of Breath and/or Wheezing  guaiFENesin   Syrup  (Sugar-Free) 200 milliGRAM(s) Oral every 6 hours PRN Cough         Vitals log        ICU Vital Signs Last 24 Hrs  T(C): 36.6 (09 Mar 2021 04:54), Max: 36.8 (08 Mar 2021 20:15)  T(F): 97.9 (09 Mar 2021 04:54), Max: 98.3 (08 Mar 2021 20:15)  HR: 71 (09 Mar 2021 04:54) (68 - 75)  BP: 168/89 (09 Mar 2021 04:54) (112/65 - 168/89)  BP(mean): --  ABP: --  ABP(mean): --  RR: 18 (09 Mar 2021 04:54) (18 - 21)  SpO2: 95% (09 Mar 2021 04:54) (93% - 95%)           Input and Output:  I&O's Detail    08 Mar 2021 07:01  -  09 Mar 2021 06:26  --------------------------------------------------------  IN:    Oral Fluid: 240 mL  Total IN: 240 mL    OUT:  Total OUT: 0 mL    Total NET: 240 mL          Lab Data                        10.1   4.43  )-----------( 171      ( 07 Mar 2021 09:13 )             33.0     03-07    142  |  112<H>  |  19  ----------------------------<  104<H>  4.3   |  27  |  0.98    Ca    8.0<L>      07 Mar 2021 09:13    TPro  6.2  /  Alb  3.3  /  TBili  0.3  /  DBili  x   /  AST  25  /  ALT  16  /  AlkPhos  69  03-07            Review of Systems	      Objective     Physical Examination    heart s1s2  lung dec BS      Pertinent Lab findings & Imaging      Ifeanyi:  NO   Adequate UO     I&O's Detail    08 Mar 2021 07:01  -  09 Mar 2021 06:26  --------------------------------------------------------  IN:    Oral Fluid: 240 mL  Total IN: 240 mL    OUT:  Total OUT: 0 mL    Total NET: 240 mL               Discussed with:     Cultures:	        Radiology                            
Date/Time Patient Seen:  		  Referring MD:   Data Reviewed	       Patient is a 81y old  Female who presents with a chief complaint of hemoptysis (11 Mar 2021 08:49)      Subjective/HPI     PAST MEDICAL & SURGICAL HISTORY:  Serum phosphorus decreased  Last level 1.5 at PST 1/13    Calculus of ureter  s/p R ureteral stent 12/20/2020    MARTINA on CPAP  Pt admits to be non-compliant    Leukemia  (APL, s/p chemo in 2012, now in remission, followed by oncologist)    Anemia  Completed iron infusions every 2 months, now Hb stable    Spinal stenosis    Mitral regurgitation    Aortic stenosis    Emphysema    Duodenal ulcer  at age 24    Rotator cuff tear  Right    Diverticulosis of colon    Coronary atherosclerosis of native coronary artery    Carpal tunnel syndrome    Dyslipidemia    Obstructive sleep apnea  cpap    Osteoporosis    Hypertension    Hernia, hiatal    Former cigarette smoker    Depression    Asthma with COPD with exacerbation  Not on home O2    Asthma    Acid reflux    Graves disease  s/p surgery    Elective surgery  Cystoscopy, right ureteroscopy, laser lithotripsy of right ureteral stone, right ureteral stent removal and replacement, right retrograde pyelogram on 12/6/19    H/O dilation and curettage    S/P ureteral stent placement  R in 12/2020    H/O mitral valve repair  2014    H/O aortic valve repair  2014    History of coronary angiogram  1/31/12    h/o  endometrial ablation  1998    H/O cone biopsy of cervix  1974    History of tonsillectomy  childhood    After cataract, bilateral  2010    Carpal tunnel right repair          Medication list         MEDICATIONS  (STANDING):  buPROPion . 75 milliGRAM(s) Oral at bedtime  buPROPion XL . 150 milliGRAM(s) Oral every 24 hours  famotidine    Tablet 20 milliGRAM(s) Oral two times a day  iron sucrose Injectable 100 milliGRAM(s) IV Push every 24 hours  levothyroxine 88 MICROGram(s) Oral daily  losartan 25 milliGRAM(s) Oral daily  metoprolol succinate ER 25 milliGRAM(s) Oral daily  simvastatin 40 milliGRAM(s) Oral at bedtime  sodium chloride 0.9% lock flush 3 milliLiter(s) IV Push every 8 hours  tiotropium 18 MICROgram(s) Capsule 1 Capsule(s) Inhalation daily    MEDICATIONS  (PRN):  ALBUTerol    90 MICROgram(s) HFA Inhaler 2 Puff(s) Inhalation every 6 hours PRN Shortness of Breath and/or Wheezing  guaiFENesin   Syrup  (Sugar-Free) 200 milliGRAM(s) Oral every 6 hours PRN Cough  melatonin 5 milliGRAM(s) Oral at bedtime PRN Insomnia         Vitals log        ICU Vital Signs Last 24 Hrs  T(C): 36.4 (12 Mar 2021 05:08), Max: 36.7 (11 Mar 2021 13:05)  T(F): 97.5 (12 Mar 2021 05:08), Max: 98 (11 Mar 2021 13:05)  HR: 88 (12 Mar 2021 05:08) (65 - 88)  BP: 159/85 (12 Mar 2021 05:08) (102/68 - 159/85)  BP(mean): --  ABP: --  ABP(mean): --  RR: 17 (12 Mar 2021 05:08) (17 - 18)  SpO2: 92% (12 Mar 2021 05:08) (92% - 94%)           Input and Output:  I&O's Detail      Lab Data                        10.2   4.16  )-----------( 159      ( 11 Mar 2021 06:51 )             34.2     03-11    143  |  112<H>  |  28<H>  ----------------------------<  93  4.6   |  28  |  0.88    Ca    8.2<L>      11 Mar 2021 06:51    TPro  5.9<L>  /  Alb  3.1<L>  /  TBili  0.3  /  DBili  x   /  AST  12<L>  /  ALT  13  /  AlkPhos  63  03-11            Review of Systems	      Objective     Physical Examination    heart s1s2  lung dec BS  abd soft  head nc  head at  verbal  alert      Pertinent Lab findings & Imaging      Ifeanyi:  NO   Adequate UO     I&O's Detail           Discussed with:     Cultures:	        Radiology                            
Date/Time Patient Seen:  		  Referring MD:   Data Reviewed	       Patient is a 81y old  Female who presents with a chief complaint of bleed (10 Mar 2021 19:43)      Subjective/HPI     PAST MEDICAL & SURGICAL HISTORY:  Serum phosphorus decreased  Last level 1.5 at PST 1/13    Calculus of ureter  s/p R ureteral stent 12/20/2020    MARTINA on CPAP  Pt admits to be non-compliant    Leukemia  (APL, s/p chemo in 2012, now in remission, followed by oncologist)    Anemia  Completed iron infusions every 2 months, now Hb stable    Spinal stenosis    Mitral regurgitation    Aortic stenosis    Emphysema    Duodenal ulcer  at age 24    Rotator cuff tear  Right    Diverticulosis of colon    Coronary atherosclerosis of native coronary artery    Carpal tunnel syndrome    Dyslipidemia    Obstructive sleep apnea  cpap    Osteoporosis    Hypertension    Hernia, hiatal    Former cigarette smoker    Depression    Asthma with COPD with exacerbation  Not on home O2    Asthma    Acid reflux    Graves disease  s/p surgery    Elective surgery  Cystoscopy, right ureteroscopy, laser lithotripsy of right ureteral stone, right ureteral stent removal and replacement, right retrograde pyelogram on 12/6/19    H/O dilation and curettage    S/P ureteral stent placement  R in 12/2020    H/O mitral valve repair  2014    H/O aortic valve repair  2014    History of coronary angiogram  1/31/12    h/o  endometrial ablation  1998    H/O cone biopsy of cervix  1974    History of tonsillectomy  childhood    After cataract, bilateral  2010    Carpal tunnel right repair          Medication list         MEDICATIONS  (STANDING):  buPROPion . 75 milliGRAM(s) Oral at bedtime  buPROPion XL . 150 milliGRAM(s) Oral every 24 hours  famotidine    Tablet 20 milliGRAM(s) Oral two times a day  levothyroxine 88 MICROGram(s) Oral daily  losartan 25 milliGRAM(s) Oral daily  metoprolol succinate ER 25 milliGRAM(s) Oral daily  simvastatin 40 milliGRAM(s) Oral at bedtime  sodium chloride 0.9% lock flush 3 milliLiter(s) IV Push every 8 hours  tiotropium 18 MICROgram(s) Capsule 1 Capsule(s) Inhalation daily    MEDICATIONS  (PRN):  ALBUTerol    90 MICROgram(s) HFA Inhaler 2 Puff(s) Inhalation every 6 hours PRN Shortness of Breath and/or Wheezing  guaiFENesin   Syrup  (Sugar-Free) 200 milliGRAM(s) Oral every 6 hours PRN Cough  melatonin 5 milliGRAM(s) Oral at bedtime PRN Insomnia         Vitals log        ICU Vital Signs Last 24 Hrs  T(C): 36.4 (11 Mar 2021 04:39), Max: 36.9 (10 Mar 2021 20:52)  T(F): 97.6 (11 Mar 2021 04:39), Max: 98.4 (10 Mar 2021 20:52)  HR: 67 (11 Mar 2021 04:39) (52 - 79)  BP: 111/70 (11 Mar 2021 04:39) (111/70 - 134/76)  BP(mean): --  ABP: --  ABP(mean): --  RR: 18 (11 Mar 2021 04:39) (16 - 18)  SpO2: 92% (11 Mar 2021 04:39) (92% - 95%)           Input and Output:  I&O's Detail      Lab Data                        10.4   5.26  )-----------( 160      ( 10 Mar 2021 06:36 )             34.0     03-10    142  |  111<H>  |  26<H>  ----------------------------<  97  4.6   |  27  |  1.10    Ca    7.9<L>      10 Mar 2021 06:36    TPro  5.9<L>  /  Alb  3.1<L>  /  TBili  0.3  /  DBili  x   /  AST  17  /  ALT  13  /  AlkPhos  67  03-10            Review of Systems	      Objective     Physical Examination    on RA  heart s1s2  lung dec BS  abd soft  head nc  head at  verbal  alert      Pertinent Lab findings & Imaging      Ifeanyi:  NO   Adequate UO     I&O's Detail           Discussed with:     Cultures:	        Radiology

## 2021-03-14 NOTE — DISCHARGE NOTE PROVIDER - NSDCMRMEDTOKEN_GEN_ALL_CORE_FT
buPROPion 75 mg oral tablet: 2 tab(s) orally once a day (in the morning and then 1 tablet(s) orally once a day (in the evening)  CeleXA 40 mg oral tablet: 1 tab(s) orally once a day  levothyroxine 88 mcg (0.088 mg) oral tablet: 1 tab(s) orally once a day  losartan 25 mg oral tablet: 1 tab(s) orally once a day  metoprolol succinate 25 mg oral tablet, extended release: 1 tab(s) orally once a day  Pepcid 40 mg oral tablet: 1 tab(s) orally 2 times a day  simvastatin 40 mg oral tablet: 1 tab(s) orally once a day (at bedtime)  Trelegy Ellipta inhalation powder: 1 puff(s) inhaled once a day

## 2021-03-14 NOTE — DISCHARGE NOTE PROVIDER - HOSPITAL COURSE
80 yo f who is currently on eliquis for dvt pe has ongoing gib but needs ivc filter placment before discontinuing doac, MARTINA, Leukemia emphysema, cad graves, former smoker. she was at home this am and noticed she was spitting up blood. she reports that last week she had rust colored sputum. no fever denies other covid sx no travel no ill contacts. no abd pain or changes in diet no nose bleed  pt got IVC filter, A stopped  stable for dc

## 2021-03-14 NOTE — DISCHARGE NOTE PROVIDER - NSDCCPCAREPLAN_GEN_ALL_CORE_FT
PRINCIPAL DISCHARGE DIAGNOSIS  Diagnosis: Hemoptysis  Assessment and Plan of Treatment:       SECONDARY DISCHARGE DIAGNOSES  Diagnosis: MARTINA on CPAP  Assessment and Plan of Treatment: Pt admits to be non-compliant    Diagnosis: Emphysema of lung  Assessment and Plan of Treatment:     Diagnosis: Anemia  Assessment and Plan of Treatment: Completed iron infusions every 2 months, now Hb stable

## 2021-03-14 NOTE — DISCHARGE NOTE PROVIDER - CARE PROVIDER_API CALL
Sammy Man   INTERNAL MEDICINE  [address] [place] [address], [address]  [place] [address]  Phone: [phone]  Fax: [phone]  Follow Up Time:

## 2021-03-14 NOTE — DISCHARGE NOTE NURSING/CASE MANAGEMENT/SOCIAL WORK - PATIENT PORTAL LINK FT
You can access the FollowMyHealth Patient Portal offered by [facility] by registering at the following website: [email or web address]. By joining [employer]’s FollowMyHealth portal, you will also be able to view your health information using other applications (apps) compatible with our system.

## 2021-03-14 NOTE — PROGRESS NOTE ADULT - ASSESSMENT
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   s/p EGD and colonocscopy  large hiatal hernia with camerons ulceration  otherwise normal, biopsied r/o h pylori  colonic diverticulosis    RECOMMEND:   CBC stable  continue hold anti-coagulation in view of bleeds.   Supportive measures.     regular diet  DC planning  PPI daily.   discussed with cardiology and GI. to be discharged without anticoagulation and will have outpatient GI evaluation and repeat EGD to further decide on need and risk of anticoagulation.  has a IVC filter placed which should decrease her risk of recurrent PE  Follow in office post DC.   hematologically stable for discharge  to take asa as per GI and cardiology

## 2021-03-14 NOTE — PROGRESS NOTE ADULT - ASSESSMENT
81 y/o F PMH of leukemia (s/p chemo in 2012 now in remission with residual chronic anemia requiring iron infusions, MVR and AVR in 2012, COPD (not on home O2), MARTINA on CPAP (patient admits to being non-compliant with use), hx of R ureteral stone s/p stent in 9/2019 revised in 12/2019, Graves s/p thyroid surgery, HLD, HTN, depression, GERD.  She recently had a DVT and PE, and has been having issue with hemoptysis. She presents today for evaluation for ivc filter She has a hiatal hernia and esophageal ulcers with recurrent GI blood loss requiring IV iron. 3/10 S/P IVC filter placement    PE/ DVT  - Patient has no c/o SOB, tolerating RA  - Repeat CTA with no PE, though with large hiatal hernia  - Pt now S/P Right  IVC filter. Tolerated procedure well  - Hematology and GI following.  Follow recommendations    S/p tissue AVR/MVR/ Diastolic dysfunction   - TTE showed normal LVEF, mild LVH, aortic gradient appropriate for valve replacement, though mitral gradient elevated (as seen previously in office), normal RV size and function, with grade 2 DD  - No evidence of volume overload.   - Asa held in setting of anemia, resume per GI     HTN  - BP: 124/72 (03-14-21 @ 04:51) (124/72 - 129/85)  - Continue BB and ARB for now     GI, anemia, hiatal hernia   - S/p EGD/ Colonoscopy: Large hiatal hernia with camerons ulceration, otherwise normal, biopsied r/o h pylori, colonic diverticulosis  - Follow GI recommendations.     - Monitor and replete lytes, keep K>4, Mg>2.  - All other medical needs as per primary team.  - Other cardiovascular workup will depend on clinical course.  - Will continue to follow.    Almaz Patterson, MS FNP, [facility]  Nurse Practitioner- Cardiology   Spectra #[phone]

## 2021-03-14 NOTE — PROGRESS NOTE ADULT - PROBLEM SELECTOR PLAN 1
80 yo f who is currently on eliquis at home for dvt pe has ongoing gib but needs ivc filter placement before discontinuing doac, MARTINA, Leukemia emphysema, cad graves dz. pmd dr Man  copd - inhaler regimen - monitor sat - VBG - follows with Dr Mcmahon  MARTINA - cpap night time  hx of dvt pe - follows with Heme Onc - no PE on curr CT - planned for IR IVC filter as per Heme Onc recs  CT does not show PNA - no evidence of LRTI -   serial HGB  cough rx regimen.
Plan for EGD Colon this am - GI following - hx of Hiatal hernia -   IVC filter placed on 3 9 21 - by Vascular team -   80 yo f who is currently on eliquis at home for dvt pe has ongoing gib but needs ivc filter placement before discontinuing doac, MARTINA, Leukemia emphysema, cad graves dz. pmd dr Man  copd - inhaler regimen - monitor sat - VBG - follows with Dr Mcmahon  MARTINA - cpap night time  hx of dvt pe - follows with Heme Onc - no PE on curr CT -   CT does not show PNA - no evidence of LRTI -   serial HGB  cough rx regimen.
on Lovenox for DVT p -   s/p EGD - HH - no active bleed - dc plan ok from GI team - on PPI -   IVC filter placed on 3 9 21 - by Vascular team -   82 yo f who is currently on eliquis at home for dvt pe has ongoing gib but needs ivc filter placement before discontinuing doac, MARTINA, Leukemia emphysema, cad graves dz. pmd dr Man  copd - inhaler regimen - monitor sat - VBG - follows with Dr Mcmahon  MARTINA - cpap night time  hx of dvt pe - follows with Heme Onc - no PE on curr CT -   CT does not show PNA - no evidence of LRTI -   serial HGB  cough rx regimen.
s/p EGD - HH - no active bleed - dc plan ok from GI team - on PPI -   IVC filter placed on 3 9 21 - by Vascular team -   80 yo f who is currently on eliquis at home for dvt pe has ongoing gib but needs ivc filter placement before discontinuing doac, MARTINA, Leukemia emphysema, cad graves dz. pmd dr Man  copd - inhaler regimen - monitor sat - VBG - follows with Dr Mcmahon  MARTINA - cpap night time  hx of dvt pe - follows with Heme Onc - no PE on curr CT -   CT does not show PNA - no evidence of LRTI -   serial HGB  cough rx regimen.
will hold off on ABX - no clear evidence of PNA -   pt is on RA - biomarkers reviewed - CT imaging reviewed -   80 yo f who is currently on eliquis at home for dvt pe has ongoing gib but needs ivc filter placement before discontinuing doac, MARTINA, Leukemia emphysema, cad graves dz. pmd dr Man  copd - inhaler regimen - monitor sat - VBG - follows with Dr Mcmahon  MARTINA - cpap night time  hx of dvt pe - follows with Heme Onc - no PE on curr CT - discussion about IVC filter as outpatient - as per patient was planned to see Vascular - curr on Eliquis at home for AC -   CT does not show PNA - no evidence of LRTI -   Vascular - vs IR eval for IVC filter placement  serial HGB  cough rx regimen.
GI f/u noted - recs reviewed -   IVC filter placed on 3 9 21 - by Vascular team -   82 yo f who is currently on eliquis at home for dvt pe has ongoing gib but needs ivc filter placement before discontinuing doac, MARTINA, Leukemia emphysema, cad graves dz. pmd dr Man  copd - inhaler regimen - monitor sat - VBG - follows with Dr Mcmahon  MARTINA - cpap night time  hx of dvt pe - follows with Heme Onc - no PE on curr CT -   CT does not show PNA - no evidence of LRTI -   serial HGB  cough rx regimen.
IVC filter placed on 3 9 21 - by Vascular team -   80 yo f who is currently on eliquis at home for dvt pe has ongoing gib but needs ivc filter placement before discontinuing doac, MARTINA, Leukemia emphysema, cad graves dz. pmd dr Man  copd - inhaler regimen - monitor sat - VBG - follows with Dr Mcmahon  MARTINA - cpap night time  hx of dvt pe - follows with Heme Onc - no PE on curr CT -   CT does not show PNA - no evidence of LRTI -   serial HGB  cough rx regimen.

## 2021-03-15 ENCOUNTER — NON-APPOINTMENT (OUTPATIENT)
Age: 81
End: 2021-03-15

## 2021-03-20 ENCOUNTER — EMERGENCY (EMERGENCY)
Facility: HOSPITAL | Age: 81
LOS: 1 days | Discharge: ANOTHER HOSPITAL | End: 2021-03-20
Attending: EMERGENCY MEDICINE | Admitting: EMERGENCY MEDICINE
Payer: MEDICARE

## 2021-03-20 VITALS
TEMPERATURE: 98 F | OXYGEN SATURATION: 97 % | WEIGHT: 164.91 LBS | RESPIRATION RATE: 19 BRPM | SYSTOLIC BLOOD PRESSURE: 142 MMHG | DIASTOLIC BLOOD PRESSURE: 84 MMHG | HEART RATE: 73 BPM | HEIGHT: 62 IN

## 2021-03-20 DIAGNOSIS — Z98.890 OTHER SPECIFIED POSTPROCEDURAL STATES: Chronic | ICD-10-CM

## 2021-03-20 DIAGNOSIS — Z41.9 ENCOUNTER FOR PROCEDURE FOR PURPOSES OTHER THAN REMEDYING HEALTH STATE, UNSPECIFIED: Chronic | ICD-10-CM

## 2021-03-20 DIAGNOSIS — Z96.0 PRESENCE OF UROGENITAL IMPLANTS: Chronic | ICD-10-CM

## 2021-03-20 LAB
ALBUMIN SERPL ELPH-MCNC: 3.7 G/DL — SIGNIFICANT CHANGE UP (ref 3.3–5)
ALP SERPL-CCNC: 80 U/L — SIGNIFICANT CHANGE UP (ref 40–120)
ALT FLD-CCNC: 23 U/L — SIGNIFICANT CHANGE UP (ref 12–78)
ANION GAP SERPL CALC-SCNC: 3 MMOL/L — LOW (ref 5–17)
APTT BLD: 27.3 SEC — LOW (ref 27.5–35.5)
AST SERPL-CCNC: 20 U/L — SIGNIFICANT CHANGE UP (ref 15–37)
BASOPHILS # BLD AUTO: 0.03 K/UL — SIGNIFICANT CHANGE UP (ref 0–0.2)
BASOPHILS NFR BLD AUTO: 0.7 % — SIGNIFICANT CHANGE UP (ref 0–2)
BILIRUB SERPL-MCNC: 0.3 MG/DL — SIGNIFICANT CHANGE UP (ref 0.2–1.2)
BUN SERPL-MCNC: 15 MG/DL — SIGNIFICANT CHANGE UP (ref 7–23)
CALCIUM SERPL-MCNC: 8.7 MG/DL — SIGNIFICANT CHANGE UP (ref 8.5–10.1)
CHLORIDE SERPL-SCNC: 111 MMOL/L — HIGH (ref 96–108)
CO2 SERPL-SCNC: 29 MMOL/L — SIGNIFICANT CHANGE UP (ref 22–31)
CREAT SERPL-MCNC: 0.94 MG/DL — SIGNIFICANT CHANGE UP (ref 0.5–1.3)
EOSINOPHIL # BLD AUTO: 0.13 K/UL — SIGNIFICANT CHANGE UP (ref 0–0.5)
EOSINOPHIL NFR BLD AUTO: 2.9 % — SIGNIFICANT CHANGE UP (ref 0–6)
GLUCOSE SERPL-MCNC: 98 MG/DL — SIGNIFICANT CHANGE UP (ref 70–99)
HCT VFR BLD CALC: 39.5 % — SIGNIFICANT CHANGE UP (ref 34.5–45)
HGB BLD-MCNC: 12.2 G/DL — SIGNIFICANT CHANGE UP (ref 11.5–15.5)
IMM GRANULOCYTES NFR BLD AUTO: 0.4 % — SIGNIFICANT CHANGE UP (ref 0–1.5)
INR BLD: 1.11 RATIO — SIGNIFICANT CHANGE UP (ref 0.88–1.16)
LYMPHOCYTES # BLD AUTO: 0.76 K/UL — LOW (ref 1–3.3)
LYMPHOCYTES # BLD AUTO: 16.9 % — SIGNIFICANT CHANGE UP (ref 13–44)
MCHC RBC-ENTMCNC: 28.9 PG — SIGNIFICANT CHANGE UP (ref 27–34)
MCHC RBC-ENTMCNC: 30.9 GM/DL — LOW (ref 32–36)
MCV RBC AUTO: 93.6 FL — SIGNIFICANT CHANGE UP (ref 80–100)
MONOCYTES # BLD AUTO: 0.47 K/UL — SIGNIFICANT CHANGE UP (ref 0–0.9)
MONOCYTES NFR BLD AUTO: 10.4 % — SIGNIFICANT CHANGE UP (ref 2–14)
NEUTROPHILS # BLD AUTO: 3.1 K/UL — SIGNIFICANT CHANGE UP (ref 1.8–7.4)
NEUTROPHILS NFR BLD AUTO: 68.7 % — SIGNIFICANT CHANGE UP (ref 43–77)
NRBC # BLD: 0 /100 WBCS — SIGNIFICANT CHANGE UP (ref 0–0)
PLATELET # BLD AUTO: 218 K/UL — SIGNIFICANT CHANGE UP (ref 150–400)
POTASSIUM SERPL-MCNC: 4.3 MMOL/L — SIGNIFICANT CHANGE UP (ref 3.5–5.3)
POTASSIUM SERPL-SCNC: 4.3 MMOL/L — SIGNIFICANT CHANGE UP (ref 3.5–5.3)
PROT SERPL-MCNC: 7.4 G/DL — SIGNIFICANT CHANGE UP (ref 6–8.3)
PROTHROM AB SERPL-ACNC: 12.9 SEC — SIGNIFICANT CHANGE UP (ref 10.6–13.6)
RBC # BLD: 4.22 M/UL — SIGNIFICANT CHANGE UP (ref 3.8–5.2)
RBC # FLD: 20.2 % — HIGH (ref 10.3–14.5)
SODIUM SERPL-SCNC: 143 MMOL/L — SIGNIFICANT CHANGE UP (ref 135–145)
WBC # BLD: 4.51 K/UL — SIGNIFICANT CHANGE UP (ref 3.8–10.5)
WBC # FLD AUTO: 4.51 K/UL — SIGNIFICANT CHANGE UP (ref 3.8–10.5)

## 2021-03-20 PROCEDURE — 73502 X-RAY EXAM HIP UNI 2-3 VIEWS: CPT | Mod: 26,RT

## 2021-03-20 PROCEDURE — 74177 CT ABD & PELVIS W/CONTRAST: CPT | Mod: 26,MG

## 2021-03-20 PROCEDURE — 93971 EXTREMITY STUDY: CPT | Mod: 26,RT

## 2021-03-20 PROCEDURE — G1004: CPT

## 2021-03-20 PROCEDURE — 99285 EMERGENCY DEPT VISIT HI MDM: CPT

## 2021-03-20 RX ORDER — MORPHINE SULFATE 50 MG/1
2 CAPSULE, EXTENDED RELEASE ORAL ONCE
Refills: 0 | Status: DISCONTINUED | OUTPATIENT
Start: 2021-03-20 | End: 2021-03-20

## 2021-03-20 RX ORDER — LIDOCAINE 4 G/100G
1 CREAM TOPICAL ONCE
Refills: 0 | Status: COMPLETED | OUTPATIENT
Start: 2021-03-20 | End: 2021-03-20

## 2021-03-20 RX ORDER — METHOCARBAMOL 500 MG/1
1500 TABLET, FILM COATED ORAL ONCE
Refills: 0 | Status: COMPLETED | OUTPATIENT
Start: 2021-03-20 | End: 2021-03-20

## 2021-03-20 RX ADMIN — LIDOCAINE 1 PATCH: 4 CREAM TOPICAL at 22:43

## 2021-03-20 RX ADMIN — MORPHINE SULFATE 2 MILLIGRAM(S): 50 CAPSULE, EXTENDED RELEASE ORAL at 18:36

## 2021-03-20 RX ADMIN — METHOCARBAMOL 1500 MILLIGRAM(S): 500 TABLET, FILM COATED ORAL at 22:43

## 2021-03-20 RX ADMIN — MORPHINE SULFATE 2 MILLIGRAM(S): 50 CAPSULE, EXTENDED RELEASE ORAL at 19:05

## 2021-03-20 NOTE — ED ADULT NURSE REASSESSMENT NOTE - NSIMPLEMENTINTERV_GEN_ALL_ED
Implemented All Fall with Harm Risk Interventions:  [place] to call system. Call bell, personal items and telephone within reach. Instruct patient to call for assistance. Room bathroom lighting operational. Non-slip footwear when patient is off stretcher. Physically safe environment: no spills, clutter or unnecessary equipment. Stretcher in lowest position, wheels locked, appropriate side rails in place. Provide visual cue, wrist band, yellow gown, etc. Monitor gait and stability. Monitor for mental status changes and reorient to person, place, and time. Review medications for side effects contributing to fall risk. Reinforce activity limits and safety measures with patient and family. Provide visual clues: red socks.

## 2021-03-20 NOTE — ED PROVIDER NOTE - NSFOLLOWUPINSTRUCTIONS_ED_ALL_ED_FT
1. TAKE ALL MEDICATIONS AS DIRECTED.  FOR PAIN YOU CAN TAKE  ACETAMINOPHEN (TYLENOL) AS NEEDED, AS DIRECTED ON PACKAGING.  2. FOLLOW UP WITH ______VASCULAR____ AS DIRECTED.  YOU WERE GIVEN COPIES OF ALL LABS AND IMAGING RESULTS FROM YOUR ER VISIT--PLEASE TAKE THEM WITH YOU TO YOUR APPOINTMENT.  3. IF NEEDED, CALL [phone] TO FIND A PRIMARY CARE PHYSICIAN.  OR CALL [phone] TO MAKE AN APPOINTMENT WITH THE MEDICAL CLINIC.  4. RETURN TO THE ER FOR ANY WORSENING SYMPTOMS.      REST APPLY ICE AS NEEDED FOR PAIN 1. TAKE ALL MEDICATIONS AS DIRECTED.  FOR PAIN YOU CAN TAKE  ACETAMINOPHEN (TYLENOL) AS NEEDED, AS DIRECTED ON PACKAGING.  2. FOLLOW UP WITH ______VASCULAR and your PCP____ AS DIRECTED.  YOU WERE GIVEN COPIES OF ALL LABS AND IMAGING RESULTS FROM YOUR ER VISIT--PLEASE TAKE THEM WITH YOU TO YOUR APPOINTMENT.  3. IF NEEDED, CALL [phone] TO FIND A PRIMARY CARE PHYSICIAN.  OR CALL [phone] TO MAKE AN APPOINTMENT WITH THE MEDICAL CLINIC.  4. RETURN TO THE ER FOR ANY WORSENING SYMPTOMS.      REST APPLY ICE AS NEEDED FOR PAIN

## 2021-03-20 NOTE — ED ADULT NURSE NOTE - CHPI ED NUR SYMPTOMS NEG
no bleeding at site/no blood in mucus/no chills/no drainage/no fever/no purulent drainage/no redness

## 2021-03-20 NOTE — ED PROVIDER NOTE - CLINICAL SUMMARY MEDICAL DECISION MAKING FREE TEXT BOX
80 y/o f with R groin apin, s/p DUY filter placement on 3/1//21, went home and began having pain on Monday, worse with movemment, no hemaotma nored, good pulses diatlly, plan= labs sono and vascualr consult 80 y/o f with R groin apin, s/p DUY filter placement on 3/1//21, went home and began having pain on Monday, worse with movemment, no hemaotma nored, good pulses distally, plan= labs sono and vascualr consult

## 2021-03-20 NOTE — ED PROVIDER NOTE - PHYSICAL EXAMINATION
BL 2+ pedal pulses, RLE warm, no calf pain or tenderness  No hematoma or ecchymosis or swelling to R groin,  ]  abd soft nontender, no palpable masses or hernias

## 2021-03-20 NOTE — ED PROVIDER NOTE - CARE PROVIDER_API CALL
Cathy Huerta (MD)  Vascular Surgery  [address] [place] [address]  Phone: [phone]  Fax: [phone]  Follow Up Time: 1-3 Days

## 2021-03-20 NOTE — ED PROVIDER NOTE - PATIENT PORTAL LINK FT
You can access the FollowMyHealth Patient Portal offered by [facility] by registering at the following website: [email or web address]. By joining [employer]’s FollowMyHealth portal, you will also be able to view your health information using other applications (apps) compatible with our system. You can access the FollowMyHealth Patient Portal offered by [facility] by registering at the following website: [email or web address]. By joining [employer] [facility]’s FollowMyHealth portal, you will also be able to view your health information using other applications (apps) compatible with our system.

## 2021-03-20 NOTE — ED ADULT NURSE NOTE - OBJECTIVE STATEMENT
pt aox4, " pain  rt groin  puncture wound area, green filter insertion" no redness , not tender, FROM  lower extremities, good cap refill

## 2021-03-20 NOTE — ED PROVIDER NOTE - OBJECTIVE STATEMENT
82 y/o F with PMH MARTINA, DVT,  HTN, HLD, COPD, s/p IVC filter placement on 3/11/21  by Dr Patrick at this hospital, was discharged on 3/14/21. States on 3/15 noticed R groin pain, worse with movement. Has been taking tylenol as needed with no relief. Denies n/v/d, no recent injury trauma or fall, denies hematuria and dysuria.  No swelling noted to groin. No numbness or tingling no weakness to R leg, no swelling of R leg.

## 2021-03-20 NOTE — ED PROVIDER NOTE - PROGRESS NOTE DETAILS
Case d/w Dr Crawford ( on call for vascular), states to obtain labs and sono and if normal pt to be d.c home with follow up. Patient attempted to get dressed, now with worsening pain, states unable to ambulate, will do CT and reeval. Ct normal. No radiographic evidence of cause of groin pain Possibly muscle strain? especially as pain worse with movement or possible lumbar radiculopathy 2/2/ spinal stenosis? Discussed these possibilities with patient.  Reevaluated patient at bedside.  Patient says morphine did help pain but persists when she bends over.   Discussed the results of all diagnostic testing in ED and copies of all reports given.   An opportunity to ask questions was given.  Discussed the importance of prompt, close medical follow-up.  Patient will return with any changes, concerns or persistent / worsening symptoms.  Percocet sent to pharmacy. Instructed to taker tylenol or Motrin as needed. Understanding of all instructions verbalized. Called to bedside to find patient crying, yelling, refusing to leave. states pain serve again when trying to get dressed. Upon reassessment patient with no groin tenderness, no abdominal tenderness. no CVAT. TTP over R glute. Will do hip and pelvis xray and patient will likely need admission for pT eval and pain control Called to bedside to find patient crying, yelling, refusing to leave. states pain serve again when trying to get dressed. States pain radiates to her back. Upon reassessment patient with no groin tenderness, no abdominal tenderness. no CVAT. TTP over R glute. Discussed with shai Westbrook. Will do hip and pelvis xray and patient will likely need admission for pT eval and pain control. Called to bedside to find patient crying, yelling, refusing to leave. states pain serve again when trying to get dressed. States pain radiates to her back. Upon reassessment patient with no groin tenderness, no abdominal tenderness. no CVAT. TTP over R glute. Discussed with shai Westbrook. Will do hip and pelvis xray and patient will likely need  pT eval and possible admission  for pain control. .patient feeling better after meds, no acute fracture noted on XRAY however chornic arthritis. Case d/w dr Chaves who admitted patient last admission. states pt to be seen by PT, no need for admission at this time, SW and PT to eval in [place]. Pt will liekly need rehab. Pt aware and agreeable to plan. Pt doing well, no acute co or changes. Pt seen by PT - will need rehab placement. Will admit / xfer. Dr Burgos paged Dw Dr JAEL Burton (FOREIGN Man), accepts xfer to [facility] Pt doing well, no acute co or changes. Pt seen by PT - will need rehab placement. Will admit / xfer.

## 2021-03-20 NOTE — ED PROVIDER NOTE - ATTENDING CONTRIBUTION TO CARE
80 yo F p/w sp R groin IVC filter placed. Pt with persistent mild discomfort to R groin. NO redness / swelling. No bleeding. No numb/ting/focal weak. no Cp/sob/palp. no abd pain. no n/v/d. No dysuria / hematuria.  NO known covid exposure / prior infxn / immunization. no agg/allev factors. no other inj or co.  Exam: MM Moist. neck supple. no meningeal signs. Nl reps effort. no acc muscle use. abd soft NT. no  hsm. no cvat. no other acute findings. R groin wiht minimal tenderness. no edema, no erythema. No bruit. no crepitus / abnl flow palpated. No other acute findings.  Check labs, UA, dw Vascular.

## 2021-03-20 NOTE — ED PROVIDER NOTE - CARE PLAN
Principal Discharge DX:	Groin pain, right   Principal Discharge DX:	Groin pain, right  Secondary Diagnosis:	Intractable pain

## 2021-03-21 ENCOUNTER — INPATIENT (INPATIENT)
Facility: HOSPITAL | Age: 81
LOS: 2 days | Discharge: HOME | DRG: 391 | End: 2021-03-24
Attending: INTERNAL MEDICINE | Admitting: INTERNAL MEDICINE
Payer: MEDICARE

## 2021-03-21 VITALS
SYSTOLIC BLOOD PRESSURE: 107 MMHG | HEART RATE: 74 BPM | TEMPERATURE: 98 F | OXYGEN SATURATION: 98 % | DIASTOLIC BLOOD PRESSURE: 72 MMHG | RESPIRATION RATE: 16 BRPM

## 2021-03-21 VITALS
HEART RATE: 72 BPM | OXYGEN SATURATION: 93 % | SYSTOLIC BLOOD PRESSURE: 125 MMHG | DIASTOLIC BLOOD PRESSURE: 79 MMHG | RESPIRATION RATE: 17 BRPM | TEMPERATURE: 99 F

## 2021-03-21 DIAGNOSIS — Z98.890 OTHER SPECIFIED POSTPROCEDURAL STATES: Chronic | ICD-10-CM

## 2021-03-21 DIAGNOSIS — F32.9 MAJOR DEPRESSIVE DISORDER, SINGLE EPISODE, UNSPECIFIED: ICD-10-CM

## 2021-03-21 DIAGNOSIS — I26.99 OTHER PULMONARY EMBOLISM WITHOUT ACUTE COR PULMONALE: ICD-10-CM

## 2021-03-21 DIAGNOSIS — Z41.9 ENCOUNTER FOR PROCEDURE FOR PURPOSES OTHER THAN REMEDYING HEALTH STATE, UNSPECIFIED: Chronic | ICD-10-CM

## 2021-03-21 DIAGNOSIS — R10.31 RIGHT LOWER QUADRANT PAIN: ICD-10-CM

## 2021-03-21 DIAGNOSIS — I10 ESSENTIAL (PRIMARY) HYPERTENSION: ICD-10-CM

## 2021-03-21 DIAGNOSIS — Z96.0 PRESENCE OF UROGENITAL IMPLANTS: Chronic | ICD-10-CM

## 2021-03-21 LAB — SARS-COV-2 RNA SPEC QL NAA+PROBE: SIGNIFICANT CHANGE UP

## 2021-03-21 PROCEDURE — 85730 THROMBOPLASTIN TIME PARTIAL: CPT

## 2021-03-21 PROCEDURE — 96374 THER/PROPH/DIAG INJ IV PUSH: CPT | Mod: XU

## 2021-03-21 PROCEDURE — 93005 ELECTROCARDIOGRAM TRACING: CPT

## 2021-03-21 PROCEDURE — 73502 X-RAY EXAM HIP UNI 2-3 VIEWS: CPT

## 2021-03-21 PROCEDURE — 99285 EMERGENCY DEPT VISIT HI MDM: CPT | Mod: 25

## 2021-03-21 PROCEDURE — 97161 PT EVAL LOW COMPLEX 20 MIN: CPT

## 2021-03-21 PROCEDURE — U0005: CPT

## 2021-03-21 PROCEDURE — 72170 X-RAY EXAM OF PELVIS: CPT

## 2021-03-21 PROCEDURE — 36415 COLL VENOUS BLD VENIPUNCTURE: CPT

## 2021-03-21 PROCEDURE — 71045 X-RAY EXAM CHEST 1 VIEW: CPT

## 2021-03-21 PROCEDURE — 85610 PROTHROMBIN TIME: CPT

## 2021-03-21 PROCEDURE — 74177 CT ABD & PELVIS W/CONTRAST: CPT

## 2021-03-21 PROCEDURE — 80053 COMPREHEN METABOLIC PANEL: CPT

## 2021-03-21 PROCEDURE — 93971 EXTREMITY STUDY: CPT

## 2021-03-21 PROCEDURE — 85025 COMPLETE CBC W/AUTO DIFF WBC: CPT

## 2021-03-21 PROCEDURE — U0003: CPT

## 2021-03-21 PROCEDURE — 71045 X-RAY EXAM CHEST 1 VIEW: CPT | Mod: 26

## 2021-03-21 PROCEDURE — 93010 ELECTROCARDIOGRAM REPORT: CPT

## 2021-03-21 RX ORDER — BUPROPION HYDROCHLORIDE 150 MG/1
150 TABLET, EXTENDED RELEASE ORAL DAILY
Refills: 0 | Status: DISCONTINUED | OUTPATIENT
Start: 2021-03-21 | End: 2021-03-24

## 2021-03-21 RX ORDER — ACETAMINOPHEN 500 MG
650 TABLET ORAL ONCE
Refills: 0 | Status: COMPLETED | OUTPATIENT
Start: 2021-03-21 | End: 2021-03-21

## 2021-03-21 RX ORDER — ACETAMINOPHEN 500 MG
650 TABLET ORAL EVERY 6 HOURS
Refills: 0 | Status: DISCONTINUED | OUTPATIENT
Start: 2021-03-21 | End: 2021-03-24

## 2021-03-21 RX ORDER — METOPROLOL TARTRATE 50 MG
25 TABLET ORAL DAILY
Refills: 0 | Status: DISCONTINUED | OUTPATIENT
Start: 2021-03-21 | End: 2021-03-24

## 2021-03-21 RX ORDER — BUDESONIDE AND FORMOTEROL FUMARATE DIHYDRATE 160; 4.5 UG/1; UG/1
2 AEROSOL RESPIRATORY (INHALATION)
Refills: 0 | Status: DISCONTINUED | OUTPATIENT
Start: 2021-03-21 | End: 2021-03-24

## 2021-03-21 RX ORDER — CITALOPRAM 10 MG/1
40 TABLET, FILM COATED ORAL DAILY
Refills: 0 | Status: DISCONTINUED | OUTPATIENT
Start: 2021-03-21 | End: 2021-03-24

## 2021-03-21 RX ORDER — LEVOTHYROXINE SODIUM 125 MCG
88 TABLET ORAL DAILY
Refills: 0 | Status: DISCONTINUED | OUTPATIENT
Start: 2021-03-21 | End: 2021-03-24

## 2021-03-21 RX ORDER — SIMVASTATIN 20 MG/1
40 TABLET, FILM COATED ORAL AT BEDTIME
Refills: 0 | Status: DISCONTINUED | OUTPATIENT
Start: 2021-03-21 | End: 2021-03-24

## 2021-03-21 RX ORDER — LOSARTAN POTASSIUM 100 MG/1
25 TABLET, FILM COATED ORAL DAILY
Refills: 0 | Status: DISCONTINUED | OUTPATIENT
Start: 2021-03-21 | End: 2021-03-24

## 2021-03-21 RX ORDER — FAMOTIDINE 10 MG/ML
40 INJECTION INTRAVENOUS
Refills: 0 | Status: DISCONTINUED | OUTPATIENT
Start: 2021-03-21 | End: 2021-03-24

## 2021-03-21 RX ORDER — OXYCODONE AND ACETAMINOPHEN 5; 325 MG/1; MG/1
1 TABLET ORAL EVERY 6 HOURS
Refills: 0 | Status: DISCONTINUED | OUTPATIENT
Start: 2021-03-21 | End: 2021-03-24

## 2021-03-21 RX ADMIN — LOSARTAN POTASSIUM 25 MILLIGRAM(S): 100 TABLET, FILM COATED ORAL at 22:04

## 2021-03-21 RX ADMIN — FAMOTIDINE 40 MILLIGRAM(S): 10 INJECTION INTRAVENOUS at 22:03

## 2021-03-21 RX ADMIN — Medication 650 MILLIGRAM(S): at 09:58

## 2021-03-21 RX ADMIN — Medication 650 MILLIGRAM(S): at 09:01

## 2021-03-21 RX ADMIN — BUPROPION HYDROCHLORIDE 150 MILLIGRAM(S): 150 TABLET, EXTENDED RELEASE ORAL at 22:02

## 2021-03-21 RX ADMIN — CITALOPRAM 40 MILLIGRAM(S): 10 TABLET, FILM COATED ORAL at 22:03

## 2021-03-21 RX ADMIN — BUDESONIDE AND FORMOTEROL FUMARATE DIHYDRATE 2 PUFF(S): 160; 4.5 AEROSOL RESPIRATORY (INHALATION) at 22:02

## 2021-03-21 RX ADMIN — Medication 650 MILLIGRAM(S): at 22:16

## 2021-03-21 RX ADMIN — SIMVASTATIN 40 MILLIGRAM(S): 20 TABLET, FILM COATED ORAL at 22:04

## 2021-03-21 RX ADMIN — Medication 650 MILLIGRAM(S): at 22:46

## 2021-03-21 NOTE — H&P ADULT - HISTORY OF PRESENT ILLNESS
82 y/o F with PMH MARTINA, DVT,  HTN, HLD, COPD, s/p IVC filter placement on 3/11/21  by Dr Patrick at this hospital, was discharged on 3/14/21. States on 3/15 noticed R groin pain, worse with movement. Has been taking tylenol as needed with no relief. Denies n/v/d, no recent injury trauma or fall, denies hematuria and dysuria.  No swelling noted to groin. No numbness or tingling no weakness to R leg, no swelling of R leg. Ptunable to walk due to pain, Seen by PT and recommending [facility]ORES

## 2021-03-21 NOTE — PATIENT PROFILE ADULT - STATED REASON FOR ADMISSION
pt had Coleman filter put on for Pe of right lung and dvt right leg 3/11/2021. Pt c/o of right groin pain went to [facility],

## 2021-03-21 NOTE — PHYSICAL THERAPY INITIAL EVALUATION ADULT - PERTINENT HX OF CURRENT PROBLEM, REHAB EVAL
82 y/o female in ER 3/20 with R groin pain. Pt has had previous admissions with same complaint. R hip and pelvis Xrays taken. As per Dr. Worthy, physical therapy can work with pt and ambulate patient.

## 2021-03-21 NOTE — ED ADULT NURSE REASSESSMENT NOTE - NS ED NURSE REASSESS COMMENT FT1
Patient alert and calm, she was seen by PT and EKG was performed.  She is a potential [facility] transfer for admission.  Hourly rounding performed.
Patient and report received at 0715.  Patient observed lying on stretcher with eyes closed.  Respirations even and unlabored.  While I attached the call bell to the railing, she opened her eyes.  She is awake and alert and oriented x 4.  She told me the plan of care, she is awaiting physical therapy consult and social work consult.  She states she is having groin pain after insertion of elma filter, and she lives alone and it is hard for here to walk because of the pain.  Call bell is within reach.
Patient was seen by Jen Aguirre.  He states he is comfortable and denies needs for pain medication at this time.  He said, well maybe in an hour or so I will want pain medication.  Patient asked her for ice chips, she told him no, he needs to be nothing by mouth in preparation for the OR later today.
patient accepted from day shift RN a/o x 4 with an anxious affect.  patient refused to be discharged to home so patient will stay for SW and PT in the AM.

## 2021-03-22 LAB
ANION GAP SERPL CALC-SCNC: 8 MMOL/L — SIGNIFICANT CHANGE UP (ref 5–17)
BUN SERPL-MCNC: 27 MG/DL — HIGH (ref 7–23)
CALCIUM SERPL-MCNC: 8.7 MG/DL — SIGNIFICANT CHANGE UP (ref 8.4–10.5)
CHLORIDE SERPL-SCNC: 108 MMOL/L — SIGNIFICANT CHANGE UP (ref 96–108)
CO2 SERPL-SCNC: 26 MMOL/L — SIGNIFICANT CHANGE UP (ref 22–31)
CREAT SERPL-MCNC: 1.12 MG/DL — SIGNIFICANT CHANGE UP (ref 0.5–1.3)
GLUCOSE SERPL-MCNC: 106 MG/DL — HIGH (ref 70–99)
HCT VFR BLD CALC: 35.7 % — SIGNIFICANT CHANGE UP (ref 34.5–45)
HGB BLD-MCNC: 10.9 G/DL — LOW (ref 11.5–15.5)
MCHC RBC-ENTMCNC: 28.8 PG — SIGNIFICANT CHANGE UP (ref 27–34)
MCHC RBC-ENTMCNC: 30.5 GM/DL — LOW (ref 32–36)
MCV RBC AUTO: 94.2 FL — SIGNIFICANT CHANGE UP (ref 80–100)
NRBC # BLD: 0 /100 WBCS — SIGNIFICANT CHANGE UP (ref 0–0)
PLATELET # BLD AUTO: 193 K/UL — SIGNIFICANT CHANGE UP (ref 150–400)
POTASSIUM SERPL-MCNC: 4.2 MMOL/L — SIGNIFICANT CHANGE UP (ref 3.5–5.3)
POTASSIUM SERPL-SCNC: 4.2 MMOL/L — SIGNIFICANT CHANGE UP (ref 3.5–5.3)
RBC # BLD: 3.79 M/UL — LOW (ref 3.8–5.2)
RBC # FLD: 20.3 % — HIGH (ref 10.3–14.5)
SODIUM SERPL-SCNC: 142 MMOL/L — SIGNIFICANT CHANGE UP (ref 135–145)
WBC # BLD: 4.78 K/UL — SIGNIFICANT CHANGE UP (ref 3.8–10.5)
WBC # FLD AUTO: 4.78 K/UL — SIGNIFICANT CHANGE UP (ref 3.8–10.5)

## 2021-03-22 RX ADMIN — FAMOTIDINE 40 MILLIGRAM(S): 10 INJECTION INTRAVENOUS at 05:58

## 2021-03-22 RX ADMIN — CITALOPRAM 40 MILLIGRAM(S): 10 TABLET, FILM COATED ORAL at 11:42

## 2021-03-22 RX ADMIN — BUPROPION HYDROCHLORIDE 150 MILLIGRAM(S): 150 TABLET, EXTENDED RELEASE ORAL at 11:40

## 2021-03-22 RX ADMIN — Medication 650 MILLIGRAM(S): at 05:58

## 2021-03-22 RX ADMIN — BUDESONIDE AND FORMOTEROL FUMARATE DIHYDRATE 2 PUFF(S): 160; 4.5 AEROSOL RESPIRATORY (INHALATION) at 05:59

## 2021-03-22 RX ADMIN — BUDESONIDE AND FORMOTEROL FUMARATE DIHYDRATE 2 PUFF(S): 160; 4.5 AEROSOL RESPIRATORY (INHALATION) at 17:57

## 2021-03-22 RX ADMIN — SIMVASTATIN 40 MILLIGRAM(S): 20 TABLET, FILM COATED ORAL at 21:29

## 2021-03-22 RX ADMIN — Medication 650 MILLIGRAM(S): at 06:28

## 2021-03-22 RX ADMIN — FAMOTIDINE 40 MILLIGRAM(S): 10 INJECTION INTRAVENOUS at 17:57

## 2021-03-22 RX ADMIN — LOSARTAN POTASSIUM 25 MILLIGRAM(S): 100 TABLET, FILM COATED ORAL at 10:08

## 2021-03-22 RX ADMIN — Medication 88 MICROGRAM(S): at 05:59

## 2021-03-22 RX ADMIN — Medication 25 MILLIGRAM(S): at 10:08

## 2021-03-22 NOTE — PHYSICAL THERAPY INITIAL EVALUATION ADULT - ACTIVE RANGE OF MOTION EXAMINATION, REHAB EVAL
Right hip ROM decreased due tp pain/bilateral upper extremity Active ROM was WFL (within functional limits)/Left LE Active ROM was WFL (within functional limits)/deficits as listed below

## 2021-03-22 NOTE — PHYSICAL THERAPY INITIAL EVALUATION ADULT - PERTINENT HX OF CURRENT PROBLEM, REHAB EVAL
Admitted with right groin pain, difficulty ambulation.s/p IVC filter placement 3/11/21.80 y/o F with PMH MARTINA, DVT,  HTN, HLD, COPD, s/p IVC filter placement on 3/11/21  by Dr Patrick at this hospital, was discharged on 3/14/21. States on 3/15 noticed R groin pain, worse with movement. Has been taking tylenol as needed with no relief. Denies n/v/d, no recent injury trauma or fall, denies hematuria and dysuria.

## 2021-03-23 ENCOUNTER — TRANSCRIPTION ENCOUNTER (OUTPATIENT)
Age: 81
End: 2021-03-23

## 2021-03-23 RX ORDER — ACETAMINOPHEN 500 MG
2 TABLET ORAL
Qty: 0 | Refills: 0 | DISCHARGE
Start: 2021-03-23

## 2021-03-23 RX ORDER — JNJ-78436735 50000000000 [PFU]/.5ML
0.5 SUSPENSION INTRAMUSCULAR ONCE
Refills: 0 | Status: COMPLETED | OUTPATIENT
Start: 2021-03-23 | End: 2021-03-23

## 2021-03-23 RX ORDER — GABAPENTIN 400 MG/1
100 CAPSULE ORAL THREE TIMES A DAY
Refills: 0 | Status: DISCONTINUED | OUTPATIENT
Start: 2021-03-23 | End: 2021-03-24

## 2021-03-23 RX ORDER — GABAPENTIN 400 MG/1
1 CAPSULE ORAL
Qty: 0 | Refills: 0 | DISCHARGE
Start: 2021-03-23

## 2021-03-23 RX ADMIN — BUDESONIDE AND FORMOTEROL FUMARATE DIHYDRATE 2 PUFF(S): 160; 4.5 AEROSOL RESPIRATORY (INHALATION) at 07:56

## 2021-03-23 RX ADMIN — FAMOTIDINE 40 MILLIGRAM(S): 10 INJECTION INTRAVENOUS at 06:01

## 2021-03-23 RX ADMIN — JNJ-78436735 0.5 MILLILITER(S): 50000000000 SUSPENSION INTRAMUSCULAR at 15:30

## 2021-03-23 RX ADMIN — OXYCODONE AND ACETAMINOPHEN 1 TABLET(S): 5; 325 TABLET ORAL at 21:36

## 2021-03-23 RX ADMIN — LOSARTAN POTASSIUM 25 MILLIGRAM(S): 100 TABLET, FILM COATED ORAL at 06:01

## 2021-03-23 RX ADMIN — OXYCODONE AND ACETAMINOPHEN 1 TABLET(S): 5; 325 TABLET ORAL at 21:06

## 2021-03-23 RX ADMIN — FAMOTIDINE 40 MILLIGRAM(S): 10 INJECTION INTRAVENOUS at 17:04

## 2021-03-23 RX ADMIN — Medication 25 MILLIGRAM(S): at 06:01

## 2021-03-23 RX ADMIN — OXYCODONE AND ACETAMINOPHEN 1 TABLET(S): 5; 325 TABLET ORAL at 08:00

## 2021-03-23 RX ADMIN — GABAPENTIN 100 MILLIGRAM(S): 400 CAPSULE ORAL at 12:46

## 2021-03-23 RX ADMIN — BUPROPION HYDROCHLORIDE 150 MILLIGRAM(S): 150 TABLET, EXTENDED RELEASE ORAL at 12:46

## 2021-03-23 RX ADMIN — OXYCODONE AND ACETAMINOPHEN 1 TABLET(S): 5; 325 TABLET ORAL at 08:30

## 2021-03-23 RX ADMIN — CITALOPRAM 40 MILLIGRAM(S): 10 TABLET, FILM COATED ORAL at 12:46

## 2021-03-23 RX ADMIN — GABAPENTIN 100 MILLIGRAM(S): 400 CAPSULE ORAL at 21:06

## 2021-03-23 RX ADMIN — SIMVASTATIN 40 MILLIGRAM(S): 20 TABLET, FILM COATED ORAL at 21:06

## 2021-03-23 RX ADMIN — Medication 88 MICROGRAM(S): at 06:01

## 2021-03-23 NOTE — DISCHARGE NOTE PROVIDER - NSDCMRMEDTOKEN_GEN_ALL_CORE_FT
acetaminophen 325 mg oral tablet: 2 tab(s) orally every 6 hours, As needed, Moderate Pain (4 - 6)  buPROPion 75 mg oral tablet: 2 tab(s) orally once a day (in the morning and then 1 tablet(s) orally once a day (in the evening)  CeleXA 40 mg oral tablet: 1 tab(s) orally once a day  gabapentin 100 mg oral capsule: 1 cap(s) orally 3 times a day  levothyroxine 88 mcg (0.088 mg) oral tablet: 1 tab(s) orally once a day  losartan 25 mg oral tablet: 1 tab(s) orally once a day  metoprolol succinate 25 mg oral tablet, extended release: 1 tab(s) orally once a day  oxycodone-acetaminophen 5 mg-325 mg oral tablet: 1 tab(s) orally every 6 hours, As Needed for SEVERE PAIN MDD:4 tabs  Pepcid 40 mg oral tablet: 1 tab(s) orally 2 times a day  simvastatin 40 mg oral tablet: 1 tab(s) orally once a day (at bedtime)  Trelegy Ellipta inhalation powder: 1 puff(s) inhaled once a day

## 2021-03-23 NOTE — DISCHARGE NOTE PROVIDER - CARE PROVIDER_API CALL
Sammy Man E  INTERNAL MEDICINE  [address] [place] [address], [address]  [place] [address]  Phone: [phone]  Fax: [phone]  Follow Up Time: 2 weeks    Cathy Huerta)  Vascular Surgery  [address] [place] [address]  Phone: [phone]  Fax: [phone]  Follow Up Time: 2 weeks

## 2021-03-23 NOTE — DISCHARGE NOTE PROVIDER - PROVIDER TOKENS
PROVIDER:[TOKEN:[[id number]],FOLLOWUP:[2 weeks]],PROVIDER:[TOKEN:[[id number]],FOLLOWUP:[2 weeks]]

## 2021-03-24 ENCOUNTER — TRANSCRIPTION ENCOUNTER (OUTPATIENT)
Age: 81
End: 2021-03-24

## 2021-03-24 VITALS
OXYGEN SATURATION: 99 % | RESPIRATION RATE: 16 BRPM | DIASTOLIC BLOOD PRESSURE: 80 MMHG | HEART RATE: 78 BPM | TEMPERATURE: 98 F | SYSTOLIC BLOOD PRESSURE: 128 MMHG

## 2021-03-24 LAB — SARS-COV-2 RNA SPEC QL NAA+PROBE: SIGNIFICANT CHANGE UP

## 2021-03-24 PROCEDURE — 97165 OT EVAL LOW COMPLEX 30 MIN: CPT

## 2021-03-24 PROCEDURE — 97530 THERAPEUTIC ACTIVITIES: CPT

## 2021-03-24 PROCEDURE — 80048 BASIC METABOLIC PNL TOTAL CA: CPT

## 2021-03-24 PROCEDURE — 36415 COLL VENOUS BLD VENIPUNCTURE: CPT

## 2021-03-24 PROCEDURE — 85027 COMPLETE CBC AUTOMATED: CPT

## 2021-03-24 PROCEDURE — 97116 GAIT TRAINING THERAPY: CPT

## 2021-03-24 PROCEDURE — 0031A: CPT

## 2021-03-24 PROCEDURE — U0005: CPT

## 2021-03-24 PROCEDURE — 97161 PT EVAL LOW COMPLEX 20 MIN: CPT

## 2021-03-24 PROCEDURE — U0003: CPT

## 2021-03-24 PROCEDURE — 94640 AIRWAY INHALATION TREATMENT: CPT

## 2021-03-24 PROCEDURE — 86769 SARS-COV-2 COVID-19 ANTIBODY: CPT

## 2021-03-24 PROCEDURE — G0378: CPT

## 2021-03-24 RX ORDER — ASPIRIN/CALCIUM CARB/MAGNESIUM 324 MG
81 TABLET ORAL DAILY
Refills: 0 | Status: DISCONTINUED | OUTPATIENT
Start: 2021-03-24 | End: 2021-03-24

## 2021-03-24 RX ORDER — ASPIRIN/CALCIUM CARB/MAGNESIUM 324 MG
1 TABLET ORAL
Qty: 0 | Refills: 0 | DISCHARGE
Start: 2021-03-24

## 2021-03-24 RX ADMIN — CITALOPRAM 40 MILLIGRAM(S): 10 TABLET, FILM COATED ORAL at 12:15

## 2021-03-24 RX ADMIN — FAMOTIDINE 40 MILLIGRAM(S): 10 INJECTION INTRAVENOUS at 05:38

## 2021-03-24 RX ADMIN — Medication 88 MICROGRAM(S): at 05:39

## 2021-03-24 RX ADMIN — Medication 81 MILLIGRAM(S): at 14:01

## 2021-03-24 RX ADMIN — OXYCODONE AND ACETAMINOPHEN 1 TABLET(S): 5; 325 TABLET ORAL at 14:29

## 2021-03-24 RX ADMIN — BUPROPION HYDROCHLORIDE 150 MILLIGRAM(S): 150 TABLET, EXTENDED RELEASE ORAL at 12:15

## 2021-03-24 RX ADMIN — GABAPENTIN 100 MILLIGRAM(S): 400 CAPSULE ORAL at 13:38

## 2021-03-24 RX ADMIN — GABAPENTIN 100 MILLIGRAM(S): 400 CAPSULE ORAL at 05:39

## 2021-03-24 RX ADMIN — OXYCODONE AND ACETAMINOPHEN 1 TABLET(S): 5; 325 TABLET ORAL at 13:39

## 2021-03-24 RX ADMIN — LOSARTAN POTASSIUM 25 MILLIGRAM(S): 100 TABLET, FILM COATED ORAL at 05:38

## 2021-03-24 RX ADMIN — Medication 25 MILLIGRAM(S): at 05:39

## 2021-03-24 NOTE — DISCHARGE NOTE NURSING/CASE MANAGEMENT/SOCIAL WORK - PATIENT PORTAL LINK FT
You can access the FollowMyHealth Patient Portal offered by [facility] by registering at the following website: [email or web address]. By joining [employer]’s FollowMyHealth portal, you will also be able to view your health information using other applications (apps) compatible with our system.

## 2021-03-24 NOTE — DISCHARGE NOTE NURSING/CASE MANAGEMENT/SOCIAL WORK - NSDCVIVACCINE_GEN_ALL_CORE_FT
Severe acute respiratory syndrome coronavirus 2 (SARS-CoV-2) (Coronavirus disease [COVID-19]) vaccine , 2021/3/23 15:30 , Isis Szymanski (NEHA)

## 2021-03-24 NOTE — PROGRESS NOTE ADULT - PROBLEM SELECTOR PLAN 4
s/i ivc  no ac due to gib  us neg for dvt  supportive care

## 2021-03-24 NOTE — PROVIDER CONTACT NOTE (OTHER) - BACKGROUND
This is a well-nourished 81 y.o. female who came in c/o R side groin pain after a recent IVC filter placement.

## 2021-03-24 NOTE — OCCUPATIONAL THERAPY INITIAL EVALUATION ADULT - RANGE OF MOTION EXAMINATION, UPPER EXTREMITY
Right UE elbow-digits WFL's GG mild weakness noted/Left UE Active ROM was WFL (within functional limits)/Right UE Active ROM was WNL (within normal limits)

## 2021-03-24 NOTE — PROGRESS NOTE ADULT - REASON FOR ADMISSION
groin pain and difficulty walking

## 2021-03-24 NOTE — OCCUPATIONAL THERAPY INITIAL EVALUATION ADULT - ADL RETRAINING, OT EVAL
Patient will dress upper body Independently within 3-4 sessions 2. Patient will dress lower body I'ly , AE as needed within 3-4 sessions

## 2021-03-24 NOTE — OCCUPATIONAL THERAPY INITIAL EVALUATION ADULT - PERTINENT HX OF CURRENT PROBLEM, REHAB EVAL
s/p IVC filter placement on 3/11/21  by Dr Patrick [facility], was discharged on 3/14/21. States on 3/15 noticed Right groin pain, worse with movement and unable to perform ADL's

## 2021-03-24 NOTE — PROVIDER CONTACT NOTE (OTHER) - ASSESSMENT
Writer met with patient to do a health and wellness check and to offer emotional support. Pt. was in bed, fully alert, and willing to engage with writer. Pt. reports she is "OK" but expressed worries over being sent home without help because she lives alone. Writer assured pt. that SW and case management are working on it, to ensure safest d/c plan. Pt. says she has pain in her groin and stomach when she gets up to go to the bathroom, but no pain at rest.  Pt. says she eats "fine" and that she "never sleeps well, even at home."  Parris, nurse manager came in during this visit and spoke with pt. as well.

## 2021-03-24 NOTE — PROGRESS NOTE ADULT - PROBLEM SELECTOR PLAN 1
had recent ivc filter place  no further intervention per vascular  pt/sw eval noted  add neurontin for pain control  for colton
had recent ivc filter place  no further intervention per vascular  pt/sw eval noted  add neurontin for pain control  for colton if approved  ot eval
had recent ivc filter place  no further intervention per vascular  pt/sw eval noted  for colton

## 2021-03-24 NOTE — PROVIDER CONTACT NOTE (OTHER) - RECOMMENDATIONS
Pt. was informed writer is available should she want to talk further or need any additional support.

## 2021-03-24 NOTE — OCCUPATIONAL THERAPY INITIAL EVALUATION ADULT - LEVEL OF INDEPENDENCE: DRESS LOWER BODY, OT EVAL
due to Right groin pain and decreased Right Shld ROM and decreased endurance/dependent (less than 25% patients effort)

## 2021-03-24 NOTE — PROGRESS NOTE ADULT - SUBJECTIVE AND OBJECTIVE BOX
Patient is a 81y old  Female who presents with a chief complaint of groin pain and difficulty walking (23 Mar 2021 13:11)      INTERVAL HPI/OVERNIGHT EVENTS: stable, feels ok, still with pain in right groin, ot eval and dc planning    MEDICATIONS  (STANDING):  budesonide 160 MICROgram(s)/formoterol 4.5 MICROgram(s) Inhaler 2 Puff(s) Inhalation two times a day  buPROPion XL . 150 milliGRAM(s) Oral daily  citalopram 40 milliGRAM(s) Oral daily  famotidine    Tablet 40 milliGRAM(s) Oral two times a day  gabapentin 100 milliGRAM(s) Oral three times a day  levothyroxine 88 MICROGram(s) Oral daily  losartan 25 milliGRAM(s) Oral daily  metoprolol succinate ER 25 milliGRAM(s) Oral daily  simvastatin 40 milliGRAM(s) Oral at bedtime    MEDICATIONS  (PRN):  acetaminophen   Tablet .. 650 milliGRAM(s) Oral every 6 hours PRN Moderate Pain (4 - 6)  oxycodone    5 mG/acetaminophen 325 mG 1 Tablet(s) Oral every 6 hours PRN Severe Pain (7 - 10)      Allergies    adhesives (Rash; Other)  Cat dander- wheezing, itchy throat, SOB (Other)  penicillin (Rash)  sulfa drugs (Rash)  tetracycline (Stomach Upset)    Intolerances        REVIEW OF SYSTEMS:  CONSTITUTIONAL: No fever, weight loss, or fatigue  EYES: No eye pain, visual disturbances  ENMT:  No difficulty hearing, tinnitus, vertigo; No sinus or throat pain  NECK: No pain or stiffness  RESPIRATORY: No cough, wheezing, chills or hemoptysis; No shortness of breath  CARDIOVASCULAR: No chest pain, palpitations, dizziness  GASTROINTESTINAL: No abdominal or epigastric pain. No nausea, vomiting, or hematemesis; No diarrhea or constipation. No melena or hematochezia.  GENITOURINARY: No dysuria, frequency, hematuria, or incontinence  NEUROLOGICAL: No headaches, memory loss, loss of strength, numbness, or tremors  SKIN: No itching, burning  LYMPH NODES: No enlarged glands  MUSCULOSKELETAL: right groin pain and back pain  PSYCHIATRIC: No depression, mood swings  HEME/LYMPH: No easy bruising, or bleeding gums  ALLERGY AND IMMUNOLOGIC: No hives    Vital Signs Last 24 Hrs  T(C): 36.5 (24 Mar 2021 05:30), Max: 36.8 (23 Mar 2021 22:03)  T(F): 97.7 (24 Mar 2021 05:30), Max: 98.3 (23 Mar 2021 22:03)  HR: 67 (24 Mar 2021 05:30) (67 - 81)  BP: 129/81 (24 Mar 2021 05:30) (117/66 - 145/79)  BP(mean): --  RR: 15 (24 Mar 2021 05:30) (14 - 17)  SpO2: 96% (24 Mar 2021 05:30) (94% - 96%)    PHYSICAL EXAM:  GENERAL: NAD, well-groomed, well-developed  HEAD:  Atraumatic, Normocephalic  EYES: EOMI, PERRLA, conjunctiva and sclera clear  ENMT: No tonsillar erythema, exudates, or enlargement   NECK: Supple, No JVD  NERVOUS SYSTEM:  Alert & Oriented X3, Good concentration  CHEST/LUNG: Clear to auscultation bilaterally; No rales, rhonchi, wheezing  HEART: Regular rate and rhythm  ABDOMEN: Soft, Nontender, Nondistended; Bowel sounds present  EXTREMITIES:  2+ Peripheral Pulses  tenderness in right groin and lower back area  LYMPH: No lymphadenopathy noted  SKIN: No rashes     LABS:              CAPILLARY BLOOD GLUCOSE                RADIOLOGY & ADDITIONAL TESTS:      Consultant(s) Notes Reviewed:  [x ] YES  [ ] NO    Care Discussed with Consultants/Other Providers [x ] YES  [ ] NO    Advanced care planning discussed with patient and family, advanced care planning forms reviewed, discussed, and completed.  20 minutes spent.  
Patient is a 81y old  Female who presents with a chief complaint of groin pain and difficulty walking (21 Mar 2021 19:10)      INTERVAL HPI/OVERNIGHT EVENTS: stable feels well but still c/o of right groin pain, pt eval noted    MEDICATIONS  (STANDING):  budesonide 160 MICROgram(s)/formoterol 4.5 MICROgram(s) Inhaler 2 Puff(s) Inhalation two times a day  buPROPion XL . 150 milliGRAM(s) Oral daily  citalopram 40 milliGRAM(s) Oral daily  famotidine    Tablet 40 milliGRAM(s) Oral two times a day  levothyroxine 88 MICROGram(s) Oral daily  losartan 25 milliGRAM(s) Oral daily  metoprolol succinate ER 25 milliGRAM(s) Oral daily  simvastatin 40 milliGRAM(s) Oral at bedtime    MEDICATIONS  (PRN):  acetaminophen   Tablet .. 650 milliGRAM(s) Oral every 6 hours PRN Moderate Pain (4 - 6)  oxycodone    5 mG/acetaminophen 325 mG 1 Tablet(s) Oral every 6 hours PRN Severe Pain (7 - 10)      Allergies    adhesives (Rash; Other)  Cat dander- wheezing, itchy throat, SOB (Other)  penicillin (Rash)  sulfa drugs (Rash)  tetracycline (Stomach Upset)    Intolerances        REVIEW OF SYSTEMS:  CONSTITUTIONAL: No fever, weight loss, or fatigue  EYES: No eye pain, visual disturbances  ENMT:  No difficulty hearing, tinnitus, vertigo; No sinus or throat pain  NECK: No pain or stiffness  RESPIRATORY: No cough, wheezing, chills or hemoptysis; No shortness of breath  CARDIOVASCULAR: No chest pain, palpitations, dizziness  GASTROINTESTINAL: No abdominal or epigastric pain. No nausea, vomiting, or hematemesis; No diarrhea or constipation. No melena or hematochezia.  GENITOURINARY: No dysuria, frequency, hematuria, or incontinence  NEUROLOGICAL: No headaches, memory loss, loss of strength, numbness, or tremors  SKIN: No itching, burning  LYMPH NODES: No enlarged glands  MUSCULOSKELETAL: right groin pain  PSYCHIATRIC: No depression, mood swings  HEME/LYMPH: No easy bruising, or bleeding gums  ALLERGY AND IMMUNOLOGIC: No hives    Vital Signs Last 24 Hrs  T(C): 36.7 (22 Mar 2021 09:57), Max: 37.4 (21 Mar 2021 18:13)  T(F): 98 (22 Mar 2021 09:57), Max: 99.3 (21 Mar 2021 18:13)  HR: 78 (22 Mar 2021 09:57) (72 - 78)  BP: 109/65 (22 Mar 2021 09:57) (98/61 - 125/79)  BP(mean): --  RR: 18 (22 Mar 2021 09:57) (16 - 18)  SpO2: 96% (22 Mar 2021 09:57) (93% - 98%)    PHYSICAL EXAM:  GENERAL: NAD, well-groomed, well-developed  HEAD:  Atraumatic, Normocephalic  EYES: EOMI, PERRLA, conjunctiva and sclera clear  ENMT: No tonsillar erythema, exudates, or enlargement   NECK: Supple, No JVD  NERVOUS SYSTEM:  Alert & Oriented X3, Good concentration  CHEST/LUNG: Clear to auscultation bilaterally; No rales, rhonchi, wheezing  HEART: Regular rate and rhythm  ABDOMEN: Soft, Nontender, Nondistended; Bowel sounds present  EXTREMITIES:  2+ Peripheral Pulses, pain in right groin, no bruising, able to move right hip external and interal rotation   LYMPH: No lymphadenopathy noted  SKIN: No rashes     LABS:                        10.9   4.78  )-----------( 193      ( 22 Mar 2021 07:36 )             35.7     22 Mar 2021 07:36    142    |  108    |  27     ----------------------------<  106    4.2     |  26     |  1.12     Ca    8.7        22 Mar 2021 07:36      PT/INR - ( 20 Mar 2021 16:26 )   PT: 12.9 sec;   INR: 1.11 ratio         PTT - ( 20 Mar 2021 16:26 )  PTT:27.3 sec    CAPILLARY BLOOD GLUCOSE                RADIOLOGY & ADDITIONAL TESTS: ct from 2/20/21 noted      Consultant(s) Notes Reviewed:  [x ] YES  [ ] NO    Care Discussed with Consultants/Other Providers [ z] YES  [ ] NO    Advanced care planning discussed with patient and family, advanced care planning forms reviewed, discussed, and completed.  20 minutes spent.  
Patient is a 81y old  Female who presents with a chief complaint of groin pain and difficulty walking (23 Mar 2021 12:17)      INTERVAL HPI/OVERNIGHT EVENTS: pt still with groin pain, for dc planning to [facility]  MEDICATIONS  (STANDING):  budesonide 160 MICROgram(s)/formoterol 4.5 MICROgram(s) Inhaler 2 Puff(s) Inhalation two times a day  buPROPion XL . 150 milliGRAM(s) Oral daily  citalopram 40 milliGRAM(s) Oral daily  coronavirus (EUA) Vaccine ([employer]ix) 0.5 milliLiter(s) IntraMuscular once  famotidine    Tablet 40 milliGRAM(s) Oral two times a day  gabapentin 100 milliGRAM(s) Oral three times a day  levothyroxine 88 MICROGram(s) Oral daily  losartan 25 milliGRAM(s) Oral daily  metoprolol succinate ER 25 milliGRAM(s) Oral daily  simvastatin 40 milliGRAM(s) Oral at bedtime    MEDICATIONS  (PRN):  acetaminophen   Tablet .. 650 milliGRAM(s) Oral every 6 hours PRN Moderate Pain (4 - 6)  oxycodone    5 mG/acetaminophen 325 mG 1 Tablet(s) Oral every 6 hours PRN Severe Pain (7 - 10)      Allergies    adhesives (Rash; Other)  Cat dander- wheezing, itchy throat, SOB (Other)  penicillin (Rash)  sulfa drugs (Rash)  tetracycline (Stomach Upset)    Intolerances        REVIEW OF SYSTEMS:  CONSTITUTIONAL: No fever, weight loss, or fatigue  EYES: No eye pain, visual disturbances  ENMT:  No difficulty hearing, tinnitus, vertigo; No sinus or throat pain  NECK: No pain or stiffness  RESPIRATORY: No cough, wheezing, chills or hemoptysis; No shortness of breath  CARDIOVASCULAR: No chest pain, palpitations, dizziness  GASTROINTESTINAL: No abdominal or epigastric pain. No nausea, vomiting, or hematemesis; No diarrhea or constipation. No melena or hematochezia.  GENITOURINARY: No dysuria, frequency, hematuria, or incontinence  NEUROLOGICAL: No headaches, memory loss, loss of strength, numbness, or tremors  SKIN: No itching, burning  LYMPH NODES: No enlarged glands  MUSCULOSKELETAL: right groin pain  PSYCHIATRIC: No depression, mood swings  HEME/LYMPH: No easy bruising, or bleeding gums  ALLERGY AND IMMUNOLOGIC: No hives    Vital Signs Last 24 Hrs  T(C): 36.4 (23 Mar 2021 10:46), Max: 36.7 (22 Mar 2021 18:55)  T(F): 97.5 (23 Mar 2021 10:46), Max: 98.1 (22 Mar 2021 18:55)  HR: 70 (23 Mar 2021 10:46) (70 - 77)  BP: 145/79 (23 Mar 2021 10:46) (99/62 - 145/79)  BP(mean): --  RR: 17 (23 Mar 2021 10:46) (17 - 19)  SpO2: 95% (23 Mar 2021 10:46) (94% - 96%)    PHYSICAL EXAM:  GENERAL: NAD, well-groomed, well-developed  HEAD:  Atraumatic, Normocephalic  EYES: EOMI, PERRLA, conjunctiva and sclera clear  ENMT: No tonsillar erythema, exudates, or enlargement   NECK: Supple, No JVD  NERVOUS SYSTEM:  Alert & Oriented X3, Good concentration  CHEST/LUNG: Clear to auscultation bilaterally; No rales, rhonchi, wheezing  HEART: Regular rate and rhythm  ABDOMEN: Soft, Nontender, Nondistended; Bowel sounds present  EXTREMITIES:  2+ Peripheral Pulses   LYMPH: No lymphadenopathy noted  SKIN: No rashes     LABS:      Ca    8.7        22 Mar 2021 07:36          CAPILLARY BLOOD GLUCOSE                RADIOLOGY & ADDITIONAL TESTS:      Consultant(s) Notes Reviewed:  [x] YES  [ ] NO    Care Discussed with Consultants/Other Providers [x ] YES  [ ] NO    Advanced care planning discussed with patient and family, advanced care planning forms reviewed, discussed, and completed.  20 minutes spent.

## 2021-03-24 NOTE — OCCUPATIONAL THERAPY INITIAL EVALUATION ADULT - ADDITIONAL COMMENTS
Pt lives alone +tub with grab bars and shower chair, grab bars by the toilet no steps.  PTA to IVC filter placement on 3/11/21  pt was Independent w/ ADL's/IADL's and used a SAC while ambulating in the community and nothing inside her apartment.    Once pt was discharged on 3/14/21, pt  stated on 3/15 noticed Right groin pain, worse with movement and unable to perform ADL's. Daughter assisted w/ ADL's when she was able to.

## 2021-03-24 NOTE — OCCUPATIONAL THERAPY INITIAL EVALUATION ADULT - ANTICIPATED DISCHARGE DISPOSITION, OT EVAL
Pt would benefit from continued OT in inpatient rehabilitation facility to optimize pt's function, safety and maximize return to prior level of functioning. SW made aware./rehabilitation facility

## 2021-04-13 RX ORDER — METOPROLOL TARTRATE 50 MG
1 TABLET ORAL
Qty: 30 | Refills: 0
Start: 2021-04-13 | End: 2021-05-12

## 2021-04-13 RX ORDER — CITALOPRAM 10 MG/1
1 TABLET, FILM COATED ORAL
Qty: 30 | Refills: 0
Start: 2021-04-13 | End: 2021-05-12

## 2021-04-13 RX ORDER — FAMOTIDINE 10 MG/ML
1 INJECTION INTRAVENOUS
Qty: 60 | Refills: 0
Start: 2021-04-13 | End: 2021-05-12

## 2021-04-13 RX ORDER — BUPROPION HYDROCHLORIDE 150 MG/1
2 TABLET, EXTENDED RELEASE ORAL
Qty: 120 | Refills: 0
Start: 2021-04-13 | End: 2021-05-12

## 2021-04-13 RX ORDER — FAMOTIDINE 10 MG/ML
1 INJECTION INTRAVENOUS
Qty: 0 | Refills: 0 | DISCHARGE

## 2021-04-13 RX ORDER — GABAPENTIN 400 MG/1
1 CAPSULE ORAL
Qty: 90 | Refills: 0
Start: 2021-04-13 | End: 2021-05-12

## 2021-04-13 RX ORDER — SIMVASTATIN 20 MG/1
1 TABLET, FILM COATED ORAL
Qty: 30 | Refills: 0
Start: 2021-04-13 | End: 2021-05-12

## 2021-04-13 RX ORDER — BUPROPION HYDROCHLORIDE 150 MG/1
2 TABLET, EXTENDED RELEASE ORAL
Qty: 0 | Refills: 0 | DISCHARGE

## 2021-04-13 RX ORDER — LEVOTHYROXINE SODIUM 125 MCG
1 TABLET ORAL
Qty: 30 | Refills: 0
Start: 2021-04-13 | End: 2021-05-12

## 2021-04-13 RX ORDER — LOSARTAN POTASSIUM 100 MG/1
1 TABLET, FILM COATED ORAL
Qty: 0 | Refills: 0 | DISCHARGE

## 2021-04-13 RX ORDER — CITALOPRAM 10 MG/1
1 TABLET, FILM COATED ORAL
Qty: 0 | Refills: 0 | DISCHARGE

## 2021-04-13 RX ORDER — FLUTICASONE FUROATE, UMECLIDINIUM BROMIDE AND VILANTEROL TRIFENATATE 200; 62.5; 25 UG/1; UG/1; UG/1
1 POWDER RESPIRATORY (INHALATION)
Qty: 0 | Refills: 0 | DISCHARGE

## 2021-04-13 RX ORDER — METOPROLOL TARTRATE 50 MG
1 TABLET ORAL
Qty: 0 | Refills: 0 | DISCHARGE

## 2021-04-13 RX ORDER — LOSARTAN POTASSIUM 100 MG/1
1 TABLET, FILM COATED ORAL
Qty: 30 | Refills: 0
Start: 2021-04-13 | End: 2021-05-12

## 2021-04-30 RX ORDER — ASPIRIN ENTERIC COATED TABLETS 81 MG 81 MG/1
81 TABLET, DELAYED RELEASE ORAL
Qty: 90 | Refills: 3 | Status: ACTIVE | COMMUNITY
Start: 2021-04-30

## 2021-04-30 RX ORDER — APIXABAN 5 MG/1
5 TABLET, FILM COATED ORAL
Qty: 90 | Refills: 3 | Status: DISCONTINUED | COMMUNITY
Start: 2020-11-18 | End: 2021-04-30

## 2021-05-02 NOTE — ED ADULT NURSE NOTE - LOCATION
Dr. Jewels Beltran   Division of Hospital Medicine  [facility]   Pager: [phone]     Patient is a 69y old  Female who presents with a chief complaint of abdominal pain (01 May 2021 12:13)    : [id number]     SUBJECTIVE / OVERNIGHT EVENTS: Patient seen and examined at bedside, States that she last BM 3 days ago wants something, has intermittent abd with cough, denies any CP, SOB, and n/v. No acute events overnight.     ROS:  All other review of systems negative    Allergies    eggs (Diarrhea)  No Known Drug Allergies    Intolerances        MEDICATIONS  (STANDING):  apixaban 5 milliGRAM(s) Oral every 12 hours  atorvastatin 10 milliGRAM(s) Oral at bedtime  dextrose 40% Gel 15 Gram(s) Oral once  dextrose 5%. 1000 milliLiter(s) (50 mL/Hr) IV Continuous <Continuous>  dextrose 5%. 1000 milliLiter(s) (100 mL/Hr) IV Continuous <Continuous>  dextrose 50% Injectable 25 Gram(s) IV Push once  dextrose 50% Injectable 12.5 Gram(s) IV Push once  dextrose 50% Injectable 25 Gram(s) IV Push once  diltiazem    milliGRAM(s) Oral daily  gabapentin 300 milliGRAM(s) Oral three times a day  glucagon  Injectable 1 milliGRAM(s) IntraMuscular once  insulin lispro (ADMELOG) corrective regimen sliding scale   SubCutaneous three times a day before meals  insulin lispro (ADMELOG) corrective regimen sliding scale   SubCutaneous at bedtime  pantoprazole    Tablet 40 milliGRAM(s) Oral before breakfast  polyethylene glycol 3350 17 Gram(s) Oral every 12 hours  sertraline 25 milliGRAM(s) Oral daily  sotalol 80 milliGRAM(s) Oral two times a day    MEDICATIONS  (PRN):  acetaminophen   Tablet .. 650 milliGRAM(s) Oral every 6 hours PRN Mild Pain (1 - 3), Moderate Pain (4 - 6)      Vital Signs Last 24 Hrs  T(C): 36.4 (02 May 2021 10:24), Max: 36.6 (01 May 2021 17:37)  T(F): 97.5 (02 May 2021 10:24), Max: 97.9 (01 May 2021 17:37)  HR: 76 (02 May 2021 10:24) (72 - 76)  BP: 122/77 (02 May 2021 10:24) (104/63 - 130/76)  BP(mean): --  RR: 18 (02 May 2021 10:24) (18 - 20)  SpO2: 97% (02 May 2021 10:24) (95% - 98%)  CAPILLARY BLOOD GLUCOSE      POCT Blood Glucose.: 199 mg/dL (02 May 2021 12:01)  POCT Blood Glucose.: 165 mg/dL (02 May 2021 08:01)  POCT Blood Glucose.: 178 mg/dL (01 May 2021 21:56)  POCT Blood Glucose.: 164 mg/dL (01 May 2021 17:34)  POCT Blood Glucose.: 190 mg/dL (01 May 2021 12:25)    I&O's Summary    01 May 2021 07:01  -  02 May 2021 07:00  --------------------------------------------------------  IN: 1020 mL / OUT: 2350 mL / NET: -1330 mL        PHYSICAL EXAM:  GENERAL: morbid obesity   HEAD:  Atraumatic, Normocephalic  EYES: EOMI, PERRLA, conjunctiva and sclera clear  NECK: Supple, No JVD  CHEST/LUNG: Clear to auscultation bilaterally; No wheeze  HEART: S1, S2, Regular rate and rhythm; No murmurs, rubs, or gallops  ABDOMEN: Soft, abdomen nontender  Bowel sounds present  EXTREMITIES: 2+ Peripheral Pulses, No clubbing, cyanosis, or edema  PSYCH: AAOx3  NEUROLOGY: non-focal  SKIN: No rashes or lesions    LABS:                        13.8   10.38 )-----------( 343      ( 02 May 2021 07:17 )             44.1     05-02    140  |  97  |  38<H>  ----------------------------<  159<H>  4.1   |  28  |  1.32<H>    Ca    10.2      02 May 2021 07:18                RADIOLOGY & ADDITIONAL TESTS:    Care Discussed with Consultants/Other Providers: Medicine NP flank

## 2021-05-06 NOTE — DISCHARGE NOTE NURSING/CASE MANAGEMENT/SOCIAL WORK - CASE MANAGER'S NAME
Ebony is a 31 year old female here for an OB check.  She is      .  Gestational Age: 16w0d.      No Concerns    Patient stated she thinks she might be feeling the baby move.    She denies bleeding.  She denies cramping.  She denies nausea.  She reports breast tenderness.    No U/A was needed for today's visit per provider.    [employer] application verified/signed up YES but not using the gary.   Pt informed that [employer] is loaded with  provider- approved content, customized resources, and weekly tasks and tips    Patient is currently part of the covid-19 OB modified schedule: NO         Heidi Stout--RN

## 2021-05-20 ENCOUNTER — APPOINTMENT (OUTPATIENT)
Dept: CARDIOLOGY | Facility: CLINIC | Age: 81
End: 2021-05-20

## 2021-05-20 NOTE — PHYSICAL EXAM
[General Appearance - Well Developed] : well developed [Normal Appearance] : normal appearance [General Appearance - Well Nourished] : well nourished [Well Groomed] : well groomed [No Deformities] : no deformities [General Appearance - In No Acute Distress] : no acute distress [Normal Conjunctiva] : the conjunctiva exhibited no abnormalities [Eyelids - No Xanthelasma] : the eyelids demonstrated no xanthelasmas [Normal Oral Mucosa] : normal oral mucosa [Normal Jugular Venous A Waves Present] : normal jugular venous A waves present [Normal Jugular Venous V Waves Present] : normal jugular venous V waves present [No Jugular Venous Jaime A Waves] : no jugular venous jaime A waves [] : no respiratory distress [Respiration, Rhythm And Depth] : normal respiratory rhythm and effort [Exaggerated Use Of Accessory Muscles For Inspiration] : no accessory muscle use [Auscultation Breath Sounds / Voice Sounds] : lungs were clear to auscultation bilaterally [Bowel Sounds] : normal bowel sounds [Abdomen Soft] : soft [Abdomen Tenderness] : non-tender [Abnormal Walk] : normal gait [Gait - Sufficient For Exercise Testing] : the gait was sufficient for exercise testing [Nail Clubbing] : no clubbing of the fingernails [Cyanosis, Localized] : no localized cyanosis [Skin Color & Pigmentation] : normal skin color and pigmentation [Oriented To Time, Place, And Person] : oriented to person, place, and time [Impaired Insight] : insight and judgment were intact [Affect] : the affect was normal [Mood] : the mood was normal [Not Palpable] : not palpable [No Precordial Heave] : no precordial heave was noted [Normal Rate] : normal [Rhythm Regular] : regular [Normal S1] : normal S1 [Normal S2] : normal S2 [No Gallop] : no gallop heard [Prosthetic Aortic Valve] : prosthetic aortic valve heard [Prosthetic Mitral Valve] : prosthetic mitral valve heard [II] : a grade 2 [2+] : left 2+ [1+] : left 1+ [No Abnormalities] : the abdominal aorta was not enlarged and no bruit was heard [___ +] : bilateral [unfilled]U+ pitting edema to the ankles [Apical Thrill] : no thrill palpable at the apex [Click] : no click

## 2021-06-01 ENCOUNTER — INPATIENT (INPATIENT)
Facility: HOSPITAL | Age: 81
LOS: 4 days | Discharge: HOME | DRG: 812 | End: 2021-06-06
Attending: INTERNAL MEDICINE | Admitting: INTERNAL MEDICINE
Payer: MEDICARE

## 2021-06-01 VITALS
HEIGHT: 62 IN | DIASTOLIC BLOOD PRESSURE: 74 MMHG | TEMPERATURE: 98 F | RESPIRATION RATE: 16 BRPM | OXYGEN SATURATION: 97 % | SYSTOLIC BLOOD PRESSURE: 120 MMHG | HEART RATE: 78 BPM | WEIGHT: 167.99 LBS

## 2021-06-01 DIAGNOSIS — Z98.890 OTHER SPECIFIED POSTPROCEDURAL STATES: Chronic | ICD-10-CM

## 2021-06-01 DIAGNOSIS — Z96.0 PRESENCE OF UROGENITAL IMPLANTS: Chronic | ICD-10-CM

## 2021-06-01 DIAGNOSIS — Z41.9 ENCOUNTER FOR PROCEDURE FOR PURPOSES OTHER THAN REMEDYING HEALTH STATE, UNSPECIFIED: Chronic | ICD-10-CM

## 2021-06-01 LAB
ALBUMIN SERPL ELPH-MCNC: 3 G/DL — LOW (ref 3.3–5)
ALP SERPL-CCNC: 67 U/L — SIGNIFICANT CHANGE UP (ref 40–120)
ALT FLD-CCNC: 10 U/L — LOW (ref 12–78)
ANION GAP SERPL CALC-SCNC: 7 MMOL/L — SIGNIFICANT CHANGE UP (ref 5–17)
AST SERPL-CCNC: 21 U/L — SIGNIFICANT CHANGE UP (ref 15–37)
BASOPHILS # BLD AUTO: 0.03 K/UL — SIGNIFICANT CHANGE UP (ref 0–0.2)
BASOPHILS NFR BLD AUTO: 0.5 % — SIGNIFICANT CHANGE UP (ref 0–2)
BILIRUB SERPL-MCNC: 0.3 MG/DL — SIGNIFICANT CHANGE UP (ref 0.2–1.2)
BUN SERPL-MCNC: 23 MG/DL — SIGNIFICANT CHANGE UP (ref 7–23)
CALCIUM SERPL-MCNC: 8.4 MG/DL — LOW (ref 8.5–10.1)
CHLORIDE SERPL-SCNC: 112 MMOL/L — HIGH (ref 96–108)
CO2 SERPL-SCNC: 23 MMOL/L — SIGNIFICANT CHANGE UP (ref 22–31)
CREAT SERPL-MCNC: 1 MG/DL — SIGNIFICANT CHANGE UP (ref 0.5–1.3)
EOSINOPHIL # BLD AUTO: 0.2 K/UL — SIGNIFICANT CHANGE UP (ref 0–0.5)
EOSINOPHIL NFR BLD AUTO: 3.5 % — SIGNIFICANT CHANGE UP (ref 0–6)
GLUCOSE SERPL-MCNC: 100 MG/DL — HIGH (ref 70–99)
HCT VFR BLD CALC: 24.2 % — LOW (ref 34.5–45)
HGB BLD-MCNC: 7.1 G/DL — LOW (ref 11.5–15.5)
IMM GRANULOCYTES NFR BLD AUTO: 0.9 % — SIGNIFICANT CHANGE UP (ref 0–1.5)
LYMPHOCYTES # BLD AUTO: 1.01 K/UL — SIGNIFICANT CHANGE UP (ref 1–3.3)
LYMPHOCYTES # BLD AUTO: 17.5 % — SIGNIFICANT CHANGE UP (ref 13–44)
MCHC RBC-ENTMCNC: 25.6 PG — LOW (ref 27–34)
MCHC RBC-ENTMCNC: 28.9 GM/DL — LOW (ref 32–36)
MCV RBC AUTO: 88.6 FL — SIGNIFICANT CHANGE UP (ref 80–100)
MONOCYTES # BLD AUTO: 0.68 K/UL — SIGNIFICANT CHANGE UP (ref 0–0.9)
MONOCYTES NFR BLD AUTO: 11.8 % — SIGNIFICANT CHANGE UP (ref 2–14)
NEUTROPHILS # BLD AUTO: 3.8 K/UL — SIGNIFICANT CHANGE UP (ref 1.8–7.4)
NEUTROPHILS NFR BLD AUTO: 65.8 % — SIGNIFICANT CHANGE UP (ref 43–77)
NRBC # BLD: 0 /100 WBCS — SIGNIFICANT CHANGE UP (ref 0–0)
NT-PROBNP SERPL-SCNC: 203 PG/ML — SIGNIFICANT CHANGE UP (ref 0–450)
PLATELET # BLD AUTO: 225 K/UL — SIGNIFICANT CHANGE UP (ref 150–400)
POTASSIUM SERPL-MCNC: 4.7 MMOL/L — SIGNIFICANT CHANGE UP (ref 3.5–5.3)
POTASSIUM SERPL-SCNC: 4.7 MMOL/L — SIGNIFICANT CHANGE UP (ref 3.5–5.3)
PROT SERPL-MCNC: 6.3 G/DL — SIGNIFICANT CHANGE UP (ref 6–8.3)
RAPID RVP RESULT: SIGNIFICANT CHANGE UP
RBC # BLD: 2.73 M/UL — LOW (ref 3.8–5.2)
RBC # FLD: 17.2 % — HIGH (ref 10.3–14.5)
SARS-COV-2 RNA SPEC QL NAA+PROBE: SIGNIFICANT CHANGE UP
SODIUM SERPL-SCNC: 142 MMOL/L — SIGNIFICANT CHANGE UP (ref 135–145)
TROPONIN I SERPL-MCNC: <.015 NG/ML — SIGNIFICANT CHANGE UP (ref 0.01–0.04)
WBC # BLD: 5.77 K/UL — SIGNIFICANT CHANGE UP (ref 3.8–10.5)
WBC # FLD AUTO: 5.77 K/UL — SIGNIFICANT CHANGE UP (ref 3.8–10.5)

## 2021-06-01 PROCEDURE — 99285 EMERGENCY DEPT VISIT HI MDM: CPT

## 2021-06-01 PROCEDURE — 71275 CT ANGIOGRAPHY CHEST: CPT | Mod: 26,MB

## 2021-06-01 PROCEDURE — 71045 X-RAY EXAM CHEST 1 VIEW: CPT | Mod: 26

## 2021-06-01 PROCEDURE — 93010 ELECTROCARDIOGRAM REPORT: CPT

## 2021-06-01 NOTE — ED PROVIDER NOTE - CLINICAL SUMMARY MEDICAL DECISION MAKING FREE TEXT BOX
ontiveros , sob, lives alone, no chest pain, hx of dvt, pe, on asa, has ivc filter, will obtain labs, ekg, ct, rvp , admission

## 2021-06-01 NOTE — ED ADULT NURSE NOTE - OBJECTIVE STATEMENT
Received pt awake and alert, Pt sts she was on weekly iron infusions and occasionally requires a blood transfusion, was in rehab for 21 days and did not have her iron infusions, sts she is way overdue. pt c/o feeling generalized weakness and having difficulty taking care of herself at home.

## 2021-06-01 NOTE — ED PROVIDER NOTE - CARE PLAN
Principal Discharge DX:	BOOTH (dyspnea on exertion)   Principal Discharge DX:	Symptomatic anemia  Secondary Diagnosis:	BOOTH (dyspnea on exertion)

## 2021-06-01 NOTE — ED ADULT TRIAGE NOTE - CHIEF COMPLAINT QUOTE
Patient has history of COPD and leukemia suppose to get blood transfusion every week but missed it since end of March 2021, thinks her hemoglobin is low was in rehab since March.

## 2021-06-01 NOTE — ED PROVIDER NOTE - ATTENDING CONTRIBUTION TO CARE
81 F history of anemia, leukemia, hiatal hernia here for worsening shortness of breath and fatigue. patient lives alone, now feels like she cannot care for her self. minimal exertion causes her to be short of breath.  On exam, patient appears pale, heart regular rate and rhythm, lungs clear to auscultation, abdomen soft, non-tender. r/o anemia, PE, acs.

## 2021-06-01 NOTE — ED PROVIDER NOTE - OBJECTIVE STATEMENT
81 y female presents with ontiveros, states she has hx of pe, dvt 81 y female presents with ontiveros, sob, denies chest pain, states she has hx of pe, dvt, had ivc filter placed in the past, is on aspirin, no other anticoagulants,  states she was in Rehab in March, arrived home in April, states she usually would have iron infusions every week, but has not had in several weeks,  states she also has hx of having blood transfusions for anemia.  denies etoh,  former smoker. patient is requesting admission , states she lives alone.   PMD Dr Man, Cardio Dr Harmon    PMH:  Acid reflux    Anemia  Completed iron infusions every 2 months, now Hb stable  Aortic stenosis    Asthma with COPD with exacerbation  Not on home O2  Calculus of ureter  s/p R ureteral stent 12/20/2020  Carpal tunnel syndrome    Coronary atherosclerosis of native coronary artery    Depression    Diverticulosis of colon    Dyslipidemia    Emphysema

## 2021-06-02 DIAGNOSIS — D64.9 ANEMIA, UNSPECIFIED: ICD-10-CM

## 2021-06-02 DIAGNOSIS — K44.9 DIAPHRAGMATIC HERNIA WITHOUT OBSTRUCTION OR GANGRENE: ICD-10-CM

## 2021-06-02 LAB
HCT VFR BLD CALC: 34.7 % — SIGNIFICANT CHANGE UP (ref 34.5–45)
HGB BLD-MCNC: 10.5 G/DL — LOW (ref 11.5–15.5)
IRON SATN MFR SERPL: 26 UG/DL — LOW (ref 30–160)
IRON SATN MFR SERPL: 7 % — LOW (ref 14–50)
MCHC RBC-ENTMCNC: 27 PG — SIGNIFICANT CHANGE UP (ref 27–34)
MCHC RBC-ENTMCNC: 30.3 GM/DL — LOW (ref 32–36)
MCV RBC AUTO: 89.2 FL — SIGNIFICANT CHANGE UP (ref 80–100)
NRBC # BLD: 0 /100 WBCS — SIGNIFICANT CHANGE UP (ref 0–0)
PLATELET # BLD AUTO: 190 K/UL — SIGNIFICANT CHANGE UP (ref 150–400)
RBC # BLD: 3.89 M/UL — SIGNIFICANT CHANGE UP (ref 3.8–5.2)
RBC # FLD: 15.9 % — HIGH (ref 10.3–14.5)
TIBC SERPL-MCNC: 357 UG/DL — SIGNIFICANT CHANGE UP (ref 220–430)
UIBC SERPL-MCNC: 332 UG/DL — SIGNIFICANT CHANGE UP (ref 110–370)
WBC # BLD: 6.07 K/UL — SIGNIFICANT CHANGE UP (ref 3.8–10.5)
WBC # FLD AUTO: 6.07 K/UL — SIGNIFICANT CHANGE UP (ref 3.8–10.5)

## 2021-06-02 RX ORDER — METOPROLOL TARTRATE 50 MG
25 TABLET ORAL DAILY
Refills: 0 | Status: DISCONTINUED | OUTPATIENT
Start: 2021-06-02 | End: 2021-06-06

## 2021-06-02 RX ORDER — PANTOPRAZOLE SODIUM 20 MG/1
40 TABLET, DELAYED RELEASE ORAL EVERY 12 HOURS
Refills: 0 | Status: DISCONTINUED | OUTPATIENT
Start: 2021-06-02 | End: 2021-06-04

## 2021-06-02 RX ORDER — LOSARTAN POTASSIUM 100 MG/1
25 TABLET, FILM COATED ORAL DAILY
Refills: 0 | Status: DISCONTINUED | OUTPATIENT
Start: 2021-06-02 | End: 2021-06-06

## 2021-06-02 RX ORDER — CITALOPRAM 10 MG/1
40 TABLET, FILM COATED ORAL DAILY
Refills: 0 | Status: DISCONTINUED | OUTPATIENT
Start: 2021-06-02 | End: 2021-06-06

## 2021-06-02 RX ORDER — BUPROPION HYDROCHLORIDE 150 MG/1
75 TABLET, EXTENDED RELEASE ORAL EVERY 24 HOURS
Refills: 0 | Status: DISCONTINUED | OUTPATIENT
Start: 2021-06-02 | End: 2021-06-06

## 2021-06-02 RX ORDER — PANTOPRAZOLE SODIUM 20 MG/1
40 TABLET, DELAYED RELEASE ORAL DAILY
Refills: 0 | Status: DISCONTINUED | OUTPATIENT
Start: 2021-06-02 | End: 2021-06-02

## 2021-06-02 RX ORDER — FAMOTIDINE 10 MG/ML
20 INJECTION INTRAVENOUS
Refills: 0 | Status: DISCONTINUED | OUTPATIENT
Start: 2021-06-02 | End: 2021-06-02

## 2021-06-02 RX ORDER — SIMVASTATIN 20 MG/1
40 TABLET, FILM COATED ORAL AT BEDTIME
Refills: 0 | Status: DISCONTINUED | OUTPATIENT
Start: 2021-06-02 | End: 2021-06-06

## 2021-06-02 RX ORDER — GABAPENTIN 400 MG/1
100 CAPSULE ORAL THREE TIMES A DAY
Refills: 0 | Status: DISCONTINUED | OUTPATIENT
Start: 2021-06-02 | End: 2021-06-06

## 2021-06-02 RX ORDER — BUPROPION HYDROCHLORIDE 150 MG/1
150 TABLET, EXTENDED RELEASE ORAL DAILY
Refills: 0 | Status: DISCONTINUED | OUTPATIENT
Start: 2021-06-02 | End: 2021-06-06

## 2021-06-02 RX ORDER — LEVOTHYROXINE SODIUM 125 MCG
88 TABLET ORAL DAILY
Refills: 0 | Status: DISCONTINUED | OUTPATIENT
Start: 2021-06-02 | End: 2021-06-06

## 2021-06-02 RX ORDER — ACETAMINOPHEN 500 MG
650 TABLET ORAL EVERY 6 HOURS
Refills: 0 | Status: DISCONTINUED | OUTPATIENT
Start: 2021-06-02 | End: 2021-06-06

## 2021-06-02 RX ORDER — PANTOPRAZOLE SODIUM 20 MG/1
40 TABLET, DELAYED RELEASE ORAL ONCE
Refills: 0 | Status: COMPLETED | OUTPATIENT
Start: 2021-06-02 | End: 2021-06-02

## 2021-06-02 RX ADMIN — CITALOPRAM 40 MILLIGRAM(S): 10 TABLET, FILM COATED ORAL at 12:12

## 2021-06-02 RX ADMIN — PANTOPRAZOLE SODIUM 40 MILLIGRAM(S): 20 TABLET, DELAYED RELEASE ORAL at 01:08

## 2021-06-02 RX ADMIN — GABAPENTIN 100 MILLIGRAM(S): 400 CAPSULE ORAL at 13:07

## 2021-06-02 RX ADMIN — PANTOPRAZOLE SODIUM 40 MILLIGRAM(S): 20 TABLET, DELAYED RELEASE ORAL at 18:03

## 2021-06-02 RX ADMIN — Medication 650 MILLIGRAM(S): at 20:16

## 2021-06-02 RX ADMIN — Medication 25 MILLIGRAM(S): at 12:12

## 2021-06-02 RX ADMIN — Medication 650 MILLIGRAM(S): at 21:10

## 2021-06-02 RX ADMIN — SIMVASTATIN 40 MILLIGRAM(S): 20 TABLET, FILM COATED ORAL at 21:38

## 2021-06-02 RX ADMIN — BUPROPION HYDROCHLORIDE 75 MILLIGRAM(S): 150 TABLET, EXTENDED RELEASE ORAL at 21:38

## 2021-06-02 RX ADMIN — GABAPENTIN 100 MILLIGRAM(S): 400 CAPSULE ORAL at 21:38

## 2021-06-02 RX ADMIN — Medication 88 MICROGRAM(S): at 12:12

## 2021-06-02 RX ADMIN — LOSARTAN POTASSIUM 25 MILLIGRAM(S): 100 TABLET, FILM COATED ORAL at 12:12

## 2021-06-02 RX ADMIN — BUPROPION HYDROCHLORIDE 150 MILLIGRAM(S): 150 TABLET, EXTENDED RELEASE ORAL at 12:12

## 2021-06-02 NOTE — CONSULT NOTE ADULT - ASSESSMENT
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 Compelted Nov 2013. In CR. Last PML-VIVIANA transcript in 7/2020 negative;   WBC and Plts normal     Hx of Fe def, GIB  on IV iron.   When stable Hgb 12g/dL   Hx hiatal hernia and esophageal ulcers with recurrent GI blood loss requiring IV iron; most recently exacerbated by initiation of anticoagulation (Eliquis) as a result of incidental right subsegmental pulmonary embolism in 11/2020      Admitted with Anemia.  Hgb 7.1g.dL  s/p 2U PRBC    In Nov 2020, and PE and DVT. Possibly provoked by immobility  ·	Filling defect identified segmental to subsegmental branches of the right lower lobe pulmonary artery, compatible with pulmonary embolism.  ·	Below-knee deep vein thrombosis in the right posterior tibial vein.  In mar 2021 with hemoptysis  Comorbid atherosclerosis  ·	Atherosclerotic disease. 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      RECOMMENDATIONS  Hold ASA, AC.   Was off AC outpt.     Monitor CBC., Hold transfusion.   Transfuse PRBC if clinically indicated.   Transfuse PRBC if Hgb <7.0 or if symptomatic.   Check FOBT    Appreciate GI eval.   PPI    Follow anemia studies, ordered already for AM.     Thank you for consulting us.   I have discussed the above plan of care with patient in detail. They expressed understanding of the treatment plan . Risks, benefits and alternatives discussed in detail. I have asked if they have any questions or concerns and appropriately addressed them; all questions answered to their satisfactions and in lay terms.     Discussed with Dr Burton.  
81-year-old female with intrathoracic stomach, hiatal hernia with Nish's ulcers found in March 2021, Presented with dyspnea on exertion, found with normocytic anemia with hemoglobin of 7.1 MCV of 88.6 on admission. ? recurrent bleeding from [place] ulcers/HH?    -Check Iron panel, Vitamin B12 level, folate level, reticulocyte, TSH (ordered)  -Regular diet  -No plans for repeat upper endoscopy at this time  -Transfuse as needed to a goal hemoglobin 7-8  -Hematology evaluation    Aylin Albarado [place] [address]  [phone]    ACP (advance care planning). Plan: Advanced care planning was discussed with patient and family.  Advanced care planning forms were reviewed and discussed.  Risks, benefits and alternatives of gastroenterologic procedures were discussed in detail and all questions were answered.    30 minutes spent.

## 2021-06-02 NOTE — CONSULT NOTE ADULT - SUBJECTIVE AND OBJECTIVE BOX
Patient is a 81y old  Female who presents with a chief complaint of ONTIVEROS (2021 12:46)      HPI:    81 y female presents with ontiveros, sob, denies chest pain, states she has hx of pe, dvt, had ivc filter placed in the past, is on aspirin, no other anticoagulants,  states she was in Rehab in March, arrived home in April, states she usually would have iron infusions every week, but has not had in several weeks,  states she also has hx of having blood transfusions for anemia.  denies etoh,  former smoker. Pt also had recent egd and colon in 3/2021 revealing hiatal hernia ulcer.   (2021 10:46)    Pt known to office. 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 Achieved CR [complete remission] followed by consolidation therapy, completed 2013.   Since in remission.   However has had recurrent episodes of Gi blood loss due to hiatal hernia and recurrent esophageal/gastric ulcers as well as colon polyps.   Prior GI,  Dr. Armenta who retired and has more recently been seen by Dr. Sandoval.   Hospitalized in 2020 due to flank pain and was noted incidentally to have right subsegmental pulmonary embolism.   Started at that time on anticoagulation with Eliquis at that time.   Requirements for IV iron had increased since starting on Eliquis.     Recently presented to the ER on 3/7/21 with hemoptysis after reporting that last week she also had rust colored sputum.    Last seen in office 21 Hgb 10g/dL> Has not followed in office since last hosp due to placement in rehab.   Had appt last week 21 but missed.     Admitted for anemia     hgb 7.1 on admission s/p transfusion, Hgb 10.5        PAST MEDICAL & SURGICAL HISTORY:  Graves disease, s/p surgery  Acid reflux  Asthma with COPD with exacerbation, Not on home O2  Depression  Former cigarette smoker  Hernia, hiatal  Hypertension  Osteoporosis  Dyslipidemia  Carpal tunnel syndrome  Coronary atherosclerosis of native coronary artery  Diverticulosis of colon  Rotator cuff tear, Right  Emphysema  Aortic stenosis  Anemia, Completed iron infusions every 2 months, now Hb stable  Leukemia, (APL, s/p chemo in , now in remission, followed by oncologist)  MARTINA on CPAP, Pt admits to be non-compliant  Calculus of ureter, s/p R ureteral stent 2020  Serum phosphorus decreased, Last level 1.5 at PST   After cataract, bilateral,   History of tonsillectomy, childhood  H/O cone biopsy of cervix,   h/o  endometrial ablation,   History of coronary angiogram, 12  H/O aortic valve repair,   H/O mitral valve repair,   S/P ureteral stent placement, R in 2020  H/O dilation and curettage  Elective surgery, Cystoscopy, right ureteroscopy, laser lithotripsy of right ureteral stone, right ureteral stent removal and replacement, right retrograde pyelogram on 19       HEALTH ISSUES - PROBLEM Dx:  Symptomatic anemia  Hiatal hernia with GERD and esophagitis    Anemia [D64.9]  Graves disease [242.00]  Acid reflux [530.81]  Asthma [493.90]  Asthma with COPD with exacerbation [493.22]  Depression [311]  Former cigarette smoker [V15.82]  Hernia, hiatal [553.3]  Hypertension [401.9]  Osteoporosis [733.00]  Obstructive sleep apnea [327.23]  Dyslipidemia [272.4]  Carpal tunnel syndrome [354.0]  Coronary atherosclerosis of native coronary artery [414.01]  Diverticulosis of colon [562.10]  Rotator cuff tear [840.4]  Duodenal ulcer [532.90]  Emphysema [492.8]  Aortic stenosis [424.1]  Mitral regurgitation [424.0]  Spinal stenosis [724.00]  Anemia [D64.9]  Leukemia [C95.90]  MARTINA on CPAP [G47.33]  Calculus of ureter [N20.1]  Serum phosphorus decreased [E83.39]  Carpal tunnel right repair [354.0]  After cataract, bilateral [366.50]  History of tonsillectomy [V45.89]  H/O cone biopsy of cervix [V45.89]  h/o  endometrial ablation [V45.89]  History of coronary angiogram [V15.1]  H/O aortic valve repair [Z98.890]  H/O mitral valve repair [Z98.890]  S/P ureteral stent placement [Z96.0]  H/O dilation and curettage [Z98.890]  Elective surgery [Z41.9]  ONTIVEROS (dyspnea on exertion) [R06.00]    FAMILY HISTORY:  FH: myocardial infarction  (Father )        FAMILY HISTORY:  FH: myocardial infarction  (Father )      [SOCIAL HISTORY: ]     smoking:  ex-smoker     EtOH:  denies     illicit drugs:  denies     occupation:  retired     marital status:       Other:       [ALLERGIES/INTOLERANCES:]  Allergies     adhesives (Rash; Other)     Cat dander- wheezing, itchy throat, SOB (Other)     penicillin (Rash)     sulfa drugs (Rash)     tetracycline (Stomach Upset)  Intolerances      [MEDICATIONS]  MEDICATIONS  (STANDING):  buPROPion . 75 milliGRAM(s) Oral every 24 hours  buPROPion XL (24-Hour) . 150 milliGRAM(s) Oral daily  citalopram 40 milliGRAM(s) Oral daily  gabapentin 100 milliGRAM(s) Oral three times a day  levothyroxine 88 MICROGram(s) Oral daily  losartan 25 milliGRAM(s) Oral daily  metoprolol succinate ER 25 milliGRAM(s) Oral daily  pantoprazole  Injectable 40 milliGRAM(s) IV Push every 12 hours  simvastatin 40 milliGRAM(s) Oral at bedtime    MEDICATIONS  (PRN):  acetaminophen   Tablet .. 650 milliGRAM(s) Oral every 6 hours PRN Temp greater or equal to 38C (100.4F), Moderate Pain (4 - 6)        [REVIEW OF SYSTEMS: ]  CONSTITUTIONAL: normal, no fever, no shakes, no chills   EYES: No eye pain, no visual disturbances, no discharge  ENMT:  no discharge  NECK: No pain, no stiffness  BREASTS: No pain, no masses, no nipple discharge  RESPIRATORY: No cough, no wheezing, no chills, no hemoptysis; No shortness of breath  CARDIOVASCULAR: No chest pain, no palpitations, no dizziness, no leg swelling  GASTROINTESTINAL: No abdominal, no epigastric pain. No nausea, no vomiting, no hematemesis; No diarrhea , no constipation. No melena, no hematochezia.  GENITOURINARY: No dysuria, no frequency, no hematuria, no incontinence  NEUROLOGICAL: No headaches, no memory loss, no loss of strength, no numbness, no tremors  SKIN: No itching, no burning, no rashes, no lesions   LYMPH NODES: No enlarged glands  ENDOCRINE: No heat or cold intolerance; No hair loss  MUSCULOSKELETAL: No joint pain or swelling; No muscle, no back, no extremity pain  PSYCHIATRIC: No depression, no anxiety, no mood swings, no difficulty sleeping  HEME/LYMPH: No easy bruising, no bleeding gums      [VITALS SIGNS 24hrs]  Vital Signs Last 24 Hrs  T(C): 36.6 (2021 16:55), Max: 37 (2021 09:12)  T(F): 97.8 (2021 16:55), Max: 98.6 (2021 09:12)  HR: 69 (2021 16:55) (69 - 79)  BP: 127/74 (2021 16:55) (99/55 - 150/78)  BP(mean): --  RR: 18 (2021 16:55) (16 - 18)  SpO2: 93% (2021 16:55) (93% - 97%)    [PHYSICAL EXAM]  General: adult in NAD,  WN,  WD.  HEENT: clear oropharynx, anicteric sclera, pink conjunctivae.  Neck: supple, no masses.  CV: normal S1S2, no murmur, no rubs, no gallops.  Lungs: clear to auscultation, no wheezes, no rales, no rhonchi.  Abdomen: soft, non-tender, non-distended, no hepatosplenomegaly, normal BS, no guarding.  Ext: no clubbing, no cyanosis, no edema.  Skin: no rashes,  no petechiae, no venous stasis changes.  Neuro: alert and oriented X3, no focal motor deficits.  LN: no SC JUAN.      [LABS: ]                        10.5   6.07  )-----------( 190      ( 2021 12:44 )             34.7     CBC Full  -  ( 2021 12:44 )  WBC Count : 6.07 K/uL  RBC Count : 3.89 M/uL  Hemoglobin : 10.5 g/dL  Hematocrit : 34.7 %  Platelet Count - Automated : 190 K/uL  Mean Cell Volume : 89.2 fl  Mean Cell Hemoglobin : 27.0 pg  Mean Cell Hemoglobin Concentration : 30.3 gm/dL      06-01    142  |  112<H>  |  23  ----------------------------<  100<H>  4.7   |  23  |  1.00    Ca    8.4<L>      2021 20:24    TPro  6.3  /  Alb  3.0<L>  /  TBili  0.3  /  DBili  x   /  AST  21  /  ALT  10<L>  /  AlkPhos  67  06-01      LIVER FUNCTIONS - ( 2021 20:24 )  Alb: 3.0 g/dL / Pro: 6.3 g/dL / ALK PHOS: 67 U/L / ALT: 10 U/L / AST: 21 U/L / GGT: x           CARDIAC MARKERS ( 2021 20:31 )  <.015 ng/mL / x     / x     / x     / x              WBC  TREND (5 Days)  WBC Count: 6.07 K/uL ( @ 12:44)  WBC Count: 5.77 K/uL ( @ 20:24)    HGB  TREND (5 Days)  Hemoglobin: 10.5 g/dL ( @ 12:44)  Hemoglobin: 7.1 g/dL ( @ 20:24)    HCT  TREND (5 Days)  Hematocrit: 34.7 % ( @ 12:44)  Hematocrit: 24.2 % ( @ 20:24)    PLT  TREND (5 Days)  Platelet Count - Automated: 190 K/uL ( @ 12:44)  Platelet Count - Automated: 225 K/uL ( @ 20:24)      Anemia Studies      Ferritin, Serum: 332 ng/mL ( @ 19:26)    Iron - Total Binding Capacity.: 290 ug/dL ( @ 19:24)          [RADIOLOGY & ADDITIONAL STUDIES:]     < from: CT Angio Chest PE Protocol w/ IV Cont (21 @ 22:19) >  EXAM:  CT ANGIO CHEST PULM [facility]                            PROCEDURE DATE:  2021          INTERPRETATION:  CLINICAL INFORMATION: Shortness of breath. History of pulmonary embolus.    COMPARISON: CT chest 3/7/2021    CONTRAST/COMPLICATIONS:  IV Contrast: Omnipaque 350  78 cc administered   22 cc discarded  Oral Contrast: NONE  Complications: None reported at time of study completion    PROCEDURE:  CT Angiography of the Chest.  Sagittal and coronal reformats were performed as well as 3D (MIP) reconstructions.    FINDINGS:    LUNGS AND AIRWAYS: Patent central airways.  Subsegmental bilateral lower lobe atelectasis.  PLEURA: No pleural effusion.  MEDIASTINUM AND GUERA: No lymphadenopathy.  VESSELS: No pulmonary embolus. Mild atherosclerotic consultations of the aorta.  HEART: Heart size is normal. No pericardial effusion. Aortic and mitral valve repair.  CHEST WALL AND LOWER NECK: Bilateral thyroid lobe calcifications..  VISUALIZED UPPER ABDOMEN: Large hiatal hernia with nearly intrathoracic stomach.  BONES: Median sternotomy. Degenerative changes of the spine. Stable T12 compression deformity.    IMPRESSION:  No pulmonary embolus.    Large hiatal hernia.    < end of copied text >

## 2021-06-02 NOTE — H&P ADULT - HISTORY OF PRESENT ILLNESS
81 y female presents with ontiveros, sob, denies chest pain, states she has hx of pe, dvt, had ivc filter placed in the past, is on aspirin, no other anticoagulants,  states she was in Rehab in March, arrived home in April, states she usually would have iron infusions every week, but has not had in several weeks,  states she also has hx of having blood transfusions for anemia.  denies etoh,  former smoker. Pt also had recent egd and colon in 3/2021 revealing hiatal hernia ulcer.

## 2021-06-02 NOTE — CONSULT NOTE ADULT - SUBJECTIVE AND OBJECTIVE BOX
[facility]    MD Matteo Hallman MD Jaydeep Kadam, MD Faisal Sheikh, DO Darci Robin, YADY Hermosillo      Chief Complaint:  Patient is a 81y old  Female who presents with a chief complaint of BOOTH    HPI:    81-year-old female with history of PE/DVT/IVC filter Placed on 3/11/2021on aspirin/hypertension/hyperlipidemia/COPD Dyspnea on exertion, found to have anemia.  Patient was discharged with a hemoglobin of 10.9 in March 2021, had EGD/colonoscopy in 3/2021 found to have extremely large hiatal hernia, linear ulcers consistent with Nish's ulcerations were found.  Colonoscopy demonstrated significant left-sided diverticular disease with colonic narrowing, hemorrhoids.  Biopsies negative for H. pylori.    Patient states that she usually has iron infusions weekly, has not been having them since she was admitted to rehab at the end of March.    On GI review of systems, patient denies any melena/hematochezia/hematemesis or any other overt signs of GI bleeding.    Allergies:  adhesives (Rash; Other)  Cat dander- wheezing, itchy throat, SOB (Other)  penicillin (Rash)  sulfa drugs (Rash)  tetracycline (Stomach Upset)      Home Medications:    Hospital Medications:  acetaminophen   Tablet .. 650 milliGRAM(s) Oral every 6 hours PRN  buPROPion XL (24-Hour) . 150 milliGRAM(s) Oral daily  citalopram 40 milliGRAM(s) Oral daily  gabapentin 100 milliGRAM(s) Oral three times a day  levothyroxine 88 MICROGram(s) Oral daily  losartan 25 milliGRAM(s) Oral daily  metoprolol succinate ER 25 milliGRAM(s) Oral daily  pantoprazole  Injectable 40 milliGRAM(s) IV Push every 12 hours  simvastatin 40 milliGRAM(s) Oral at bedtime      PMHX/PSHX:  Graves disease    Acid reflux    Asthma    Asthma with COPD with exacerbation    Depression    Former cigarette smoker    Hernia, hiatal    Hypertension    Osteoporosis    Obstructive sleep apnea    Dyslipidemia    Carpal tunnel syndrome    Coronary atherosclerosis of native coronary artery    Diverticulosis of colon    Rotator cuff tear    Duodenal ulcer    Emphysema    Aortic stenosis    Mitral regurgitation    Spinal stenosis    Anemia    Leukemia    MARTINA on CPAP    Calculus of ureter    Serum phosphorus decreased    Carpal tunnel right repair    After cataract, bilateral    History of tonsillectomy    H/O cone biopsy of cervix    h/o  endometrial ablation    History of coronary angiogram    H/O aortic valve repair    H/O mitral valve repair    S/P ureteral stent placement    H/O dilation and curettage    Elective surgery        Family history:  No pertinent family history in first degree relatives    FH: myocardial infarction        Social History:     ROS:     General:  No wt loss, fevers, chills, night sweats, fatigue,   Eyes:  Good vision, no reported pain  ENT:  No sore throat, pain, runny nose, dysphagia  CV:  No pain, palpitations, hypo/hypertension  Resp:  No dyspnea, cough, tachypnea, wheezing  GI:  See HPI  :  No pain, bleeding, incontinence, nocturia  Muscle:  No pain, weakness  Neuro:  No weakness, tingling, memory problems  Psych:  No fatigue, insomnia, mood problems, depression  Endocrine:  No polyuria, polydipsia, cold/heat intolerance  Heme:  No petechiae, ecchymosis, easy bruisability  Skin:  No rash, tattoos, scars, edema      PHYSICAL EXAM:     GENERAL:  Appears stated age, well-groomed, well-nourished, no distress  HEENT:  NC/AT,  conjunctivae clear and pink, no thyromegaly, nodules, adenopathy, no JVD, sclera -anicteric  CHEST:  Full & symmetric excursion, no increased effort, breath sounds clear  HEART:  Regular rhythm, S1, S2, no murmur/rub/S3/S4, no abdominal bruit, no edema  ABDOMEN:  Soft, non-tender, non-distended, normoactive bowel sounds  EXT:  no cyanosis, clubbing or edema  SKIN:  No rash/erythema, intact   NEURO:  Alert, oriented, no asterixis, no tremor, no encephalopathy    Vital Signs:  Vital Signs Last 24 Hrs  T(C): 37 (02 Jun 2021 09:45), Max: 37 (02 Jun 2021 09:12)  T(F): 98.6 (02 Jun 2021 09:45), Max: 98.6 (02 Jun 2021 09:12)  HR: 79 (02 Jun 2021 09:45) (70 - 79)  BP: 150/78 (02 Jun 2021 09:45) (99/55 - 150/78)  BP(mean): --  RR: 18 (02 Jun 2021 09:45) (16 - 18)  SpO2: 95% (02 Jun 2021 09:45) (95% - 97%)  Daily Height in cm: 157.48 (01 Jun 2021 19:30)    Daily     LABS:                        7.1    5.77  )-----------( 225      ( 01 Jun 2021 20:24 )             24.2     Mean Cell Volume: 88.6 fl (06-01-21 @ 20:24)    06-01    142  |  112<H>  |  23  ----------------------------<  100<H>  4.7   |  23  |  1.00    Ca    8.4<L>      01 Jun 2021 20:24    TPro  6.3  /  Alb  3.0<L>  /  TBili  0.3  /  DBili  x   /  AST  21  /  ALT  10<L>  /  AlkPhos  67  06-01    LIVER FUNCTIONS - ( 01 Jun 2021 20:24 )  Alb: 3.0 g/dL / Pro: 6.3 g/dL / ALK PHOS: 67 U/L / ALT: 10 U/L / AST: 21 U/L / GGT: x                                       7.1    5.77  )-----------( 225      ( 01 Jun 2021 20:24 )             24.2       Imaging:           [facility]    MD Matteo Hallman MD Jaydeep Kadam, MD Faisal Sheikh, DO Darci Robin, YADY Hermosillo      Chief Complaint:  Patient is a 81y old  Female who presents with a chief complaint of BOOTH    HPI:    81-year-old female with history of PE/DVT/IVC filter Placed on 3/11/2021on aspirin/hypertension/hyperlipidemia/COPD Dyspnea on exertion, found to have anemia.  Patient was discharged with a hemoglobin of 10.9 in March 2021, had EGD/colonoscopy in 3/2021 found to have extremely large hiatal hernia, linear ulcers consistent with Nish's ulcerations were found.  Colonoscopy demonstrated significant left-sided diverticular disease with colonic narrowing, hemorrhoids.  Biopsies negative for H. pylori.    Patient states that she usually has iron infusions weekly, has not been having them since she was admitted to rehab at the end of March.  She has only been taking famotidine for her stomach since her discharge    On GI review of systems, patient denies any melena/hematochezia/hematemesis or any other overt signs of GI bleeding.    Allergies:  adhesives (Rash; Other)  Cat dander- wheezing, itchy throat, SOB (Other)  penicillin (Rash)  sulfa drugs (Rash)  tetracycline (Stomach Upset)      Home Medications:    Hospital Medications:  acetaminophen   Tablet .. 650 milliGRAM(s) Oral every 6 hours PRN  buPROPion XL (24-Hour) . 150 milliGRAM(s) Oral daily  citalopram 40 milliGRAM(s) Oral daily  gabapentin 100 milliGRAM(s) Oral three times a day  levothyroxine 88 MICROGram(s) Oral daily  losartan 25 milliGRAM(s) Oral daily  metoprolol succinate ER 25 milliGRAM(s) Oral daily  pantoprazole  Injectable 40 milliGRAM(s) IV Push every 12 hours  simvastatin 40 milliGRAM(s) Oral at bedtime      PMHX/PSHX:  Graves disease    Acid reflux    Asthma    Asthma with COPD with exacerbation    Depression    Former cigarette smoker    Hernia, hiatal    Hypertension    Osteoporosis    Obstructive sleep apnea    Dyslipidemia    Carpal tunnel syndrome    Coronary atherosclerosis of native coronary artery    Diverticulosis of colon    Rotator cuff tear    Duodenal ulcer    Emphysema    Aortic stenosis    Mitral regurgitation    Spinal stenosis    Anemia    Leukemia    MARTINA on CPAP    Calculus of ureter    Serum phosphorus decreased    Carpal tunnel right repair    After cataract, bilateral    History of tonsillectomy    H/O cone biopsy of cervix    h/o  endometrial ablation    History of coronary angiogram    H/O aortic valve repair    H/O mitral valve repair    S/P ureteral stent placement    H/O dilation and curettage    Elective surgery        Family history:  No pertinent family history in first degree relatives    FH: myocardial infarction        Social History:     ROS:     General:  No wt loss, fevers, chills, night sweats, fatigue,   Eyes:  Good vision, no reported pain  ENT:  No sore throat, pain, runny nose, dysphagia  CV:  No pain, palpitations, hypo/hypertension  Resp:  No dyspnea, cough, tachypnea, wheezing  GI:  See HPI  :  No pain, bleeding, incontinence, nocturia  Muscle:  No pain, weakness  Neuro:  No weakness, tingling, memory problems  Psych:  No fatigue, insomnia, mood problems, depression  Endocrine:  No polyuria, polydipsia, cold/heat intolerance  Heme:  No petechiae, ecchymosis, easy bruisability  Skin:  No rash, tattoos, scars, edema      PHYSICAL EXAM:     GENERAL:  Appears stated age, well-groomed, well-nourished, no distress  HEENT:  NC/AT,  conjunctivae clear and pink, no thyromegaly, nodules, adenopathy, no JVD, sclera -anicteric  CHEST:  Full & symmetric excursion, no increased effort, breath sounds clear  HEART:  Regular rhythm, S1, S2, no murmur/rub/S3/S4, no abdominal bruit, no edema  ABDOMEN:  Soft, non-tender, non-distended, normoactive bowel sounds  EXT:  no cyanosis, clubbing or edema  SKIN:  No rash/erythema, intact   NEURO:  Alert, oriented, no asterixis, no tremor, no encephalopathy    Vital Signs:  Vital Signs Last 24 Hrs  T(C): 37 (02 Jun 2021 09:45), Max: 37 (02 Jun 2021 09:12)  T(F): 98.6 (02 Jun 2021 09:45), Max: 98.6 (02 Jun 2021 09:12)  HR: 79 (02 Jun 2021 09:45) (70 - 79)  BP: 150/78 (02 Jun 2021 09:45) (99/55 - 150/78)  BP(mean): --  RR: 18 (02 Jun 2021 09:45) (16 - 18)  SpO2: 95% (02 Jun 2021 09:45) (95% - 97%)  Daily Height in cm: 157.48 (01 Jun 2021 19:30)    Daily     LABS:                        7.1    5.77  )-----------( 225      ( 01 Jun 2021 20:24 )             24.2     Mean Cell Volume: 88.6 fl (06-01-21 @ 20:24)    06-01    142  |  112<H>  |  23  ----------------------------<  100<H>  4.7   |  23  |  1.00    Ca    8.4<L>      01 Jun 2021 20:24    TPro  6.3  /  Alb  3.0<L>  /  TBili  0.3  /  DBili  x   /  AST  21  /  ALT  10<L>  /  AlkPhos  67  06-01    LIVER FUNCTIONS - ( 01 Jun 2021 20:24 )  Alb: 3.0 g/dL / Pro: 6.3 g/dL / ALK PHOS: 67 U/L / ALT: 10 U/L / AST: 21 U/L / GGT: x                                       7.1    5.77  )-----------( 225      ( 01 Jun 2021 20:24 )             24.2       Imaging:

## 2021-06-02 NOTE — ED ADULT NURSE REASSESSMENT NOTE - NS ED NURSE REASSESS COMMENT FT1
1st unit of RBC's initiated, consent obtained, Pt understands need for transfusion and made aware of possible side effects, VSS, will continue to monitor closely.
Dr Viera at bedside, pt refused rectal exam, will continue to monitor.
Lab called, pt has antibodies, lab is working on specimen, said it will be 2-3 hours, will let RN when RBC/s can be sent for, will continue to monitor.
Pt sleeping and comfortable.  Lights dimmed.  Pt on monitor.  Blood transfusion infusing at this time. VS stable.

## 2021-06-02 NOTE — H&P ADULT - PROBLEM SELECTOR PLAN 1
pt with new onset anemia when compared to prior labx  old chart noted  pt had a egd showing hiatal hernia with ulcer  heme and gi eval  iv protonix bid  clear liquid diet  check stool for occult blood  transfuse 2 units of blood  fu with gi and heme  not on any ac except asa

## 2021-06-03 LAB
ANION GAP SERPL CALC-SCNC: 5 MMOL/L — SIGNIFICANT CHANGE UP (ref 5–17)
ANISOCYTOSIS BLD QL: SLIGHT — SIGNIFICANT CHANGE UP
BUN SERPL-MCNC: 17 MG/DL — SIGNIFICANT CHANGE UP (ref 7–23)
CALCIUM SERPL-MCNC: 8.2 MG/DL — LOW (ref 8.5–10.1)
CHLORIDE SERPL-SCNC: 112 MMOL/L — HIGH (ref 96–108)
CO2 SERPL-SCNC: 27 MMOL/L — SIGNIFICANT CHANGE UP (ref 22–31)
COVID-19 SPIKE DOMAIN AB INTERP: POSITIVE
COVID-19 SPIKE DOMAIN ANTIBODY RESULT: >250 U/ML — HIGH
CREAT SERPL-MCNC: 0.87 MG/DL — SIGNIFICANT CHANGE UP (ref 0.5–1.3)
DACRYOCYTES BLD QL SMEAR: SLIGHT — SIGNIFICANT CHANGE UP
ELLIPTOCYTES BLD QL SMEAR: SLIGHT — SIGNIFICANT CHANGE UP
FERRITIN SERPL-MCNC: 27 NG/ML — SIGNIFICANT CHANGE UP (ref 15–150)
FOLATE SERPL-MCNC: 10.2 NG/ML — SIGNIFICANT CHANGE UP
GLUCOSE SERPL-MCNC: 84 MG/DL — SIGNIFICANT CHANGE UP (ref 70–99)
HCT VFR BLD CALC: 31.4 % — LOW (ref 34.5–45)
HGB BLD-MCNC: 9.7 G/DL — LOW (ref 11.5–15.5)
IRON SATN MFR SERPL: 23 UG/DL — LOW (ref 30–160)
IRON SATN MFR SERPL: 7 % — LOW (ref 14–50)
LG PLATELETS BLD QL AUTO: SLIGHT — SIGNIFICANT CHANGE UP
MANUAL SMEAR VERIFICATION: SIGNIFICANT CHANGE UP
MCHC RBC-ENTMCNC: 27.2 PG — SIGNIFICANT CHANGE UP (ref 27–34)
MCHC RBC-ENTMCNC: 30.9 GM/DL — LOW (ref 32–36)
MCV RBC AUTO: 88.2 FL — SIGNIFICANT CHANGE UP (ref 80–100)
NRBC # BLD: 0 /100 WBCS — SIGNIFICANT CHANGE UP (ref 0–0)
OVALOCYTES BLD QL SMEAR: SLIGHT — SIGNIFICANT CHANGE UP
PLAT MORPH BLD: NORMAL — SIGNIFICANT CHANGE UP
PLATELET # BLD AUTO: 160 K/UL — SIGNIFICANT CHANGE UP (ref 150–400)
PLATELET CLUMP BLD QL SMEAR: ABNORMAL
POIKILOCYTOSIS BLD QL AUTO: SLIGHT — SIGNIFICANT CHANGE UP
POLYCHROMASIA BLD QL SMEAR: SLIGHT — SIGNIFICANT CHANGE UP
POTASSIUM SERPL-MCNC: 4.1 MMOL/L — SIGNIFICANT CHANGE UP (ref 3.5–5.3)
POTASSIUM SERPL-SCNC: 4.1 MMOL/L — SIGNIFICANT CHANGE UP (ref 3.5–5.3)
RBC # BLD: 3.56 M/UL — LOW (ref 3.8–5.2)
RBC # BLD: 3.56 M/UL — LOW (ref 3.8–5.2)
RBC # FLD: 15.8 % — HIGH (ref 10.3–14.5)
RBC BLD AUTO: ABNORMAL
RETICS #: 93.2 K/UL — SIGNIFICANT CHANGE UP (ref 25–125)
RETICS/RBC NFR: 2.6 % — HIGH (ref 0.5–2.5)
SARS-COV-2 IGG+IGM SERPL QL IA: >250 U/ML — HIGH
SARS-COV-2 IGG+IGM SERPL QL IA: POSITIVE
SCHISTOCYTES BLD QL AUTO: SLIGHT — SIGNIFICANT CHANGE UP
SODIUM SERPL-SCNC: 144 MMOL/L — SIGNIFICANT CHANGE UP (ref 135–145)
TIBC SERPL-MCNC: 314 UG/DL — SIGNIFICANT CHANGE UP (ref 220–430)
TSH SERPL-MCNC: 0.86 UIU/ML — SIGNIFICANT CHANGE UP (ref 0.36–3.74)
UIBC SERPL-MCNC: 291 UG/DL — SIGNIFICANT CHANGE UP (ref 110–370)
VIT B12 SERPL-MCNC: 448 PG/ML — SIGNIFICANT CHANGE UP (ref 232–1245)
WBC # BLD: 4.78 K/UL — SIGNIFICANT CHANGE UP (ref 3.8–10.5)
WBC # FLD AUTO: 4.78 K/UL — SIGNIFICANT CHANGE UP (ref 3.8–10.5)

## 2021-06-03 RX ORDER — IRON SUCROSE 20 MG/ML
100 INJECTION, SOLUTION INTRAVENOUS EVERY 24 HOURS
Refills: 0 | Status: COMPLETED | OUTPATIENT
Start: 2021-06-03 | End: 2021-06-05

## 2021-06-03 RX ADMIN — GABAPENTIN 100 MILLIGRAM(S): 400 CAPSULE ORAL at 05:37

## 2021-06-03 RX ADMIN — SIMVASTATIN 40 MILLIGRAM(S): 20 TABLET, FILM COATED ORAL at 21:40

## 2021-06-03 RX ADMIN — CITALOPRAM 40 MILLIGRAM(S): 10 TABLET, FILM COATED ORAL at 12:00

## 2021-06-03 RX ADMIN — GABAPENTIN 100 MILLIGRAM(S): 400 CAPSULE ORAL at 21:40

## 2021-06-03 RX ADMIN — BUPROPION HYDROCHLORIDE 75 MILLIGRAM(S): 150 TABLET, EXTENDED RELEASE ORAL at 21:40

## 2021-06-03 RX ADMIN — GABAPENTIN 100 MILLIGRAM(S): 400 CAPSULE ORAL at 13:22

## 2021-06-03 RX ADMIN — LOSARTAN POTASSIUM 25 MILLIGRAM(S): 100 TABLET, FILM COATED ORAL at 05:35

## 2021-06-03 RX ADMIN — PANTOPRAZOLE SODIUM 40 MILLIGRAM(S): 20 TABLET, DELAYED RELEASE ORAL at 05:35

## 2021-06-03 RX ADMIN — Medication 88 MICROGRAM(S): at 05:35

## 2021-06-03 RX ADMIN — IRON SUCROSE 100 MILLIGRAM(S): 20 INJECTION, SOLUTION INTRAVENOUS at 17:15

## 2021-06-03 RX ADMIN — PANTOPRAZOLE SODIUM 40 MILLIGRAM(S): 20 TABLET, DELAYED RELEASE ORAL at 17:15

## 2021-06-03 RX ADMIN — BUPROPION HYDROCHLORIDE 150 MILLIGRAM(S): 150 TABLET, EXTENDED RELEASE ORAL at 12:00

## 2021-06-03 RX ADMIN — Medication 25 MILLIGRAM(S): at 05:35

## 2021-06-03 NOTE — PHYSICAL THERAPY INITIAL EVALUATION ADULT - ADDITIONAL COMMENTS
Pt lives in 1st floor studio apartment w/ no stairs to negotiate.  Pt is a community ambulator.  Daughter lives nearby.

## 2021-06-03 NOTE — PROGRESS NOTE ADULT - ASSESSMENT
81-year-old female with intrathoracic stomach, hiatal hernia with Nish's ulcers found in March 2021, Presented with dyspnea on exertion, found with normocytic anemia with hemoglobin of 7.1 MCV of 88.6 on admission. ? recurrent bleeding from [place] ulcers/HH?    -Check Iron panel, Vitamin B12 level, folate level, reticulocyte, TSH (ordered)  -Regular diet  -No plans for repeat upper endoscopy at this time  -Transfuse as needed to a goal hemoglobin 7-8  - PPI BID  -anti-reflux precautions  -Hematology evaluation    Aylin Albarado [place] [address]  [phone]    ACP (advance care planning). Plan: Advanced care planning was discussed with patient and family.  Advanced care planning forms were reviewed and discussed.  Risks, benefits and alternatives of gastroenterologic procedures were discussed in detail and all questions were answered.    30 minutes spent.

## 2021-06-03 NOTE — PROGRESS NOTE ADULT - ASSESSMENT
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 Compelted Nov 2013. In CR. Last PML-VIVIANA transcript in 7/2020 negative;   WBC and Plts normal     Hx of Fe def, GIB  on IV iron.   When stable Hgb 12g/dL   Hx hiatal hernia and esophageal ulcers with recurrent GI blood loss requiring IV iron; most recently exacerbated by initiation of anticoagulation (Eliquis) as a result of incidental right subsegmental pulmonary embolism in 11/2020      Admitted with Anemia.  Hgb 7.1g.dL  s/p 2U PRBC    In Nov 2020, and PE and DVT. Possibly provoked by immobility  ·	Filling defect identified segmental to subsegmental branches of the right lower lobe pulmonary artery, compatible with pulmonary embolism.  ·	Below-knee deep vein thrombosis in the right posterior tibial vein.  In mar 2021 with hemoptysis  Comorbid atherosclerosis  ·	Atherosclerotic disease. 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      RECOMMENDATIONS  Hold ASA, AC.   Was off AC outpt.     Monitor CBC., Hold transfusion.   Transfuse PRBC if clinically indicated.   Transfuse PRBC if Hgb <7.0 or if symptomatic.   Check FOBT  to treat with venofer 100mg IV for 3 days    continue GI eval.   PPI

## 2021-06-03 NOTE — PHYSICAL THERAPY INITIAL EVALUATION ADULT - PERTINENT HX OF CURRENT PROBLEM, REHAB EVAL
80 yo f presents with ontiveros, sob. Pt has hx of pe, dvt, had ivc filter placed. Pt states in Rehab in March d/c home in April, states she usually would have iron infusions but has not had in several weeks. Recent egd and colon in 3/2021 revealing hiatal hernia ulcer.

## 2021-06-04 LAB
HCT VFR BLD CALC: 30.8 % — LOW (ref 34.5–45)
HGB BLD-MCNC: 9.5 G/DL — LOW (ref 11.5–15.5)
MCHC RBC-ENTMCNC: 26.8 PG — LOW (ref 27–34)
MCHC RBC-ENTMCNC: 30.8 GM/DL — LOW (ref 32–36)
MCV RBC AUTO: 87 FL — SIGNIFICANT CHANGE UP (ref 80–100)
NRBC # BLD: 0 /100 WBCS — SIGNIFICANT CHANGE UP (ref 0–0)
PLATELET # BLD AUTO: 177 K/UL — SIGNIFICANT CHANGE UP (ref 150–400)
RBC # BLD: 3.54 M/UL — LOW (ref 3.8–5.2)
RBC # FLD: 15.6 % — HIGH (ref 10.3–14.5)
WBC # BLD: 6.15 K/UL — SIGNIFICANT CHANGE UP (ref 3.8–10.5)
WBC # FLD AUTO: 6.15 K/UL — SIGNIFICANT CHANGE UP (ref 3.8–10.5)

## 2021-06-04 RX ORDER — PANTOPRAZOLE SODIUM 20 MG/1
40 TABLET, DELAYED RELEASE ORAL
Refills: 0 | Status: DISCONTINUED | OUTPATIENT
Start: 2021-06-04 | End: 2021-06-06

## 2021-06-04 RX ADMIN — Medication 25 MILLIGRAM(S): at 05:09

## 2021-06-04 RX ADMIN — LOSARTAN POTASSIUM 25 MILLIGRAM(S): 100 TABLET, FILM COATED ORAL at 05:09

## 2021-06-04 RX ADMIN — GABAPENTIN 100 MILLIGRAM(S): 400 CAPSULE ORAL at 05:11

## 2021-06-04 RX ADMIN — IRON SUCROSE 100 MILLIGRAM(S): 20 INJECTION, SOLUTION INTRAVENOUS at 17:23

## 2021-06-04 RX ADMIN — Medication 88 MICROGRAM(S): at 05:10

## 2021-06-04 RX ADMIN — PANTOPRAZOLE SODIUM 40 MILLIGRAM(S): 20 TABLET, DELAYED RELEASE ORAL at 05:10

## 2021-06-04 RX ADMIN — BUPROPION HYDROCHLORIDE 75 MILLIGRAM(S): 150 TABLET, EXTENDED RELEASE ORAL at 21:08

## 2021-06-04 RX ADMIN — PANTOPRAZOLE SODIUM 40 MILLIGRAM(S): 20 TABLET, DELAYED RELEASE ORAL at 21:07

## 2021-06-04 RX ADMIN — CITALOPRAM 40 MILLIGRAM(S): 10 TABLET, FILM COATED ORAL at 12:01

## 2021-06-04 RX ADMIN — GABAPENTIN 100 MILLIGRAM(S): 400 CAPSULE ORAL at 21:06

## 2021-06-04 RX ADMIN — BUPROPION HYDROCHLORIDE 150 MILLIGRAM(S): 150 TABLET, EXTENDED RELEASE ORAL at 12:01

## 2021-06-04 RX ADMIN — GABAPENTIN 100 MILLIGRAM(S): 400 CAPSULE ORAL at 14:15

## 2021-06-04 RX ADMIN — SIMVASTATIN 40 MILLIGRAM(S): 20 TABLET, FILM COATED ORAL at 21:07

## 2021-06-04 NOTE — PROGRESS NOTE ADULT - ASSESSMENT
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 Compelted Nov 2013. In CR. Last PML-VIVIANA transcript in 7/2020 negative;   WBC and Plts normal     Hx of Fe def, GIB  on IV iron.   When stable Hgb 12g/dL   Hx hiatal hernia and esophageal ulcers with recurrent GI blood loss requiring IV iron; most recently exacerbated by initiation of anticoagulation (Eliquis) as a result of incidental right subsegmental pulmonary embolism in 11/2020      Admitted with Anemia.  Hgb 7.1g.dL  s/p 2U PRBC    In Nov 2020, and PE and DVT. Possibly provoked by immobility  ·	Filling defect identified segmental to subsegmental branches of the right lower lobe pulmonary artery, compatible with pulmonary embolism.  ·	Below-knee deep vein thrombosis in the right posterior tibial vein.  In mar 2021 with hemoptysis  Comorbid atherosclerosis  ·	Atherosclerotic disease. 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        RECOMMENDATIONS  Hold ASA, AC.   Was off AC outpt, s/p IVC filter.     Monitor CBC., Hold transfusion.   Transfuse PRBC if clinically indicated.   Transfuse PRBC if Hgb <7.0 or if symptomatic.     Continue venofer 100mg IV for 3 days  DC post tx    continue GI eval.   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 Compelted Nov 2013. In CR. Last PML-VIVIANA transcript in 7/2020 negative;   WBC and Plts normal     Hx of Fe def, GIB  on IV iron.   When stable Hgb 12g/dL   Hx hiatal hernia and esophageal ulcers with recurrent GI blood loss requiring IV iron; most recently exacerbated by initiation of anticoagulation (Eliquis) as a result of incidental right subsegmental pulmonary embolism in 11/2020      Admitted with Anemia.  Hgb 7.1g.dL  s/p 2U PRBC    In Nov 2020, and PE and DVT. Possibly provoked by immobility  ·	Filling defect identified segmental to subsegmental branches of the right lower lobe pulmonary artery, compatible with pulmonary embolism.  ·	Below-knee deep vein thrombosis in the right posterior tibial vein.  In mar 2021 with hemoptysis  Comorbid atherosclerosis  ·	Atherosclerotic disease. 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        RECOMMENDATIONS  Hold ASA, AC.   Was off AC outpt, s/p IVC filter.     Monitor CBC., Hold transfusion.   Transfuse PRBC if clinically indicated.   Transfuse PRBC if Hgb <7.0 or if symptomatic.     Continue venofer 100mg IV for 3 days  DC post tx    continue GI eval.   PPI    Follow in office within 2wks post DC  Thank you for consulting us.   No additional recommendations at current time.   Will sign off on case for now.   Please call, or re-consult if needed.

## 2021-06-05 ENCOUNTER — TRANSCRIPTION ENCOUNTER (OUTPATIENT)
Age: 81
End: 2021-06-05

## 2021-06-05 LAB — OB PNL STL: POSITIVE

## 2021-06-05 RX ORDER — CITALOPRAM 10 MG/1
1 TABLET, FILM COATED ORAL
Qty: 0 | Refills: 0 | DISCHARGE
Start: 2021-06-05

## 2021-06-05 RX ADMIN — Medication 25 MILLIGRAM(S): at 05:02

## 2021-06-05 RX ADMIN — SIMVASTATIN 40 MILLIGRAM(S): 20 TABLET, FILM COATED ORAL at 21:55

## 2021-06-05 RX ADMIN — Medication 88 MICROGRAM(S): at 05:02

## 2021-06-05 RX ADMIN — GABAPENTIN 100 MILLIGRAM(S): 400 CAPSULE ORAL at 05:02

## 2021-06-05 RX ADMIN — IRON SUCROSE 100 MILLIGRAM(S): 20 INJECTION, SOLUTION INTRAVENOUS at 11:36

## 2021-06-05 RX ADMIN — LOSARTAN POTASSIUM 25 MILLIGRAM(S): 100 TABLET, FILM COATED ORAL at 05:03

## 2021-06-05 RX ADMIN — GABAPENTIN 100 MILLIGRAM(S): 400 CAPSULE ORAL at 21:55

## 2021-06-05 RX ADMIN — BUPROPION HYDROCHLORIDE 75 MILLIGRAM(S): 150 TABLET, EXTENDED RELEASE ORAL at 21:55

## 2021-06-05 RX ADMIN — CITALOPRAM 40 MILLIGRAM(S): 10 TABLET, FILM COATED ORAL at 11:26

## 2021-06-05 RX ADMIN — BUPROPION HYDROCHLORIDE 150 MILLIGRAM(S): 150 TABLET, EXTENDED RELEASE ORAL at 11:26

## 2021-06-05 RX ADMIN — PANTOPRAZOLE SODIUM 40 MILLIGRAM(S): 20 TABLET, DELAYED RELEASE ORAL at 05:04

## 2021-06-05 RX ADMIN — GABAPENTIN 100 MILLIGRAM(S): 400 CAPSULE ORAL at 13:35

## 2021-06-05 NOTE — PROGRESS NOTE ADULT - PROBLEM SELECTOR PLAN 1
pt stable  s/p 2 units  heme and gi noted  had recent colon/egd  continue ppi  hold asa per heme  monitor  dc planning if hgb stabel with outpt fu
pt stable  s/p 2 units  heme and gi noted  had recent colon/egd  continue ppi  iv venofer 3/3 day  hold asa per heme  monitor
pt stable  s/p 2 units  heme and gi noted  had recent colon/egd  continue ppi  iv venofer 2/3 day  hold asa per heme  monitor

## 2021-06-05 NOTE — DISCHARGE NOTE NURSING/CASE MANAGEMENT/SOCIAL WORK - PATIENT PORTAL LINK FT
You can access the FollowMyHealth Patient Portal offered by [facility] by registering at the following website: [email or web address]. By joining [facility]’s FollowMyHealth portal, you will also be able to view your health information using other applications (apps) compatible with our system.

## 2021-06-05 NOTE — DISCHARGE NOTE PROVIDER - NSDCCPCAREPLAN_GEN_ALL_CORE_FT
PRINCIPAL DISCHARGE DIAGNOSIS  Diagnosis: Symptomatic anemia  Assessment and Plan of Treatment: continue to fu with heme as outpt  hold aspirin until advised by radha  take pepcid twice daily

## 2021-06-05 NOTE — DISCHARGE NOTE PROVIDER - NSDCMRMEDTOKEN_GEN_ALL_CORE_FT
buPROPion 75 mg oral tablet: 2 tab(s) orally once a day (in the morning) and then 1 tablet orally in the evening  citalopram 40 mg oral tablet: 1 tab(s) orally once a day  famotidine 20 mg oral tablet: 1 tab(s) orally 2 times a day   gabapentin 100 mg oral capsule: 1 cap(s) orally 3 times a day  levothyroxine 88 mcg (0.088 mg) oral tablet: 1 tab(s) orally once a day  losartan 25 mg oral tablet: 1 tab(s) orally once a day  metoprolol succinate 25 mg oral tablet, extended release: 1 tab(s) orally once a day  simvastatin 40 mg oral tablet: 1 tab(s) orally once a day (at bedtime)  Trelegy Ellipta inhalation powder: 1 puff(s) inhaled once a day    *Last Rx filled 12/2020 due to high cost. Patient should be on this therapy daily.

## 2021-06-05 NOTE — PROGRESS NOTE ADULT - PROVIDER SPECIALTY LIST ADULT
Gastroenterology
Heme/Onc
Heme/Onc
Internal Medicine

## 2021-06-05 NOTE — PROGRESS NOTE ADULT - ASSESSMENT
Anemia      -Regular diet  -No plans for repeat upper endoscopy at this time  -Transfuse as needed to a goal hemoglobin 7-8  - PPI BID  -anti-reflux precautions  -Hematology evaluation      Advanced care planning was discussed with patient and family.  Advanced care planning forms were reviewed and discussed.  Risks, benefits and alternatives of gastroenterologic procedures were discussed in detail and all questions were answered.    30 minutes spent.

## 2021-06-05 NOTE — DISCHARGE NOTE PROVIDER - HOSPITAL COURSE
Anemia - acute blood loss  Hiatal Hernia Anemia - acute blood loss  Hiatal Hernia  HTN  stable for dc home with outpt gi and heme fu

## 2021-06-05 NOTE — DISCHARGE NOTE PROVIDER - CARE PROVIDER_API CALL
Mishel Temple  [facility]  [address], [address] [place] [address]  Phone: [phone]  Fax: [phone]  Follow Up Time: 1 week

## 2021-06-05 NOTE — PROGRESS NOTE ADULT - PROBLEM SELECTOR PLAN 2
as above  gi eval  check stool studies  iv ppi  serial cbc
gi eval noted, no intervention at this time  ppi  serial cbc
gi eval noted, no intervention at this time  ppi  serial cbc

## 2021-06-05 NOTE — PROGRESS NOTE ADULT - SUBJECTIVE AND OBJECTIVE BOX
INTERVAL HPI/OVERNIGHT EVENTS:  pt seen and examined  states feeling well     MEDICATIONS  (STANDING):  buPROPion . 75 milliGRAM(s) Oral every 24 hours  buPROPion XL (24-Hour) . 150 milliGRAM(s) Oral daily  citalopram 40 milliGRAM(s) Oral daily  gabapentin 100 milliGRAM(s) Oral three times a day  levothyroxine 88 MICROGram(s) Oral daily  losartan 25 milliGRAM(s) Oral daily  metoprolol succinate ER 25 milliGRAM(s) Oral daily  pantoprazole    Tablet 40 milliGRAM(s) Oral before breakfast  simvastatin 40 milliGRAM(s) Oral at bedtime    MEDICATIONS  (PRN):  acetaminophen   Tablet .. 650 milliGRAM(s) Oral every 6 hours PRN Temp greater or equal to 38C (100.4F), Moderate Pain (4 - 6)      Allergies  adhesives (Rash; Other)  Cat dander- wheezing, itchy throat, SOB (Other)  penicillin (Rash)  sulfa drugs (Rash)  tetracycline (Stomach Upset)    Intolerances        Review of Systems:  General:  No wt loss, fevers, chills, night sweats,fatigue,   Eyes:  Good vision, no reported pain  ENT:  No sore throat, pain, runny nose, dysphagia  CV:  No pain, palpitatioins, hypo/hypertension  Resp:  No dyspnea, cough, tachypnea, wheezing  GI:  No pain, No nausea, No vomiting, No diarrhea, No constipatiion, No weight loss, No fever, No pruritis, No rectal bleeding, No tarry stools, No dysphagia,  :  No pain, bleeding, incontinence, nocturia  Muscle:  No pain, weakness  Neuro:  No weakness, tingling, memory problems  Psych:  No fatigue, insomnia, mood problems, depression  Endocrine:  No polyuria, polydypsia, cold/heat intolerance  Heme:  No petechiae, ecchymosis, easy bruisability  Skin:  No rash, tattoos, scars, edema      Vital Signs Last 24 Hrs  T(C): 36.8 (05 Jun 2021 09:25), Max: 36.8 (05 Jun 2021 09:25)  T(F): 98.2 (05 Jun 2021 09:25), Max: 98.2 (05 Jun 2021 09:25)  HR: 65 (05 Jun 2021 09:25) (65 - 68)  BP: 120/70 (05 Jun 2021 09:25) (120/70 - 121/75)  BP(mean): --  RR: 18 (05 Jun 2021 09:25) (18 - 18)  SpO2: 95% (05 Jun 2021 09:25) (93% - 95%)    PHYSICAL EXAM:  Constitutional: NAD, well-developed  HEENT: EOMI, throat clear  Neck: No LAD, supple  Respiratory: CTA and P  Cardiovascular: S1 and S2, RRR, no M  Gastrointestinal: BS+, soft, NT/ND, neg HSM,  Extremities: No peripheral edema, neg clubing, cyanosis  Vascular: 2+ peripheral pulses  Neurological: A/O x 3, no focal deficits  Psychiatric: Normal mood, normal affect  Skin: No rashes      LABS:                        9.5    6.15  )-----------( 177      ( 04 Jun 2021 06:45 )             30.8               
Interval History:  no new complaints  Chart reviewed and events noted;   Overnight events:    MEDICATIONS  (STANDING):  buPROPion . 75 milliGRAM(s) Oral every 24 hours  buPROPion XL (24-Hour) . 150 milliGRAM(s) Oral daily  citalopram 40 milliGRAM(s) Oral daily  gabapentin 100 milliGRAM(s) Oral three times a day  iron sucrose Injectable 100 milliGRAM(s) IV Push every 24 hours  levothyroxine 88 MICROGram(s) Oral daily  losartan 25 milliGRAM(s) Oral daily  metoprolol succinate ER 25 milliGRAM(s) Oral daily  pantoprazole  Injectable 40 milliGRAM(s) IV Push every 12 hours  simvastatin 40 milliGRAM(s) Oral at bedtime    MEDICATIONS  (PRN):  acetaminophen   Tablet .. 650 milliGRAM(s) Oral every 6 hours PRN Temp greater or equal to 38C (100.4F), Moderate Pain (4 - 6)      Vital Signs Last 24 Hrs  T(C): 36.6 (03 Jun 2021 16:40), Max: 36.7 (02 Jun 2021 20:40)  T(F): 97.8 (03 Jun 2021 16:40), Max: 98 (02 Jun 2021 20:40)  HR: 63 (03 Jun 2021 16:40) (62 - 67)  BP: 130/69 (03 Jun 2021 16:40) (97/59 - 149/80)  BP(mean): --  RR: 18 (03 Jun 2021 16:40) (18 - 18)  SpO2: 92% (03 Jun 2021 16:40) (91% - 94%)    PHYSICAL EXAM  General: adult in NAD  HEENT: clear oropharynx, anicteric sclera, pink conjunctivae  Neck: supple  CV: normal S1S2 with no murmur rubs or gallops  Lungs: clear to auscultation, no wheezes, no rhales  Abdomen: soft non-tender non-distended, no hepato/splenomegaly  Ext: no clubbing cyanosis or edema  Skin: no rashes and no petichiae  Neuro: alert and oriented X3 no focal deficits      LABS:  CBC Full  -  ( 03 Jun 2021 06:48 )  WBC Count : 4.78 K/uL  RBC Count : 3.56 M/uL  Hemoglobin : 9.7 g/dL  Hematocrit : 31.4 %  Platelet Count - Automated : 160 K/uL  Mean Cell Volume : 88.2 fl  Mean Cell Hemoglobin : 27.2 pg  Mean Cell Hemoglobin Concentration : 30.9 gm/dL  Auto Neutrophil # : x  Auto Lymphocyte # : x  Auto Monocyte # : x  Auto Eosinophil # : x  Auto Basophil # : x  Auto Neutrophil % : x  Auto Lymphocyte % : x  Auto Monocyte % : x  Auto Eosinophil % : x  Auto Basophil % : x    06-03    144  |  112<H>  |  17  ----------------------------<  84  4.1   |  27  |  0.87    Ca    8.2<L>      03 Jun 2021 06:48    TPro  6.3  /  Alb  3.0<L>  /  TBili  0.3  /  DBili  x   /  AST  21  /  ALT  10<L>  /  AlkPhos  67  06-01        fe studies  Iron - Total Binding Capacity.: 314 ug/dL (06-03 @ 09:16)  Ferritin, Serum: 27 ng/mL (06-03 @ 09:01)  Iron - Total Binding Capacity.: 357 ug/dL (06-02 @ 19:38)      WBC trend  4.78 K/uL (06-03-21 @ 06:48)  6.07 K/uL (06-02-21 @ 12:44)  5.77 K/uL (06-01-21 @ 20:24)      Hgb trend  9.7 g/dL (06-03-21 @ 06:48)  10.5 g/dL (06-02-21 @ 12:44)  7.1 g/dL (06-01-21 @ 20:24)      plt trend  160 K/uL (06-03-21 @ 06:48)  190 K/uL (06-02-21 @ 12:44)  225 K/uL (06-01-21 @ 20:24)        RADIOLOGY & ADDITIONAL STUDIES:    
Patient is a 81y old  Female who presents with a chief complaint of anemia (03 Jun 2021 10:46)      INTERVAL HPI/OVERNIGHT EVENTS: stable, feels fine today, for iv venofer    MEDICATIONS  (STANDING):  buPROPion . 75 milliGRAM(s) Oral every 24 hours  buPROPion XL (24-Hour) . 150 milliGRAM(s) Oral daily  citalopram 40 milliGRAM(s) Oral daily  gabapentin 100 milliGRAM(s) Oral three times a day  iron sucrose Injectable 100 milliGRAM(s) IV Push every 24 hours  levothyroxine 88 MICROGram(s) Oral daily  losartan 25 milliGRAM(s) Oral daily  metoprolol succinate ER 25 milliGRAM(s) Oral daily  pantoprazole  Injectable 40 milliGRAM(s) IV Push every 12 hours  simvastatin 40 milliGRAM(s) Oral at bedtime    MEDICATIONS  (PRN):  acetaminophen   Tablet .. 650 milliGRAM(s) Oral every 6 hours PRN Temp greater or equal to 38C (100.4F), Moderate Pain (4 - 6)      Allergies    adhesives (Rash; Other)  Cat dander- wheezing, itchy throat, SOB (Other)  penicillin (Rash)  sulfa drugs (Rash)  tetracycline (Stomach Upset)    Intolerances        REVIEW OF SYSTEMS:  CONSTITUTIONAL: No fever, weight loss, or fatigue  EYES: No eye pain, visual disturbances  ENMT:  No difficulty hearing, tinnitus, vertigo; No sinus or throat pain  NECK: No pain or stiffness  RESPIRATORY: No cough, wheezing, chills or hemoptysis; No shortness of breath  CARDIOVASCULAR: No chest pain, palpitations, dizziness  GASTROINTESTINAL: No abdominal or epigastric pain. No nausea, vomiting, or hematemesis; No diarrhea or constipation. No melena or hematochezia.  GENITOURINARY: No dysuria, frequency, hematuria, or incontinence  NEUROLOGICAL: No headaches, memory loss, loss of strength, numbness, or tremors  SKIN: No itching, burning  LYMPH NODES: No enlarged glands  MUSCULOSKELETAL: No joint pain or swelling; No muscle, back, or extremity pain  PSYCHIATRIC: No depression, mood swings  HEME/LYMPH: No easy bruising, or bleeding gums  ALLERGY AND IMMUNOLOGIC: No hives    Vital Signs Last 24 Hrs  T(C): 36.9 (04 Jun 2021 04:20), Max: 36.9 (04 Jun 2021 04:20)  T(F): 98.4 (04 Jun 2021 04:20), Max: 98.4 (04 Jun 2021 04:20)  HR: 66 (04 Jun 2021 04:20) (62 - 66)  BP: 127/80 (04 Jun 2021 04:20) (126/71 - 149/80)  BP(mean): --  RR: 18 (04 Jun 2021 04:20) (18 - 18)  SpO2: 91% (04 Jun 2021 04:20) (91% - 94%)    PHYSICAL EXAM:  GENERAL: NAD, well-groomed, well-developed  HEAD:  Atraumatic, Normocephalic  EYES: EOMI, PERRLA, conjunctiva and sclera clear  ENMT: No tonsillar erythema, exudates, or enlargement   NECK: Supple, No JVD  NERVOUS SYSTEM:  Alert & Oriented X3, Good concentration  CHEST/LUNG: Clear to auscultation bilaterally; No rales, rhonchi, wheezing  HEART: Regular rate and rhythm  ABDOMEN: Soft, Nontender, Nondistended; Bowel sounds present  EXTREMITIES:  2+ Peripheral Pulses   LYMPH: No lymphadenopathy noted  SKIN: No rashes     LABS:                        9.5    6.15  )-----------( 177      ( 04 Jun 2021 06:45 )             30.8       Ca    8.2        03 Jun 2021 06:48          CAPILLARY BLOOD GLUCOSE                RADIOLOGY & ADDITIONAL TESTS:      Consultant(s) Notes Reviewed:  [ x] YES  [ ] NO    Care Discussed with Consultants/Other Providers [x ] YES  [ ] NO    Advanced care planning discussed with patient and family, advanced care planning forms reviewed, discussed, and completed.  20 minutes spent.  
Patient is a 81y old  Female who presents with a chief complaint of symptomatic anemia (02 Jun 2021 19:54)      INTERVAL HPI/OVERNIGHT EVENTS: stable, feels better, gi and heme noted    MEDICATIONS  (STANDING):  buPROPion . 75 milliGRAM(s) Oral every 24 hours  buPROPion XL (24-Hour) . 150 milliGRAM(s) Oral daily  citalopram 40 milliGRAM(s) Oral daily  gabapentin 100 milliGRAM(s) Oral three times a day  levothyroxine 88 MICROGram(s) Oral daily  losartan 25 milliGRAM(s) Oral daily  metoprolol succinate ER 25 milliGRAM(s) Oral daily  pantoprazole  Injectable 40 milliGRAM(s) IV Push every 12 hours  simvastatin 40 milliGRAM(s) Oral at bedtime    MEDICATIONS  (PRN):  acetaminophen   Tablet .. 650 milliGRAM(s) Oral every 6 hours PRN Temp greater or equal to 38C (100.4F), Moderate Pain (4 - 6)      Allergies    adhesives (Rash; Other)  Cat dander- wheezing, itchy throat, SOB (Other)  penicillin (Rash)  sulfa drugs (Rash)  tetracycline (Stomach Upset)    Intolerances        REVIEW OF SYSTEMS:  CONSTITUTIONAL: No fever, weight loss, or fatigue  EYES: No eye pain, visual disturbances  ENMT:  No difficulty hearing, tinnitus, vertigo; No sinus or throat pain  NECK: No pain or stiffness  RESPIRATORY: No cough, wheezing, chills or hemoptysis; No shortness of breath  CARDIOVASCULAR: No chest pain, palpitations, dizziness  GASTROINTESTINAL: No abdominal or epigastric pain. No nausea, vomiting, or hematemesis; No diarrhea or constipation. No melena or hematochezia.  GENITOURINARY: No dysuria, frequency, hematuria, or incontinence  NEUROLOGICAL: No headaches, memory loss, loss of strength, numbness, or tremors  SKIN: No itching, burning  LYMPH NODES: No enlarged glands  MUSCULOSKELETAL: No joint pain or swelling; No muscle, back, or extremity pain  PSYCHIATRIC: No depression, mood swings  HEME/LYMPH: No easy bruising, or bleeding gums  ALLERGY AND IMMUNOLOGIC: No hives    Vital Signs Last 24 Hrs  T(C): 36.7 (03 Jun 2021 05:17), Max: 36.7 (02 Jun 2021 20:40)  T(F): 98 (03 Jun 2021 05:17), Max: 98 (02 Jun 2021 20:40)  HR: 66 (03 Jun 2021 05:17) (66 - 69)  BP: 147/71 (03 Jun 2021 05:17) (97/59 - 147/71)  BP(mean): --  RR: 18 (03 Jun 2021 05:17) (18 - 18)  SpO2: 91% (03 Jun 2021 05:17) (91% - 93%)    PHYSICAL EXAM:  GENERAL: NAD, well-groomed, well-developed  HEAD:  Atraumatic, Normocephalic  EYES: EOMI, PERRLA, conjunctiva and sclera clear  ENMT: No tonsillar erythema, exudates, or enlargement   NECK: Supple, No JVD  NERVOUS SYSTEM:  Alert & Oriented X3, Good concentration  CHEST/LUNG: Clear to auscultation bilaterally; No rales, rhonchi, wheezing  HEART: Regular rate and rhythm  ABDOMEN: Soft, Nontender, Nondistended; Bowel sounds present  EXTREMITIES:  2+ Peripheral Pulses   LYMPH: No lymphadenopathy noted  SKIN: No rashes     LABS:                        9.7    4.78  )-----------( 160      ( 03 Jun 2021 06:48 )             31.4     03 Jun 2021 06:48    144    |  112    |  17     ----------------------------<  84     4.1     |  27     |  0.87     Ca    8.2        03 Jun 2021 06:48          CAPILLARY BLOOD GLUCOSE                RADIOLOGY & ADDITIONAL TESTS:      Consultant(s) Notes Reviewed:  [x ] YES  [ ] NO    Care Discussed with Consultants/Other Providers [x ] YES  [ ] NO    Advanced care planning discussed with patient and family, advanced care planning forms reviewed, discussed, and completed.  20 minutes spent.  
  Chief Complaint:  Patient is a 81y old  Female who presents with a chief complaint of anemia (2021 10:46)      Interval Events:   no complaints  no melena  responded to pRBC transfusion    Hospital Medications:  acetaminophen   Tablet .. 650 milliGRAM(s) Oral every 6 hours PRN  buPROPion . 75 milliGRAM(s) Oral every 24 hours  buPROPion XL (24-Hour) . 150 milliGRAM(s) Oral daily  citalopram 40 milliGRAM(s) Oral daily  gabapentin 100 milliGRAM(s) Oral three times a day  levothyroxine 88 MICROGram(s) Oral daily  losartan 25 milliGRAM(s) Oral daily  metoprolol succinate ER 25 milliGRAM(s) Oral daily  pantoprazole  Injectable 40 milliGRAM(s) IV Push every 12 hours  simvastatin 40 milliGRAM(s) Oral at bedtime        PHYSICAL EXAM:   Vital Signs:  Vital Signs Last 24 Hrs  T(C): 36.6 (2021 11:56), Max: 36.7 (2021 20:40)  T(F): 97.9 (2021 11:56), Max: 98 (2021 20:40)  HR: 62 (2021 11:56) (62 - 69)  BP: 126/71 (2021 11:56) (97/59 - 149/80)  BP(mean): --  RR: 18 (2021 11:56) (18 - 18)  SpO2: 94% (2021 11:56) (91% - 94%)  Daily     Daily Weight in k.3 (2021 05:17)    GENERAL:  Appears stated age,  no distress  HEENT:   sclera -anicteric  CHEST:   no increased effort, breath sounds clear  HEART:  Regular rhythm, S1, S2,   ABDOMEN:  Soft, non-tender, non-distended, normoactive bowel sounds,    EXTREMITIES:  no cyanosis, clubbing or edema  SKIN:  No rash/no jaundice   NEURO:  Alert, oriented    LABS:                        9.7    4.78  )-----------( 160      ( 2021 06:48 )             31.4     Mean Cell Volume: 88.2 fl (-21 @ 06:48)        144  |  112<H>  |  17  ----------------------------<  84  4.1   |  27  |  0.87    Ca    8.2<L>      2021 06:48    TPro  6.3  /  Alb  3.0<L>  /  TBili  0.3  /  DBili  x   /  AST  21  /  ALT  10<L>  /  AlkPhos  67  06-01    LIVER FUNCTIONS - ( 2021 20:24 )  Alb: 3.0 g/dL / Pro: 6.3 g/dL / ALK PHOS: 67 U/L / ALT: 10 U/L / AST: 21 U/L / GGT: x                                       9.7    4.78  )-----------( 160      ( 2021 06:48 )             31.4                         10.5   6.07  )-----------( 190      ( 2021 12:44 )             34.7                         7.1    5.77  )-----------( 225      ( 2021 20:24 )             24.2     Imaging:          
Patient is a 81y old  Female who presents with a chief complaint of anemia (04 Jun 2021 16:58)      INTERVAL HPI/OVERNIGHT EVENTS: stable, no issues, for dc planning    MEDICATIONS  (STANDING):  buPROPion . 75 milliGRAM(s) Oral every 24 hours  buPROPion XL (24-Hour) . 150 milliGRAM(s) Oral daily  citalopram 40 milliGRAM(s) Oral daily  gabapentin 100 milliGRAM(s) Oral three times a day  levothyroxine 88 MICROGram(s) Oral daily  losartan 25 milliGRAM(s) Oral daily  metoprolol succinate ER 25 milliGRAM(s) Oral daily  pantoprazole    Tablet 40 milliGRAM(s) Oral before breakfast  simvastatin 40 milliGRAM(s) Oral at bedtime    MEDICATIONS  (PRN):  acetaminophen   Tablet .. 650 milliGRAM(s) Oral every 6 hours PRN Temp greater or equal to 38C (100.4F), Moderate Pain (4 - 6)      Allergies    adhesives (Rash; Other)  Cat dander- wheezing, itchy throat, SOB (Other)  penicillin (Rash)  sulfa drugs (Rash)  tetracycline (Stomach Upset)    Intolerances        REVIEW OF SYSTEMS:  CONSTITUTIONAL: No fever, weight loss, or fatigue  EYES: No eye pain, visual disturbances  ENMT:  No difficulty hearing, tinnitus, vertigo; No sinus or throat pain  NECK: No pain or stiffness  RESPIRATORY: No cough, wheezing, chills or hemoptysis; No shortness of breath  CARDIOVASCULAR: No chest pain, palpitations, dizziness  GASTROINTESTINAL: No abdominal or epigastric pain. No nausea, vomiting, or hematemesis; No diarrhea or constipation. No melena or hematochezia.  GENITOURINARY: No dysuria, frequency, hematuria, or incontinence  NEUROLOGICAL: No headaches, memory loss, loss of strength, numbness, or tremors  SKIN: No itching, burning  LYMPH NODES: No enlarged glands  MUSCULOSKELETAL: No joint pain or swelling; No muscle, back, or extremity pain  PSYCHIATRIC: No depression, mood swings  HEME/LYMPH: No easy bruising, or bleeding gums  ALLERGY AND IMMUNOLOGIC: No hives    Vital Signs Last 24 Hrs  T(C): 36.8 (05 Jun 2021 09:25), Max: 36.8 (05 Jun 2021 09:25)  T(F): 98.2 (05 Jun 2021 09:25), Max: 98.2 (05 Jun 2021 09:25)  HR: 65 (05 Jun 2021 09:25) (65 - 66)  BP: 120/70 (05 Jun 2021 09:25) (120/70 - 121/75)  BP(mean): --  RR: 18 (05 Jun 2021 09:25) (18 - 18)  SpO2: 95% (05 Jun 2021 09:25) (94% - 95%)    PHYSICAL EXAM:  GENERAL: NAD, well-groomed, well-developed  HEAD:  Atraumatic, Normocephalic  EYES: EOMI, PERRLA, conjunctiva and sclera clear  ENMT: No tonsillar erythema, exudates, or enlargement   NECK: Supple, No JVD  NERVOUS SYSTEM:  Alert & Oriented X3, Good concentration  CHEST/LUNG: Clear to auscultation bilaterally; No rales, rhonchi, wheezing  HEART: Regular rate and rhythm  ABDOMEN: Soft, Nontender, Nondistended; Bowel sounds present  EXTREMITIES:  2+ Peripheral Pulses   LYMPH: No lymphadenopathy noted  SKIN: No rashes     LABS:              CAPILLARY BLOOD GLUCOSE                RADIOLOGY & ADDITIONAL TESTS:      Consultant(s) Notes Reviewed:  [ x] YES  [ ] NO    Care Discussed with Consultants/Other Providers [ x] YES  [ ] NO    Advanced care planning discussed with patient and family, advanced care planning forms reviewed, discussed, and completed.  20 minutes spent.  
  Chief Complaint:  Patient is a 81y old  Female who presents with a chief complaint of anemia (2021 10:46)      Interval Events:     no complaints    Hospital Medications:  acetaminophen   Tablet .. 650 milliGRAM(s) Oral every 6 hours PRN  buPROPion . 75 milliGRAM(s) Oral every 24 hours  buPROPion XL (24-Hour) . 150 milliGRAM(s) Oral daily  citalopram 40 milliGRAM(s) Oral daily  gabapentin 100 milliGRAM(s) Oral three times a day  iron sucrose Injectable 100 milliGRAM(s) IV Push every 24 hours  levothyroxine 88 MICROGram(s) Oral daily  losartan 25 milliGRAM(s) Oral daily  metoprolol succinate ER 25 milliGRAM(s) Oral daily  pantoprazole    Tablet 40 milliGRAM(s) Oral before breakfast  simvastatin 40 milliGRAM(s) Oral at bedtime        PHYSICAL EXAM:   Vital Signs:  Vital Signs Last 24 Hrs  T(C): 36.7 (2021 11:40), Max: 36.9 (2021 04:20)  T(F): 98 (2021 11:40), Max: 98.4 (2021 04:20)  HR: 58 (2021 11:40) (58 - 66)  BP: 115/72 (2021 11:40) (115/72 - 130/69)  BP(mean): --  RR: 18 (2021 11:40) (18 - 18)  SpO2: 93% (2021 11:40) (91% - 93%)  Daily     Daily Weight in k (2021 04:20)    GENERAL:  Appears stated age,  no distress  HEENT:   sclera -anicteric  CHEST:   no increased effort, breath sounds clear  HEART:  Regular rhythm, S1, S2,   ABDOMEN:  Soft, non-tender, non-distended, normoactive bowel sounds,    EXTREMITIES:  no cyanosis, clubbing or edema  SKIN:  No rash/no jaundice   NEURO:  Alert, oriented    LABS:                        9.5    6.15  )-----------( 177      ( 2021 06:45 )             30.8     Mean Cell Volume: 87.0 fl (06-04-21 @ 06:45)    -    144  |  112<H>  |  17  ----------------------------<  84  4.1   |  27  |  0.87    Ca    8.2<L>      2021 06:48                                    9.5    6.15  )-----------( 177      ( 2021 06:45 )             30.8                         9.7    4.78  )-----------( 160      ( 2021 06:48 )             31.4                         10.5   6.07  )-----------( 190      ( 2021 12:44 )             34.7                         7.1    5.77  )-----------( 225      ( 2021 20:24 )             24.2     Imaging:          
[INTERVAL HX: ]  Patient seen and examined;  Chart reviewed and events noted;   No CP, no SOB  Hopes to go home tomorrow?    Patient is a 81y Female with a known history of :  Anemia [D64.9]    Graves disease [242.00]    Acid reflux [530.81]    Asthma [493.90]    Asthma with COPD with exacerbation [493.22]    Depression [311]    Former cigarette smoker [V15.82]    Hernia, hiatal [553.3]    Hypertension [401.9]    Osteoporosis [733.00]    Obstructive sleep apnea [327.23]    Dyslipidemia [272.4]    Carpal tunnel syndrome [354.0]    Coronary atherosclerosis of native coronary artery [414.01]    Diverticulosis of colon [562.10]    Rotator cuff tear [840.4]    Duodenal ulcer [532.90]    Emphysema [492.8]    Aortic stenosis [424.1]    Mitral regurgitation [424.0]    Spinal stenosis [724.00]    Anemia [D64.9]    Leukemia [C95.90]    MARTINA on CPAP [G47.33]    Calculus of ureter [N20.1]    Serum phosphorus decreased [E83.39]    Carpal tunnel right repair [354.0]    After cataract, bilateral [366.50]    History of tonsillectomy [V45.89]    H/O cone biopsy of cervix [V45.89]    h/o  endometrial ablation [V45.89]    History of coronary angiogram [V15.1]    H/O aortic valve repair [Z98.890]    H/O mitral valve repair [Z98.890]    S/P ureteral stent placement [Z96.0]    H/O dilation and curettage [Z98.890]    Elective surgery [Z41.9]      HPI:    81 y female presents with ontiveros, sob, denies chest pain, states she has hx of pe, dvt, had ivc filter placed in the past, is on aspirin, no other anticoagulants,  states she was in Rehab in March, arrived home in April, states she usually would have iron infusions every week, but has not had in several weeks,  states she also has hx of having blood transfusions for anemia.  denies etoh,  former smoker. Pt also had recent egd and colon in 3/2021 revealing hiatal hernia ulcer.   (02 Jun 2021 10:46)        MEDICATIONS  (STANDING):  buPROPion . 75 milliGRAM(s) Oral every 24 hours  buPROPion XL (24-Hour) . 150 milliGRAM(s) Oral daily  citalopram 40 milliGRAM(s) Oral daily  gabapentin 100 milliGRAM(s) Oral three times a day  iron sucrose Injectable 100 milliGRAM(s) IV Push every 24 hours  levothyroxine 88 MICROGram(s) Oral daily  losartan 25 milliGRAM(s) Oral daily  metoprolol succinate ER 25 milliGRAM(s) Oral daily  pantoprazole    Tablet 40 milliGRAM(s) Oral before breakfast  simvastatin 40 milliGRAM(s) Oral at bedtime    MEDICATIONS  (PRN):  acetaminophen   Tablet .. 650 milliGRAM(s) Oral every 6 hours PRN Temp greater or equal to 38C (100.4F), Moderate Pain (4 - 6)      Vital Signs Last 24 Hrs  T(C): 36.7 (04 Jun 2021 11:40), Max: 36.9 (04 Jun 2021 04:20)  T(F): 98 (04 Jun 2021 11:40), Max: 98.4 (04 Jun 2021 04:20)  HR: 58 (04 Jun 2021 11:40) (58 - 66)  BP: 115/72 (04 Jun 2021 11:40) (115/72 - 127/80)  BP(mean): --  RR: 18 (04 Jun 2021 11:40) (18 - 18)  SpO2: 93% (04 Jun 2021 11:40) (91% - 93%)      [PHYSICAL EXAM]  General: adult in NAD,  WN,  WD.  HEENT: clear oropharynx, anicteric sclera, pink conjunctivae.  Neck: supple, no masses.  CV: normal S1S2, no murmur, no rubs, no gallops.  Lungs: clear to auscultation, no wheezes, no rales, no rhonchi.  Abdomen: soft, non-tender, non-distended, no hepatosplenomegaly, normal BS, no guarding.  Ext: no clubbing, no cyanosis, no edema.  Skin: no rashes,  no petechiae, no venous stasis changes.  Neuro: alert and oriented X  , no focal motor deficits.  LN: no SC JUAN.      [LABS:]                        9.5    6.15  )-----------( 177      ( 04 Jun 2021 06:45 )             30.8     06-03    144  |  112<H>  |  17  ----------------------------<  84  4.1   |  27  |  0.87    Ca    8.2<L>      03 Jun 2021 06:48                  [RADIOLOGY STUDIES:]

## 2021-06-05 NOTE — PROGRESS NOTE ADULT - PROBLEM SELECTOR PROBLEM 2
Hiatal hernia with GERD and esophagitis

## 2021-06-06 VITALS
HEART RATE: 72 BPM | DIASTOLIC BLOOD PRESSURE: 78 MMHG | OXYGEN SATURATION: 94 % | SYSTOLIC BLOOD PRESSURE: 134 MMHG | TEMPERATURE: 98 F | RESPIRATION RATE: 17 BRPM

## 2021-06-06 PROCEDURE — 99285 EMERGENCY DEPT VISIT HI MDM: CPT | Mod: 25

## 2021-06-06 PROCEDURE — 97530 THERAPEUTIC ACTIVITIES: CPT

## 2021-06-06 PROCEDURE — 86901 BLOOD TYPING SEROLOGIC RH(D): CPT

## 2021-06-06 PROCEDURE — 86922 COMPATIBILITY TEST ANTIGLOB: CPT

## 2021-06-06 PROCEDURE — 80048 BASIC METABOLIC PNL TOTAL CA: CPT

## 2021-06-06 PROCEDURE — 86900 BLOOD TYPING SEROLOGIC ABO: CPT

## 2021-06-06 PROCEDURE — 96374 THER/PROPH/DIAG INJ IV PUSH: CPT

## 2021-06-06 PROCEDURE — 84443 ASSAY THYROID STIM HORMONE: CPT

## 2021-06-06 PROCEDURE — 85045 AUTOMATED RETICULOCYTE COUNT: CPT

## 2021-06-06 PROCEDURE — 93005 ELECTROCARDIOGRAM TRACING: CPT

## 2021-06-06 PROCEDURE — 85027 COMPLETE CBC AUTOMATED: CPT

## 2021-06-06 PROCEDURE — 86880 COOMBS TEST DIRECT: CPT

## 2021-06-06 PROCEDURE — 86769 SARS-COV-2 COVID-19 ANTIBODY: CPT

## 2021-06-06 PROCEDURE — 71275 CT ANGIOGRAPHY CHEST: CPT

## 2021-06-06 PROCEDURE — 83540 ASSAY OF IRON: CPT

## 2021-06-06 PROCEDURE — 82607 VITAMIN B-12: CPT

## 2021-06-06 PROCEDURE — 0225U NFCT DS DNA&RNA 21 SARSCOV2: CPT

## 2021-06-06 PROCEDURE — 84484 ASSAY OF TROPONIN QUANT: CPT

## 2021-06-06 PROCEDURE — 83880 ASSAY OF NATRIURETIC PEPTIDE: CPT

## 2021-06-06 PROCEDURE — 82272 OCCULT BLD FECES 1-3 TESTS: CPT

## 2021-06-06 PROCEDURE — 97116 GAIT TRAINING THERAPY: CPT

## 2021-06-06 PROCEDURE — 36415 COLL VENOUS BLD VENIPUNCTURE: CPT

## 2021-06-06 PROCEDURE — 86870 RBC ANTIBODY IDENTIFICATION: CPT

## 2021-06-06 PROCEDURE — 86850 RBC ANTIBODY SCREEN: CPT

## 2021-06-06 PROCEDURE — 71045 X-RAY EXAM CHEST 1 VIEW: CPT

## 2021-06-06 PROCEDURE — 36430 TRANSFUSION BLD/BLD COMPNT: CPT

## 2021-06-06 PROCEDURE — 82728 ASSAY OF FERRITIN: CPT

## 2021-06-06 PROCEDURE — 80053 COMPREHEN METABOLIC PANEL: CPT

## 2021-06-06 PROCEDURE — P9016: CPT

## 2021-06-06 PROCEDURE — 83550 IRON BINDING TEST: CPT

## 2021-06-06 PROCEDURE — 85025 COMPLETE CBC W/AUTO DIFF WBC: CPT

## 2021-06-06 PROCEDURE — 82746 ASSAY OF FOLIC ACID SERUM: CPT

## 2021-06-06 PROCEDURE — 97162 PT EVAL MOD COMPLEX 30 MIN: CPT

## 2021-06-06 RX ADMIN — LOSARTAN POTASSIUM 25 MILLIGRAM(S): 100 TABLET, FILM COATED ORAL at 05:20

## 2021-06-06 RX ADMIN — Medication 88 MICROGRAM(S): at 05:20

## 2021-06-06 RX ADMIN — Medication 25 MILLIGRAM(S): at 05:20

## 2021-06-06 RX ADMIN — GABAPENTIN 100 MILLIGRAM(S): 400 CAPSULE ORAL at 05:24

## 2021-06-06 RX ADMIN — PANTOPRAZOLE SODIUM 40 MILLIGRAM(S): 20 TABLET, DELAYED RELEASE ORAL at 05:20

## 2021-07-09 ENCOUNTER — RX RENEWAL (OUTPATIENT)
Age: 81
End: 2021-07-09

## 2021-07-21 ENCOUNTER — APPOINTMENT (OUTPATIENT)
Dept: CARDIOLOGY | Facility: CLINIC | Age: 81
End: 2021-07-21

## 2021-07-21 DIAGNOSIS — Z95.828 PRESENCE OF OTHER VASCULAR IMPLANTS AND GRAFTS: ICD-10-CM

## 2021-07-21 DIAGNOSIS — J44.9 CHRONIC OBSTRUCTIVE PULMONARY DISEASE, UNSPECIFIED: ICD-10-CM

## 2021-07-21 NOTE — HISTORY OF PRESENT ILLNESS
[FreeTextEntry1] : I saw Keisha Marcum in the office today for a followup visit, She is an 81 y-0 female who is status post aortic and mitral valve replacement for aortic stenosis and mitral insufficiency respectively on March 2012 at [facility]. It was performed by Dr. Wood. Echo 7/20 demonstrated an ejection fraction of 55%. There was a peak gradient across the mitral prosthesis of 16 mm mercury. Normally functioning aortic valve prosthesis. LVH. Mild to moderate TR with a PA pressure of 35..\par \par She is being treated for hypertension, and hyperlipidemia. Fortunately she had no coronary disease. She also has a history of depression, anxiety, and some degree of chronic obstructive pulmonary disease with MARTINA. She is using her mask.. She also is hypothyroid.\par \par She has been diagnosed with promyelocytic leukemia and is undergoing chemotherapy at [facility]. The first treatment 5/13 was complicated by an episode of torsades with cardiac arrest, in the setting of MSSA bacteremia. QT prolongation by Arsenic. Echo showed normal LV systolic function. She subsequently underwent a second and third treatment treatment 10/13 without any trouble. Finished chemotherapy 11/13.  She's been in remission for approximately one year..\par \par The patient is physically limited by shortness of breath. Everything she does is a great effort. She has not seen a pulmonologist for some time.  She is receiving iron infusions for anemia. She is no longer taking the Lasix. Her leg edema has improved and she has no volume overload.She is wearing support stockings.\par \par The patient has been anemic and has some blood in her stool and had an endoscopy and colonoscopy at [place] on 1/5/17. This showed acid reflux. Cauterized bleeding.With the iron infusions her hemoglobin has been above 9 and she is feeling much better. She is able to walk and hopefully now and start losing weight.She has been off of low-dose aspirin for several years.\par \par She has recurrent kidney stones. She had stents placed for the kidney stones that have been removed.\par \par She does have significant COPD and has been complaining of increasing dyspnea on exertion.\par \par 11/20 the patient went to the emergency room with shortness of breath. Workup demonstrated a PE in the right lower lobe with a left below-knee DVT. She was treated with heparin and discharged on Eliquis. Echocardiogram showed ejection fraction of 55-60%. There was a bio aortic and mitral valve. There was mild MS, and mild AI. There was stage II diastolic dysfunction with LVH.\par \par Patient is feeling better. She still is short of breath but does have significant anemia and COPD. She has no pleuritic chest pain and no increasing swelling of her legs. O2 sat on room air is 97%. She is tolerating medicines well. She sees Dr. Escalera about the anemia and is getting IV Iron and is seeing  Dr. Mcmahon, Pulmonologist.\par \par She underwent an IVC filter in order to stop her anticoagulation and go back on low-dose aspirin.  She was admitted to the hospital 6/21 with blood loss.  Hemoglobin was 7.1.  Iron was low with a level of 23 and 7% saturation.\par \par \par

## 2021-07-21 NOTE — PHYSICAL EXAM
[General Appearance - Well Developed] : well developed [Normal Appearance] : normal appearance [Well Groomed] : well groomed [General Appearance - Well Nourished] : well nourished [No Deformities] : no deformities [General Appearance - In No Acute Distress] : no acute distress [Normal Conjunctiva] : the conjunctiva exhibited no abnormalities [Eyelids - No Xanthelasma] : the eyelids demonstrated no xanthelasmas [Normal Oral Mucosa] : normal oral mucosa [Normal Jugular Venous A Waves Present] : normal jugular venous A waves present [Normal Jugular Venous V Waves Present] : normal jugular venous V waves present [No Jugular Venous Jaime A Waves] : no jugular venous jaime A waves [] : no respiratory distress [Respiration, Rhythm And Depth] : normal respiratory rhythm and effort [Exaggerated Use Of Accessory Muscles For Inspiration] : no accessory muscle use [Auscultation Breath Sounds / Voice Sounds] : lungs were clear to auscultation bilaterally [Abdomen Soft] : soft [Bowel Sounds] : normal bowel sounds [Abdomen Tenderness] : non-tender [Abnormal Walk] : normal gait [Gait - Sufficient For Exercise Testing] : the gait was sufficient for exercise testing [Cyanosis, Localized] : no localized cyanosis [Nail Clubbing] : no clubbing of the fingernails [Skin Color & Pigmentation] : normal skin color and pigmentation [Oriented To Time, Place, And Person] : oriented to person, place, and time [Impaired Insight] : insight and judgment were intact [Affect] : the affect was normal [Mood] : the mood was normal [Not Palpable] : not palpable [No Precordial Heave] : no precordial heave was noted [Normal Rate] : normal [Rhythm Regular] : regular [Normal S1] : normal S1 [Normal S2] : normal S2 [No Gallop] : no gallop heard [Prosthetic Aortic Valve] : prosthetic aortic valve heard [Prosthetic Mitral Valve] : prosthetic mitral valve heard [II] : a grade 2 [2+] : left 2+ [1+] : left 1+ [No Abnormalities] : the abdominal aorta was not enlarged and no bruit was heard [___ +] : bilateral [unfilled]U+ pitting edema to the ankles [Apical Thrill] : no thrill palpable at the apex [Click] : no click

## 2021-08-19 ENCOUNTER — NON-APPOINTMENT (OUTPATIENT)
Age: 81
End: 2021-08-19

## 2021-08-21 NOTE — ED PROVIDER NOTE - PSH
Bruno Emanuel(Resident) After cataract, bilateral  2010  Elective surgery  Cystoscopy, right ureteroscopy, laser lithotripsy of right ureteral stone, right ureteral stent removal and replacement, right retrograde pyelogram on 12/6/19  h/o  endometrial ablation  1998  H/O aortic valve repair  2014  H/O cone biopsy of cervix  1974  H/O dilation and curettage    H/O mitral valve repair  2014  History of coronary angiogram  1/31/12  History of tonsillectomy  childhood  S/P ureteral stent placement  R in 12/2020

## 2021-09-09 NOTE — H&P PST ADULT - NEUROLOGICAL SYMPTOMS
Subjective   Ari Ngo is a 51 y.o. male.     Ari Ngo is in for some lingering sinus pressure and congestion. He has a nonproductive cough that has also been present for 2 mos. He has a normal appetite and has not had any fever. He is fully vaccinated for Covid and has had a negative test recently as well. There is no history of chest pain or dyspnea. There is no history of issue with bowel or bladder dysfunction. There is no history of dizziness or lightheadedness. There is no history of issue with sleep or mood. There is no history of issue with present medication.            /81 (BP Location: Left arm, Patient Position: Sitting, Cuff Size: Large Adult)   Pulse 71   Temp 98 °F (36.7 °C) (Temporal)   Wt 108 kg (237 lb)   SpO2 98%   BMI 33.05 kg/m²       Chief Complaint   Patient presents with   • Cough     since july    • Ear Fullness     both ears everything sounds muffled    • Shortness of Breath     when he works out - been going on since July            Current Outpatient Medications:   •  atorvastatin (LIPITOR) 20 MG tablet, TAKE 1 TABLET BY MOUTH EVERYDAY AT BEDTIME, Disp: 90 tablet, Rfl: 1  •  benzonatate (Tessalon Perles) 100 MG capsule, Take 1 capsule by mouth 3 (Three) Times a Day As Needed for Cough., Disp: 30 capsule, Rfl: 0  •  fluticasone (Flonase) 50 MCG/ACT nasal spray, 2 sprays into the nostril(s) as directed by provider Daily., Disp: 15.8 mL, Rfl: 3  •  methylPREDNISolone (MEDROL) 4 MG dose pack, Take as directed on package instructions., Disp: 1 each, Rfl: 0  •  PSEUDOPHED-CHLOPHEDIANOL-GG PO, Take  by mouth., Disp: , Rfl:         The following portions of the patient's history were reviewed and updated as appropriate: allergies, current medications, past family history, past medical history, past social history, past surgical history, and problem list.    Review of Systems   Constitutional: Negative for activity change, fatigue, fever and unexpected weight change.   HENT:  Positive for congestion, postnasal drip and sinus pain. Negative for facial swelling, hearing loss, nosebleeds and rhinorrhea.    Eyes: Negative for visual disturbance.   Respiratory: Negative for chest tightness and shortness of breath.    Cardiovascular: Negative for chest pain.   Gastrointestinal: Negative for constipation and diarrhea.   Genitourinary: Negative for difficulty urinating and frequency.   Musculoskeletal: Negative for back pain and myalgias.   Neurological: Positive for headaches. Negative for dizziness and weakness.       Objective   Physical Exam  Vitals and nursing note reviewed.   Constitutional:       Appearance: Normal appearance.   HENT:      Right Ear: Tympanic membrane and ear canal normal.      Left Ear: Tympanic membrane and ear canal normal.      Nose: Nose normal.      Mouth/Throat:      Pharynx: Oropharynx is clear. No oropharyngeal exudate.   Eyes:      Conjunctiva/sclera: Conjunctivae normal.      Pupils: Pupils are equal, round, and reactive to light.   Cardiovascular:      Rate and Rhythm: Normal rate and regular rhythm.      Heart sounds: Normal heart sounds.   Pulmonary:      Effort: Pulmonary effort is normal.   Musculoskeletal:      Cervical back: Neck supple.   Lymphadenopathy:      Cervical: No cervical adenopathy.   Neurological:      Mental Status: He is alert.           Assessment/Plan   Problems Addressed this Visit     None      Visit Diagnoses     Fatigue, unspecified type    -  Primary    Relevant Orders    Comprehensive metabolic panel (Completed)    T3, free (Completed)    T4, free (Completed)    TSH (Completed)    Testosterone (Free & Total), LC / MS    CBC w AUTO Differential (Completed)    Chronic sinusitis, unspecified location        Relevant Orders    CT Sinus Without Contrast      Diagnoses       Codes Comments    Fatigue, unspecified type    -  Primary ICD-10-CM: R53.83  ICD-9-CM: 780.79     Chronic sinusitis, unspecified location     ICD-10-CM:  J32.9  ICD-9-CM: 473.9         I will send for CT of the sinuses looking for reasons for his lingering symptoms  He may need a referral to ENT  At his request I will get another Covid test  I will get some labs to see if we can explain some of his lingering fatigue  I will see him back or refer as indicated          weakness/difficulty walking

## 2021-09-15 ENCOUNTER — RX RENEWAL (OUTPATIENT)
Age: 81
End: 2021-09-15

## 2021-09-20 ENCOUNTER — RX RENEWAL (OUTPATIENT)
Age: 81
End: 2021-09-20

## 2021-10-19 ENCOUNTER — APPOINTMENT (OUTPATIENT)
Dept: CARDIOLOGY | Facility: CLINIC | Age: 81
End: 2021-10-19

## 2022-01-31 ENCOUNTER — RX RENEWAL (OUTPATIENT)
Age: 82
End: 2022-01-31

## 2022-02-08 NOTE — ED PROVIDER NOTE - CHPI ED SYMPTOMS NEG
Yes no fever/no hematuria/no abdominal distension/no blood in stool/no diarrhea/no dysuria/no burning urination/no vomiting/no chills

## 2022-03-16 NOTE — PHYSICAL THERAPY INITIAL EVALUATION ADULT - DISCHARGE DISPOSITION, PT EVAL
your medications from [address] in [facility] [address] or cut your pills and open capsules, mix with liquid to drink  Take Tylenol every 8 hours around the clock, unless instructed otherwise  Take your omeprazole daily  It is important to stay hydrated and follow your discharge diet progression   Mild nausea is ok as long as you can drink fluids, sip very slowly and get up and walk during any periods of nausea  You may shower normally after 48 hours, but do not scrub incision sites, blot gently with clean towel to dry incisions  Take home medications as usual unless instructed otherwise while in hospital  Follow up with Dr Bharat Hackett and your PCP within the next week  Sleeve gastrectomy patients ONLY: Complete full course of lovenox injections! FLORES/rehabilitation facility

## 2022-04-11 ENCOUNTER — RX RENEWAL (OUTPATIENT)
Age: 82
End: 2022-04-11

## 2022-04-14 ENCOUNTER — INPATIENT (INPATIENT)
Facility: HOSPITAL | Age: 82
LOS: 1 days | Discharge: HOME HEALTH | DRG: 191 | End: 2022-04-16
Attending: INTERNAL MEDICINE | Admitting: INTERNAL MEDICINE
Payer: MEDICARE

## 2022-04-14 VITALS
HEIGHT: 62 IN | HEART RATE: 87 BPM | OXYGEN SATURATION: 98 % | SYSTOLIC BLOOD PRESSURE: 188 MMHG | DIASTOLIC BLOOD PRESSURE: 112 MMHG | RESPIRATION RATE: 20 BRPM | WEIGHT: 160.06 LBS | TEMPERATURE: 98 F

## 2022-04-14 DIAGNOSIS — D64.9 ANEMIA, UNSPECIFIED: ICD-10-CM

## 2022-04-14 DIAGNOSIS — Z98.890 OTHER SPECIFIED POSTPROCEDURAL STATES: Chronic | ICD-10-CM

## 2022-04-14 DIAGNOSIS — I10 ESSENTIAL (PRIMARY) HYPERTENSION: ICD-10-CM

## 2022-04-14 DIAGNOSIS — J44.1 CHRONIC OBSTRUCTIVE PULMONARY DISEASE WITH (ACUTE) EXACERBATION: ICD-10-CM

## 2022-04-14 DIAGNOSIS — Z41.9 ENCOUNTER FOR PROCEDURE FOR PURPOSES OTHER THAN REMEDYING HEALTH STATE, UNSPECIFIED: Chronic | ICD-10-CM

## 2022-04-14 DIAGNOSIS — Z74.09 OTHER REDUCED MOBILITY: ICD-10-CM

## 2022-04-14 DIAGNOSIS — Z29.9 ENCOUNTER FOR PROPHYLACTIC MEASURES, UNSPECIFIED: ICD-10-CM

## 2022-04-14 DIAGNOSIS — Z96.0 PRESENCE OF UROGENITAL IMPLANTS: Chronic | ICD-10-CM

## 2022-04-14 DIAGNOSIS — E03.9 HYPOTHYROIDISM, UNSPECIFIED: ICD-10-CM

## 2022-04-14 DIAGNOSIS — Z86.79 PERSONAL HISTORY OF OTHER DISEASES OF THE CIRCULATORY SYSTEM: ICD-10-CM

## 2022-04-14 LAB
ALBUMIN SERPL ELPH-MCNC: 3.2 G/DL — LOW (ref 3.3–5)
ALP SERPL-CCNC: 68 U/L — SIGNIFICANT CHANGE UP (ref 30–120)
ALT FLD-CCNC: 23 U/L DA — SIGNIFICANT CHANGE UP (ref 10–60)
ANION GAP SERPL CALC-SCNC: 7 MMOL/L — SIGNIFICANT CHANGE UP (ref 5–17)
APPEARANCE UR: CLEAR — SIGNIFICANT CHANGE UP
APTT BLD: 25.9 SEC — LOW (ref 27.5–35.5)
AST SERPL-CCNC: 24 U/L — SIGNIFICANT CHANGE UP (ref 10–40)
BACTERIA # UR AUTO: NEGATIVE — SIGNIFICANT CHANGE UP
BASOPHILS # BLD AUTO: 0.04 K/UL — SIGNIFICANT CHANGE UP (ref 0–0.2)
BASOPHILS NFR BLD AUTO: 0.6 % — SIGNIFICANT CHANGE UP (ref 0–2)
BILIRUB SERPL-MCNC: 0.3 MG/DL — SIGNIFICANT CHANGE UP (ref 0.2–1.2)
BILIRUB UR-MCNC: NEGATIVE — SIGNIFICANT CHANGE UP
BUN SERPL-MCNC: 22 MG/DL — SIGNIFICANT CHANGE UP (ref 7–23)
CALCIUM SERPL-MCNC: 9 MG/DL — SIGNIFICANT CHANGE UP (ref 8.4–10.5)
CHLORIDE SERPL-SCNC: 104 MMOL/L — SIGNIFICANT CHANGE UP (ref 96–108)
CO2 SERPL-SCNC: 26 MMOL/L — SIGNIFICANT CHANGE UP (ref 22–31)
COLOR SPEC: YELLOW — SIGNIFICANT CHANGE UP
CREAT SERPL-MCNC: 1.06 MG/DL — SIGNIFICANT CHANGE UP (ref 0.5–1.3)
CRP SERPL-MCNC: 6 MG/L — HIGH
DIFF PNL FLD: ABNORMAL
EGFR: 52 ML/MIN/1.73M2 — LOW
EOSINOPHIL # BLD AUTO: 0.21 K/UL — SIGNIFICANT CHANGE UP (ref 0–0.5)
EOSINOPHIL NFR BLD AUTO: 3.3 % — SIGNIFICANT CHANGE UP (ref 0–6)
EPI CELLS # UR: SIGNIFICANT CHANGE UP
FLUAV AG NPH QL: SIGNIFICANT CHANGE UP
FLUBV AG NPH QL: SIGNIFICANT CHANGE UP
GLUCOSE SERPL-MCNC: 106 MG/DL — HIGH (ref 70–99)
GLUCOSE UR QL: NEGATIVE MG/DL — SIGNIFICANT CHANGE UP
HCT VFR BLD CALC: 33.5 % — LOW (ref 34.5–45)
HGB BLD-MCNC: 10.2 G/DL — LOW (ref 11.5–15.5)
IMM GRANULOCYTES NFR BLD AUTO: 0.5 % — SIGNIFICANT CHANGE UP (ref 0–1.5)
INR BLD: 1.09 RATIO — SIGNIFICANT CHANGE UP (ref 0.88–1.16)
KETONES UR-MCNC: NEGATIVE — SIGNIFICANT CHANGE UP
LACTATE SERPL-SCNC: 1.1 MMOL/L — SIGNIFICANT CHANGE UP (ref 0.7–2)
LEUKOCYTE ESTERASE UR-ACNC: NEGATIVE — SIGNIFICANT CHANGE UP
LYMPHOCYTES # BLD AUTO: 0.49 K/UL — LOW (ref 1–3.3)
LYMPHOCYTES # BLD AUTO: 7.6 % — LOW (ref 13–44)
MCHC RBC-ENTMCNC: 27.5 PG — SIGNIFICANT CHANGE UP (ref 27–34)
MCHC RBC-ENTMCNC: 30.4 GM/DL — LOW (ref 32–36)
MCV RBC AUTO: 90.3 FL — SIGNIFICANT CHANGE UP (ref 80–100)
MONOCYTES # BLD AUTO: 0.76 K/UL — SIGNIFICANT CHANGE UP (ref 0–0.9)
MONOCYTES NFR BLD AUTO: 11.8 % — SIGNIFICANT CHANGE UP (ref 2–14)
NEUTROPHILS # BLD AUTO: 4.92 K/UL — SIGNIFICANT CHANGE UP (ref 1.8–7.4)
NEUTROPHILS NFR BLD AUTO: 76.2 % — SIGNIFICANT CHANGE UP (ref 43–77)
NITRITE UR-MCNC: NEGATIVE — SIGNIFICANT CHANGE UP
NRBC # BLD: 0 /100 WBCS — SIGNIFICANT CHANGE UP (ref 0–0)
NT-PROBNP SERPL-SCNC: 413 PG/ML — SIGNIFICANT CHANGE UP (ref 0–450)
PH UR: 6 — SIGNIFICANT CHANGE UP (ref 5–8)
PLATELET # BLD AUTO: 170 K/UL — SIGNIFICANT CHANGE UP (ref 150–400)
POTASSIUM SERPL-MCNC: 4.2 MMOL/L — SIGNIFICANT CHANGE UP (ref 3.5–5.3)
POTASSIUM SERPL-SCNC: 4.2 MMOL/L — SIGNIFICANT CHANGE UP (ref 3.5–5.3)
PROT SERPL-MCNC: 6.4 G/DL — SIGNIFICANT CHANGE UP (ref 6–8.3)
PROT UR-MCNC: NEGATIVE MG/DL — SIGNIFICANT CHANGE UP
PROTHROM AB SERPL-ACNC: 12.9 SEC — SIGNIFICANT CHANGE UP (ref 10.5–13.4)
RBC # BLD: 3.71 M/UL — LOW (ref 3.8–5.2)
RBC # FLD: 20.2 % — HIGH (ref 10.3–14.5)
RBC CASTS # UR COMP ASSIST: SIGNIFICANT CHANGE UP /HPF (ref 0–4)
RSV RNA NPH QL NAA+NON-PROBE: SIGNIFICANT CHANGE UP
SARS-COV-2 RNA SPEC QL NAA+PROBE: SIGNIFICANT CHANGE UP
SODIUM SERPL-SCNC: 137 MMOL/L — SIGNIFICANT CHANGE UP (ref 135–145)
SP GR SPEC: 1.02 — SIGNIFICANT CHANGE UP (ref 1.01–1.02)
TROPONIN I, HIGH SENSITIVITY RESULT: 8.1 NG/L — SIGNIFICANT CHANGE UP
UROBILINOGEN FLD QL: NEGATIVE MG/DL — SIGNIFICANT CHANGE UP
WBC # BLD: 6.45 K/UL — SIGNIFICANT CHANGE UP (ref 3.8–10.5)
WBC # FLD AUTO: 6.45 K/UL — SIGNIFICANT CHANGE UP (ref 3.8–10.5)
WBC UR QL: SIGNIFICANT CHANGE UP

## 2022-04-14 PROCEDURE — 93010 ELECTROCARDIOGRAM REPORT: CPT

## 2022-04-14 PROCEDURE — 71045 X-RAY EXAM CHEST 1 VIEW: CPT | Mod: 26

## 2022-04-14 PROCEDURE — 99285 EMERGENCY DEPT VISIT HI MDM: CPT

## 2022-04-14 PROCEDURE — 93306 TTE W/DOPPLER COMPLETE: CPT | Mod: 26

## 2022-04-14 PROCEDURE — 99223 1ST HOSP IP/OBS HIGH 75: CPT

## 2022-04-14 RX ORDER — ALBUTEROL 90 UG/1
2.5 AEROSOL, METERED ORAL EVERY 6 HOURS
Refills: 0 | Status: DISCONTINUED | OUTPATIENT
Start: 2022-04-14 | End: 2022-04-16

## 2022-04-14 RX ORDER — ENOXAPARIN SODIUM 100 MG/ML
40 INJECTION SUBCUTANEOUS EVERY 24 HOURS
Refills: 0 | Status: DISCONTINUED | OUTPATIENT
Start: 2022-04-14 | End: 2022-04-16

## 2022-04-14 RX ORDER — LEVOTHYROXINE SODIUM 125 MCG
88 TABLET ORAL DAILY
Refills: 0 | Status: DISCONTINUED | OUTPATIENT
Start: 2022-04-14 | End: 2022-04-16

## 2022-04-14 RX ORDER — LOSARTAN POTASSIUM 100 MG/1
25 TABLET, FILM COATED ORAL DAILY
Refills: 0 | Status: DISCONTINUED | OUTPATIENT
Start: 2022-04-14 | End: 2022-04-16

## 2022-04-14 RX ORDER — TIOTROPIUM BROMIDE 18 UG/1
1 CAPSULE ORAL; RESPIRATORY (INHALATION) DAILY
Refills: 0 | Status: DISCONTINUED | OUTPATIENT
Start: 2022-04-14 | End: 2022-04-16

## 2022-04-14 RX ORDER — BUPROPION HYDROCHLORIDE 150 MG/1
75 TABLET, EXTENDED RELEASE ORAL DAILY
Refills: 0 | Status: DISCONTINUED | OUTPATIENT
Start: 2022-04-14 | End: 2022-04-14

## 2022-04-14 RX ORDER — GABAPENTIN 400 MG/1
200 CAPSULE ORAL DAILY
Refills: 0 | Status: DISCONTINUED | OUTPATIENT
Start: 2022-04-14 | End: 2022-04-16

## 2022-04-14 RX ORDER — ALBUTEROL 90 UG/1
1 AEROSOL, METERED ORAL EVERY 4 HOURS
Refills: 0 | Status: DISCONTINUED | OUTPATIENT
Start: 2022-04-14 | End: 2022-04-16

## 2022-04-14 RX ORDER — BUDESONIDE AND FORMOTEROL FUMARATE DIHYDRATE 160; 4.5 UG/1; UG/1
2 AEROSOL RESPIRATORY (INHALATION)
Refills: 0 | Status: DISCONTINUED | OUTPATIENT
Start: 2022-04-14 | End: 2022-04-16

## 2022-04-14 RX ORDER — BUPROPION HYDROCHLORIDE 150 MG/1
150 TABLET, EXTENDED RELEASE ORAL DAILY
Refills: 0 | Status: DISCONTINUED | OUTPATIENT
Start: 2022-04-14 | End: 2022-04-14

## 2022-04-14 RX ORDER — CITALOPRAM 10 MG/1
40 TABLET, FILM COATED ORAL DAILY
Refills: 0 | Status: DISCONTINUED | OUTPATIENT
Start: 2022-04-14 | End: 2022-04-16

## 2022-04-14 RX ORDER — SIMVASTATIN 20 MG/1
40 TABLET, FILM COATED ORAL AT BEDTIME
Refills: 0 | Status: DISCONTINUED | OUTPATIENT
Start: 2022-04-14 | End: 2022-04-16

## 2022-04-14 RX ORDER — ACETAMINOPHEN 500 MG
650 TABLET ORAL ONCE
Refills: 0 | Status: COMPLETED | OUTPATIENT
Start: 2022-04-14 | End: 2022-04-14

## 2022-04-14 RX ORDER — FAMOTIDINE 10 MG/ML
20 INJECTION INTRAVENOUS DAILY
Refills: 0 | Status: DISCONTINUED | OUTPATIENT
Start: 2022-04-14 | End: 2022-04-16

## 2022-04-14 RX ORDER — IPRATROPIUM/ALBUTEROL SULFATE 18-103MCG
3 AEROSOL WITH ADAPTER (GRAM) INHALATION ONCE
Refills: 0 | Status: COMPLETED | OUTPATIENT
Start: 2022-04-14 | End: 2022-04-14

## 2022-04-14 RX ORDER — METOPROLOL TARTRATE 50 MG
25 TABLET ORAL DAILY
Refills: 0 | Status: DISCONTINUED | OUTPATIENT
Start: 2022-04-14 | End: 2022-04-16

## 2022-04-14 RX ORDER — CHOLECALCIFEROL (VITAMIN D3) 125 MCG
1000 CAPSULE ORAL DAILY
Refills: 0 | Status: DISCONTINUED | OUTPATIENT
Start: 2022-04-14 | End: 2022-04-16

## 2022-04-14 RX ORDER — BUPROPION HYDROCHLORIDE 150 MG/1
75 TABLET, EXTENDED RELEASE ORAL THREE TIMES A DAY
Refills: 0 | Status: DISCONTINUED | OUTPATIENT
Start: 2022-04-14 | End: 2022-04-16

## 2022-04-14 RX ADMIN — Medication 40 MILLIGRAM(S): at 21:06

## 2022-04-14 RX ADMIN — BUDESONIDE AND FORMOTEROL FUMARATE DIHYDRATE 2 PUFF(S): 160; 4.5 AEROSOL RESPIRATORY (INHALATION) at 18:36

## 2022-04-14 RX ADMIN — Medication 88 MICROGRAM(S): at 12:45

## 2022-04-14 RX ADMIN — Medication 650 MILLIGRAM(S): at 23:44

## 2022-04-14 RX ADMIN — Medication 3 MILLILITER(S): at 11:24

## 2022-04-14 RX ADMIN — ALBUTEROL 2.5 MILLIGRAM(S): 90 AEROSOL, METERED ORAL at 19:37

## 2022-04-14 RX ADMIN — Medication 1000 UNIT(S): at 17:35

## 2022-04-14 RX ADMIN — TIOTROPIUM BROMIDE 1 CAPSULE(S): 18 CAPSULE ORAL; RESPIRATORY (INHALATION) at 17:35

## 2022-04-14 RX ADMIN — CITALOPRAM 40 MILLIGRAM(S): 10 TABLET, FILM COATED ORAL at 12:44

## 2022-04-14 RX ADMIN — BUPROPION HYDROCHLORIDE 75 MILLIGRAM(S): 150 TABLET, EXTENDED RELEASE ORAL at 21:06

## 2022-04-14 RX ADMIN — Medication 40 MILLIGRAM(S): at 14:19

## 2022-04-14 RX ADMIN — ENOXAPARIN SODIUM 40 MILLIGRAM(S): 100 INJECTION SUBCUTANEOUS at 12:46

## 2022-04-14 RX ADMIN — Medication 650 MILLIGRAM(S): at 22:44

## 2022-04-14 RX ADMIN — BUPROPION HYDROCHLORIDE 150 MILLIGRAM(S): 150 TABLET, EXTENDED RELEASE ORAL at 12:45

## 2022-04-14 RX ADMIN — GABAPENTIN 200 MILLIGRAM(S): 400 CAPSULE ORAL at 12:45

## 2022-04-14 RX ADMIN — LOSARTAN POTASSIUM 25 MILLIGRAM(S): 100 TABLET, FILM COATED ORAL at 12:45

## 2022-04-14 RX ADMIN — Medication 40 MILLIGRAM(S): at 09:27

## 2022-04-14 RX ADMIN — SIMVASTATIN 40 MILLIGRAM(S): 20 TABLET, FILM COATED ORAL at 21:06

## 2022-04-14 RX ADMIN — Medication 25 MILLIGRAM(S): at 12:44

## 2022-04-14 RX ADMIN — FAMOTIDINE 20 MILLIGRAM(S): 10 INJECTION INTRAVENOUS at 12:45

## 2022-04-14 NOTE — H&P ADULT - ASSESSMENT
82F PMH COPD (former smoker), CAD, Aortic stenosis, Mitral and aortic valve repair 2014, HTN, Graves disease s/p thyroidectomy,  MARTINA non-compliant with CPAP, Leukemia (APL 2012, now in remission), iron deficiency anemia, DVT/PE s/p IVC filter presented with increasing SOB and inability to care for self.

## 2022-04-14 NOTE — ED PROVIDER NOTE - OBJECTIVE STATEMENT
81 yo F with hx of COPD, anemia, DVT BIBA co SOB for several days. Not improving with inhalers. Pt states she is supposed to use nebulizer and CPAP but doesn't. Denies fever, CP, sputum, NV or other symptom. PCP Donovan  Pt states she got 3rd Moderna shot yesterday and hasn't felt well since.  Pulm Baktidy

## 2022-04-14 NOTE — ED ADULT NURSE NOTE - INTERVENTIONS DEFINITIONS
[place] to call system/Call bell, personal items and telephone within reach/Instruct patient to call for assistance/Stretcher in lowest position, wheels locked, appropriate side rails in place

## 2022-04-14 NOTE — CONSULT NOTE ADULT - ASSESSMENT
The patient is an 82 year old female with a history of HTN, HL, prior leukemia, MARTINA on CPAP, COPD, bioprosthetic AVR and MVR who presents with shortness of breath.    Plan:  - Shortness of breath likely due to worsening COPD  - ECG with nonspecific findings  - Cardiac enzymes negative ruling out an acute MI  - BNP normal at 413  - CXR with left lung atelectasis vs. PNA  - Check echo  - Continue simvastatin 40 mg daily  - Continue aspirin 81 mg daily  - Continue metoprolol succinate 25 mg daily  - Continue losartan 25 mg daily  - Pulm eval
  JAMES HOLLIS 82 f 4/14/2022 1939 DR VIDHYA WOLF    Initial evaluation/Pulmonary Critical Care consultation requested on 4/14/2022  by Dr KNOTT  from Dr Palafox   Patient examined chart reviewed    HOSPITAL ADMISSION   PATIENT CAME  FROM (if information available)      JAMES HOLLIS 82 f 4/14/2022 1939 DR VIDHYA WOLF    REVIEW OF SYMPTOMS      Able to give ROS  Yes     RELIABLE +/-   CONSTITUTIONAL Weakness Yes  Chills No   ENDOCRINE  No heat or cold intolerance    ALLERGY No hives  Sore throat No stridor  RESP Coughing blood no  Shortness of breath YES   NEURO No Headache  Confusion Pain neck No   CARDIAC No Chest pain No Palpitations   GI  Pain abdomen NO   Vomiting NO     PHYSICAL EXAM    HEENT Unremarkable  atraumatic   RESP Fair air entry EXP prolonged    Harsh breath sound Resp distres mild   CARDIAC S1 S2 No S3     NO JVD    ABDOMEN SOFT BS PRESENT NOT DISTENDED No hepatosplenomegaly PEDAL EDEMA present No calf tenderness  NO rash       PATIENT PRESENTATION.  83 yo F with hx of COPD, anemia, DVT BIBA co SOB for several days. Not improving with inhalers. Pt states she is supposed to use nebulizer and CPAP but doesn't. Denies fever, CP, sputum, NV or other symptom. PCP Donovan  Pt states she got 3rd Moderna shot yesterday and hasn't felt well since.  Pulm consulted 4/14/2022     DOA/CC/PROBLEMS poa .    82 f  DOA 4/14/2022   cc sob sev d  Got moderna 3 4/13 worse since then    PMH-PSH .  pmh COPD   pmh anemia   pmh     COVID/ICU/CODE STATUS.                       COVID  STATUS.     4/14/2022 scv2 (-)       ICU STAY. none  GOC.      BEST PRACTICE ISSUES.                                                  HEAD OF BED ELEVATION. Yes  DVT PROPHYLAXIS. 4/14/2022 lvnx 40      VALDEZ PROPHYLAXIS.       4/14/2022 famotidine 20                                                                                 DIET.   4/14/2022 dash              GENERAL ISSUES .                                       ALLERGY.         pncl teetracycline sulfa cat dander                    WEIGHT.     4/14/2022 72                               BMI.                4/14/2022 29           VITALS/PO/IO/VENT/DRIPS.     4/14/2022 afeb 90 120/50      PROBLEM DATA/PLAN.    HEMODYNAMICS.   Monitor bp Target MAP 65 (+)    RESP.   Monitor po Target po 90-95%      PMH/PSH PROBLEMS.  Management continued/modified as indicated    OXYGEN REQUIREMENTS.  4/14/2022 2l 96%  Will need home oxygen at discharge if ra rest or ra exercise PO drops below 88%      INFECTION.  W 4/14/2022 w 6.4   CXR 4/14/2022 new sm l base infiltr  Flu ab 4/14/2022 (-)  rsv 4/14/2022 (-)     COPD ex  4/14/2022 albuterol .4   4/14/2022 symbicort   4/14/2022 solumed 40.3   4/14/2022 spiriva     CAD.   4/14/2022 metoprolol 25    CHF  bnp 4/14/2022 bnp 413   4/14/2022 losartan 25    ANEMIA  Hb 4/14/2022 b 10.2     DEPRESSION.  4/14/2022 bupropion 75.3     HYPOTHYROID  4/14/2022 levoxyl 88      TIME SPENT   Over 55 minutes aggregate care time spent on encounter; activities included   direct patient care, counseling and/or coordinating care reviewing notes, lab data/ imaging , discussion with multidisciplinary team/ patient  /family and explaining in detail risks, benefits, alternatives  of the recommendations     JAMES HOLLIS 82 f 4/14/2022 1939 DR VIDHYA WOLF

## 2022-04-14 NOTE — ED ADULT NURSE NOTE - NSICDXPASTSURGICALHX_GEN_ALL_CORE_FT
Well have him stop the Bevespi for now and go back on metoprolol and call back in a few days with an update.
PAST SURGICAL HISTORY:  After cataract, bilateral 2010    Elective surgery Cystoscopy, right ureteroscopy, laser lithotripsy of right ureteral stone, right ureteral stent removal and replacement, right retrograde pyelogram on 12/6/19    h/o  endometrial ablation 1998    H/O aortic valve repair 2014    H/O cone biopsy of cervix 1974    H/O dilation and curettage     H/O mitral valve repair 2014    History of coronary angiogram 1/31/12    History of tonsillectomy childhood    S/P ureteral stent placement R in 12/2020

## 2022-04-14 NOTE — H&P ADULT - HISTORY OF PRESENT ILLNESS
82F PMH COPD (former smoker), CAD, Aortic stenosis, Mitral and aortic valve repair 2014, HTN, Graves disease s/p thyroidectomy,  MARTINA non-compliant with CPAP, Leukemia (APL 2012, now in remission), iron deficiency anemia, DVT/PE s/p IVC filter.  Patient presented with 1 year of increasing BOOTH, 6-8 weeks of increasing non-productive cough.  Today she called 911 because her breathing was so bad she couldnt take care of herself.  Admits to non-compliance with inhalers (trilogy and nebulizers) at home.  Has not been to her doctors recently due to lack of transportation.  Her balance has been increasingly unsteady for the past 2 years and she fell last week but was able to get up.    Denies chest pain, nausea, abd pain, other recent changes.

## 2022-04-14 NOTE — ED ADULT NURSE NOTE - PRIMARY CARE PROVIDER
Chronic and ongoing issue, for at least 15 years.   Had previously noted reducible left inguinal hernia,      but recently concerned about hernia / pain on right groin.  Has been ongoing acutely, for ~1-2 months.     However, recently progressing and degree of pain.  Previously declined surgery, due to cost.   Now has insurance and would like follow-up.   … Previously ordered CT scan, but declined by insurance.  - Ultrasound has been ordered, but still pending scheduling.  - On exam, today, more prominence and tenderness than before.     (Bulge with cough, bilaterally, but more on left).  - Will place referral to general surgery.  
Patient presents today, with forms to be completed for his employer, noting that he needs to have annual/preventative physical.… We will go over health screening, and preventive measures, as noted elsewhere.…   ... However, has not had blood work done, so can not speak to general screenings or lab work.   - form completion declined, until after labs completed.   
influ vaccine - 10/2021  Pneumo Vaccine - n/a  Tetanus - 8/2020   Shingles, Colonoscopy, - n/a  Dexa, Hep.C.screen, PSA - n/a   Labs < 12 mo / met screen - not done yet....   
Donovan C

## 2022-04-14 NOTE — H&P ADULT - PROBLEM SELECTOR PLAN 1
No evidence of acute infection  Symptoms improved with steroids, nebs - will continue  Pulm consult - Dr Palafox pending  encourage incentive spirometer  will not use CPAP for MARTINA, refused ABG

## 2022-04-14 NOTE — H&P ADULT - NSHPLABSRESULTS_GEN_ALL_CORE
Labs:                        10.2   6.45  )-----------( 170      ( 14 Apr 2022 09:31 )             33.5       04-14    137  |  104  |  22  ----------------------------<  106<H>  4.2   |  26  |  1.06    Ca    9.0      14 Apr 2022 09:31    TPro  6.4  /  Alb  3.2<L>  /  TBili  0.3  /  DBili  x   /  AST  24  /  ALT  23  /  AlkPhos  68  04-14          PT/INR - ( 14 Apr 2022 09:31 )   PT: 12.9 sec;   INR: 1.09 ratio    PTT - ( 14 Apr 2022 09:31 )  PTT:25.9 sec    Lactate Trend  04-14 @ 09:31 Lactate:1.1     CAPILLARY BLOOD GLUCOSE    EKG:   Personally Reviewed:  [ ] YES     Imaging:  CXR - new infiltrate vs. atelectasis left base  Personally Reviewed:  [ x] YES

## 2022-04-14 NOTE — PATIENT PROFILE ADULT - FALL HARM RISK - HARM RISK INTERVENTIONS

## 2022-04-14 NOTE — CONSULT NOTE ADULT - SUBJECTIVE AND OBJECTIVE BOX
History of Present Illness: The patient is an 82 year old female with a history of HTN, HL, prior leukemia, MARTINA on CPAP, COPD, bioprosthetic AVR and MVR who presents with shortness of breath. She states she has baseline dyspnea on exertion which has been progressive over the past year but worse over the last few days. There is wheezing and a cough at times, sometimes productive of sputum. Her legs at times may swell at the ankle area but not today. She notes orthopnea. No fevers, chest pain, palpitations.    Past Medical/Surgical History:  HTN, HL, prior leukemia, MARTINA on CPAP, COPD, bioprosthetic AVR and MVR    Medications:  Home Medications:  buPROPion 75 mg oral tablet: 2 tab(s) orally once a day (in the morning) and then 1 tablet orally in the evening (14 Apr 2022 08:12)  citalopram 40 mg oral tablet: 1 tab(s) orally once a day (14 Apr 2022 08:12)  Vitamin D2 50 mcg (2000 intl units) oral capsule: 1 cap(s) orally once a day (14 Apr 2022 08:12)      Family History: Non-contributory family history of premature cardiovascular atherosclerotic disease    Social History: No tobacco, alcohol or drug use    Review of Systems:  General: No fevers, chills, weight gain  Skin: No rashes, color changes  Cardiovascular: No chest pain, +orthopnea  Respiratory: +shortness of breath, +cough  Gastrointestinal: No nausea, abdominal pain  Genitourinary: No incontinence, pain with urination  Musculoskeletal: No pain, swelling, decreased range of motion  Neurological: No headache, weakness  Psychiatric: No depression, anxiety  Endocrine: No weight gain, increased thirst  All other systems are comprehensively negative.    Physical Exam:  Vitals:        Vital Signs Last 24 Hrs  T(C): 36.9 (14 Apr 2022 08:02), Max: 36.9 (14 Apr 2022 08:02)  T(F): 98.4 (14 Apr 2022 08:02), Max: 98.4 (14 Apr 2022 08:02)  HR: 90 (14 Apr 2022 11:24) (87 - 90)  BP: 188/112 (14 Apr 2022 08:02) (188/112 - 188/112)  BP(mean): --  RR: 20 (14 Apr 2022 08:02) (20 - 20)  SpO2: 100% (14 Apr 2022 11:24) (98% - 100%)  General: NAD  HEENT: MMM  Neck: No JVD, no carotid bruit  Lungs: CTAB  CV: RRR, nl S1/S2, no M/R/G  Abdomen: S/NT/ND, +BS  Extremities: Trace LE edema, no cyanosis  Neuro: AAOx3, non-focal  Skin: No rash    Labs:                        10.2   6.45  )-----------( 170      ( 14 Apr 2022 09:31 )             33.5     04-14    137  |  104  |  22  ----------------------------<  106<H>  4.2   |  26  |  1.06    Ca    9.0      14 Apr 2022 09:31    TPro  6.4  /  Alb  3.2<L>  /  TBili  0.3  /  DBili  x   /  AST  24  /  ALT  23  /  AlkPhos  68  04-14        PT/INR - ( 14 Apr 2022 09:31 )   PT: 12.9 sec;   INR: 1.09 ratio         PTT - ( 14 Apr 2022 09:31 )  PTT:25.9 sec    ECG: NSR, LAD, nonspecific anterior ST abnormality    
    TATIANA RAMIREZ    [place]  [facility]    Allergies    adhesives (Rash; Other)  Cat dander- wheezing, itchy throat, SOB (Other)  penicillin (Rash)  sulfa drugs (Rash)  tetracycline (Stomach Upset)    Intolerances        PAST MEDICAL & SURGICAL HISTORY:  Graves disease  s/p surgery    Acid reflux    Asthma with COPD with exacerbation  Not on home O2    Depression    Former cigarette smoker    Hernia, hiatal    Hypertension    Osteoporosis    Dyslipidemia    Carpal tunnel syndrome    Coronary atherosclerosis of native coronary artery    Diverticulosis of colon    Rotator cuff tear  Right    Emphysema    Aortic stenosis    Anemia  Completed iron infusions every 2 months, now Hb stable    Leukemia  (APL, s/p chemo in , now in remission, followed by oncologist)    MARTINA on CPAP  Pt admits to be non-compliant    Calculus of ureter  s/p R ureteral stent 2020    Serum phosphorus decreased  Last level 1.5 at PST     After cataract, bilateral      History of tonsillectomy  childhood    H/O cone biopsy of cervix      h/o  endometrial ablation      History of coronary angiogram  11    H/O aortic valve repair      H/O mitral valve repair      S/P ureteral stent placement  R in 2020    H/O dilation and curettage    Elective surgery  Cystoscopy, right ureteroscopy, laser lithotripsy of right ureteral stone, right ureteral stent removal and replacement, right retrograde pyelogram on 19        FAMILY HISTORY:  FH: myocardial infarction  (Father )        Home Medications:  buPROPion 75 mg oral tablet: 2 tab(s) orally once a day (in the morning) and then 1 tablet orally in the evening (2022 08:12)  citalopram 40 mg oral tablet: 1 tab(s) orally once a day (2022 08:12)  Vitamin D2 50 mcg (2000 intl units) oral capsule: 1 cap(s) orally once a day (2022 08:12)      MEDICATIONS  (STANDING):  ALBUTerol    0.083% 2.5 milliGRAM(s) Nebulizer every 6 hours  ALBUTerol    90 MICROgram(s) HFA Inhaler 1 Puff(s) Inhalation every 4 hours  budesonide 160 MICROgram(s)/formoterol 4.5 MICROgram(s) Inhaler 2 Puff(s) Inhalation two times a day  cholecalciferol 1000 Unit(s) Oral daily  citalopram 40 milliGRAM(s) Oral daily  enoxaparin Injectable 40 milliGRAM(s) SubCutaneous every 24 hours  famotidine    Tablet 20 milliGRAM(s) Oral daily  gabapentin 200 milliGRAM(s) Oral daily  levothyroxine 88 MICROGram(s) Oral daily  losartan 25 milliGRAM(s) Oral daily  methylPREDNISolone sodium succinate Injectable 40 milliGRAM(s) IV Push every 8 hours  metoprolol succinate ER 25 milliGRAM(s) Oral daily  simvastatin 40 milliGRAM(s) Oral at bedtime  tiotropium 18 MICROgram(s) Capsule 1 Capsule(s) Inhalation daily    MEDICATIONS  (PRN):              Vital Signs Last 24 Hrs  T(C): 36.9 (2022 08:02), Max: 36.9 (2022 08:02)  T(F): 98.4 (2022 08:02), Max: 98.4 (2022 08:02)  HR: 90 (2022 11:24) (87 - 90)  BP: 188/112 (2022 08:02) (188/112 - 188/112)  BP(mean): --  RR: 20 (2022 08:02) (20 - 20)  SpO2: 100% (2022 11:24) (98% - 100%)              LABS:                        10.2   6.45  )-----------( 170      ( 2022 09:31 )             33.5     -14    137  |  104  |  22  ----------------------------<  106<H>  4.2   |  26  |  1.06    Ca    9.0      2022 09:31    TPro  6.4  /  Alb  3.2<L>  /  TBili  0.3  /  DBili  x   /  AST  24  /  ALT  23  /  AlkPhos  68  04-14    PT/INR - ( 2022 09:31 )   PT: 12.9 sec;   INR: 1.09 ratio         PTT - ( 2022 09:31 )  PTT:25.9 sec          WBC:  WBC Count: 6.45 K/uL (14 @ 09:31)      MICROBIOLOGY:  RECENT CULTURES:              PT/INR - ( 2022 09:31 )   PT: 12.9 sec;   INR: 1.09 ratio         PTT - ( 2022 09:31 )  PTT:25.9 sec    Sodium:  Sodium, Serum: 137 mmol/L ( @ 09:31)      1.06 mg/dL  @ 09:31      Hemoglobin:  Hemoglobin: 10.2 g/dL ( @ 09:31)      Platelets: Platelet Count - Automated: 170 K/uL ( @ 09:31)      LIVER FUNCTIONS - ( 2022 09:31 )  Alb: 3.2 g/dL / Pro: 6.4 g/dL / ALK PHOS: 68 U/L / ALT: 23 U/L DA / AST: 24 U/L / GGT: x                 RADIOLOGY & ADDITIONAL STUDIES:      MICROBIOLOGY:  RECENT CULTURES:

## 2022-04-14 NOTE — ED ADULT NURSE NOTE - OBJECTIVE STATEMENT
83 y/o female received aox4 via lizbeth arias for sob eval. pt reports sudden onset of shortness of breath this morning and called the ambulance for assistance. EMS reports that pt's symptoms resolved immediately after supplemental oxygen was initiated. pt currently denies any complaints, placed on cardiac monitor, O2 sat 100% on nasal cannula at 2lpm. IVL inserted, bloods drawn and sent to lab.

## 2022-04-14 NOTE — H&P ADULT - NSHPPHYSICALEXAM_GEN_ALL_CORE
PHYSICAL EXAM:  Vital Signs Last 24 Hrs  T(C): 36.9 (14 Apr 2022 08:02), Max: 36.9 (14 Apr 2022 08:02)  T(F): 98.4 (14 Apr 2022 08:02), Max: 98.4 (14 Apr 2022 08:02)  HR: 90 (14 Apr 2022 11:24) (87 - 90)  BP: 188/112 (14 Apr 2022 08:02) (188/112 - 188/112)  BP(mean): --  RR: 20 (14 Apr 2022 08:02) (20 - 20)  SpO2: 100% (14 Apr 2022 11:24) (98% - 100%)    GENERAL: NAD, well-groomed, well-developed  HEAD:  Atraumatic, Normocephalic  EYES:  conjunctiva and sclera clear  ENMT: ; Moist mucous membranes  NECK: Supple, No JVD  NERVOUS SYSTEM:  Alert & Oriented X3, Good concentration; Moving all extremities  CHEST/LUNG: decreased air entry with a scattered crackle  HEART: Regular rate and rhythm;   ABDOMEN: Soft, Nontender, Nondistended; Bowel sounds present  EXTREMITIES:  1+ Peripheral Pulses, No clubbing, cyanosis, or edema; no calf tenderness  kyphotic back

## 2022-04-14 NOTE — ED PROVIDER NOTE - CLINICAL SUMMARY MEDICAL DECISION MAKING FREE TEXT BOX
82 F with COPD exac. Noncompliance with tx. COVID vaxx yesterday.  PE unrevealing. Plan - Labs, CXR, Duoneb, pulm eval.

## 2022-04-15 ENCOUNTER — TRANSCRIPTION ENCOUNTER (OUTPATIENT)
Age: 82
End: 2022-04-15

## 2022-04-15 LAB
24R-OH-CALCIDIOL SERPL-MCNC: 64 NG/ML — SIGNIFICANT CHANGE UP (ref 30–80)
ALBUMIN SERPL ELPH-MCNC: 3 G/DL — LOW (ref 3.3–5)
ALP SERPL-CCNC: 59 U/L — SIGNIFICANT CHANGE UP (ref 30–120)
ALT FLD-CCNC: 16 U/L DA — SIGNIFICANT CHANGE UP (ref 10–60)
ANION GAP SERPL CALC-SCNC: 6 MMOL/L — SIGNIFICANT CHANGE UP (ref 5–17)
AST SERPL-CCNC: 14 U/L — SIGNIFICANT CHANGE UP (ref 10–40)
BASOPHILS # BLD AUTO: 0.01 K/UL — SIGNIFICANT CHANGE UP (ref 0–0.2)
BASOPHILS NFR BLD AUTO: 0.1 % — SIGNIFICANT CHANGE UP (ref 0–2)
BILIRUB SERPL-MCNC: 0.2 MG/DL — SIGNIFICANT CHANGE UP (ref 0.2–1.2)
BUN SERPL-MCNC: 25 MG/DL — HIGH (ref 7–23)
CALCIUM SERPL-MCNC: 8.9 MG/DL — SIGNIFICANT CHANGE UP (ref 8.4–10.5)
CHLORIDE SERPL-SCNC: 106 MMOL/L — SIGNIFICANT CHANGE UP (ref 96–108)
CO2 SERPL-SCNC: 26 MMOL/L — SIGNIFICANT CHANGE UP (ref 22–31)
CREAT SERPL-MCNC: 1.2 MG/DL — SIGNIFICANT CHANGE UP (ref 0.5–1.3)
EGFR: 45 ML/MIN/1.73M2 — LOW
EOSINOPHIL # BLD AUTO: 0 K/UL — SIGNIFICANT CHANGE UP (ref 0–0.5)
EOSINOPHIL NFR BLD AUTO: 0 % — SIGNIFICANT CHANGE UP (ref 0–6)
FERRITIN SERPL-MCNC: 215 NG/ML — HIGH (ref 15–150)
FOLATE SERPL-MCNC: 5.7 NG/ML — SIGNIFICANT CHANGE UP
GLUCOSE SERPL-MCNC: 151 MG/DL — HIGH (ref 70–99)
HCT VFR BLD CALC: 33 % — LOW (ref 34.5–45)
HGB BLD-MCNC: 10 G/DL — LOW (ref 11.5–15.5)
IMM GRANULOCYTES NFR BLD AUTO: 1 % — SIGNIFICANT CHANGE UP (ref 0–1.5)
IRON SATN MFR SERPL: 16 % — SIGNIFICANT CHANGE UP (ref 14–50)
IRON SATN MFR SERPL: 41 UG/DL — SIGNIFICANT CHANGE UP (ref 30–160)
LYMPHOCYTES # BLD AUTO: 0.43 K/UL — LOW (ref 1–3.3)
LYMPHOCYTES # BLD AUTO: 4.2 % — LOW (ref 13–44)
MAGNESIUM SERPL-MCNC: 2 MG/DL — SIGNIFICANT CHANGE UP (ref 1.6–2.6)
MCHC RBC-ENTMCNC: 27.4 PG — SIGNIFICANT CHANGE UP (ref 27–34)
MCHC RBC-ENTMCNC: 30.3 GM/DL — LOW (ref 32–36)
MCV RBC AUTO: 90.4 FL — SIGNIFICANT CHANGE UP (ref 80–100)
MONOCYTES # BLD AUTO: 0.35 K/UL — SIGNIFICANT CHANGE UP (ref 0–0.9)
MONOCYTES NFR BLD AUTO: 3.4 % — SIGNIFICANT CHANGE UP (ref 2–14)
NEUTROPHILS # BLD AUTO: 9.41 K/UL — HIGH (ref 1.8–7.4)
NEUTROPHILS NFR BLD AUTO: 91.3 % — HIGH (ref 43–77)
NRBC # BLD: 0 /100 WBCS — SIGNIFICANT CHANGE UP (ref 0–0)
PLATELET # BLD AUTO: 181 K/UL — SIGNIFICANT CHANGE UP (ref 150–400)
POTASSIUM SERPL-MCNC: 4.1 MMOL/L — SIGNIFICANT CHANGE UP (ref 3.5–5.3)
POTASSIUM SERPL-SCNC: 4.1 MMOL/L — SIGNIFICANT CHANGE UP (ref 3.5–5.3)
PROT SERPL-MCNC: 6.3 G/DL — SIGNIFICANT CHANGE UP (ref 6–8.3)
RBC # BLD: 3.65 M/UL — LOW (ref 3.8–5.2)
RBC # FLD: 20.5 % — HIGH (ref 10.3–14.5)
SODIUM SERPL-SCNC: 138 MMOL/L — SIGNIFICANT CHANGE UP (ref 135–145)
TIBC SERPL-MCNC: 260 UG/DL — SIGNIFICANT CHANGE UP (ref 220–430)
TSH SERPL-MCNC: 0.37 UIU/ML — SIGNIFICANT CHANGE UP (ref 0.27–4.2)
UIBC SERPL-MCNC: 219 UG/DL — SIGNIFICANT CHANGE UP (ref 110–370)
VIT B12 SERPL-MCNC: 437 PG/ML — SIGNIFICANT CHANGE UP (ref 232–1245)
WBC # BLD: 10.3 K/UL — SIGNIFICANT CHANGE UP (ref 3.8–10.5)
WBC # FLD AUTO: 10.3 K/UL — SIGNIFICANT CHANGE UP (ref 3.8–10.5)

## 2022-04-15 PROCEDURE — 99233 SBSQ HOSP IP/OBS HIGH 50: CPT

## 2022-04-15 PROCEDURE — 99497 ADVNCD CARE PLAN 30 MIN: CPT

## 2022-04-15 RX ORDER — ACETAMINOPHEN 500 MG
650 TABLET ORAL EVERY 6 HOURS
Refills: 0 | Status: DISCONTINUED | OUTPATIENT
Start: 2022-04-15 | End: 2022-04-16

## 2022-04-15 RX ADMIN — BUPROPION HYDROCHLORIDE 75 MILLIGRAM(S): 150 TABLET, EXTENDED RELEASE ORAL at 12:54

## 2022-04-15 RX ADMIN — CITALOPRAM 40 MILLIGRAM(S): 10 TABLET, FILM COATED ORAL at 12:08

## 2022-04-15 RX ADMIN — ALBUTEROL 2.5 MILLIGRAM(S): 90 AEROSOL, METERED ORAL at 19:19

## 2022-04-15 RX ADMIN — Medication 40 MILLIGRAM(S): at 05:18

## 2022-04-15 RX ADMIN — TIOTROPIUM BROMIDE 1 CAPSULE(S): 18 CAPSULE ORAL; RESPIRATORY (INHALATION) at 07:18

## 2022-04-15 RX ADMIN — ALBUTEROL 2.5 MILLIGRAM(S): 90 AEROSOL, METERED ORAL at 13:01

## 2022-04-15 RX ADMIN — Medication 40 MILLIGRAM(S): at 13:00

## 2022-04-15 RX ADMIN — BUPROPION HYDROCHLORIDE 75 MILLIGRAM(S): 150 TABLET, EXTENDED RELEASE ORAL at 05:18

## 2022-04-15 RX ADMIN — LOSARTAN POTASSIUM 25 MILLIGRAM(S): 100 TABLET, FILM COATED ORAL at 05:18

## 2022-04-15 RX ADMIN — ENOXAPARIN SODIUM 40 MILLIGRAM(S): 100 INJECTION SUBCUTANEOUS at 12:07

## 2022-04-15 RX ADMIN — Medication 650 MILLIGRAM(S): at 12:55

## 2022-04-15 RX ADMIN — ALBUTEROL 2.5 MILLIGRAM(S): 90 AEROSOL, METERED ORAL at 00:43

## 2022-04-15 RX ADMIN — Medication 25 MILLIGRAM(S): at 05:18

## 2022-04-15 RX ADMIN — Medication 1000 UNIT(S): at 12:08

## 2022-04-15 RX ADMIN — FAMOTIDINE 20 MILLIGRAM(S): 10 INJECTION INTRAVENOUS at 12:08

## 2022-04-15 RX ADMIN — Medication 650 MILLIGRAM(S): at 12:57

## 2022-04-15 RX ADMIN — BUDESONIDE AND FORMOTEROL FUMARATE DIHYDRATE 2 PUFF(S): 160; 4.5 AEROSOL RESPIRATORY (INHALATION) at 21:26

## 2022-04-15 RX ADMIN — Medication 650 MILLIGRAM(S): at 12:30

## 2022-04-15 RX ADMIN — SIMVASTATIN 40 MILLIGRAM(S): 20 TABLET, FILM COATED ORAL at 21:25

## 2022-04-15 RX ADMIN — ALBUTEROL 2.5 MILLIGRAM(S): 90 AEROSOL, METERED ORAL at 07:41

## 2022-04-15 RX ADMIN — GABAPENTIN 200 MILLIGRAM(S): 400 CAPSULE ORAL at 12:08

## 2022-04-15 RX ADMIN — Medication 88 MICROGRAM(S): at 05:18

## 2022-04-15 RX ADMIN — BUPROPION HYDROCHLORIDE 75 MILLIGRAM(S): 150 TABLET, EXTENDED RELEASE ORAL at 21:25

## 2022-04-15 RX ADMIN — BUDESONIDE AND FORMOTEROL FUMARATE DIHYDRATE 2 PUFF(S): 160; 4.5 AEROSOL RESPIRATORY (INHALATION) at 07:18

## 2022-04-15 RX ADMIN — Medication 40 MILLIGRAM(S): at 21:25

## 2022-04-15 NOTE — DISCHARGE NOTE NURSING/CASE MANAGEMENT/SOCIAL WORK - PATIENT PORTAL LINK FT
You can access the FollowMyHealth Patient Portal offered by [facility] by registering at the following website: [email or web address]. By joining [facility]’s FollowMyHealth portal, you will also be able to view your health information using other applications (apps) compatible with our system.

## 2022-04-15 NOTE — PHYSICAL THERAPY INITIAL EVALUATION ADULT - PERTINENT HX OF CURRENT PROBLEM, REHAB EVAL
Pt. admitted due to SOB and pt unable to care for herself.  Admits to non-compliance with inhalers (trilogy and nebulizers) at home.  Has not been to her doctors recently due to lack of transportation.  Her balance has been increasingly unsteady for the past 2 years and she fell last week but was able to get up.  CXR->small basilar lung findings

## 2022-04-15 NOTE — PROGRESS NOTE ADULT - PROBLEM SELECTOR PLAN 2
Cardiology consult appreciated  TTE ordered -   1. Endocardium not well visualized in the parasternal windows. Grossly,   normal left ventricular size with hyperdynamic systolic function. LVEF   estimated at 70-75%  2. Biatrial enlargement.  3. Bioprosthetic mitral valve replacement with mean gradient 11.7 mmHg,   which is elevated.  4. There is at least mild aortic valve insufficiency but regurgitant jet   was not well seen.  continue cardiac meds  cardio recs appreciated

## 2022-04-15 NOTE — DISCHARGE NOTE NURSING/CASE MANAGEMENT/SOCIAL WORK - NSDCPEFALRISK_GEN_ALL_CORE
For information on Fall & Injury Prevention, visit: [email or web address] OR  [email or web address] OR  https://www.cdc.gov/steadi/patient.html

## 2022-04-15 NOTE — DISCHARGE NOTE NURSING/CASE MANAGEMENT/SOCIAL WORK - CASE MANAGER'S NAME
case management office # [phone] Diane Jameson RN Case Manager [phone] office # [phone]

## 2022-04-15 NOTE — CHART NOTE - NSCHARTNOTEFT_GEN_A_CORE
Called by RN as patient seems very stressed about her disposition planning. Was asking about possible discharge home during the day. Had a phone call earlier which caused her to have a headache and tylenol was ordered. On my assessment, patient is sleeping comfortably in her bed. Primary MD Dr. Leggett is following and will discuss with patient regarding her hospital course in the AM.

## 2022-04-15 NOTE — DISCHARGE NOTE NURSING/CASE MANAGEMENT/SOCIAL WORK - NSDCVIVACCINE_GEN_ALL_CORE_FT
COVID-19 vaccine, vector-nr, rS-Ad26, PF, 0.5 mL (Johnathan); 23-Mar-2021 15:30; Isis Szymanski (NEHA); Johnathan; [id number] (Exp. Date: 25-May-2021); IntraMuscular; Deltoid Left.; 0.5 milliLiter(s);

## 2022-04-15 NOTE — GOALS OF CARE CONVERSATION - ADVANCED CARE PLANNING - CONVERSATION DETAILS
Writer met with pt at bedside. Reviewed patient's medical and social history as well as events leading to patient's hospitalization. Writer discussed patient's current diagnosis (COPD, CAD, Aortic stenosis, mitral valve rep. HTN, graves disease, MARTINA), medical condition and management, prognosis, and life expectancy. Inquired about patient's wishes regarding extent of medical care to be provided including escalation of medical care into the ICU and use of vasopressor support. In addition, the writer inquired about thoughts regarding cardiopulmonary resuscitation, artificial nutrition and hydration including use of feeding tubes and IVF, antibiotics, and further investigative studies such as blood draws and radiology. Pt. showed good  insight into medical condition. All questions answered. Writer recommended she complete a new HCP ,family issues considered she can name her best friend. Info given to pt ,she is unsure of what she wants regarding resuscitation . Psychosocial support provided.

## 2022-04-15 NOTE — PHYSICAL THERAPY INITIAL EVALUATION ADULT - RANGE OF MOTION EXAMINATION, REHAB EVAL
right shld flex ~90 degrees/Left UE ROM was WFL (within functional limits)/bilateral lower extremity ROM was WFL (within functional limits)/deficits as listed below

## 2022-04-15 NOTE — DISCHARGE NOTE NURSING/CASE MANAGEMENT/SOCIAL WORK - NSSCNAMETXT_GEN_ALL_CORE
[facility] - [phone] or [phone]  Nurse to visit the day after hospital discharge; physical therapist to follow. Please contact the home care agency if you have not heard from them by 12 noon on the day after your hospital discharge.

## 2022-04-15 NOTE — DISCHARGE NOTE NURSING/CASE MANAGEMENT/SOCIAL WORK - NSSCCARECORD_GEN_ALL_CORE
Home Care Agency/[facility] Home Care Agency/[facility]urable Medical [facility] Agency/[facility]

## 2022-04-16 ENCOUNTER — TRANSCRIPTION ENCOUNTER (OUTPATIENT)
Age: 82
End: 2022-04-16

## 2022-04-16 VITALS
SYSTOLIC BLOOD PRESSURE: 117 MMHG | HEART RATE: 85 BPM | OXYGEN SATURATION: 95 % | TEMPERATURE: 98 F | RESPIRATION RATE: 17 BRPM | DIASTOLIC BLOOD PRESSURE: 73 MMHG

## 2022-04-16 LAB
ALBUMIN SERPL ELPH-MCNC: 2.9 G/DL — LOW (ref 3.3–5)
ALP SERPL-CCNC: 55 U/L — SIGNIFICANT CHANGE UP (ref 30–120)
ALT FLD-CCNC: 15 U/L DA — SIGNIFICANT CHANGE UP (ref 10–60)
ANION GAP SERPL CALC-SCNC: 6 MMOL/L — SIGNIFICANT CHANGE UP (ref 5–17)
AST SERPL-CCNC: 15 U/L — SIGNIFICANT CHANGE UP (ref 10–40)
BILIRUB SERPL-MCNC: 0.2 MG/DL — SIGNIFICANT CHANGE UP (ref 0.2–1.2)
BUN SERPL-MCNC: 33 MG/DL — HIGH (ref 7–23)
CALCIUM SERPL-MCNC: 9 MG/DL — SIGNIFICANT CHANGE UP (ref 8.4–10.5)
CHLORIDE SERPL-SCNC: 105 MMOL/L — SIGNIFICANT CHANGE UP (ref 96–108)
CO2 SERPL-SCNC: 26 MMOL/L — SIGNIFICANT CHANGE UP (ref 22–31)
CREAT SERPL-MCNC: 1.04 MG/DL — SIGNIFICANT CHANGE UP (ref 0.5–1.3)
EGFR: 53 ML/MIN/1.73M2 — LOW
GLUCOSE SERPL-MCNC: 126 MG/DL — HIGH (ref 70–99)
HCT VFR BLD CALC: 33.5 % — LOW (ref 34.5–45)
HGB BLD-MCNC: 10.3 G/DL — LOW (ref 11.5–15.5)
MCHC RBC-ENTMCNC: 27.5 PG — SIGNIFICANT CHANGE UP (ref 27–34)
MCHC RBC-ENTMCNC: 30.7 GM/DL — LOW (ref 32–36)
MCV RBC AUTO: 89.6 FL — SIGNIFICANT CHANGE UP (ref 80–100)
NRBC # BLD: 0 /100 WBCS — SIGNIFICANT CHANGE UP (ref 0–0)
PLATELET # BLD AUTO: 193 K/UL — SIGNIFICANT CHANGE UP (ref 150–400)
POTASSIUM SERPL-MCNC: 4.6 MMOL/L — SIGNIFICANT CHANGE UP (ref 3.5–5.3)
POTASSIUM SERPL-SCNC: 4.6 MMOL/L — SIGNIFICANT CHANGE UP (ref 3.5–5.3)
PROT SERPL-MCNC: 6.1 G/DL — SIGNIFICANT CHANGE UP (ref 6–8.3)
RBC # BLD: 3.74 M/UL — LOW (ref 3.8–5.2)
RBC # FLD: 21.2 % — HIGH (ref 10.3–14.5)
SODIUM SERPL-SCNC: 137 MMOL/L — SIGNIFICANT CHANGE UP (ref 135–145)
WBC # BLD: 16.88 K/UL — HIGH (ref 3.8–10.5)
WBC # FLD AUTO: 16.88 K/UL — HIGH (ref 3.8–10.5)

## 2022-04-16 PROCEDURE — 86140 C-REACTIVE PROTEIN: CPT

## 2022-04-16 PROCEDURE — 83550 IRON BINDING TEST: CPT

## 2022-04-16 PROCEDURE — 81001 URINALYSIS AUTO W/SCOPE: CPT

## 2022-04-16 PROCEDURE — 93306 TTE W/DOPPLER COMPLETE: CPT

## 2022-04-16 PROCEDURE — 83605 ASSAY OF LACTIC ACID: CPT

## 2022-04-16 PROCEDURE — 96374 THER/PROPH/DIAG INJ IV PUSH: CPT

## 2022-04-16 PROCEDURE — 97161 PT EVAL LOW COMPLEX 20 MIN: CPT

## 2022-04-16 PROCEDURE — 99285 EMERGENCY DEPT VISIT HI MDM: CPT | Mod: 25

## 2022-04-16 PROCEDURE — 94664 DEMO&/EVAL PT USE INHALER: CPT

## 2022-04-16 PROCEDURE — 93005 ELECTROCARDIOGRAM TRACING: CPT

## 2022-04-16 PROCEDURE — 84484 ASSAY OF TROPONIN QUANT: CPT

## 2022-04-16 PROCEDURE — 80053 COMPREHEN METABOLIC PANEL: CPT

## 2022-04-16 PROCEDURE — 83540 ASSAY OF IRON: CPT

## 2022-04-16 PROCEDURE — 94640 AIRWAY INHALATION TREATMENT: CPT

## 2022-04-16 PROCEDURE — 83880 ASSAY OF NATRIURETIC PEPTIDE: CPT

## 2022-04-16 PROCEDURE — 87040 BLOOD CULTURE FOR BACTERIA: CPT

## 2022-04-16 PROCEDURE — 82746 ASSAY OF FOLIC ACID SERUM: CPT

## 2022-04-16 PROCEDURE — 87637 SARSCOV2&INF A&B&RSV AMP PRB: CPT

## 2022-04-16 PROCEDURE — 36415 COLL VENOUS BLD VENIPUNCTURE: CPT

## 2022-04-16 PROCEDURE — 83735 ASSAY OF MAGNESIUM: CPT

## 2022-04-16 PROCEDURE — 99239 HOSP IP/OBS DSCHRG MGMT >30: CPT | Mod: GC

## 2022-04-16 PROCEDURE — 85025 COMPLETE CBC W/AUTO DIFF WBC: CPT

## 2022-04-16 PROCEDURE — 85027 COMPLETE CBC AUTOMATED: CPT

## 2022-04-16 PROCEDURE — 82728 ASSAY OF FERRITIN: CPT

## 2022-04-16 PROCEDURE — 85610 PROTHROMBIN TIME: CPT

## 2022-04-16 PROCEDURE — 82607 VITAMIN B-12: CPT

## 2022-04-16 PROCEDURE — 71045 X-RAY EXAM CHEST 1 VIEW: CPT

## 2022-04-16 PROCEDURE — 82306 VITAMIN D 25 HYDROXY: CPT

## 2022-04-16 PROCEDURE — 84443 ASSAY THYROID STIM HORMONE: CPT

## 2022-04-16 PROCEDURE — 85730 THROMBOPLASTIN TIME PARTIAL: CPT

## 2022-04-16 RX ORDER — ALBUTEROL 90 UG/1
2 AEROSOL, METERED ORAL
Qty: 1 | Refills: 0
Start: 2022-04-16

## 2022-04-16 RX ORDER — LOSARTAN POTASSIUM 100 MG/1
1 TABLET, FILM COATED ORAL
Qty: 0 | Refills: 0 | DISCHARGE
Start: 2022-04-16

## 2022-04-16 RX ADMIN — Medication 1000 UNIT(S): at 12:52

## 2022-04-16 RX ADMIN — LOSARTAN POTASSIUM 25 MILLIGRAM(S): 100 TABLET, FILM COATED ORAL at 05:23

## 2022-04-16 RX ADMIN — ENOXAPARIN SODIUM 40 MILLIGRAM(S): 100 INJECTION SUBCUTANEOUS at 12:52

## 2022-04-16 RX ADMIN — BUPROPION HYDROCHLORIDE 75 MILLIGRAM(S): 150 TABLET, EXTENDED RELEASE ORAL at 05:23

## 2022-04-16 RX ADMIN — GABAPENTIN 200 MILLIGRAM(S): 400 CAPSULE ORAL at 12:52

## 2022-04-16 RX ADMIN — CITALOPRAM 40 MILLIGRAM(S): 10 TABLET, FILM COATED ORAL at 12:52

## 2022-04-16 RX ADMIN — Medication 25 MILLIGRAM(S): at 05:23

## 2022-04-16 RX ADMIN — Medication 40 MILLIGRAM(S): at 05:23

## 2022-04-16 RX ADMIN — Medication 88 MICROGRAM(S): at 05:23

## 2022-04-16 RX ADMIN — BUDESONIDE AND FORMOTEROL FUMARATE DIHYDRATE 2 PUFF(S): 160; 4.5 AEROSOL RESPIRATORY (INHALATION) at 05:24

## 2022-04-16 RX ADMIN — TIOTROPIUM BROMIDE 1 CAPSULE(S): 18 CAPSULE ORAL; RESPIRATORY (INHALATION) at 05:24

## 2022-04-16 RX ADMIN — FAMOTIDINE 20 MILLIGRAM(S): 10 INJECTION INTRAVENOUS at 12:52

## 2022-04-16 RX ADMIN — ALBUTEROL 2.5 MILLIGRAM(S): 90 AEROSOL, METERED ORAL at 06:30

## 2022-04-16 NOTE — DISCHARGE NOTE PROVIDER - HOSPITAL COURSE
HPI:  82F PMH COPD (former smoker), CAD, Aortic stenosis, Mitral and aortic valve repair 2014, HTN, Graves disease s/p thyroidectomy,  MARTINA non-compliant with CPAP, Leukemia (APL 2012, now in remission), iron deficiency anemia, DVT/PE s/p IVC filter.  Patient presented with 1 year of increasing BOOTH, 6-8 weeks of increasing non-productive cough.  Today she called 911 because her breathing was so bad she couldnt take care of herself.  Admits to non-compliance with inhalers (trilogy and nebulizers) at home.  Has not been to her doctors recently due to lack of transportation.  Her balance has been increasingly unsteady for the past 2 years and she fell last week but was able to get up.    Denies chest pain, nausea, abd pain, other recent changes.  (14 Apr 2022 13:02)      82F PMH COPD (former smoker), CAD, Aortic stenosis, Mitral and aortic valve repair 2014, HTN, Graves disease s/p thyroidectomy,  MARTINA non-compliant with CPAP, Leukemia (APL 2012, now in remission), iron deficiency anemia, DVT/PE s/p IVC filter presented with increasing SOB and inability to care for self.     Problem/Plan - 1:  ·  Problem: COPD exacerbation.   ·  Plan: No evidence of acute infection  Symptoms improved with steroids, nebs - will continue  Pulm consult - Dr Javy almazan appreciated  encourage incentive spirometer  will not use CPAP for MARTINA, refused ABG.  Pt doing well on room air.  Pulmonary recommends prednisone 40mg daily for 5 days     Problem/Plan - 2:  ·  Problem: H/O mitral valve disorder.   ·  Plan: Cardiology consult appreciated  TTE ordered -   1. Endocardium not well visualized in the parasternal windows. Grossly,   normal left ventricular size with hyperdynamic systolic function. LVEF   estimated at 70-75%  2. Biatrial enlargement.  3. Bioprosthetic mitral valve replacement with mean gradient 11.7 mmHg,   which is elevated.  4. There is at least mild aortic valve insufficiency but regurgitant jet   was not well seen.  continue cardiac meds  cardio recs appreciated.     Problem/Plan - 3:  ·  Problem: Anemia.   ·  Plan: check iron and b12 levels  no signs of acute issue at this time.     Problem/Plan - 4:  ·  Problem: Hypothyroidism, acquired.   ·  Plan: check TSH  continue synthroid.     Problem/Plan - 5:  ·  Problem: Hypertension.   ·  Plan: continue metoprolol and losartan with hold parameters.     Problem/Plan - 6:  ·  Problem: Impaired functional mobility, balance, and endurance.   ·  Plan: PT airam ZUNIGA consult as unclear if patient can care for self at home.

## 2022-04-16 NOTE — DISCHARGE NOTE PROVIDER - NSDCMRMEDTOKEN_GEN_ALL_CORE_FT
Albuterol (Eqv-ProAir HFA) 90 mcg/inh inhalation aerosol: 2 puff(s) inhaled every 6 hours  buPROPion 75 mg oral tablet: 2 tab(s) orally once a day (in the morning) and then 1 tablet orally in the evening  citalopram 40 mg oral tablet: 1 tab(s) orally once a day  famotidine 20 mg oral tablet: 1 tab(s) orally 2 times a day   gabapentin 100 mg oral capsule: 1 cap(s) orally 3 times a day  levothyroxine 88 mcg (0.088 mg) oral tablet: 1 tab(s) orally once a day  losartan 25 mg oral tablet: 1 tab(s) orally once a day  metoprolol succinate 25 mg oral tablet, extended release: 1 tab(s) orally once a day  predniSONE 20 mg oral tablet: 2 tab(s) orally once a day   Rolling walker: 1 each  once a day   simvastatin 40 mg oral tablet: 1 tab(s) orally once a day (at bedtime)  Trelegy Ellipta inhalation powder: 1 puff(s) inhaled once a day    *Last Rx filled 12/2020 due to high cost. Patient should be on this therapy daily.  Vitamin D2 50 mcg (2000 intl units) oral capsule: 1 cap(s) orally once a day

## 2022-04-16 NOTE — DISCHARGE NOTE PROVIDER - ATTENDING DISCHARGE PHYSICAL EXAMINATION:
Vital Signs Last 24 Hrs  T(C): 36.6 (16 Apr 2022 09:30), Max: 36.8 (15 Apr 2022 18:00)  T(F): 97.8 (16 Apr 2022 09:30), Max: 98.2 (15 Apr 2022 18:00)  HR: 85 (16 Apr 2022 09:30) (76 - 86)  BP: 117/73 (16 Apr 2022 09:30) (106/62 - 137/71)  BP(mean): --  RR: 17 (16 Apr 2022 09:30) (17 - 18)  SpO2: 95% (16 Apr 2022 09:30) (94% - 97%)    GENERAL: NAD, well-groomed, well-developed  HEAD:  Atraumatic, Normocephalic  EYES: EOMI, PERRLA, conjunctiva and sclera clear  ENMT: No tonsillar erythema, exudates, or enlargement; Moist mucous membranes  CHEST/LUNG: Clear to percussion bilaterally; No rales, rhonchi, wheezing, or rubs  HEART: Regular rate and rhythm; No murmurs, rubs, or gallops  ABDOMEN: Soft, Nontender, Nondistended; Bowel sounds present  EXTREMITIES:  2+ Peripheral Pulses, No clubbing, cyanosis, or edema  NERVOUS SYSTEM:  Alert & Oriented X3, Good concentration;  SKIN: No rashes or lesions

## 2022-04-16 NOTE — PROGRESS NOTE ADULT - SUBJECTIVE AND OBJECTIVE BOX
TATIANA RAMIREZ    SY 1EST 114 W1    Allergies    adhesives (Rash; Other)  Cat dander- wheezing, itchy throat, SOB (Other)  penicillin (Rash)  sulfa drugs (Rash)  tetracycline (Stomach Upset)    Intolerances        PAST MEDICAL & SURGICAL HISTORY:  Graves disease  s/p surgery    Acid reflux    Asthma with COPD with exacerbation  Not on home O2    Depression    Former cigarette smoker    Hernia, hiatal    Hypertension    Osteoporosis    Dyslipidemia    Carpal tunnel syndrome    Coronary atherosclerosis of native coronary artery    Diverticulosis of colon    Rotator cuff tear  Right    Emphysema    Aortic stenosis    Anemia  Completed iron infusions every 2 months, now Hb stable    Leukemia  (APL, s/p chemo in , now in remission, followed by oncologist)    MARTINA on CPAP  Pt admits to be non-compliant    Calculus of ureter  s/p R ureteral stent 2020    Serum phosphorus decreased  Last level 1.5 at PST     After cataract, bilateral      History of tonsillectomy  childhood    H/O cone biopsy of cervix      h/o  endometrial ablation      History of coronary angiogram  12    H/O aortic valve repair      H/O mitral valve repair      S/P ureteral stent placement  R in 2020    H/O dilation and curettage    Elective surgery  Cystoscopy, right ureteroscopy, laser lithotripsy of right ureteral stone, right ureteral stent removal and replacement, right retrograde pyelogram on 19        FAMILY HISTORY:  FH: myocardial infarction  (Father )        Home Medications:  buPROPion 75 mg oral tablet: 2 tab(s) orally once a day (in the morning) and then 1 tablet orally in the evening (2022 08:12)  citalopram 40 mg oral tablet: 1 tab(s) orally once a day (2022 08:12)  Vitamin D2 50 mcg (2000 intl units) oral capsule: 1 cap(s) orally once a day (2022 12:52)      MEDICATIONS  (STANDING):  ALBUTerol    0.083% 2.5 milliGRAM(s) Nebulizer every 6 hours  ALBUTerol    90 MICROgram(s) HFA Inhaler 1 Puff(s) Inhalation every 4 hours  budesonide 160 MICROgram(s)/formoterol 4.5 MICROgram(s) Inhaler 2 Puff(s) Inhalation two times a day  buPROPion . 75 milliGRAM(s) Oral three times a day  cholecalciferol 1000 Unit(s) Oral daily  citalopram 40 milliGRAM(s) Oral daily  enoxaparin Injectable 40 milliGRAM(s) SubCutaneous every 24 hours  famotidine    Tablet 20 milliGRAM(s) Oral daily  gabapentin 200 milliGRAM(s) Oral daily  levothyroxine 88 MICROGram(s) Oral daily  losartan 25 milliGRAM(s) Oral daily  methylPREDNISolone sodium succinate Injectable 40 milliGRAM(s) IV Push daily  metoprolol succinate ER 25 milliGRAM(s) Oral daily  simvastatin 40 milliGRAM(s) Oral at bedtime  tiotropium 18 MICROgram(s) Capsule 1 Capsule(s) Inhalation daily    MEDICATIONS  (PRN):  acetaminophen     Tablet .. 650 milliGRAM(s) Oral every 6 hours PRN Mild Pain (1 - 3)      Diet, DASH/TLC:   Sodium & Cholesterol Restricted (22 @ 13:36) [Active]          Vital Signs Last 24 Hrs  T(C): 36.6 (2022 05:18), Max: 36.8 (15 Apr 2022 18:00)  T(F): 97.8 (2022 05:18), Max: 98.2 (15 Apr 2022 18:00)  HR: 76 (2022 06:36) (76 - 86)  BP: 137/71 (2022 05:18) (106/62 - 137/71)  BP(mean): --  RR: 18 (2022 05:18) (16 - 18)  SpO2: 97% (2022 06:36) (94% - 97%)      04-15-22 @ 07:01  -  22 @ 07:00  --------------------------------------------------------  IN: 140 mL / OUT: 800 mL / NET: -660 mL              LABS:                        10.3   16.88 )-----------( 193      ( 2022 07:16 )             33.5     04    137  |  105  |  33<H>  ----------------------------<  126<H>  4.6   |  26  |  1.04    Ca    9.0      2022 07:16  Mg     2.0     04-15    TPro  6.1  /  Alb  2.9<L>  /  TBili  0.2  /  DBili  x   /  AST  15  /  ALT  15  /  AlkPhos  55      PT/INR - ( 2022 09:31 )   PT: 12.9 sec;   INR: 1.09 ratio         PTT - ( 2022 09:31 )  PTT:25.9 sec  Urinalysis Basic - ( 2022 15:17 )    Color: Yellow / Appearance: Clear / S.020 / pH: x  Gluc: x / Ketone: Negative  / Bili: Negative / Urobili: Negative mg/dL   Blood: x / Protein: Negative mg/dL / Nitrite: Negative   Leuk Esterase: Negative / RBC: 0-2 /HPF / WBC 3-5   Sq Epi: x / Non Sq Epi: Occasional / Bacteria: Negative            WBC:  WBC Count: 16.88 K/uL ( @ 07:16)  WBC Count: 10.30 K/uL (04-15 @ 07:19)  WBC Count: 6.45 K/uL ( @ 09:31)      MICROBIOLOGY:  RECENT CULTURES:   .Blood Blood XXXX XXXX   No growth to date.                PT/INR - ( 2022 09:31 )   PT: 12.9 sec;   INR: 1.09 ratio         PTT - ( 2022 09:31 )  PTT:25.9 sec    Sodium:  Sodium, Serum: 137 mmol/L ( @ 07:16)  Sodium, Serum: 138 mmol/L (04-15 @ 07:19)  Sodium, Serum: 137 mmol/L ( @ 09:31)      1.04 mg/dL  @ 07:16  1.20 mg/dL 04-15 @ 07:19  1.06 mg/dL  @ 09:31      Hemoglobin:  Hemoglobin: 10.3 g/dL ( @ 07:16)  Hemoglobin: 10.0 g/dL (04-15 @ 07:19)  Hemoglobin: 10.2 g/dL ( @ 09:31)      Platelets: Platelet Count - Automated: 193 K/uL ( @ 07:16)  Platelet Count - Automated: 181 K/uL (04-15 @ 07:19)  Platelet Count - Automated: 170 K/uL ( @ 09:31)      LIVER FUNCTIONS - ( 2022 07:16 )  Alb: 2.9 g/dL / Pro: 6.1 g/dL / ALK PHOS: 55 U/L / ALT: 15 U/L DA / AST: 15 U/L / GGT: x             Urinalysis Basic - ( 2022 15:17 )    Color: Yellow / Appearance: Clear / S.020 / pH: x  Gluc: x / Ketone: Negative  / Bili: Negative / Urobili: Negative mg/dL   Blood: x / Protein: Negative mg/dL / Nitrite: Negative   Leuk Esterase: Negative / RBC: 0-2 /HPF / WBC 3-5   Sq Epi: x / Non Sq Epi: Occasional / Bacteria: Negative        RADIOLOGY & ADDITIONAL STUDIES:      MICROBIOLOGY:  RECENT CULTURES:   .Blood Blood XXXX XXXX   No growth to date.            
    TATIANA RAMIREZ    SY 1EST 114 W1    Allergies    adhesives (Rash; Other)  Cat dander- wheezing, itchy throat, SOB (Other)  penicillin (Rash)  sulfa drugs (Rash)  tetracycline (Stomach Upset)    Intolerances        PAST MEDICAL & SURGICAL HISTORY:  Graves disease  s/p surgery    Acid reflux    Asthma with COPD with exacerbation  Not on home O2    Depression    Former cigarette smoker    Hernia, hiatal    Hypertension    Osteoporosis    Dyslipidemia    Carpal tunnel syndrome    Coronary atherosclerosis of native coronary artery    Diverticulosis of colon    Rotator cuff tear  Right    Emphysema    Aortic stenosis    Anemia  Completed iron infusions every 2 months, now Hb stable    Leukemia  (APL, s/p chemo in , now in remission, followed by oncologist)    MARTINA on CPAP  Pt admits to be non-compliant    Calculus of ureter  s/p R ureteral stent 2020    Serum phosphorus decreased  Last level 1.5 at PST     After cataract, bilateral      History of tonsillectomy  childhood    H/O cone biopsy of cervix      h/o  endometrial ablation      History of coronary angiogram  12    H/O aortic valve repair      H/O mitral valve repair      S/P ureteral stent placement  R in 2020    H/O dilation and curettage    Elective surgery  Cystoscopy, right ureteroscopy, laser lithotripsy of right ureteral stone, right ureteral stent removal and replacement, right retrograde pyelogram on 19        FAMILY HISTORY:  FH: myocardial infarction  (Father )        Home Medications:  buPROPion 75 mg oral tablet: 2 tab(s) orally once a day (in the morning) and then 1 tablet orally in the evening (2022 08:12)  citalopram 40 mg oral tablet: 1 tab(s) orally once a day (2022 08:12)  Vitamin D2 50 mcg (2000 intl units) oral capsule: 1 cap(s) orally once a day (2022 12:52)      MEDICATIONS  (STANDING):  ALBUTerol    0.083% 2.5 milliGRAM(s) Nebulizer every 6 hours  ALBUTerol    90 MICROgram(s) HFA Inhaler 1 Puff(s) Inhalation every 4 hours  budesonide 160 MICROgram(s)/formoterol 4.5 MICROgram(s) Inhaler 2 Puff(s) Inhalation two times a day  buPROPion . 75 milliGRAM(s) Oral three times a day  cholecalciferol 1000 Unit(s) Oral daily  citalopram 40 milliGRAM(s) Oral daily  enoxaparin Injectable 40 milliGRAM(s) SubCutaneous every 24 hours  famotidine    Tablet 20 milliGRAM(s) Oral daily  gabapentin 200 milliGRAM(s) Oral daily  levothyroxine 88 MICROGram(s) Oral daily  losartan 25 milliGRAM(s) Oral daily  methylPREDNISolone sodium succinate Injectable 40 milliGRAM(s) IV Push every 8 hours  metoprolol succinate ER 25 milliGRAM(s) Oral daily  simvastatin 40 milliGRAM(s) Oral at bedtime  tiotropium 18 MICROgram(s) Capsule 1 Capsule(s) Inhalation daily    MEDICATIONS  (PRN):      Diet, DASH/TLC:   Sodium & Cholesterol Restricted (22 @ 13:36) [Active]          Vital Signs Last 24 Hrs  T(C): 36.3 (15 Apr 2022 05:20), Max: 36.9 (2022 08:02)  T(F): 97.4 (15 Apr 2022 05:20), Max: 98.4 (2022 08:02)  HR: 85 (15 Apr 2022 05:20) (76 - 90)  BP: 151/79 (15 Apr 2022 05:20) (98/81 - 188/112)  BP(mean): --  RR: 17 (15 Apr 2022 05:20) (14 - 20)  SpO2: 97% (15 Apr 2022 05:20) (94% - 100%)      22 @ 07:01  -  04-15-22 @ 06:30  --------------------------------------------------------  IN: 200 mL / OUT: 200 mL / NET: 0 mL              LABS:                        10.2   6.45  )-----------( 170      ( 2022 09:31 )             33.5         137  |  104  |  22  ----------------------------<  106<H>  4.2   |  26  |  1.06    Ca    9.0      2022 09:31    TPro  6.4  /  Alb  3.2<L>  /  TBili  0.3  /  DBili  x   /  AST  24  /  ALT  23  /  AlkPhos  68      PT/INR - ( 2022 09:31 )   PT: 12.9 sec;   INR: 1.09 ratio         PTT - ( 2022 09:31 )  PTT:25.9 sec  Urinalysis Basic - ( 2022 15:17 )    Color: Yellow / Appearance: Clear / S.020 / pH: x  Gluc: x / Ketone: Negative  / Bili: Negative / Urobili: Negative mg/dL   Blood: x / Protein: Negative mg/dL / Nitrite: Negative   Leuk Esterase: Negative / RBC: 0-2 /HPF / WBC 3-5   Sq Epi: x / Non Sq Epi: Occasional / Bacteria: Negative            WBC:  WBC Count: 6.45 K/uL ( @ 09:31)      MICROBIOLOGY:  RECENT CULTURES:              PT/INR - ( 2022 09:31 )   PT: 12.9 sec;   INR: 1.09 ratio         PTT - ( 2022 09:31 )  PTT:25.9 sec    Sodium:  Sodium, Serum: 137 mmol/L ( @ 09:31)      1.06 mg/dL  @ 09:31      Hemoglobin:  Hemoglobin: 10.2 g/dL ( @ 09:31)      Platelets: Platelet Count - Automated: 170 K/uL ( @ 09:31)      LIVER FUNCTIONS - ( 2022 09:31 )  Alb: 3.2 g/dL / Pro: 6.4 g/dL / ALK PHOS: 68 U/L / ALT: 23 U/L DA / AST: 24 U/L / GGT: x             Urinalysis Basic - ( 2022 15:17 )    Color: Yellow / Appearance: Clear / S.020 / pH: x  Gluc: x / Ketone: Negative  / Bili: Negative / Urobili: Negative mg/dL   Blood: x / Protein: Negative mg/dL / Nitrite: Negative   Leuk Esterase: Negative / RBC: 0-2 /HPF / WBC 3-5   Sq Epi: x / Non Sq Epi: Occasional / Bacteria: Negative        RADIOLOGY & ADDITIONAL STUDIES:      MICROBIOLOGY:  RECENT CULTURES:          
Chief Complaint: Shortness of breath    Interval Events: No events overnight.    Review of Systems:  General: No fevers, chills, weight gain  Skin: No rashes, color changes  Cardiovascular: No chest pain, orthopnea  Respiratory: No shortness of breath, cough  Gastrointestinal: No nausea, abdominal pain  Genitourinary: No incontinence, pain with urination  Musculoskeletal: No pain, swelling, decreased range of motion  Neurological: No headache, weakness  Psychiatric: No depression, anxiety  Endocrine: No weight gain, increased thirst  All other systems are comprehensively negative.    Physical Exam:  Vital Signs Last 24 Hrs  T(C): 36.6 (16 Apr 2022 05:18), Max: 36.8 (15 Apr 2022 18:00)  T(F): 97.8 (16 Apr 2022 05:18), Max: 98.2 (15 Apr 2022 18:00)  HR: 76 (16 Apr 2022 06:36) (76 - 86)  BP: 137/71 (16 Apr 2022 05:18) (106/62 - 137/71)  BP(mean): --  RR: 18 (16 Apr 2022 05:18) (18 - 18)  SpO2: 97% (16 Apr 2022 06:36) (94% - 97%)  General: NAD  HEENT: MMM  Neck: No JVD, no carotid bruit  Lungs: CTAB  CV: RRR, nl S1/S2, no M/R/G  Abdomen: S/NT/ND, +BS  Extremities: No LE edema, no cyanosis  Neuro: AAOx3, non-focal  Skin: No rash    Labs:             04-16    137  |  105  |  33<H>  ----------------------------<  126<H>  4.6   |  26  |  1.04    Ca    9.0      16 Apr 2022 07:16  Mg     2.0     04-15    TPro  6.1  /  Alb  2.9<L>  /  TBili  0.2  /  DBili  x   /  AST  15  /  ALT  15  /  AlkPhos  55  04-16                        10.3   16.88 )-----------( 193      ( 16 Apr 2022 07:16 )             33.5       Telemetry: Sinus rhythm
Chief Complaint: Shortness of breath    Interval Events: No events overnight.    Review of Systems:  General: No fevers, chills, weight gain  Skin: No rashes, color changes  Cardiovascular: No chest pain, orthopnea  Respiratory: No shortness of breath, cough  Gastrointestinal: No nausea, abdominal pain  Genitourinary: No incontinence, pain with urination  Musculoskeletal: No pain, swelling, decreased range of motion  Neurological: No headache, weakness  Psychiatric: No depression, anxiety  Endocrine: No weight gain, increased thirst  All other systems are comprehensively negative.    Physical Exam:  Vitals:        Vital Signs Last 24 Hrs  T(C): 36.3 (15 Apr 2022 05:20), Max: 36.8 (14 Apr 2022 11:24)  T(F): 97.4 (15 Apr 2022 05:20), Max: 98.3 (14 Apr 2022 11:24)  HR: 76 (15 Apr 2022 07:41) (76 - 90)  BP: 151/79 (15 Apr 2022 05:20) (98/81 - 151/79)  BP(mean): --  RR: 17 (15 Apr 2022 05:20) (14 - 18)  SpO2: 98% (15 Apr 2022 07:41) (94% - 100%)  General: NAD  HEENT: MMM  Neck: No JVD, no carotid bruit  Lungs: CTAB  CV: RRR, nl S1/S2, no M/R/G  Abdomen: S/NT/ND, +BS  Extremities: No LE edema, no cyanosis  Neuro: AAOx3, non-focal  Skin: No rash    Labs:                        10.0   10.30 )-----------( 181      ( 15 Apr 2022 07:19 )             33.0     04-15    138  |  106  |  25<H>  ----------------------------<  151<H>  4.1   |  26  |  1.20    Ca    8.9      15 Apr 2022 07:19  Mg     2.0     04-15    TPro  6.3  /  Alb  3.0<L>  /  TBili  0.2  /  DBili  x   /  AST  14  /  ALT  16  /  AlkPhos  59  04-15        PT/INR - ( 14 Apr 2022 09:31 )   PT: 12.9 sec;   INR: 1.09 ratio         PTT - ( 14 Apr 2022 09:31 )  PTT:25.9 sec    Telemetry: Sinus rhythm
Patient is a 82y old  Female who presents with a chief complaint of     INTERVAL HPI/OVERNIGHT EVENTS:    overnight events noted.   at bedside says she doing fine, and wants to go home tomorrow.     MEDICATIONS  (STANDING):  ALBUTerol    0.083% 2.5 milliGRAM(s) Nebulizer every 6 hours  ALBUTerol    90 MICROgram(s) HFA Inhaler 1 Puff(s) Inhalation every 4 hours  budesonide 160 MICROgram(s)/formoterol 4.5 MICROgram(s) Inhaler 2 Puff(s) Inhalation two times a day  buPROPion . 75 milliGRAM(s) Oral three times a day  cholecalciferol 1000 Unit(s) Oral daily  citalopram 40 milliGRAM(s) Oral daily  enoxaparin Injectable 40 milliGRAM(s) SubCutaneous every 24 hours  famotidine    Tablet 20 milliGRAM(s) Oral daily  gabapentin 200 milliGRAM(s) Oral daily  levothyroxine 88 MICROGram(s) Oral daily  losartan 25 milliGRAM(s) Oral daily  methylPREDNISolone sodium succinate Injectable 40 milliGRAM(s) IV Push every 8 hours  metoprolol succinate ER 25 milliGRAM(s) Oral daily  simvastatin 40 milliGRAM(s) Oral at bedtime  tiotropium 18 MICROgram(s) Capsule 1 Capsule(s) Inhalation daily    MEDICATIONS  (PRN):      Allergies    adhesives (Rash; Other)  Cat dander- wheezing, itchy throat, SOB (Other)  penicillin (Rash)  sulfa drugs (Rash)  tetracycline (Stomach Upset)    Intolerances        Vital Signs Last 24 Hrs  T(C): 36.6 (15 Apr 2022 09:15), Max: 36.8 (2022 11:24)  T(F): 97.8 (15 Apr 2022 09:15), Max: 98.3 (2022 11:24)  HR: 83 (15 Apr 2022 09:15) (76 - 90)  BP: 129/70 (15 Apr 2022 09:15) (98/81 - 151/79)  BP(mean): --  RR: 16 (15 Apr 2022 09:15) (14 - 18)  SpO2: 96% (15 Apr 2022 09:15) (94% - 100%)    PHYSICAL EXAM:  GENERAL: NAD, well-groomed, well-developed  HEAD:  Atraumatic, Normocephalic  EYES:  conjunctiva and sclera clear  ENMT: ; Moist mucous membranes  NECK: Supple, No JVD  NERVOUS SYSTEM:  Alert & Oriented X3, Good concentration; Moving all extremities  CHEST/LUNG: decreased air entry with a scattered crackle  HEART: Regular rate and rhythm;   ABDOMEN: Soft, Nontender, Nondistended; Bowel sounds present  EXTREMITIES:  1+ Peripheral Pulses, No clubbing, cyanosis, or edema; no calf tenderness  kyphotic back    LABS:                        10.0   10.30 )-----------( 181      ( 15 Apr 2022 07:19 )             33.0     15 Apr 2022 07:19    138    |  106    |  25     ----------------------------<  151    4.1     |  26     |  1.20     Ca    8.9        15 Apr 2022 07:19  Mg     2.0       15 Apr 2022 07:19    TPro  6.3    /  Alb  3.0    /  TBili  0.2    /  DBili  x      /  AST  14     /  ALT  16     /  AlkPhos  59     15 Apr 2022 07:19    PT/INR - ( 2022 09:31 )   PT: 12.9 sec;   INR: 1.09 ratio         PTT - ( 2022 09:31 )  PTT:25.9 sec  Urinalysis Basic - ( 2022 15:17 )    Color: Yellow / Appearance: Clear / S.020 / pH: x  Gluc: x / Ketone: Negative  / Bili: Negative / Urobili: Negative mg/dL   Blood: x / Protein: Negative mg/dL / Nitrite: Negative   Leuk Esterase: Negative / RBC: 0-2 /HPF / WBC 3-5   Sq Epi: x / Non Sq Epi: Occasional / Bacteria: Negative      CAPILLARY BLOOD GLUCOSE          RADIOLOGY & ADDITIONAL TESTS:

## 2022-04-16 NOTE — PROGRESS NOTE ADULT - ASSESSMENT
The patient is an 82 year old female with a history of HTN, HL, prior leukemia, MARTINA on CPAP, COPD, bioprosthetic AVR and MVR who presents with shortness of breath.    Plan:  - Shortness of breath likely due to COPD and non-compliance  - ECG with nonspecific findings  - Cardiac enzymes negative ruling out an acute MI  - BNP normal at 413  - CXR with left lung atelectasis vs. PNA  - Echo with normal LV systolic function, elevated MVR transvalvular gradients  - Continue simvastatin 40 mg daily  - Continue aspirin 81 mg daily  - Continue metoprolol succinate 25 mg daily  - Continue losartan 25 mg daily  - Pulm follow-up
The patient is an 82 year old female with a history of HTN, HL, prior leukemia, MARTINA on CPAP, COPD, bioprosthetic AVR and MVR who presents with shortness of breath.    Plan:  - Shortness of breath likely due to worsening COPD  - ECG with nonspecific findings  - Cardiac enzymes negative ruling out an acute MI  - BNP normal at 413  - CXR with left lung atelectasis vs. PNA  - Echo with normal LV systolic function, elevated MVR transvalvular gradients  - Continue simvastatin 40 mg daily  - Continue aspirin 81 mg daily  - Continue metoprolol succinate 25 mg daily  - Continue losartan 25 mg daily  - Pulm follow-up
    DOA/CC/PROBLEMS poa .    82 f  DOA 4/14/2022   cc sob sev d  Got moderna 3 4/13 worse since then    PMH-PSH .  pmh COPD   pmh anemia   pmh     COVID/ICU/CODE STATUS.                       COVID  STATUS.     4/14/2022 scv2 (-)       ICU STAY. none  GOC.      BEST PRACTICE ISSUES.                                                  HEAD OF BED ELEVATION. Yes  DVT PROPHYLAXIS. 4/14/2022 lvnx 40      VALDEZ PROPHYLAXIS.       4/14/2022 famotidine 20                                                                                 DIET.   4/14/2022 dash      PROBLEM DATA/PLAN.    HEMODYNAMICS.   Monitor bp Target MAP 65 (+)    RESP.   Monitor po Target po 90-95%      PMH/PSH PROBLEMS.  Management continued/modified as indicated    OXYGEN REQUIREMENTS.  4/15/2022 ra 94%    INFECTION.  W 4/14/2022 w 6.4   CXR 4/14/2022 new sm l base infiltr  Flu ab 4/14/2022 (-)  rsv 4/14/2022 (-)   doubt active infection     COPD ex  4/14/2022 albuterol .4   4/14/2022 symbicort   4/14-4/15/2022 solumed 40.3 -> solumed 40   4/14/2022 spiriva     CAD.   4/14/2022 metoprolol 25    CHF  bnp 4/14/2022 bnp 413   4/14/2022 losartan 25    ANEMIA  Hb 4/14/2022 b 10.2     DEPRESSION.  4/14/2022 bupropion 75.3     HYPOTHYROID  4/14/2022 levoxyl 88          TIME SPENT   Over 25 minutes aggregate care time spent on encounter; activities included   direct patient care, counseling and/or coordinating care reviewing notes, lab data/ imaging , discussion with multidisciplinary team/ patient  /family and explaining in detail risks, benefits, alternatives  of the recommendations     JAMES HOLLIS 82 f 4/14/2022 1939 DR VIDHYA WOLF  
  JAMES HOLLIS 82 f 4/14/2022 1939 DR VIDHYA WOLF    REVIEW OF SYMPTOMS      Able to give ROS  Yes     RELIABLE +/-   CONSTITUTIONAL Weakness Yes  Chills No   ENDOCRINE  No heat or cold intolerance    ALLERGY No hives  Sore throat No stridor  RESP Coughing blood no  Shortness of breath YES   NEURO No Headache  Confusion Pain neck No   CARDIAC No Chest pain No Palpitations   GI  Pain abdomen NO   Vomiting NO     PHYSICAL EXAM    HEENT Unremarkable  atraumatic   RESP Fair air entry EXP prolonged    Harsh breath sound Resp distres mild   CARDIAC S1 S2 No S3     NO JVD    ABDOMEN SOFT BS PRESENT NOT DISTENDED No hepatosplenomegaly PEDAL EDEMA present No calf tenderness  NO rash       DOA/CC/PROBLEMS poa .    82 f  DOA 4/14/2022   cc sob sev d  Got moderna 3 4/13 worse since then    PMH-PSH .  pmh COPD   pmh anemia   pmh     COVID/ICU/CODE STATUS.                       COVID  STATUS.     4/14/2022 scv2 (-)       ICU STAY. none  GOC.      BEST PRACTICE ISSUES.                                                  HEAD OF BED ELEVATION. Yes  DVT PROPHYLAXIS. 4/14/2022 lvnx 40      VALDEZ PROPHYLAXIS.       4/14/2022 famotidine 20                                                                                 DIET.   4/14/2022 dash     VITALS/PO/IO/VENT/DRIPS.     4/15/2022 afeb 82 100/60      PROBLEM DATA/PLAN.    HEMODYNAMICS.   Monitor bp Target MAP 65 (+)    RESP.   Monitor po Target po 90-95%      PMH/PSH PROBLEMS.  Management continued/modified as indicated    OXYGEN REQUIREMENTS.  4/15/2022 ra 94%    INFECTION.  W 4/14/2022 w 6.4   CXR 4/14/2022 new sm l base infiltr  Flu ab 4/14/2022 (-)  rsv 4/14/2022 (-)   doubt active infection     COPD ex  4/14/2022 albuterol .4   4/14/2022 symbicort   4/14-4/15/2022 solumed 40.3 -> solumed 40   4/14/2022 spiriva     CAD.   4/14/2022 metoprolol 25    CHF  bnp 4/14/2022 bnp 413   4/14/2022 losartan 25    ANEMIA  Hb 4/14/2022 b 10.2     DEPRESSION.  4/14/2022 bupropion 75.3     HYPOTHYROID  4/14/2022 levoxyl 88      TIME SPENT   Over 25 minutes aggregate care time spent on encounter; activities included   direct patient care, counseling and/or coordinating care reviewing notes, lab data/ imaging , discussion with multidisciplinary team/ patient  /family and explaining in detail risks, benefits, alternatives  of the recommendations     JAMES HOLLIS 82 f 4/14/2022 1939 DR VIDHYA WOLF    
82F PMH COPD (former smoker), CAD, Aortic stenosis, Mitral and aortic valve repair 2014, HTN, Graves disease s/p thyroidectomy,  MARTINA non-compliant with CPAP, Leukemia (APL 2012, now in remission), iron deficiency anemia, DVT/PE s/p IVC filter presented with increasing SOB and inability to care for self.

## 2022-04-16 NOTE — DISCHARGE NOTE PROVIDER - NSDCCPCAREPLAN_GEN_ALL_CORE_FT
PRINCIPAL DISCHARGE DIAGNOSIS  Diagnosis: COPD exacerbation  Assessment and Plan of Treatment: improved on steroids and inhalers.  Seen by pulmonary.  Continue steroids for 5 more days.

## 2022-04-27 ENCOUNTER — RX RENEWAL (OUTPATIENT)
Age: 82
End: 2022-04-27

## 2022-05-18 ENCOUNTER — NON-APPOINTMENT (OUTPATIENT)
Age: 82
End: 2022-05-18

## 2022-05-18 ENCOUNTER — APPOINTMENT (OUTPATIENT)
Dept: CARDIOLOGY | Facility: CLINIC | Age: 82
End: 2022-05-18
Payer: MEDICARE

## 2022-05-18 VITALS
HEIGHT: 61 IN | OXYGEN SATURATION: 94 % | WEIGHT: 170 LBS | HEART RATE: 72 BPM | SYSTOLIC BLOOD PRESSURE: 164 MMHG | DIASTOLIC BLOOD PRESSURE: 91 MMHG | BODY MASS INDEX: 32.1 KG/M2

## 2022-05-18 DIAGNOSIS — I82.409 ACUTE EMBOLISM AND THROMBOSIS OF UNSPECIFIED DEEP VEINS OF UNSPECIFIED LOWER EXTREMITY: ICD-10-CM

## 2022-05-18 DIAGNOSIS — I26.99 OTHER PULMONARY EMBOLISM W/OUT ACUTE COR PULMONALE: ICD-10-CM

## 2022-05-18 DIAGNOSIS — E78.5 HYPERLIPIDEMIA, UNSPECIFIED: ICD-10-CM

## 2022-05-18 DIAGNOSIS — I10 ESSENTIAL (PRIMARY) HYPERTENSION: ICD-10-CM

## 2022-05-18 PROCEDURE — 99214 OFFICE O/P EST MOD 30 MIN: CPT

## 2022-05-18 PROCEDURE — 93000 ELECTROCARDIOGRAM COMPLETE: CPT

## 2022-05-18 RX ORDER — ALBUTEROL 90 MCG
AEROSOL (GRAM) INHALATION
Refills: 0 | Status: ACTIVE | COMMUNITY

## 2022-05-18 NOTE — REVIEW OF SYSTEMS
[Feeling Fatigued] : feeling fatigued [Anxiety] : anxiety [Negative] : Genitourinary [Joint Pain] : no joint pain [Rash] : no rash [Dizziness] : no dizziness [Depression] : no depression [Easy Bleeding] : no tendency for easy bleeding [Easy Bruising] : no tendency for easy bruising

## 2022-05-18 NOTE — HISTORY OF PRESENT ILLNESS
[FreeTextEntry1] : I saw Keisha Marcum in the office today for a followup visit, She is an 82-y-o female who is status post aortic and mitral valve replacement for aortic stenosis and mitral insufficiency respectively on March 2012 at [facility]. It was performed by Dr. Wood. Echo 7/20 demonstrated an ejection fraction of 55%. There was a peak gradient across the mitral prosthesis of 16 mm mercury. Normally functioning aortic valve prosthesis. LVH. Mild to moderate TR with a PA pressure of 35..\par \par She is being treated for hypertension, and hyperlipidemia. Fortunately she had no coronary disease. She also has a history of depression, anxiety, and some degree of chronic obstructive pulmonary disease with MARTINA. She is using her mask.. She also is hypothyroid.\par \par She was diagnosed with promyelocytic leukemia and had chemotherapy at [facility]. The first treatment 5/13 was complicated by an episode of torsades with cardiac arrest, in the setting of MSSA bacteremia. QT prolongation by Arsenic. Echo showed normal LV systolic function. She subsequently underwent a second and third treatment treatment 10/13 without any trouble. Finished chemotherapy 11/13.  She's been in remission.\par \par The patient is physically limited by shortness of breath. Everything she does is a great effort. She has not seen a pulmonologist for some time.  She is receiving iron infusions for anemia. She is no longer taking the Lasix. Her leg edema has improved and she has no volume overload.She is wearing support stockings.\par \par The patient has been anemic and has some blood in her stool and had an endoscopy and colonoscopy at [facility] on 1/5/17. This showed acid reflux. Cauterized bleeding.With the iron infusions her hemoglobin improved and she has been doing better..\par \par She does have significant COPD and has been complaining of increasing dyspnea on exertion.\par \par 11/20 the patient went to the emergency room with shortness of breath. Workup demonstrated a PE in the right lower lobe with a left below-knee DVT. She was treated with heparin and discharged on Eliquis. Echocardiogram showed ejection fraction of 55-60%. There was a bio aortic and mitral valve. There was mild MS, and mild AI. There was stage II diastolic dysfunction with LVH.\par \par Because of recurrent bleeding on the anticoagulant, the patient had an IVC filter placed 2/21.  Her Eliquis was stopped and she was to restart aspirin therapy.  Apparently the patient never did.\par \par In April 2022, she was admitted to [facility] with increasing shortness of breath.  There is felt to be due to an exacerbation of COPD.  During her hospitalization she had an echocardiogram that showed an ejection fraction of 70-75%.  Mean gradient across the prosthetic mitral valve was 11.7 mmHg.  Properly functioning aortic valve prosthesis with mild AI.  Mild left atrial enlargement.  It was recommended that she restart her aspirin therapy.  Treated initially with steroids for COPD exacerbation.  She still is dyspneic.  O2 sat on room air is 93 to 94%.  She has an appointment to see the pulmonologist, Dr. Mcmahon later this week..\par \par ECG demonstrates sinus rhythm and is unremarkable.\par \par \par

## 2022-05-18 NOTE — DISCUSSION/SUMMARY
[FreeTextEntry1] : The patient's cardiac status appears stable.  Blood pressure is normal and ECG is stable.  Cardiac exam is unremarkable.  Echocardiogram shows properly functioning aortic and mitral valve prosthesis with some increased gradient across the mitral valve.  The patient should restart a low-dose aspirin.  She will get a coated aspirin and take it with food.\par \par She will stay on her other medications as prescribed.  She will see Dr. Chauncey Antonio in consultation for her COPD.\par \par If all is well we will see her in 6 months.  We did go over her recent hospitalization, and answered all of her questions.

## 2022-05-19 NOTE — PATIENT PROFILE ADULT - AGENT'S NAME
Last visit was 5/16/22.   Last prescribed Abilify 5 mg & Wellbutrin  mg tablet on 11/17/21. #90 with 1 refill.      neda nelson

## 2022-05-20 ENCOUNTER — RX RENEWAL (OUTPATIENT)
Age: 82
End: 2022-05-20

## 2022-05-20 NOTE — ED ADULT TRIAGE NOTE - WEIGHT IN KG
Referral generated called left detailed voice message informing pt copy will be at the  for pickup     70.8

## 2022-07-22 NOTE — PATIENT PROFILE ADULT - NSPROMUTINFOINDIVIDFT_GEN_A_NUR
Tetracycline Pregnancy And Lactation Text: This medication is Pregnancy Category D and not consider safe during pregnancy. It is also excreted in breast milk. none

## 2022-07-25 ENCOUNTER — RX RENEWAL (OUTPATIENT)
Age: 82
End: 2022-07-25

## 2022-08-29 ENCOUNTER — RX RENEWAL (OUTPATIENT)
Age: 82
End: 2022-08-29

## 2022-11-29 ENCOUNTER — INPATIENT (INPATIENT)
Facility: HOSPITAL | Age: 82
LOS: 6 days | Discharge: HOME | DRG: 191 | End: 2022-12-06
Attending: INTERNAL MEDICINE | Admitting: INTERNAL MEDICINE
Payer: MEDICARE

## 2022-11-29 VITALS
RESPIRATION RATE: 21 BRPM | WEIGHT: 167.99 LBS | HEART RATE: 90 BPM | HEIGHT: 62 IN | SYSTOLIC BLOOD PRESSURE: 158 MMHG | OXYGEN SATURATION: 91 % | TEMPERATURE: 98 F | DIASTOLIC BLOOD PRESSURE: 94 MMHG

## 2022-11-29 DIAGNOSIS — Z41.9 ENCOUNTER FOR PROCEDURE FOR PURPOSES OTHER THAN REMEDYING HEALTH STATE, UNSPECIFIED: Chronic | ICD-10-CM

## 2022-11-29 DIAGNOSIS — Z98.890 OTHER SPECIFIED POSTPROCEDURAL STATES: Chronic | ICD-10-CM

## 2022-11-29 DIAGNOSIS — Z96.0 PRESENCE OF UROGENITAL IMPLANTS: Chronic | ICD-10-CM

## 2022-11-29 LAB
ALBUMIN SERPL ELPH-MCNC: 3 G/DL — LOW (ref 3.3–5)
ALP SERPL-CCNC: 65 U/L — SIGNIFICANT CHANGE UP (ref 30–120)
ALT FLD-CCNC: 18 U/L DA — SIGNIFICANT CHANGE UP (ref 10–60)
ANION GAP SERPL CALC-SCNC: 6 MMOL/L — SIGNIFICANT CHANGE UP (ref 5–17)
APPEARANCE UR: CLEAR — SIGNIFICANT CHANGE UP
AST SERPL-CCNC: 17 U/L — SIGNIFICANT CHANGE UP (ref 10–40)
BACTERIA # UR AUTO: ABNORMAL
BASOPHILS # BLD AUTO: 0.03 K/UL — SIGNIFICANT CHANGE UP (ref 0–0.2)
BASOPHILS NFR BLD AUTO: 0.5 % — SIGNIFICANT CHANGE UP (ref 0–2)
BILIRUB SERPL-MCNC: 0.3 MG/DL — SIGNIFICANT CHANGE UP (ref 0.2–1.2)
BILIRUB UR-MCNC: NEGATIVE — SIGNIFICANT CHANGE UP
BUN SERPL-MCNC: 28 MG/DL — HIGH (ref 7–23)
CALCIUM SERPL-MCNC: 9.3 MG/DL — SIGNIFICANT CHANGE UP (ref 8.4–10.5)
CHLORIDE SERPL-SCNC: 107 MMOL/L — SIGNIFICANT CHANGE UP (ref 96–108)
CO2 SERPL-SCNC: 28 MMOL/L — SIGNIFICANT CHANGE UP (ref 22–31)
COLOR SPEC: YELLOW — SIGNIFICANT CHANGE UP
CREAT SERPL-MCNC: 1.38 MG/DL — HIGH (ref 0.5–1.3)
DIFF PNL FLD: NEGATIVE — SIGNIFICANT CHANGE UP
EGFR: 38 ML/MIN/1.73M2 — LOW
EOSINOPHIL # BLD AUTO: 0.32 K/UL — SIGNIFICANT CHANGE UP (ref 0–0.5)
EOSINOPHIL NFR BLD AUTO: 5.6 % — SIGNIFICANT CHANGE UP (ref 0–6)
EPI CELLS # UR: SIGNIFICANT CHANGE UP
FLUAV AG NPH QL: SIGNIFICANT CHANGE UP
FLUBV AG NPH QL: SIGNIFICANT CHANGE UP
GLUCOSE SERPL-MCNC: 104 MG/DL — HIGH (ref 70–99)
GLUCOSE UR QL: NEGATIVE MG/DL — SIGNIFICANT CHANGE UP
HCT VFR BLD CALC: 29.8 % — LOW (ref 34.5–45)
HGB BLD-MCNC: 9.2 G/DL — LOW (ref 11.5–15.5)
IMM GRANULOCYTES NFR BLD AUTO: 0.4 % — SIGNIFICANT CHANGE UP (ref 0–0.9)
KETONES UR-MCNC: NEGATIVE — SIGNIFICANT CHANGE UP
LEUKOCYTE ESTERASE UR-ACNC: ABNORMAL
LYMPHOCYTES # BLD AUTO: 1.21 K/UL — SIGNIFICANT CHANGE UP (ref 1–3.3)
LYMPHOCYTES # BLD AUTO: 21.2 % — SIGNIFICANT CHANGE UP (ref 13–44)
MCHC RBC-ENTMCNC: 28.1 PG — SIGNIFICANT CHANGE UP (ref 27–34)
MCHC RBC-ENTMCNC: 30.9 GM/DL — LOW (ref 32–36)
MCV RBC AUTO: 91.1 FL — SIGNIFICANT CHANGE UP (ref 80–100)
MONOCYTES # BLD AUTO: 0.61 K/UL — SIGNIFICANT CHANGE UP (ref 0–0.9)
MONOCYTES NFR BLD AUTO: 10.7 % — SIGNIFICANT CHANGE UP (ref 2–14)
NEUTROPHILS # BLD AUTO: 3.52 K/UL — SIGNIFICANT CHANGE UP (ref 1.8–7.4)
NEUTROPHILS NFR BLD AUTO: 61.6 % — SIGNIFICANT CHANGE UP (ref 43–77)
NITRITE UR-MCNC: POSITIVE
NRBC # BLD: 0 /100 WBCS — SIGNIFICANT CHANGE UP (ref 0–0)
NT-PROBNP SERPL-SCNC: 439 PG/ML — SIGNIFICANT CHANGE UP (ref 0–450)
PH UR: 6 — SIGNIFICANT CHANGE UP (ref 5–8)
PLATELET # BLD AUTO: 217 K/UL — SIGNIFICANT CHANGE UP (ref 150–400)
POTASSIUM SERPL-MCNC: 4.4 MMOL/L — SIGNIFICANT CHANGE UP (ref 3.5–5.3)
POTASSIUM SERPL-SCNC: 4.4 MMOL/L — SIGNIFICANT CHANGE UP (ref 3.5–5.3)
PROT SERPL-MCNC: 6.2 G/DL — SIGNIFICANT CHANGE UP (ref 6–8.3)
PROT UR-MCNC: 30 MG/DL
RBC # BLD: 3.27 M/UL — LOW (ref 3.8–5.2)
RBC # FLD: 17.4 % — HIGH (ref 10.3–14.5)
RBC CASTS # UR COMP ASSIST: SIGNIFICANT CHANGE UP /HPF (ref 0–4)
RSV RNA NPH QL NAA+NON-PROBE: SIGNIFICANT CHANGE UP
SARS-COV-2 RNA SPEC QL NAA+PROBE: SIGNIFICANT CHANGE UP
SODIUM SERPL-SCNC: 141 MMOL/L — SIGNIFICANT CHANGE UP (ref 135–145)
SP GR SPEC: 1.02 — SIGNIFICANT CHANGE UP (ref 1.01–1.02)
TROPONIN I, HIGH SENSITIVITY RESULT: 7.1 NG/L — SIGNIFICANT CHANGE UP
UROBILINOGEN FLD QL: NEGATIVE MG/DL — SIGNIFICANT CHANGE UP
WBC # BLD: 5.71 K/UL — SIGNIFICANT CHANGE UP (ref 3.8–10.5)
WBC # FLD AUTO: 5.71 K/UL — SIGNIFICANT CHANGE UP (ref 3.8–10.5)
WBC UR QL: ABNORMAL

## 2022-11-29 PROCEDURE — 74176 CT ABD & PELVIS W/O CONTRAST: CPT | Mod: 26,MA

## 2022-11-29 PROCEDURE — 99285 EMERGENCY DEPT VISIT HI MDM: CPT

## 2022-11-29 PROCEDURE — 71045 X-RAY EXAM CHEST 1 VIEW: CPT | Mod: 26

## 2022-11-29 RX ORDER — CIPROFLOXACIN LACTATE 400MG/40ML
400 VIAL (ML) INTRAVENOUS ONCE
Refills: 0 | Status: COMPLETED | OUTPATIENT
Start: 2022-11-29 | End: 2022-11-29

## 2022-11-29 RX ORDER — CIPROFLOXACIN LACTATE 400MG/40ML
1 VIAL (ML) INTRAVENOUS
Qty: 14 | Refills: 0
Start: 2022-11-29 | End: 2022-12-05

## 2022-11-29 RX ADMIN — Medication 200 MILLIGRAM(S): at 23:45

## 2022-11-29 NOTE — ED PROVIDER NOTE - PHYSICAL EXAMINATION
Gen: Alert, NAD  Head/eyes: NC/AT, PERRL  ENT: airway patent  Neck: supple  Pulm/lung: Bilateral clear BS  CV/heart: RRR  GI/Abd: soft, NT/ND, +BS, no guarding/rebound tenderness  Musculoskeletal: no edema/erythema/cyanosis, no CVAT b/l  Skin: no rash  Neuro: AAOx3, grossly intact

## 2022-11-29 NOTE — ED PROVIDER NOTE - OBJECTIVE STATEMENT
82-year-old female history of hypertension hyperlipidemia COPD renal stone complaining about UTI symptoms the past several weeks.  Also having some shortness of breath for the past several days.  Lives alone by herself.  Denies any fever chills cough.  Worried that she may have a UTI. Admits to getting some shortness of breath with exertion.  Denies any chest pain.

## 2022-11-29 NOTE — ED PROVIDER NOTE - PATIENT PORTAL LINK FT
You can access the FollowMyHealth Patient Portal offered by [facility] by registering at the following website: [email or web address]. By joining [employer]’s FollowMyHealth portal, you will also be able to view your health information using other applications (apps) compatible with our system.

## 2022-11-29 NOTE — ED PROVIDER NOTE - CARE PLAN
Principal Discharge DX:	Acute UTI   1 Principal Discharge DX:	Acute UTI  Secondary Diagnosis:	Dyspnea on exertion

## 2022-11-29 NOTE — ED PROVIDER NOTE - PROGRESS NOTE DETAILS
labs and imaging explained, will treat for UTI, wants to go home in the AM, uncomfortable going home at night pt now short of breath after walking to bathroom, had to sit down, tachypneic, will admit

## 2022-11-29 NOTE — ED PROVIDER NOTE - WET READ LAUNCH FT
Chief Complaint   Patient presents with    New Patient    Diabetes     Pt needs to establish a new PCP. States that she has diabetes and scrosis of the liver. She has seen a hepatologist but does not remember who she saw.      Reyes Maldonado MD- Dr. Mohinder Olivarez located in [address] [place] [address]    Please send link [phone] There is 1 Wet Read(s) to document. There are no Wet Read(s) to document.

## 2022-11-29 NOTE — ED PROVIDER NOTE - NSFOLLOWUPINSTRUCTIONS_ED_ALL_ED_FT
1) Follow-up with your Primary Medical Doctor or referred doctor. Call today / next business day for prompt follow-up.  2) Return to Emergency room for any worsening or persistent pain, weakness, fever, or any other concerning symptoms.  3) See attached instruction sheets for additional information, including information regarding signs and symptoms to look out for, reasons to seek immediate care and other important instructions.  4) Follow-up with any specialists as discussed / noted as well.   5) Cipro 500mg twice a day x 7 days      A urinary tract infection (UTI) is an infection of any part of the urinary tract. The urinary tract includes the kidneys, ureters, bladder, and urethra. These organs make, store, and get rid of urine in the body.    An upper UTI affects the ureters and kidneys. A lower UTI affects the bladder and urethra.      What are the causes?    Most urinary tract infections are caused by bacteria in your genital area around your urethra, where urine leaves your body. These bacteria grow and cause inflammation of your urinary tract.      What increases the risk?    You are more likely to develop this condition if:  •You have a urinary catheter that stays in place.      •You are not able to control when you urinate or have a bowel movement (incontinence).    •You are female and you:  •Use a spermicide or diaphragm for birth control.      •Have low estrogen levels.      •Are pregnant.        •You have certain genes that increase your risk.      •You are sexually active.      •You take antibiotic medicines.    •You have a condition that causes your flow of urine to slow down, such as:  •An enlarged prostate, if you are male.      •Blockage in your urethra.      •A kidney stone.      •A nerve condition that affects your bladder control (neurogenic bladder).      •Not getting enough to drink, or not urinating often.      •You have certain medical conditions, such as:  •Diabetes.      •A weak disease-fighting system (immunesystem).      •Sickle cell disease.      •Gout.      •Spinal cord injury.          What are the signs or symptoms?    Symptoms of this condition include:  •Needing to urinate right away (urgency).      •Frequent urination. This may include small amounts of urine each time you urinate.      •Pain or burning with urination.      •Blood in the urine.      •Urine that smells bad or unusual.      •Trouble urinating.      •Cloudy urine.      •Vaginal discharge, if you are female.      •Pain in the abdomen or the lower back.      You may also have:  •Vomiting or a decreased appetite.      •Confusion.      •Irritability or tiredness.      •A fever or chills.      •Diarrhea.      The first symptom in older adults may be confusion. In some cases, they may not have any symptoms until the infection has worsened.      How is this diagnosed?    This condition is diagnosed based on your medical history and a physical exam. You may also have other tests, including:  •Urine tests.      •Blood tests.      •Tests for STIs (sexually transmitted infections).      If you have had more than one UTI, a cystoscopy or imaging studies may be done to determine the cause of the infections.      How is this treated?    Treatment for this condition includes:  •Antibiotic medicine.      •Over-the-counter medicines to treat discomfort.      •Drinking enough water to stay hydrated.      If you have frequent infections or have other conditions such as a kidney stone, you may need to see a health care provider who specializes in the urinary tract (urologist).    In rare cases, urinary tract infections can cause sepsis. Sepsis is a life-threatening condition that occurs when the body responds to an infection. Sepsis is treated in the hospital with IV antibiotics, fluids, and other medicines.      Follow these instructions at home:     Medicines     •Take over-the-counter and prescription medicines only as told by your health care provider.      •If you were prescribed an antibiotic medicine, take it as told by your health care provider. Do not stop using the antibiotic even if you start to feel better.      General instructions   •Make sure you:  •Empty your bladder often and completely. Do not hold urine for long periods of time.      •Empty your bladder after sex.      •Wipe from front to back after urinating or having a bowel movement if you are female. Use each tissue only one time when you wipe.        •Drink enough fluid to keep your urine pale yellow.      •Keep all follow-up visits. This is important.        Contact a health care provider if:    •Your symptoms do not get better after 1–2 days.      •Your symptoms go away and then return.        Get help right away if:    •You have severe pain in your back or your lower abdomen.      •You have a fever or chills.      •You have nausea or vomiting.        Summary    •A urinary tract infection (UTI) is an infection of any part of the urinary tract, which includes the kidneys, ureters, bladder, and urethra.      •Most urinary tract infections are caused by bacteria in your genital area.      •Treatment for this condition often includes antibiotic medicines.      •If you were prescribed an antibiotic medicine, take it as told by your health care provider. Do not stop using the antibiotic even if you start to feel better.      •Keep all follow-up visits. This is important.      This information is not intended to replace advice given to you by your health care provider. Make sure you discuss any questions you have with your health care provider.

## 2022-11-29 NOTE — ED PROVIDER NOTE - NS ED ROS FT
Constitutional: - Fever, - Chills, - Anorexia, - Fatigue, - Night sweats  Eyes: - Discharge, - Irritation, - Redness, - Visual changes, - Light sensitivity, - Pain  EARS: - Ear Pain, - Tinnitus, - Decreased hearing  NOSE: - Congestion, - Epistaxis  MOUTH/THROAT: - Vocal Changes, - Drooling, - Sore throat  NECK: - Lumps, - Stiffness, - Pain  CV: - Palpitations, - Chest Pain, - Edema, - Syncope  RESP:  - Cough, + Shortness of Breath, - Dyspnea on Exertion, - Trouble speaking, - Pleuritic pain - Wheezing  GI: - Diarrhea, - Constipation, - Bloody stools, - Nausea, - Vomiting, - Abdominal Pain  : + Dysuria, +Frequency, - Hematuria, - Hesitancy, - Incontinence, - Saddle Anesthesia, - Abnormal discharge  MSK: - Myalgias, - Arthralgias, - Weakness, - Deformities, - Injuries  SKIN: - Color change, - Rash, - Swelling, - Ecchymosis, - Abrasion, - laceration  NEURO: - Change in behavior, - Dec. Alertness, - Headache, - Dizziness, - Change in speech, - Weakness, - Seizure-like activity, - Difficulty ambulating

## 2022-11-29 NOTE — ED ADULT NURSE NOTE - OBJECTIVE STATEMENT
Pt BIB EMS from home c/o dyspnea at rest and with exertion (Hx of COPD) and frequent urination.  Patient 97% on RA; speaking in full sentences without difficulty.  Denies CP, N/V/D.  Patient states, "My urine has had a metallic smell for 2 months and I've been urinating more for the past 3-4 days."  History of Urosepsis and leukemia; receives weekly iron infusions.

## 2022-11-30 DIAGNOSIS — N39.0 URINARY TRACT INFECTION, SITE NOT SPECIFIED: ICD-10-CM

## 2022-11-30 DIAGNOSIS — J44.1 CHRONIC OBSTRUCTIVE PULMONARY DISEASE WITH (ACUTE) EXACERBATION: ICD-10-CM

## 2022-11-30 DIAGNOSIS — I10 ESSENTIAL (PRIMARY) HYPERTENSION: ICD-10-CM

## 2022-11-30 PROCEDURE — 71250 CT THORAX DX C-: CPT | Mod: 26

## 2022-11-30 RX ORDER — METOPROLOL TARTRATE 50 MG
25 TABLET ORAL DAILY
Refills: 0 | Status: DISCONTINUED | OUTPATIENT
Start: 2022-11-30 | End: 2022-12-06

## 2022-11-30 RX ORDER — LOSARTAN POTASSIUM 100 MG/1
25 TABLET, FILM COATED ORAL DAILY
Refills: 0 | Status: DISCONTINUED | OUTPATIENT
Start: 2022-11-30 | End: 2022-12-06

## 2022-11-30 RX ORDER — CIPROFLOXACIN LACTATE 400MG/40ML
400 VIAL (ML) INTRAVENOUS EVERY 12 HOURS
Refills: 0 | Status: DISCONTINUED | OUTPATIENT
Start: 2022-11-30 | End: 2022-12-01

## 2022-11-30 RX ORDER — CITALOPRAM 10 MG/1
40 TABLET, FILM COATED ORAL DAILY
Refills: 0 | Status: DISCONTINUED | OUTPATIENT
Start: 2022-11-30 | End: 2022-12-06

## 2022-11-30 RX ORDER — CIPROFLOXACIN LACTATE 400MG/40ML
500 VIAL (ML) INTRAVENOUS EVERY 12 HOURS
Refills: 0 | Status: DISCONTINUED | OUTPATIENT
Start: 2022-11-30 | End: 2022-11-30

## 2022-11-30 RX ORDER — ACETAMINOPHEN 500 MG
650 TABLET ORAL EVERY 6 HOURS
Refills: 0 | Status: DISCONTINUED | OUTPATIENT
Start: 2022-11-30 | End: 2022-12-06

## 2022-11-30 RX ORDER — CIPROFLOXACIN LACTATE 400MG/40ML
250 VIAL (ML) INTRAVENOUS
Refills: 0 | Status: DISCONTINUED | OUTPATIENT
Start: 2022-11-30 | End: 2022-11-30

## 2022-11-30 RX ORDER — FAMOTIDINE 10 MG/ML
20 INJECTION INTRAVENOUS DAILY
Refills: 0 | Status: DISCONTINUED | OUTPATIENT
Start: 2022-11-30 | End: 2022-12-01

## 2022-11-30 RX ORDER — ACETAMINOPHEN 500 MG
2 TABLET ORAL
Qty: 0 | Refills: 0 | DISCHARGE

## 2022-11-30 RX ORDER — TIOTROPIUM BROMIDE 18 UG/1
2 CAPSULE ORAL; RESPIRATORY (INHALATION) DAILY
Refills: 0 | Status: DISCONTINUED | OUTPATIENT
Start: 2022-11-30 | End: 2022-12-06

## 2022-11-30 RX ORDER — SIMVASTATIN 20 MG/1
40 TABLET, FILM COATED ORAL AT BEDTIME
Refills: 0 | Status: DISCONTINUED | OUTPATIENT
Start: 2022-11-30 | End: 2022-12-06

## 2022-11-30 RX ORDER — ALBUTEROL 90 UG/1
2 AEROSOL, METERED ORAL EVERY 6 HOURS
Refills: 0 | Status: DISCONTINUED | OUTPATIENT
Start: 2022-11-30 | End: 2022-12-06

## 2022-11-30 RX ORDER — GABAPENTIN 400 MG/1
100 CAPSULE ORAL THREE TIMES A DAY
Refills: 0 | Status: DISCONTINUED | OUTPATIENT
Start: 2022-11-30 | End: 2022-12-06

## 2022-11-30 RX ORDER — BUPROPION HYDROCHLORIDE 150 MG/1
2 TABLET, EXTENDED RELEASE ORAL
Qty: 0 | Refills: 0 | DISCHARGE

## 2022-11-30 RX ORDER — SODIUM CHLORIDE 9 MG/ML
1000 INJECTION INTRAMUSCULAR; INTRAVENOUS; SUBCUTANEOUS
Refills: 0 | Status: DISCONTINUED | OUTPATIENT
Start: 2022-11-30 | End: 2022-12-01

## 2022-11-30 RX ORDER — IPRATROPIUM/ALBUTEROL SULFATE 18-103MCG
3 AEROSOL WITH ADAPTER (GRAM) INHALATION EVERY 6 HOURS
Refills: 0 | Status: COMPLETED | OUTPATIENT
Start: 2022-11-30 | End: 2022-12-01

## 2022-11-30 RX ORDER — LEVOTHYROXINE SODIUM 125 MCG
88 TABLET ORAL DAILY
Refills: 0 | Status: DISCONTINUED | OUTPATIENT
Start: 2022-11-30 | End: 2022-12-06

## 2022-11-30 RX ORDER — BUDESONIDE AND FORMOTEROL FUMARATE DIHYDRATE 160; 4.5 UG/1; UG/1
2 AEROSOL RESPIRATORY (INHALATION)
Refills: 0 | Status: DISCONTINUED | OUTPATIENT
Start: 2022-11-30 | End: 2022-12-06

## 2022-11-30 RX ORDER — BUPROPION HYDROCHLORIDE 150 MG/1
150 TABLET, EXTENDED RELEASE ORAL DAILY
Refills: 0 | Status: DISCONTINUED | OUTPATIENT
Start: 2022-11-30 | End: 2022-12-06

## 2022-11-30 RX ADMIN — Medication 3 MILLILITER(S): at 20:29

## 2022-11-30 RX ADMIN — BUPROPION HYDROCHLORIDE 150 MILLIGRAM(S): 150 TABLET, EXTENDED RELEASE ORAL at 14:38

## 2022-11-30 RX ADMIN — GABAPENTIN 100 MILLIGRAM(S): 400 CAPSULE ORAL at 21:41

## 2022-11-30 RX ADMIN — Medication 400 MILLIGRAM(S): at 00:45

## 2022-11-30 RX ADMIN — CITALOPRAM 40 MILLIGRAM(S): 10 TABLET, FILM COATED ORAL at 14:38

## 2022-11-30 RX ADMIN — Medication 200 MILLIGRAM(S): at 17:12

## 2022-11-30 RX ADMIN — FAMOTIDINE 20 MILLIGRAM(S): 10 INJECTION INTRAVENOUS at 14:38

## 2022-11-30 RX ADMIN — Medication 3 MILLILITER(S): at 13:32

## 2022-11-30 RX ADMIN — Medication 40 MILLIGRAM(S): at 18:44

## 2022-11-30 RX ADMIN — SIMVASTATIN 40 MILLIGRAM(S): 20 TABLET, FILM COATED ORAL at 21:41

## 2022-11-30 RX ADMIN — BUDESONIDE AND FORMOTEROL FUMARATE DIHYDRATE 2 PUFF(S): 160; 4.5 AEROSOL RESPIRATORY (INHALATION) at 18:54

## 2022-11-30 RX ADMIN — SODIUM CHLORIDE 50 MILLILITER(S): 9 INJECTION INTRAMUSCULAR; INTRAVENOUS; SUBCUTANEOUS at 17:02

## 2022-11-30 RX ADMIN — GABAPENTIN 100 MILLIGRAM(S): 400 CAPSULE ORAL at 14:38

## 2022-11-30 NOTE — CONSULT NOTE ADULT - ASSESSMENT
a/p chest pain   large intrathoracic stomach    plan  in and out  adv diet  surgery to see pt  may need barium esophagogram  gerd precautions w/PO intake  ppi once a day, may increase to twice a day   maalox as needed   will monitor clinically and if no improvement may need EGD  diet as tolerated    thoracic surgery to see pt  Advanced care planning was discussed with patient and family.  Advanced care planning forms were reviewed and discussed.  Risks, benefits and alternatives of gastroenterologic procedures were discussed in detail and all questions were answered.    30 minutes spent.

## 2022-11-30 NOTE — ED ADULT NURSE REASSESSMENT NOTE - NS ED NURSE REASSESS COMMENT FT1
assumed care @ seen and assessed pt . pt seen seating on a chair . Pt updated with the plan of care. Pt for admission awaiting for admitting orders . Pt still with mild dyspnea with exertion Pt denies any pain

## 2022-11-30 NOTE — H&P ADULT - PROBLEM SELECTOR PLAN 2
c/o dysuria  ua c/w uti  fu cultures  iv rocephin  pt with hx of stents and renal stones  trend labs  ivf

## 2022-11-30 NOTE — H&P ADULT - PROBLEM SELECTOR PLAN 1
pt with vague c/o sob and tightness  check ct chest  cxr non revealing  po prednisone  oral inhalers and duo nebs  supportive care  currently comfotable on ra

## 2022-11-30 NOTE — PATIENT PROFILE ADULT - FALL HARM RISK - HARM RISK INTERVENTIONS

## 2022-11-30 NOTE — CONSULT NOTE ADULT - SUBJECTIVE AND OBJECTIVE BOX
10/01/2019  1  10/01/2019  FENTANYL 100 MCG/HR PATCH  10 0  30  LO CIM   Chief Complaint:  Patient is a 82y old  Female who presents with a chief complaint of uti symptoms with occ sob c/o left sided chest pain as well    82-year-old female history of hypertension hyperlipidemia COPD renal stone complaining about UTI symptoms the past several weeks.  Also having some shortness of breath for the past several days.  Lives alone by herself.  Denies any fever chills cough.  Worried that she may have a UTI. Admits to getting some shortness of breath with exertion.  Denies any chest pain.       Review of Systems:  · Negative General Symptoms	no fever; no chills  · Skin/Breast	negative  · Ophthalmologic	negative  · ENMT	negative  · Respiratory and Thorax Symptoms	dyspnea  · Respiratory and Thorax Comments	occ sob with chest tightness  · Cardiovascular	negative  · Gastrointestinal	negative  · General Genitourinary Symptoms	dysuria  · Musculoskeletal	negative  · Neurological	negative  · Psychiatric	negative  · Hematology/Lymphatics	negative  · Additional ROS	falls at home      Allergies:  adhesives (Rash; Other)  Cat dander- wheezing, itchy throat, SOB (Other)  penicillin (Rash)  sulfa drugs (Rash)  tetracycline (Stomach Upset)      Medications:  acetaminophen     Tablet .. 650 milliGRAM(s) Oral every 6 hours PRN  albuterol    90 MICROgram(s) HFA Inhaler 2 Puff(s) Inhalation every 6 hours PRN  albuterol/ipratropium for Nebulization 3 milliLiter(s) Nebulizer every 6 hours  budesonide  80 MICROgram(s)/formoterol 4.5 MICROgram(s) Inhaler 2 Puff(s) Inhalation two times a day  buPROPion XL (24-Hour) . 150 milliGRAM(s) Oral daily  ciprofloxacin   IVPB 400 milliGRAM(s) IV Intermittent every 12 hours  citalopram 40 milliGRAM(s) Oral daily  famotidine    Tablet 20 milliGRAM(s) Oral daily  gabapentin 100 milliGRAM(s) Oral three times a day  levothyroxine 88 MICROGram(s) Oral daily  losartan 25 milliGRAM(s) Oral daily  metoprolol succinate ER 25 milliGRAM(s) Oral daily  predniSONE   Tablet 40 milliGRAM(s) Oral daily  simvastatin 40 milliGRAM(s) Oral at bedtime  sodium chloride 0.9%. 1000 milliLiter(s) IV Continuous <Continuous>  tiotropium 2.5 MICROgram(s) Inhaler 2 Puff(s) Inhalation daily      PMHX/PSHX:  Graves disease    Acid reflux    Asthma    Asthma with COPD with exacerbation    Depression    Former cigarette smoker    Hernia, hiatal    Hypertension    Osteoporosis    Obstructive sleep apnea    Dyslipidemia    Carpal tunnel syndrome    Coronary atherosclerosis of native coronary artery    Diverticulosis of colon    Rotator cuff tear    Duodenal ulcer    Emphysema    Aortic stenosis    Mitral regurgitation    Spinal stenosis    Anemia    Leukemia    MATRINA on CPAP    Calculus of ureter    Serum phosphorus decreased    Carpal tunnel right repair    After cataract, bilateral    History of tonsillectomy    H/O cone biopsy of cervix    h/o  endometrial ablation    History of coronary angiogram    H/O aortic valve repair    H/O mitral valve repair    S/P ureteral stent placement    H/O dilation and curettage    Elective surgery        Family history:  No pertinent family history in first degree relatives    FH: myocardial infarction        Social History:     no etoh no cigas no ivda    ROS:     General:  No wt loss, fevers, chills, night sweats, fatigue,   Eyes:  Good vision, no reported pain  ENT:  No sore throat, pain, runny nose, dysphagia  CV:  No pain, palpitations, hypo/hypertension  Resp:  No dyspnea, cough, tachypnea, wheezing  GI:  No pain, No nausea, No vomiting, No diarrhea, No constipation, No weight loss, No fever, No pruritis, No rectal bleeding, No tarry stools, No dysphagia,  :  No pain, bleeding, incontinence, nocturia  Muscle:  No pain, weakness  Neuro:  No weakness, tingling, memory problems  Psych:  No fatigue, insomnia, mood problems, depression  Endocrine:  No polyuria, polydipsia, cold/heat intolerance  Heme:  No petechiae, ecchymosis, easy bruisability  Skin:  No rash, tattoos, scars, edema      PHYSICAL EXAM:   Vital Signs:  Vital Signs Last 24 Hrs  T(C): 37.1 (2022 16:23), Max: 37.1 (2022 16:23)  T(F): 98.7 (2022 16:23), Max: 98.7 (2022 16:23)  HR: 70 (2022 16:23) (70 - 90)  BP: 133/82 (2022 16:23) (95/61 - 158/94)  BP(mean): --  RR: 20 (2022 16:23) (16 - 21)  SpO2: 93% (2022 16:23) (91% - 97%)    Parameters below as of 2022 16:23  Patient On (Oxygen Delivery Method): room air      Daily Height in cm: 157.48 (2022 19:57)    Daily     GENERAL:  Appears stated age, well-groomed, well-nourished, no distress  HEENT:  NC/AT,  conjunctivae clear and pink, no thyromegaly, nodules, adenopathy, no JVD, sclera -anicteric  CHEST:  Full & symmetric excursion, no increased effort, breath sounds clear  HEART:  Regular rhythm, S1, S2, no murmur/rub/S3/S4, no abdominal bruit, no edema  ABDOMEN:  Soft, non-tender, non-distended, normoactive bowel sounds,  no masses ,no hepato-splenomegaly, no signs of chronic liver disease  EXTEREMITIES:  no cyanosis,clubbing or edema  SKIN:  No rash/erythema/ecchymoses/petechiae/wounds/abscess/warm/dry  NEURO:  Alert, oriented, no asterixis, no tremor, no encephalopathy    LABS:                        9.2    5.71  )-----------( 217      ( 2022 20:33 )             29.8     11-29    141  |  107  |  28<H>  ----------------------------<  104<H>  4.4   |  28  |  1.38<H>    Ca    9.3      2022 20:33    TPro  6.2  /  Alb  3.0<L>  /  TBili  0.3  /  DBili  x   /  AST  17  /  ALT  18  /  AlkPhos  65  11-29    LIVER FUNCTIONS - ( 2022 20:33 )  Alb: 3.0 g/dL / Pro: 6.2 g/dL / ALK PHOS: 65 U/L / ALT: 18 U/L DA / AST: 17 U/L / GGT: x             Urinalysis Basic - ( 2022 22:07 )    Color: Yellow / Appearance: Clear / S.020 / pH: x  Gluc: x / Ketone: Negative  / Bili: Negative / Urobili: Negative mg/dL   Blood: x / Protein: 30 mg/dL / Nitrite: Positive   Leuk Esterase: Moderate / RBC: 0-2 /HPF / WBC 6-10   Sq Epi: x / Non Sq Epi: Few / Bacteria: Few          Imaging:

## 2022-11-30 NOTE — ED ADULT NURSE REASSESSMENT NOTE - NSIMPLEMENTINTERV_GEN_ALL_ED
Implemented All Fall Risk Interventions:  [place] to call system. Call bell, personal items and telephone within reach. Instruct patient to call for assistance. Room bathroom lighting operational. Non-slip footwear when patient is off stretcher. Physically safe environment: no spills, clutter or unnecessary equipment. Stretcher in lowest position, wheels locked, appropriate side rails in place. Provide visual cue, wrist band, yellow gown, etc. Monitor gait and stability. Monitor for mental status changes and reorient to person, place, and time. Review medications for side effects contributing to fall risk. Reinforce activity limits and safety measures with patient and family.

## 2022-12-01 DIAGNOSIS — K44.9 DIAPHRAGMATIC HERNIA WITHOUT OBSTRUCTION OR GANGRENE: ICD-10-CM

## 2022-12-01 LAB
ANION GAP SERPL CALC-SCNC: 8 MMOL/L — SIGNIFICANT CHANGE UP (ref 5–17)
BUN SERPL-MCNC: 18 MG/DL — SIGNIFICANT CHANGE UP (ref 7–23)
CALCIUM SERPL-MCNC: 8.9 MG/DL — SIGNIFICANT CHANGE UP (ref 8.4–10.5)
CHLORIDE SERPL-SCNC: 106 MMOL/L — SIGNIFICANT CHANGE UP (ref 96–108)
CO2 SERPL-SCNC: 25 MMOL/L — SIGNIFICANT CHANGE UP (ref 22–31)
CREAT SERPL-MCNC: 1 MG/DL — SIGNIFICANT CHANGE UP (ref 0.5–1.3)
EGFR: 56 ML/MIN/1.73M2 — LOW
GLUCOSE SERPL-MCNC: 144 MG/DL — HIGH (ref 70–99)
HCT VFR BLD CALC: 30 % — LOW (ref 34.5–45)
HGB BLD-MCNC: 9.1 G/DL — LOW (ref 11.5–15.5)
MCHC RBC-ENTMCNC: 27.2 PG — SIGNIFICANT CHANGE UP (ref 27–34)
MCHC RBC-ENTMCNC: 30.3 GM/DL — LOW (ref 32–36)
MCV RBC AUTO: 89.8 FL — SIGNIFICANT CHANGE UP (ref 80–100)
NRBC # BLD: 0 /100 WBCS — SIGNIFICANT CHANGE UP (ref 0–0)
PLATELET # BLD AUTO: 221 K/UL — SIGNIFICANT CHANGE UP (ref 150–400)
POTASSIUM SERPL-MCNC: 4.8 MMOL/L — SIGNIFICANT CHANGE UP (ref 3.5–5.3)
POTASSIUM SERPL-SCNC: 4.8 MMOL/L — SIGNIFICANT CHANGE UP (ref 3.5–5.3)
RBC # BLD: 3.34 M/UL — LOW (ref 3.8–5.2)
RBC # FLD: 17.7 % — HIGH (ref 10.3–14.5)
SODIUM SERPL-SCNC: 139 MMOL/L — SIGNIFICANT CHANGE UP (ref 135–145)
WBC # BLD: 5.55 K/UL — SIGNIFICANT CHANGE UP (ref 3.8–10.5)
WBC # FLD AUTO: 5.55 K/UL — SIGNIFICANT CHANGE UP (ref 3.8–10.5)

## 2022-12-01 PROCEDURE — 99223 1ST HOSP IP/OBS HIGH 75: CPT

## 2022-12-01 PROCEDURE — 74220 X-RAY XM ESOPHAGUS 1CNTRST: CPT | Mod: 26

## 2022-12-01 RX ORDER — PANTOPRAZOLE SODIUM 20 MG/1
40 TABLET, DELAYED RELEASE ORAL
Refills: 0 | Status: DISCONTINUED | OUTPATIENT
Start: 2022-12-01 | End: 2022-12-06

## 2022-12-01 RX ORDER — CIPROFLOXACIN LACTATE 400MG/40ML
500 VIAL (ML) INTRAVENOUS EVERY 12 HOURS
Refills: 0 | Status: DISCONTINUED | OUTPATIENT
Start: 2022-12-01 | End: 2022-12-06

## 2022-12-01 RX ADMIN — Medication 3 MILLILITER(S): at 07:41

## 2022-12-01 RX ADMIN — Medication 3 MILLILITER(S): at 14:38

## 2022-12-01 RX ADMIN — SIMVASTATIN 40 MILLIGRAM(S): 20 TABLET, FILM COATED ORAL at 21:43

## 2022-12-01 RX ADMIN — Medication 3 MILLILITER(S): at 00:37

## 2022-12-01 RX ADMIN — GABAPENTIN 100 MILLIGRAM(S): 400 CAPSULE ORAL at 13:10

## 2022-12-01 RX ADMIN — Medication 88 MICROGRAM(S): at 05:48

## 2022-12-01 RX ADMIN — TIOTROPIUM BROMIDE 2 PUFF(S): 18 CAPSULE ORAL; RESPIRATORY (INHALATION) at 05:58

## 2022-12-01 RX ADMIN — Medication 25 MILLIGRAM(S): at 05:48

## 2022-12-01 RX ADMIN — Medication 3 MILLILITER(S): at 19:22

## 2022-12-01 RX ADMIN — LOSARTAN POTASSIUM 25 MILLIGRAM(S): 100 TABLET, FILM COATED ORAL at 05:48

## 2022-12-01 RX ADMIN — BUPROPION HYDROCHLORIDE 150 MILLIGRAM(S): 150 TABLET, EXTENDED RELEASE ORAL at 13:11

## 2022-12-01 RX ADMIN — BUDESONIDE AND FORMOTEROL FUMARATE DIHYDRATE 2 PUFF(S): 160; 4.5 AEROSOL RESPIRATORY (INHALATION) at 21:43

## 2022-12-01 RX ADMIN — GABAPENTIN 100 MILLIGRAM(S): 400 CAPSULE ORAL at 05:48

## 2022-12-01 RX ADMIN — GABAPENTIN 100 MILLIGRAM(S): 400 CAPSULE ORAL at 21:43

## 2022-12-01 RX ADMIN — Medication 500 MILLIGRAM(S): at 18:47

## 2022-12-01 RX ADMIN — BUDESONIDE AND FORMOTEROL FUMARATE DIHYDRATE 2 PUFF(S): 160; 4.5 AEROSOL RESPIRATORY (INHALATION) at 05:47

## 2022-12-01 RX ADMIN — CITALOPRAM 40 MILLIGRAM(S): 10 TABLET, FILM COATED ORAL at 13:11

## 2022-12-01 RX ADMIN — Medication 40 MILLIGRAM(S): at 05:47

## 2022-12-01 NOTE — CONSULT NOTE ADULT - NS ATTEND AMEND GEN_ALL_CORE FT
I have personally seen and examined the patient.  I fully participated in the care of this patient.  I have made amendments to the documentation where necessary, and agree with the history, physical exam, impression/assessment, and plan as documented by the PA    I had a lengthy discussion with the patient. Given her shortness of breath which I believe is related to her underlying lung disease exacerbated by the hiatal hernia with a largely intrathoracic stomach, I would offer robotic assisted hiatal hernia repair (likely with mesh). Patient is hesitant for surgery as given her age and cardiopulmonary comorbidities she would be a higher risk for surgery. I discussed with the patient that she can follow up with me as an outpatient to further discuss if she is interested in surgery, but if her shortness of breath continues to worsen she may require surgery to repair her hiatal hernia. Patient verbalized understanding. Will follow. If patient requires surgical repair this admission would need to be transferred to [facility] for robotic assisted repair. I have personally seen and examined the patient.  I fully participated in the care of this patient.  I have made amendments to the documentation where necessary, and agree with the history, physical exam, impression/assessment, and plan as documented by the PA    Pt admitted with worsening SOB. CT images reviewed, type 3 hiatal hernia with mostly intrathoracic stomach.    I had a lengthy discussion with the patient. Given her shortness of breath which I believe is related to her underlying lung disease exacerbated by the hiatal hernia with a largely intrathoracic stomach, I would offer robotic assisted hiatal hernia repair (likely with mesh). Patient is hesitant for surgery as given her age and cardiopulmonary comorbidities she would be a higher risk for surgery. I discussed with the patient that she can follow up with me as an outpatient to further discuss if she is interested in surgery, but if her shortness of breath continues to worsen she may require surgery to repair her hiatal hernia. Patient verbalized understanding. Will follow. If patient requires surgical repair this admission would need to be transferred to [facility] for robotic assisted repair.

## 2022-12-01 NOTE — CONSULT NOTE ADULT - SUBJECTIVE AND OBJECTIVE BOX
GENERAL SURGERY PA CONSULT NOTE    HPI:  Patient is a 82y old Female who presents with a chief complaint SOB. States she fell off her couch 2 weeks ago, since then she has been experiencing increased SOB especially with movement and bending down. States she was diagnosed with hiatal hernia years ago, was not surgically repaired. Denies n/v, dyphagia, or increased reflux. States her SOB is better since being admitted with the inhalers, but when bending over, still has increased SOB. Pt has a lot of stressors with family and is anxious about possibly needing surgery.     Of note: pt mentioned about having hip surgery and they put a mesh in to help prevent clots. Unsure if it is an IVC filter.       PAST MEDICAL & SURGICAL HISTORY:  Graves disease  Acid reflux  Asthma with COPD with exacerbation, not on home O2  Depression  Former cigarette smoker  Hernia, hiatal  Hypertension  Osteoporosis  Dyslipidemia  Carpal tunnel syndrome  Coronary atherosclerosis of native coronary artery  Diverticulosis of colon  Rotator cuff tear Right  Emphysema  Aortic stenosis  Anemia  Completed iron infusions every 2 months, now Hb stable  Leukemia (APL, s/p chemo in , now in remission, followed by oncologist)  MARTINA on CPAP Pt admits to be non-compliant  Calculus of ureter  Serum phosphorus decreased  Last level 1.5 at PST     SURGICAL HISTORY  s/p R ureteral stent 2020  After cataract, bilateral   History of tonsillectomy childhood  H/O cone biopsy of cervix 1974  h/o  endometrial ablation   History of coronary angiogram 12  H/O aortic valve repair   H/O mitral valve repair   S/P ureteral stent placement R in 2020  H/O dilation and curettage  Elective surgery  Cystoscopy, right ureteroscopy, laser lithotripsy of right ureteral stone, right ureteral stent removal and replacement, right retrograde pyelogram on 19    Review of Systems:  See HPI    MEDICATIONS  (STANDING):  albuterol/ipratropium for Nebulization 3 milliLiter(s) Nebulizer every 6 hours  budesonide  80 MICROgram(s)/formoterol 4.5 MICROgram(s) Inhaler 2 Puff(s) Inhalation two times a day  buPROPion XL (24-Hour) . 150 milliGRAM(s) Oral daily  ciprofloxacin     Tablet 500 milliGRAM(s) Oral every 12 hours  citalopram 40 milliGRAM(s) Oral daily  gabapentin 100 milliGRAM(s) Oral three times a day  levothyroxine 88 MICROGram(s) Oral daily  losartan 25 milliGRAM(s) Oral daily  metoprolol succinate ER 25 milliGRAM(s) Oral daily  pantoprazole    Tablet 40 milliGRAM(s) Oral before breakfast  simvastatin 40 milliGRAM(s) Oral at bedtime  tiotropium 2.5 MICROgram(s) Inhaler 2 Puff(s) Inhalation daily    MEDICATIONS  (PRN):  acetaminophen     Tablet .. 650 milliGRAM(s) Oral every 6 hours PRN Temp greater or equal to 38C (100.4F), Moderate Pain (4 - 6)  albuterol    90 MICROgram(s) HFA Inhaler 2 Puff(s) Inhalation every 6 hours PRN Bronchospasm      Allergies:  adhesives (Rash; Other)  Cat dander- wheezing, itchy throat, SOB (Other)  penicillin (Rash)  sulfa drugs (Rash)  tetracycline (Stomach Upset)    SOCIAL HISTORY          Smoking: Yes [X ]  No [ ]  smoked since 12yo. About 1 pack a day. quit 40 years ago.           ETOH  Yes [ ]  No [X ]  Social [ ]          DRUGS:  Yes [ ]  No [X ]     FAMILY HISTORY:  FH: myocardial infarction  (Father )    Vital Signs Last 24 Hrs  T(C): 36.9 (01 Dec 2022 10:54), Max: 37.1 (2022 16:23)  T(F): 98.5 (01 Dec 2022 10:54), Max: 98.7 (2022 16:23)  HR: 86 (01 Dec 2022 10:54) (70 - 89)  BP: 111/70 (01 Dec 2022 10:54) (111/70 - 133/82)  BP(mean): --  RR: 18 (01 Dec 2022 10:54) (18 - 20)  SpO2: 94% (01 Dec 2022 10:54) (93% - 94%)    Parameters below as of 01 Dec 2022 10:54  Patient On (Oxygen Delivery Method): room air    Physical Exam:  General:  Appears stated age no distress  Eyes : ALEXANDRA  HENT: trachea midline  Chest:  shallow breathes. No wheezing.   Cardiovascular:  pulse regular  Abdomen:  abd soft. bowel sound x 4. nttp.  Extremities:  grossly symmetrical   Neuro/Psych:  Alert, oriented tp time, place and person       LABS:                        9.1    5.55  )-----------( 221      ( 01 Dec 2022 08:50 )             30.0     12-    139  |  106  |  18  ----------------------------<  144<H>  4.8   |  25  |  1.00    Ca    8.9      01 Dec 2022 08:50    TPro  6.2  /  Alb  3.0<L>  /  TBili  0.3  /  DBili  x   /  AST  17  /  ALT  18  /  AlkPhos  65        Urinalysis Basic - ( 2022 22:07 )    Color: Yellow / Appearance: Clear / S.020 / pH: x  Gluc: x / Ketone: Negative  / Bili: Negative / Urobili: Negative mg/dL   Blood: x / Protein: 30 mg/dL / Nitrite: Positive   Leuk Esterase: Moderate / RBC: 0-2 /HPF / WBC 6-10   Sq Epi: x / Non Sq Epi: Few / Bacteria: Few    RADIOLOGY & ADDITIONAL STUDIES:  < from: CT Chest No Cont (22 @ 11:09) >  PROCEDURE DATE:  2022          INTERPRETATION:  CLINICAL INFORMATION: Left-sided chest pain. History of   hiatal hernia.    COMPARISON: CT abdomen pelvis 2021    CONTRAST/COMPLICATIONS:  IV Contrast: NONE  Oral Contrast: NONE  Complications: None reported at time of study completion    PROCEDURE:  CT of the Chest was performed.  Sagittal and coronal reformats were performed.    FINDINGS:    LUNGS AND AIRWAYS: Central airways are patent. No large focal   consolidation. Dependent scarring of the lung parenchyma. Sub-4 mm   calcified and noncalcified nodules.  PLEURA: No pleural effusion or pneumothorax.  MEDIASTINUM AND GUERA: Small volume nodes of the thorax. Large hiatal   hernia with predominantly intrathoracic stomach.  VESSELS: Postsurgical changes of the aorta, without aneurysm.   Atheromatous changes also noted. Main pulmonary artery size is within   normal limits.  HEART: Aortic and mitral valve prostheses. Heart sizeis qualitatively   mildly enlarged, particularly the left atrium. No pericardial effusion.  CHEST WALL AND LOWER NECK: No chest wall hematoma. Median sternotomy.   Thyroid is either atrophic or surgically absent with a few calcifications   noted along the thyroid bed. Correlate with procedural history.  VISUALIZED UPPER ABDOMEN: Hepatic cyst. Cholelithiasis. IVC filter.   Several bilateral renal calculi and left lower pole renal cyst. These   findings are better evaluated on recent CT abdomen pelvis from one day   prior.  BONES: Median sternotomy. Stable T12 compression deformity with slight   osseous retropulsion also seen on 2021 CT. Central canal and neural   foramina are not adequately assessed on this study.    IMPRESSION:    Large hiatal hernia with predominantly intrathoracic stomach.    Aortic and mitral valve prostheses. Heart size is qualitatively mildly   enlarged, particularly the left atrium.    Stable T12 compression deformity with slight osseous retropulsion also   seenon 2021 CT.    --- End of Report ---    < end of copied text >    Assessment/Plan   82y old Female who presents for sob and UTI. During work up, found to have Hiatal hernia (Intrathoracic stomach) found on CT scan.   Pt does not want surgery at this time due to her age and family situation.   Pt's symptoms stable at this time.  Continue current care  Diet - regular (DASH)  GI/DVT prophylaxis- SCDs  Analgesia prn  OOB/ambulation on the floor  GI recs appreciated  No surgical intervention at this time  Pt to follow up out pt for further HHr work up.     Case & plan discussed with Dr. Melendez.       GENERAL SURGERY PA CONSULT NOTE    HPI:  Patient is a 82y old Female who presents with a chief complaint SOB. States she fell off her couch 2 weeks ago, since then she has been experiencing increased SOB especially with movement and bending down. States she was diagnosed with hiatal hernia years ago, was not surgically repaired. Denies n/v, dyphagia, or increased reflux. States her SOB is better since being admitted with the inhalers, but when bending over, still has increased SOB. Pt has a lot of stressors with family and is anxious about possibly needing surgery.     Of note: pt mentioned about having hip surgery and placement of IVC filter.       PAST MEDICAL & SURGICAL HISTORY:  Graves disease  Acid reflux  Asthma with COPD with exacerbation, not on home O2  Depression  Former cigarette smoker  Hernia, hiatal  Hypertension  Osteoporosis  Dyslipidemia  Carpal tunnel syndrome  Coronary atherosclerosis of native coronary artery  Diverticulosis of colon  Rotator cuff tear Right  Emphysema  Aortic stenosis  Anemia  Completed iron infusions every 2 months, now Hb stable  Leukemia (APL, s/p chemo in , now in remission, followed by oncologist)  MARTINA on CPAP Pt admits to be non-compliant  Calculus of ureter  Serum phosphorus decreased  Last level 1.5 at PST     SURGICAL HISTORY  s/p R ureteral stent 2020  After cataract, bilateral   History of tonsillectomy childhood  H/O cone biopsy of cervix   h/o  endometrial ablation   History of coronary angiogram 12  H/O aortic valve repair   H/O mitral valve repair   S/P ureteral stent placement R in 2020  H/O dilation and curettage  Elective surgery  Cystoscopy, right ureteroscopy, laser lithotripsy of right ureteral stone, right ureteral stent removal and replacement, right retrograde pyelogram on 19    Review of Systems:  See HPI    MEDICATIONS  (STANDING):  albuterol/ipratropium for Nebulization 3 milliLiter(s) Nebulizer every 6 hours  budesonide  80 MICROgram(s)/formoterol 4.5 MICROgram(s) Inhaler 2 Puff(s) Inhalation two times a day  buPROPion XL (24-Hour) . 150 milliGRAM(s) Oral daily  ciprofloxacin     Tablet 500 milliGRAM(s) Oral every 12 hours  citalopram 40 milliGRAM(s) Oral daily  gabapentin 100 milliGRAM(s) Oral three times a day  levothyroxine 88 MICROGram(s) Oral daily  losartan 25 milliGRAM(s) Oral daily  metoprolol succinate ER 25 milliGRAM(s) Oral daily  pantoprazole    Tablet 40 milliGRAM(s) Oral before breakfast  simvastatin 40 milliGRAM(s) Oral at bedtime  tiotropium 2.5 MICROgram(s) Inhaler 2 Puff(s) Inhalation daily    MEDICATIONS  (PRN):  acetaminophen     Tablet .. 650 milliGRAM(s) Oral every 6 hours PRN Temp greater or equal to 38C (100.4F), Moderate Pain (4 - 6)  albuterol    90 MICROgram(s) HFA Inhaler 2 Puff(s) Inhalation every 6 hours PRN Bronchospasm      Allergies:  adhesives (Rash; Other)  Cat dander- wheezing, itchy throat, SOB (Other)  penicillin (Rash)  sulfa drugs (Rash)  tetracycline (Stomach Upset)    SOCIAL HISTORY          Smoking: Yes [X ]  No [ ]  smoked since 10yo. About 1 pack a day. quit 40 years ago.           ETOH  Yes [ ]  No [X ]  Social [ ]          DRUGS:  Yes [ ]  No [X ]     FAMILY HISTORY:  FH: myocardial infarction  (Father )    Vital Signs Last 24 Hrs  T(C): 36.9 (01 Dec 2022 10:54), Max: 37.1 (2022 16:23)  T(F): 98.5 (01 Dec 2022 10:54), Max: 98.7 (2022 16:23)  HR: 86 (01 Dec 2022 10:54) (70 - 89)  BP: 111/70 (01 Dec 2022 10:54) (111/70 - 133/82)  BP(mean): --  RR: 18 (01 Dec 2022 10:54) (18 - 20)  SpO2: 94% (01 Dec 2022 10:54) (93% - 94%)    Parameters below as of 01 Dec 2022 10:54  Patient On (Oxygen Delivery Method): room air    Physical Exam:  General:  Appears stated age no distress  Eyes : ALEXANDRA  HENT: trachea midline  Chest:  shallow breathes. No wheezing.   Cardiovascular:  pulse regular  Abdomen:  abd soft. bowel sound x 4. nontender  Extremities:  grossly symmetrical   Neuro/Psych:  Alert, oriented tp time, place and person       LABS:                        9.1    5.55  )-----------( 221      ( 01 Dec 2022 08:50 )             30.0     12    139  |  106  |  18  ----------------------------<  144<H>  4.8   |  25  |  1.00    Ca    8.9      01 Dec 2022 08:50    TPro  6.2  /  Alb  3.0<L>  /  TBili  0.3  /  DBili  x   /  AST  17  /  ALT  18  /  AlkPhos  65        Urinalysis Basic - ( 2022 22:07 )    Color: Yellow / Appearance: Clear / S.020 / pH: x  Gluc: x / Ketone: Negative  / Bili: Negative / Urobili: Negative mg/dL   Blood: x / Protein: 30 mg/dL / Nitrite: Positive   Leuk Esterase: Moderate / RBC: 0-2 /HPF / WBC 6-10   Sq Epi: x / Non Sq Epi: Few / Bacteria: Few    RADIOLOGY & ADDITIONAL STUDIES:  < from: CT Chest No Cont (22 @ 11:09) >  PROCEDURE DATE:  2022          INTERPRETATION:  CLINICAL INFORMATION: Left-sided chest pain. History of   hiatal hernia.    COMPARISON: CT abdomen pelvis 2021    CONTRAST/COMPLICATIONS:  IV Contrast: NONE  Oral Contrast: NONE  Complications: None reported at time of study completion    PROCEDURE:  CT of the Chest was performed.  Sagittal and coronal reformats were performed.    FINDINGS:    LUNGS AND AIRWAYS: Central airways are patent. No large focal   consolidation. Dependent scarring of the lung parenchyma. Sub-4 mm   calcified and noncalcified nodules.  PLEURA: No pleural effusion or pneumothorax.  MEDIASTINUM AND GUERA: Small volume nodes of the thorax. Large hiatal   hernia with predominantly intrathoracic stomach.  VESSELS: Postsurgical changes of the aorta, without aneurysm.   Atheromatous changes also noted. Main pulmonary artery size is within   normal limits.  HEART: Aortic and mitral valve prostheses. Heart sizeis qualitatively   mildly enlarged, particularly the left atrium. No pericardial effusion.  CHEST WALL AND LOWER NECK: No chest wall hematoma. Median sternotomy.   Thyroid is either atrophic or surgically absent with a few calcifications   noted along the thyroid bed. Correlate with procedural history.  VISUALIZED UPPER ABDOMEN: Hepatic cyst. Cholelithiasis. IVC filter.   Several bilateral renal calculi and left lower pole renal cyst. These   findings are better evaluated on recent CT abdomen pelvis from one day   prior.  BONES: Median sternotomy. Stable T12 compression deformity with slight   osseous retropulsion also seen on 2021 CT. Central canal and neural   foramina are not adequately assessed on this study.    IMPRESSION:    Large hiatal hernia with predominantly intrathoracic stomach.    Aortic and mitral valve prostheses. Heart size is qualitatively mildly   enlarged, particularly the left atrium.    Stable T12 compression deformity with slight osseous retropulsion also   seenon 2021 CT.    --- End of Report ---    < end of copied text >    Assessment/Plan   82y old Female who presents for sob and UTI. During work up, found to have Hiatal hernia (Intrathoracic stomach) found on CT scan.   Pt does not want surgery at this time due to her age and family situation.   Pt's symptoms stable at this time.  Continue current care  Diet - regular (DASH)  GI/DVT prophylaxis- SCDs  Analgesia prn  OOB/ambulation on the floor  GI recs appreciated  No surgical intervention at this time  Pt to follow up out pt for further HHr work up.     Case & plan discussed with Dr. Melendez.

## 2022-12-01 NOTE — PHYSICAL THERAPY INITIAL EVALUATION ADULT - PERTINENT HX OF CURRENT PROBLEM, REHAB EVAL
Admit dx: UTI, SOB (from large hiatal hernia).   Pt is an 82-year-old female history of hypertension hyperlipidemia COPD renal stone complaining about UTI symptoms the past several weeks.  Also having some shortness of breath for the past several days.  Lives alone by herself.  Denies any fever chills cough.  Worried that she may have a UTI. Admits to getting some shortness of breath with exertion

## 2022-12-01 NOTE — PHYSICAL THERAPY INITIAL EVALUATION ADULT - ADDITIONAL COMMENTS
Pt lives alone in a studio apartment with 0 ERI, no steps inside. Pt owns a rollator and cane which she interchangeably uses when ambulating outside of house. Pt does not leave the house much and is primarily a household ambulator. Pt reports being independent with all ADLs but having difficulty with everything. Pt is attempting to receive home cleaning services and is on the waiting list. Pt has close friends who would be able to come and visit pt to assist as needed but they have been sick with covid as per pt.

## 2022-12-02 ENCOUNTER — TRANSCRIPTION ENCOUNTER (OUTPATIENT)
Age: 82
End: 2022-12-02

## 2022-12-02 LAB
-  AMIKACIN: SIGNIFICANT CHANGE UP
-  AMOXICILLIN/CLAVULANIC ACID: SIGNIFICANT CHANGE UP
-  AMPICILLIN/SULBACTAM: SIGNIFICANT CHANGE UP
-  AMPICILLIN: SIGNIFICANT CHANGE UP
-  AZTREONAM: SIGNIFICANT CHANGE UP
-  CEFAZOLIN: SIGNIFICANT CHANGE UP
-  CEFEPIME: SIGNIFICANT CHANGE UP
-  CEFOXITIN: SIGNIFICANT CHANGE UP
-  CEFTRIAXONE: SIGNIFICANT CHANGE UP
-  CIPROFLOXACIN: SIGNIFICANT CHANGE UP
-  ERTAPENEM: SIGNIFICANT CHANGE UP
-  GENTAMICIN: SIGNIFICANT CHANGE UP
-  IMIPENEM: SIGNIFICANT CHANGE UP
-  LEVOFLOXACIN: SIGNIFICANT CHANGE UP
-  MEROPENEM: SIGNIFICANT CHANGE UP
-  NITROFURANTOIN: SIGNIFICANT CHANGE UP
-  PIPERACILLIN/TAZOBACTAM: SIGNIFICANT CHANGE UP
-  TOBRAMYCIN: SIGNIFICANT CHANGE UP
-  TRIMETHOPRIM/SULFAMETHOXAZOLE: SIGNIFICANT CHANGE UP
ANION GAP SERPL CALC-SCNC: 11 MMOL/L — SIGNIFICANT CHANGE UP (ref 5–17)
BUN SERPL-MCNC: 26 MG/DL — HIGH (ref 7–23)
CALCIUM SERPL-MCNC: 8.4 MG/DL — SIGNIFICANT CHANGE UP (ref 8.4–10.5)
CHLORIDE SERPL-SCNC: 106 MMOL/L — SIGNIFICANT CHANGE UP (ref 96–108)
CO2 SERPL-SCNC: 23 MMOL/L — SIGNIFICANT CHANGE UP (ref 22–31)
CREAT SERPL-MCNC: 1.13 MG/DL — SIGNIFICANT CHANGE UP (ref 0.5–1.3)
CULTURE RESULTS: SIGNIFICANT CHANGE UP
EGFR: 48 ML/MIN/1.73M2 — LOW
GLUCOSE SERPL-MCNC: 123 MG/DL — HIGH (ref 70–99)
HCT VFR BLD CALC: 29 % — LOW (ref 34.5–45)
HGB BLD-MCNC: 8.9 G/DL — LOW (ref 11.5–15.5)
MCHC RBC-ENTMCNC: 27.8 PG — SIGNIFICANT CHANGE UP (ref 27–34)
MCHC RBC-ENTMCNC: 30.7 GM/DL — LOW (ref 32–36)
MCV RBC AUTO: 90.6 FL — SIGNIFICANT CHANGE UP (ref 80–100)
METHOD TYPE: SIGNIFICANT CHANGE UP
NRBC # BLD: 0 /100 WBCS — SIGNIFICANT CHANGE UP (ref 0–0)
ORGANISM # SPEC MICROSCOPIC CNT: SIGNIFICANT CHANGE UP
ORGANISM # SPEC MICROSCOPIC CNT: SIGNIFICANT CHANGE UP
PLATELET # BLD AUTO: 216 K/UL — SIGNIFICANT CHANGE UP (ref 150–400)
POTASSIUM SERPL-MCNC: 4.4 MMOL/L — SIGNIFICANT CHANGE UP (ref 3.5–5.3)
POTASSIUM SERPL-SCNC: 4.4 MMOL/L — SIGNIFICANT CHANGE UP (ref 3.5–5.3)
RBC # BLD: 3.2 M/UL — LOW (ref 3.8–5.2)
RBC # FLD: 17.9 % — HIGH (ref 10.3–14.5)
SARS-COV-2 RNA SPEC QL NAA+PROBE: SIGNIFICANT CHANGE UP
SODIUM SERPL-SCNC: 140 MMOL/L — SIGNIFICANT CHANGE UP (ref 135–145)
SPECIMEN SOURCE: SIGNIFICANT CHANGE UP
WBC # BLD: 11.75 K/UL — HIGH (ref 3.8–10.5)
WBC # FLD AUTO: 11.75 K/UL — HIGH (ref 3.8–10.5)

## 2022-12-02 PROCEDURE — 99233 SBSQ HOSP IP/OBS HIGH 50: CPT

## 2022-12-02 RX ORDER — ACETAMINOPHEN 500 MG
2 TABLET ORAL
Qty: 0 | Refills: 0 | DISCHARGE
Start: 2022-12-02

## 2022-12-02 RX ORDER — MELOXICAM 15 MG/1
0 TABLET ORAL
Qty: 0 | Refills: 0 | DISCHARGE

## 2022-12-02 RX ORDER — CIPROFLOXACIN LACTATE 400MG/40ML
1 VIAL (ML) INTRAVENOUS
Qty: 6 | Refills: 0
Start: 2022-12-02 | End: 2022-12-04

## 2022-12-02 RX ADMIN — GABAPENTIN 100 MILLIGRAM(S): 400 CAPSULE ORAL at 13:51

## 2022-12-02 RX ADMIN — Medication 88 MICROGRAM(S): at 05:36

## 2022-12-02 RX ADMIN — Medication 25 MILLIGRAM(S): at 05:36

## 2022-12-02 RX ADMIN — PANTOPRAZOLE SODIUM 40 MILLIGRAM(S): 20 TABLET, DELAYED RELEASE ORAL at 05:36

## 2022-12-02 RX ADMIN — Medication 500 MILLIGRAM(S): at 05:36

## 2022-12-02 RX ADMIN — Medication 20 MILLIGRAM(S): at 05:35

## 2022-12-02 RX ADMIN — CITALOPRAM 40 MILLIGRAM(S): 10 TABLET, FILM COATED ORAL at 11:46

## 2022-12-02 RX ADMIN — SIMVASTATIN 40 MILLIGRAM(S): 20 TABLET, FILM COATED ORAL at 21:23

## 2022-12-02 RX ADMIN — GABAPENTIN 100 MILLIGRAM(S): 400 CAPSULE ORAL at 21:22

## 2022-12-02 RX ADMIN — Medication 500 MILLIGRAM(S): at 17:14

## 2022-12-02 RX ADMIN — BUPROPION HYDROCHLORIDE 150 MILLIGRAM(S): 150 TABLET, EXTENDED RELEASE ORAL at 11:45

## 2022-12-02 RX ADMIN — ALBUTEROL 2 PUFF(S): 90 AEROSOL, METERED ORAL at 14:16

## 2022-12-02 RX ADMIN — LOSARTAN POTASSIUM 25 MILLIGRAM(S): 100 TABLET, FILM COATED ORAL at 05:36

## 2022-12-02 RX ADMIN — GABAPENTIN 100 MILLIGRAM(S): 400 CAPSULE ORAL at 05:36

## 2022-12-02 RX ADMIN — BUDESONIDE AND FORMOTEROL FUMARATE DIHYDRATE 2 PUFF(S): 160; 4.5 AEROSOL RESPIRATORY (INHALATION) at 21:24

## 2022-12-02 NOTE — DISCHARGE NOTE PROVIDER - NSDCMRMEDTOKEN_GEN_ALL_CORE_FT
Albuterol (Eqv-ProAir HFA) 90 mcg/inh inhalation aerosol: 2 puff(s) inhaled every 6 hours  buPROPion 75 mg oral tablet: 3 tab(s) orally 2 times a day  CeleXA 40 mg oral tablet: 1 tab(s) orally once a day  Cipro 500 mg oral tablet: 1 tab(s) orally every 12 hours  famotidine 20 mg oral tablet: 1 tab(s) orally 2 times a day   gabapentin 100 mg oral capsule: 1 cap(s) orally 3 times a day  levothyroxine 88 mcg (0.088 mg) oral tablet: 1 tab(s) orally once a day  losartan 25 mg oral tablet: 1 tab(s) orally once a day  meloxicam: orally 2 times a week  metoprolol succinate 25 mg oral tablet, extended release: 1 tab(s) orally once a day  Rolling walker: 1 each  once a day   simvastatin 40 mg oral tablet: 1 tab(s) orally once a day (at bedtime)  Trelegy Ellipta inhalation powder: 1 puff(s) inhaled once a day    *Last Rx filled 12/2020 due to high cost. Patient should be on this therapy daily.  Vitamin D2 50 mcg (2000 intl units) oral capsule: 1 cap(s) orally once a day   acetaminophen 325 mg oral tablet: 2 tab(s) orally every 6 hours, As needed, Temp greater or equal to 38C (100.4F), Moderate Pain (4 - 6)  Albuterol (Eqv-ProAir HFA) 90 mcg/inh inhalation aerosol: 2 puff(s) inhaled every 6 hours  buPROPion 75 mg oral tablet: 3 tab(s) orally 2 times a day  CeleXA 40 mg oral tablet: 1 tab(s) orally once a day  ciprofloxacin 500 mg oral tablet: 1 tab(s) orally every 12 hours  famotidine 20 mg oral tablet: 1 tab(s) orally 2 times a day   gabapentin 100 mg oral capsule: 1 cap(s) orally 3 times a day  levothyroxine 88 mcg (0.088 mg) oral tablet: 1 tab(s) orally once a day  losartan 25 mg oral tablet: 1 tab(s) orally once a day  metoprolol succinate 25 mg oral tablet, extended release: 1 tab(s) orally once a day  predniSONE 20 mg oral tablet: 1 tab(s) orally once a day  simvastatin 40 mg oral tablet: 1 tab(s) orally once a day (at bedtime)  Trelegy Ellipta inhalation powder: 1 puff(s) inhaled once a day    *Last Rx filled 12/2020 due to high cost. Patient should be on this therapy daily.  Vitamin D2 50 mcg (2000 intl units) oral capsule: 1 cap(s) orally once a day   acetaminophen 325 mg oral tablet: 2 tab(s) orally every 6 hours, As needed, Temp greater or equal to 38C (100.4F), Moderate Pain (4 - 6)  Albuterol (Eqv-ProAir HFA) 90 mcg/inh inhalation aerosol: 2 puff(s) inhaled every 6 hours  buPROPion 75 mg oral tablet: 3 tab(s) orally 2 times a day  CeleXA 40 mg oral tablet: 1 tab(s) orally once a day  ciprofloxacin 500 mg oral tablet: 1 tab(s) orally every 12 hours dc 12/5/22  famotidine 20 mg oral tablet: 1 tab(s) orally 2 times a day   gabapentin 100 mg oral capsule: 1 cap(s) orally 3 times a day  levothyroxine 88 mcg (0.088 mg) oral tablet: 1 tab(s) orally once a day  losartan 25 mg oral tablet: 1 tab(s) orally once a day  metoprolol succinate 25 mg oral tablet, extended release: 1 tab(s) orally once a day  predniSONE 20 mg oral tablet: 1 tab(s) orally once a day dc 12/6/22  simvastatin 40 mg oral tablet: 1 tab(s) orally once a day (at bedtime)  Trelegy Ellipta inhalation powder: 1 puff(s) inhaled once a day    *Last Rx filled 12/2020 due to high cost. Patient should be on this therapy daily.  Vitamin D2 50 mcg (2000 intl units) oral capsule: 1 cap(s) orally once a day   acetaminophen 325 mg oral tablet: 2 tab(s) orally every 6 hours, As needed, Temp greater or equal to 38C (100.4F), Moderate Pain (4 - 6)  Albuterol (Eqv-ProAir HFA) 90 mcg/inh inhalation aerosol: 2 puff(s) inhaled every 6 hours  buPROPion 75 mg oral tablet: 3 tab(s) orally 2 times a day  CeleXA 40 mg oral tablet: 1 tab(s) orally once a day  famotidine 20 mg oral tablet: 1 tab(s) orally 2 times a day   gabapentin 100 mg oral capsule: 1 cap(s) orally 3 times a day  levothyroxine 88 mcg (0.088 mg) oral tablet: 1 tab(s) orally once a day  losartan 25 mg oral tablet: 1 tab(s) orally once a day  metoprolol succinate 25 mg oral tablet, extended release: 1 tab(s) orally once a day  simvastatin 40 mg oral tablet: 1 tab(s) orally once a day (at bedtime)  Trelegy Ellipta inhalation powder: 1 puff(s) inhaled once a day    *Last Rx filled 12/2020 due to high cost. Patient should be on this therapy daily.  Vitamin D2 50 mcg (2000 intl units) oral capsule: 1 cap(s) orally once a day

## 2022-12-02 NOTE — DISCHARGE NOTE PROVIDER - CARE PROVIDERS DIRECT ADDRESSES
,DirectAddress_Unknown ,DirectAddress_Unknown,[email or web address],DirectAddress_Unknown

## 2022-12-02 NOTE — DISCHARGE NOTE PROVIDER - HOSPITAL COURSE
URI  Intrathoracic Stomach  Large Hiatal Hernia  COPD    stable for dc with outpt gi and pulm fu    >35 minutes spent on discharge UTI  Intrathoracic Stomach  Large Hiatal Hernia  COPD    stable for dc with outpt gi and pulm fu    >35 minutes spent on discharge

## 2022-12-02 NOTE — DISCHARGE NOTE PROVIDER - CARE PROVIDER_API CALL
Tati Melendez)  Surgery; Surgical Critical Care  [address] [place] [address]  Phone: [phone]  Fax: [phone]  Follow Up Time: 1 week   Tati Melendez)  Surgery; Surgical Critical Care  [address] [place] [address]  Phone: [phone]  Fax: [phone]  Follow Up Time: 1 week    Teressa Mcmahon)  Critical Care Medicine; Internal Medicine; Pulmonary Disease  [address], [address]  [place] [address]  Phone: [phone]  Fax: [phone]  Follow Up Time: 1 week    Darci Robin ()  Internal Medicine  [address] [place] [address]  Phone: [phone]  Fax: [phone]  Follow Up Time: 2 weeks

## 2022-12-02 NOTE — PROGRESS NOTE ADULT - TIME BILLING
Obtaining history/physical exam, reviewing labs and imaging studies, coordinating care with multidisciplinary team and nursing staff and discussing patient with surgical PA covering. Greater than 50% of the time was spent with direct face to face patient interaction, discussing with the patient and family, and answering all questions.

## 2022-12-02 NOTE — PROGRESS NOTE ADULT - NS ATTEND AMEND GEN_ALL_CORE FT
I have personally seen and examined the patient.  I fully participated in the care of this patient.  I have made amendments to the documentation where necessary, and agree with the history, physical exam, impression/assessment, and plan as documented by the PA    Discussed with patient again that surgery would be an option given her worsening symptoms (SOB, chest pressure gray with movement/bending) and progressively worsening in nature. She is at a higher surgical risk given her age and cardiopulmonary comorbidities. She will ultimately need cardiology and pulmonary clearances if she does decide to pursue surgery which would be a robotic assisted hiatal hernia repair (likely with mesh). Patient to f/u with me outpatient in 1-2 weeks.

## 2022-12-02 NOTE — DISCHARGE NOTE PROVIDER - NSDCFUADDAPPT_GEN_ALL_CORE_FT
PLEASE CALL SURGEON'S OFFICE/DR. FREDA MONTEJO TO SCHEDULE A FOLLOW-UP APPOINTMENT TO DISCUSS SURGICAL MANAGEMENT OF HIATAL HERNIA.

## 2022-12-02 NOTE — DISCHARGE NOTE PROVIDER - PROVIDER TOKENS
PROVIDER:[TOKEN:[[id number]],FOLLOWUP:[1 week]] PROVIDER:[TOKEN:[[id number]],FOLLOWUP:[1 week]],PROVIDER:[TOKEN:[[id number]],FOLLOWUP:[1 week]],PROVIDER:[TOKEN:[[id number]],FOLLOWUP:[2 weeks]]

## 2022-12-02 NOTE — DISCHARGE NOTE PROVIDER - NSDCCPCAREPLAN_GEN_ALL_CORE_FT
PRINCIPAL DISCHARGE DIAGNOSIS  Diagnosis: Acute UTI  Assessment and Plan of Treatment: finish course of abx      SECONDARY DISCHARGE DIAGNOSES  Diagnosis: Hiatal hernia  Assessment and Plan of Treatment: gu with gi    Diagnosis: Dyspnea on exertion  Assessment and Plan of Treatment: finish course of steroids orally  fu with pulm  fu with gi     PRINCIPAL DISCHARGE DIAGNOSIS  Diagnosis: Acute UTI  Assessment and Plan of Treatment: finished course of abx      SECONDARY DISCHARGE DIAGNOSES  Diagnosis: Hiatal hernia  Assessment and Plan of Treatment: gu with gi    Diagnosis: Dyspnea on exertion  Assessment and Plan of Treatment: finish course of steroids orally  fu with pulm  fu with gi

## 2022-12-03 ENCOUNTER — TRANSCRIPTION ENCOUNTER (OUTPATIENT)
Age: 82
End: 2022-12-03

## 2022-12-03 LAB
ANION GAP SERPL CALC-SCNC: 7 MMOL/L — SIGNIFICANT CHANGE UP (ref 5–17)
BUN SERPL-MCNC: 30 MG/DL — HIGH (ref 7–23)
CALCIUM SERPL-MCNC: 8.7 MG/DL — SIGNIFICANT CHANGE UP (ref 8.4–10.5)
CHLORIDE SERPL-SCNC: 108 MMOL/L — SIGNIFICANT CHANGE UP (ref 96–108)
CO2 SERPL-SCNC: 26 MMOL/L — SIGNIFICANT CHANGE UP (ref 22–31)
CREAT SERPL-MCNC: 1.09 MG/DL — SIGNIFICANT CHANGE UP (ref 0.5–1.3)
EGFR: 50 ML/MIN/1.73M2 — LOW
GLUCOSE SERPL-MCNC: 89 MG/DL — SIGNIFICANT CHANGE UP (ref 70–99)
HCT VFR BLD CALC: 27.8 % — LOW (ref 34.5–45)
HGB BLD-MCNC: 8.4 G/DL — LOW (ref 11.5–15.5)
MCHC RBC-ENTMCNC: 27.3 PG — SIGNIFICANT CHANGE UP (ref 27–34)
MCHC RBC-ENTMCNC: 30.2 GM/DL — LOW (ref 32–36)
MCV RBC AUTO: 90.3 FL — SIGNIFICANT CHANGE UP (ref 80–100)
NRBC # BLD: 0 /100 WBCS — SIGNIFICANT CHANGE UP (ref 0–0)
PLATELET # BLD AUTO: 210 K/UL — SIGNIFICANT CHANGE UP (ref 150–400)
POTASSIUM SERPL-MCNC: 4.4 MMOL/L — SIGNIFICANT CHANGE UP (ref 3.5–5.3)
POTASSIUM SERPL-SCNC: 4.4 MMOL/L — SIGNIFICANT CHANGE UP (ref 3.5–5.3)
RBC # BLD: 3.08 M/UL — LOW (ref 3.8–5.2)
RBC # FLD: 18.1 % — HIGH (ref 10.3–14.5)
SODIUM SERPL-SCNC: 141 MMOL/L — SIGNIFICANT CHANGE UP (ref 135–145)
WBC # BLD: 7.89 K/UL — SIGNIFICANT CHANGE UP (ref 3.8–10.5)
WBC # FLD AUTO: 7.89 K/UL — SIGNIFICANT CHANGE UP (ref 3.8–10.5)

## 2022-12-03 RX ORDER — CIPROFLOXACIN LACTATE 400MG/40ML
1 VIAL (ML) INTRAVENOUS
Qty: 6 | Refills: 0
Start: 2022-12-03 | End: 2022-12-05

## 2022-12-03 RX ADMIN — Medication 500 MILLIGRAM(S): at 05:52

## 2022-12-03 RX ADMIN — BUDESONIDE AND FORMOTEROL FUMARATE DIHYDRATE 2 PUFF(S): 160; 4.5 AEROSOL RESPIRATORY (INHALATION) at 19:15

## 2022-12-03 RX ADMIN — GABAPENTIN 100 MILLIGRAM(S): 400 CAPSULE ORAL at 22:35

## 2022-12-03 RX ADMIN — GABAPENTIN 100 MILLIGRAM(S): 400 CAPSULE ORAL at 15:37

## 2022-12-03 RX ADMIN — SIMVASTATIN 40 MILLIGRAM(S): 20 TABLET, FILM COATED ORAL at 22:35

## 2022-12-03 RX ADMIN — LOSARTAN POTASSIUM 25 MILLIGRAM(S): 100 TABLET, FILM COATED ORAL at 05:54

## 2022-12-03 RX ADMIN — GABAPENTIN 100 MILLIGRAM(S): 400 CAPSULE ORAL at 05:46

## 2022-12-03 RX ADMIN — BUPROPION HYDROCHLORIDE 150 MILLIGRAM(S): 150 TABLET, EXTENDED RELEASE ORAL at 13:12

## 2022-12-03 RX ADMIN — Medication 88 MICROGRAM(S): at 05:52

## 2022-12-03 RX ADMIN — BUDESONIDE AND FORMOTEROL FUMARATE DIHYDRATE 2 PUFF(S): 160; 4.5 AEROSOL RESPIRATORY (INHALATION) at 06:06

## 2022-12-03 RX ADMIN — Medication 20 MILLIGRAM(S): at 05:52

## 2022-12-03 RX ADMIN — Medication 25 MILLIGRAM(S): at 05:51

## 2022-12-03 RX ADMIN — TIOTROPIUM BROMIDE 2 PUFF(S): 18 CAPSULE ORAL; RESPIRATORY (INHALATION) at 06:06

## 2022-12-03 RX ADMIN — PANTOPRAZOLE SODIUM 40 MILLIGRAM(S): 20 TABLET, DELAYED RELEASE ORAL at 05:58

## 2022-12-03 RX ADMIN — CITALOPRAM 40 MILLIGRAM(S): 10 TABLET, FILM COATED ORAL at 13:12

## 2022-12-03 RX ADMIN — Medication 500 MILLIGRAM(S): at 18:52

## 2022-12-03 NOTE — DISCHARGE NOTE NURSING/CASE MANAGEMENT/SOCIAL WORK - NSDCVIVACCINE_GEN_ALL_CORE_FT
COVID-19 vaccine, vector-nr, rS-Ad26, PF, 0.5 mL (Johnathan); 23-Mar-2021 15:30; Isis Szymanski ([facility]); Johnathan; [id number] (Exp. Date: 25-May-2021); IntraMuscular; Deltoid Left.; 0.5 milliLiter(s);

## 2022-12-03 NOTE — DISCHARGE NOTE NURSING/CASE MANAGEMENT/SOCIAL WORK - NSSCNAMETXT_GEN_ALL_CORE
[facility] - [phone]/ [phone]  Registered Nurse to visit the day after hospital discharge; Physical Therapist to follow. Please contact the home care agency at the above phone number if you have not heard from them by 12 noon on the day after your hospital discharge.

## 2022-12-03 NOTE — DISCHARGE NOTE NURSING/CASE MANAGEMENT/SOCIAL WORK - NSDCPEFALRISK_GEN_ALL_CORE
For information on Fall & Injury Prevention, visit: [email or web address] OR  [email or web address] OR  https://www.cdc.gov/steadi/patient.html

## 2022-12-03 NOTE — DISCHARGE NOTE NURSING/CASE MANAGEMENT/SOCIAL WORK - PATIENT PORTAL LINK FT
You can access the FollowMyHealth Patient Portal offered by [facility] by registering at the following website: [email or web address]. By joining [employer]’s FollowMyHealth portal, you will also be able to view your health information using other applications (apps) compatible with our system.

## 2022-12-04 RX ADMIN — GABAPENTIN 100 MILLIGRAM(S): 400 CAPSULE ORAL at 22:45

## 2022-12-04 RX ADMIN — BUDESONIDE AND FORMOTEROL FUMARATE DIHYDRATE 2 PUFF(S): 160; 4.5 AEROSOL RESPIRATORY (INHALATION) at 22:44

## 2022-12-04 RX ADMIN — PANTOPRAZOLE SODIUM 40 MILLIGRAM(S): 20 TABLET, DELAYED RELEASE ORAL at 06:14

## 2022-12-04 RX ADMIN — GABAPENTIN 100 MILLIGRAM(S): 400 CAPSULE ORAL at 15:36

## 2022-12-04 RX ADMIN — Medication 500 MILLIGRAM(S): at 17:31

## 2022-12-04 RX ADMIN — Medication 88 MICROGRAM(S): at 05:56

## 2022-12-04 RX ADMIN — Medication 25 MILLIGRAM(S): at 05:56

## 2022-12-04 RX ADMIN — SIMVASTATIN 40 MILLIGRAM(S): 20 TABLET, FILM COATED ORAL at 22:45

## 2022-12-04 RX ADMIN — CITALOPRAM 40 MILLIGRAM(S): 10 TABLET, FILM COATED ORAL at 15:36

## 2022-12-04 RX ADMIN — BUPROPION HYDROCHLORIDE 150 MILLIGRAM(S): 150 TABLET, EXTENDED RELEASE ORAL at 15:35

## 2022-12-04 RX ADMIN — LOSARTAN POTASSIUM 25 MILLIGRAM(S): 100 TABLET, FILM COATED ORAL at 05:57

## 2022-12-04 RX ADMIN — GABAPENTIN 100 MILLIGRAM(S): 400 CAPSULE ORAL at 05:56

## 2022-12-04 RX ADMIN — Medication 500 MILLIGRAM(S): at 05:56

## 2022-12-04 RX ADMIN — Medication 20 MILLIGRAM(S): at 05:56

## 2022-12-05 LAB — SARS-COV-2 RNA SPEC QL NAA+PROBE: SIGNIFICANT CHANGE UP

## 2022-12-05 RX ADMIN — BUDESONIDE AND FORMOTEROL FUMARATE DIHYDRATE 2 PUFF(S): 160; 4.5 AEROSOL RESPIRATORY (INHALATION) at 05:21

## 2022-12-05 RX ADMIN — Medication 500 MILLIGRAM(S): at 05:20

## 2022-12-05 RX ADMIN — GABAPENTIN 100 MILLIGRAM(S): 400 CAPSULE ORAL at 05:20

## 2022-12-05 RX ADMIN — Medication 650 MILLIGRAM(S): at 12:31

## 2022-12-05 RX ADMIN — GABAPENTIN 100 MILLIGRAM(S): 400 CAPSULE ORAL at 20:37

## 2022-12-05 RX ADMIN — Medication 88 MICROGRAM(S): at 05:20

## 2022-12-05 RX ADMIN — CITALOPRAM 40 MILLIGRAM(S): 10 TABLET, FILM COATED ORAL at 11:55

## 2022-12-05 RX ADMIN — TIOTROPIUM BROMIDE 2 PUFF(S): 18 CAPSULE ORAL; RESPIRATORY (INHALATION) at 05:21

## 2022-12-05 RX ADMIN — BUPROPION HYDROCHLORIDE 150 MILLIGRAM(S): 150 TABLET, EXTENDED RELEASE ORAL at 11:55

## 2022-12-05 RX ADMIN — GABAPENTIN 100 MILLIGRAM(S): 400 CAPSULE ORAL at 13:15

## 2022-12-05 RX ADMIN — Medication 650 MILLIGRAM(S): at 11:36

## 2022-12-05 RX ADMIN — Medication 25 MILLIGRAM(S): at 05:21

## 2022-12-05 RX ADMIN — PANTOPRAZOLE SODIUM 40 MILLIGRAM(S): 20 TABLET, DELAYED RELEASE ORAL at 05:20

## 2022-12-05 RX ADMIN — Medication 500 MILLIGRAM(S): at 17:04

## 2022-12-05 RX ADMIN — LOSARTAN POTASSIUM 25 MILLIGRAM(S): 100 TABLET, FILM COATED ORAL at 05:20

## 2022-12-05 RX ADMIN — BUDESONIDE AND FORMOTEROL FUMARATE DIHYDRATE 2 PUFF(S): 160; 4.5 AEROSOL RESPIRATORY (INHALATION) at 20:37

## 2022-12-05 RX ADMIN — SIMVASTATIN 40 MILLIGRAM(S): 20 TABLET, FILM COATED ORAL at 20:38

## 2022-12-05 RX ADMIN — Medication 20 MILLIGRAM(S): at 05:20

## 2022-12-05 NOTE — PROGRESS NOTE ADULT - PROBLEM SELECTOR PLAN 2
ct chest noted  gi noted  esophagram noted  no sx intervention at this time  gi and sx noted  outpt fu with sx
ct chest noted  gi noted  for esophagram today  thoracic sx eval today
ct chest noted  gi noted  esophagram noted  no sx intervention at this time  gi and sx noted  outpt fu with sx

## 2022-12-05 NOTE — PROGRESS NOTE ADULT - PROBLEM SELECTOR PLAN 1
stable and improved  ct chest noted  cxr non revealing  po prednisone taper to 20 mg  oral inhalers and duo nebs  supportive care  currently comfotable on ra
pt with vague c/o sob and tightness  ct chest noted  cxr non revealing  po prednisone taper to 20 mg  oral inhalers and duo nebs  supportive care  currently comfotable on ra
stable and improved  ct chest noted  cxr non revealing  po prednisone taper to 20 mg  oral inhalers and duo nebs  supportive care  currently comfotable on ra

## 2022-12-05 NOTE — PROGRESS NOTE ADULT - PROBLEM SELECTOR PLAN 3
ua c/w uti  pt does not want iv lock  continue oral cipro finish to completion
ua c/w uti  pt does not want iv lock  continue oral cipro finish to completion
c/o dysuria  ua c/w uti  pt does not want iv lock  change cipro to po
ua c/w uti  continue oral cipro finish to completion
c/o dysuria  ua c/w uti  fu cultures  pt does not want iv lock  change cipro to po

## 2022-12-05 NOTE — PROGRESS NOTE ADULT - PROBLEM SELECTOR PLAN 4
stable   continue home meds

## 2022-12-06 VITALS — WEIGHT: 167.99 LBS

## 2022-12-06 PROCEDURE — 71045 X-RAY EXAM CHEST 1 VIEW: CPT

## 2022-12-06 PROCEDURE — 85025 COMPLETE CBC W/AUTO DIFF WBC: CPT

## 2022-12-06 PROCEDURE — 87635 SARS-COV-2 COVID-19 AMP PRB: CPT

## 2022-12-06 PROCEDURE — 96365 THER/PROPH/DIAG IV INF INIT: CPT

## 2022-12-06 PROCEDURE — 84484 ASSAY OF TROPONIN QUANT: CPT

## 2022-12-06 PROCEDURE — 97116 GAIT TRAINING THERAPY: CPT

## 2022-12-06 PROCEDURE — 36415 COLL VENOUS BLD VENIPUNCTURE: CPT

## 2022-12-06 PROCEDURE — 94640 AIRWAY INHALATION TREATMENT: CPT

## 2022-12-06 PROCEDURE — 97110 THERAPEUTIC EXERCISES: CPT

## 2022-12-06 PROCEDURE — 74176 CT ABD & PELVIS W/O CONTRAST: CPT | Mod: MA

## 2022-12-06 PROCEDURE — 87086 URINE CULTURE/COLONY COUNT: CPT

## 2022-12-06 PROCEDURE — 80053 COMPREHEN METABOLIC PANEL: CPT

## 2022-12-06 PROCEDURE — 80048 BASIC METABOLIC PNL TOTAL CA: CPT

## 2022-12-06 PROCEDURE — 87186 SC STD MICRODIL/AGAR DIL: CPT

## 2022-12-06 PROCEDURE — 99285 EMERGENCY DEPT VISIT HI MDM: CPT

## 2022-12-06 PROCEDURE — 93005 ELECTROCARDIOGRAM TRACING: CPT

## 2022-12-06 PROCEDURE — 87637 SARSCOV2&INF A&B&RSV AMP PRB: CPT

## 2022-12-06 PROCEDURE — 71250 CT THORAX DX C-: CPT | Mod: MH

## 2022-12-06 PROCEDURE — 97161 PT EVAL LOW COMPLEX 20 MIN: CPT

## 2022-12-06 PROCEDURE — 81001 URINALYSIS AUTO W/SCOPE: CPT

## 2022-12-06 PROCEDURE — 74220 X-RAY XM ESOPHAGUS 1CNTRST: CPT

## 2022-12-06 PROCEDURE — 85027 COMPLETE CBC AUTOMATED: CPT

## 2022-12-06 PROCEDURE — 83880 ASSAY OF NATRIURETIC PEPTIDE: CPT

## 2022-12-06 RX ORDER — ALBUTEROL 90 UG/1
2 AEROSOL, METERED ORAL
Qty: 240 | Refills: 0
Start: 2022-12-06 | End: 2023-01-04

## 2022-12-06 RX ADMIN — LOSARTAN POTASSIUM 25 MILLIGRAM(S): 100 TABLET, FILM COATED ORAL at 05:18

## 2022-12-06 RX ADMIN — TIOTROPIUM BROMIDE 2 PUFF(S): 18 CAPSULE ORAL; RESPIRATORY (INHALATION) at 05:24

## 2022-12-06 RX ADMIN — Medication 88 MICROGRAM(S): at 05:18

## 2022-12-06 RX ADMIN — GABAPENTIN 100 MILLIGRAM(S): 400 CAPSULE ORAL at 12:59

## 2022-12-06 RX ADMIN — Medication 500 MILLIGRAM(S): at 05:17

## 2022-12-06 RX ADMIN — Medication 20 MILLIGRAM(S): at 05:17

## 2022-12-06 RX ADMIN — GABAPENTIN 100 MILLIGRAM(S): 400 CAPSULE ORAL at 05:18

## 2022-12-06 RX ADMIN — CITALOPRAM 40 MILLIGRAM(S): 10 TABLET, FILM COATED ORAL at 12:53

## 2022-12-06 RX ADMIN — BUPROPION HYDROCHLORIDE 150 MILLIGRAM(S): 150 TABLET, EXTENDED RELEASE ORAL at 12:52

## 2022-12-06 RX ADMIN — Medication 25 MILLIGRAM(S): at 05:17

## 2022-12-06 RX ADMIN — BUDESONIDE AND FORMOTEROL FUMARATE DIHYDRATE 2 PUFF(S): 160; 4.5 AEROSOL RESPIRATORY (INHALATION) at 05:23

## 2022-12-06 RX ADMIN — PANTOPRAZOLE SODIUM 40 MILLIGRAM(S): 20 TABLET, DELAYED RELEASE ORAL at 05:18

## 2022-12-06 NOTE — PROGRESS NOTE ADULT - REASON FOR ADMISSION
uti symptoms with occ sob

## 2022-12-06 NOTE — PROGRESS NOTE ADULT - PROVIDER SPECIALTY LIST ADULT
Gastroenterology
Surgery
Gastroenterology
Internal Medicine

## 2022-12-06 NOTE — DIETITIAN INITIAL EVALUATION ADULT - NUTRITION DIAGNOSITC TERMINOLOGY #1
Patient seen and examined at the bedside. I reviewed and edited the entire body of the note above so that it reflects my personal, face-to-face involvement in all specified aspects of the patient's care.    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 Echocardiogram shows a large ventricular septal defect with bidirectional shunt, as well as a small to mod ASD. He may now be starting to develop some begining signs of overcirculation, although this is a bit difficult to differentiate from his other respiratory issues. At this point a trial of lasix appears reasonable.   Additionally, patient is noted to have an EKG with a short QTc, which will need ongoing evaluation Overweight/Obesity

## 2022-12-06 NOTE — DIETITIAN INITIAL EVALUATION ADULT - OTHER INFO
Visited patient in room, presents with good appetite/po intake, consuming >75% of meals. Denies n/v/d, constipated in house, no BM noted. States no hx of constipation pta. No reported difficulty chewing or swallowing. NKFA. Reported some level of weight gain, no weight loss noted, current adm weight 168#, will continue to monitor weight trends as able.     Pertinent medications/nutrition labs reviewed; noted elevated BUN. Briefly explained current diet: DASH, pt states will follow a low sodium and low fat diet after discharged. RD to continue to monitor nutrition status per protocol.

## 2022-12-06 NOTE — DIETITIAN INITIAL EVALUATION ADULT - REASON FOR ADMISSION
Per H&P "82-year-old female history of hypertension hyperlipidemia COPD renal stone complaining about UTI symptoms the past several weeks.  Also having some shortness of breath for the past several days.  Lives alone by herself.  Denies any fever chills cough.  Worried that she may have a UTI. Admits to getting some shortness of breath with exertion.  Denies any chest pain."

## 2022-12-06 NOTE — DIETITIAN INITIAL EVALUATION ADULT - ORAL INTAKE PTA/DIET HISTORY
Reported not following any therapeutic diet at home, but states eating "light", not much sauce added. At home was receiving "mom's meal", appetite/po intake was good, having 3 meals a day. Was taking vitamin D2, no other nutrition supplements reported.

## 2022-12-06 NOTE — PROGRESS NOTE ADULT - SUBJECTIVE AND OBJECTIVE BOX
INTERVAL HPI/OVERNIGHT EVENT      Allergies    adhesives (Rash; Other)  Cat dander- wheezing, itchy throat, SOB (Other)  penicillin (Rash)  sulfa drugs (Rash)  tetracycline (Stomach Upset)    Intolerances    General:  No wt loss, fevers, chills, night sweats, fatigue,   Eyes:  Good vision, no reported pain  ENT:  No sore throat, pain, runny nose, dysphagia  CV:  No pain, palpitations, hypo/hypertension  Resp:  No dyspnea, cough, tachypnea, wheezing  GI:  No pain, No nausea, No vomiting, No diarrhea, No constipation, No weight loss, No fever, No pruritis, No rectal bleeding, No tarry stools, No dysphagia,  :  No pain, bleeding, incontinence, nocturia  Muscle:  No pain, weakness  Neuro:  No weakness, tingling, memory problems  Psych:  No fatigue, insomnia, mood problems, depression  Endocrine:  No polyuria, polydipsia, cold/heat intolerance  Heme:  No petechiae, ecchymosis, easy bruisability  Skin:  No rash, tattoos, scars, edema      PHYSICAL EXAM:   Vital Signs:  Vital Signs Last 24 Hrs  T(C): 37.2 (03 Dec 2022 05:55), Max: 37.2 (03 Dec 2022 05:55)  T(F): 98.9 (03 Dec 2022 05:55), Max: 98.9 (03 Dec 2022 05:55)  HR: 72 (03 Dec 2022 05:55) (72 - 79)  BP: 144/73 (03 Dec 2022 05:55) (107/67 - 144/73)  BP(mean): --  RR: 18 (03 Dec 2022 05:55) (18 - 18)  SpO2: 94% (03 Dec 2022 05:55) (94% - 95%)    Parameters below as of 02 Dec 2022 19:52  Patient On (Oxygen Delivery Method): room air      Daily     Daily I&O's Summary      GENERAL:  Appears stated age, well-groomed, well-nourished, no distress  HEENT:  NC/AT,  conjunctivae clear and pink, no thyromegaly, nodules, adenopathy, no JVD, sclera -anicteric  CHEST:  Full & symmetric excursion, no increased effort, breath sounds clear  HEART:  Regular rhythm, S1, S2, no murmur/rub/S3/S4, no abdominal bruit, no edema  ABDOMEN:  Soft, non-tender, non-distended, normoactive bowel sounds,  no masses ,no hepato-splenomegaly, no signs of chronic liver disease  EXTEREMITIES:  no cyanosis,clubbing or edema  SKIN:  No rash/erythema/ecchymoses/petechiae/wounds/abscess/warm/dry  NEURO:  Alert, oriented, no asterixis, no tremor, no encephalopathy      LABS:                        8.4    7.89  )-----------( 210      ( 03 Dec 2022 06:00 )             27.8     12-03    141  |  108  |  30<H>  ----------------------------<  89  4.4   |  26  |  1.09    Ca    8.7      03 Dec 2022 06:00          amylase   lipase  RADIOLOGY & ADDITIONAL TESTS:  
INTERVAL HPI/OVERNIGHT EVENTS:  No new overnight event.  No N/V/D.  Tolerating diet.   MEDICATIONS  (STANDING):  budesonide  80 MICROgram(s)/formoterol 4.5 MICROgram(s) Inhaler 2 Puff(s) Inhalation two times a day  buPROPion XL (24-Hour) . 150 milliGRAM(s) Oral daily  citalopram 40 milliGRAM(s) Oral daily  gabapentin 100 milliGRAM(s) Oral three times a day  levothyroxine 88 MICROGram(s) Oral daily  losartan 25 milliGRAM(s) Oral daily  metoprolol succinate ER 25 milliGRAM(s) Oral daily  pantoprazole    Tablet 40 milliGRAM(s) Oral before breakfast  simvastatin 40 milliGRAM(s) Oral at bedtime  tiotropium 2.5 MICROgram(s) Inhaler 2 Puff(s) Inhalation daily    MEDICATIONS  (PRN):  acetaminophen     Tablet .. 650 milliGRAM(s) Oral every 6 hours PRN Temp greater or equal to 38C (100.4F), Moderate Pain (4 - 6)  albuterol    90 MICROgram(s) HFA Inhaler 2 Puff(s) Inhalation every 6 hours PRN Bronchospasm      Allergies    adhesives (Rash; Other)  Cat dander- wheezing, itchy throat, SOB (Other)  penicillin (Rash)  sulfa drugs (Rash)  tetracycline (Stomach Upset)    Intolerances        Review of Systems:    General:  No wt loss, fevers, chills, night sweats,fatigue,   Eyes:  Good vision, no reported pain  ENT:  No sore throat, pain, runny nose, dysphagia  CV:  No pain, palpitatioins, hypo/hypertension  Resp:  No dyspnea, cough, tachypnea, wheezing  GI:  No pain, No nausea, No vomiting, No diarrhea, No constipatiion, No weight loss, No fever, No pruritis, No rectal bleeding, No tarry stools, No dysphagia,  :  No pain, bleeding, incontinence, nocturia  Muscle:  No pain, weakness  Neuro:  No weakness, tingling, memory problems  Psych:  No fatigue, insomnia, mood problems, depression  Endocrine:  No polyuria, polydypsia, cold/heat intolerance  Heme:  No petechiae, ecchymosis, easy bruisability  Skin:  No rash, tattoos, scars, edema      Vital Signs Last 24 Hrs  T(C): 36.5 (06 Dec 2022 07:39), Max: 36.7 (06 Dec 2022 00:54)  T(F): 97.7 (06 Dec 2022 07:39), Max: 98 (06 Dec 2022 00:54)  HR: 71 (06 Dec 2022 07:39) (68 - 77)  BP: 105/58 (06 Dec 2022 07:39) (103/62 - 120/71)  BP(mean): --  RR: 16 (06 Dec 2022 07:39) (16 - 18)  SpO2: 94% (06 Dec 2022 07:39) (94% - 98%)    Parameters below as of 06 Dec 2022 07:39  Patient On (Oxygen Delivery Method): room air        PHYSICAL EXAM:    Constitutional: NAD, well-developed  HEENT: EOMI, throat clear  Neck: No LAD, supple  Respiratory: CTA and P  Cardiovascular: S1 and S2, RRR, no M  Gastrointestinal: BS+, soft, NT/ND, neg HSM,  Extremities: No peripheral edema, neg clubing, cyanosis  Vascular: 2+ peripheral pulses  Neurological: A/O x 3, no focal deficits  Psychiatric: Normal mood, normal affect  Skin: No rashes      LABS:                RADIOLOGY & ADDITIONAL TESTS:  
INTERVAL HPI/OVERNIGHT EVENTS:  pt chest pain and sob improved  reports it is different from her copd  she has some gerd today    Allergies    adhesives (Rash; Other)  Cat dander- wheezing, itchy throat, SOB (Other)  penicillin (Rash)  sulfa drugs (Rash)  tetracycline (Stomach Upset)    Intolerances    General:  No wt loss, fevers, chills, night sweats, fatigue,   Eyes:  Good vision, no reported pain  ENT:  No sore throat, pain, runny nose, dysphagia  CV:  No pain, palpitations, hypo/hypertension  Resp:  No dyspnea, cough, tachypnea, wheezing  GI:  No pain, No nausea, No vomiting, No diarrhea, No constipation, No weight loss, No fever, No pruritis, No rectal bleeding, No tarry stools, No dysphagia,  :  No pain, bleeding, incontinence, nocturia  Muscle:  No pain, weakness  Neuro:  No weakness, tingling, memory problems  Psych:  No fatigue, insomnia, mood problems, depression  Endocrine:  No polyuria, polydipsia, cold/heat intolerance  Heme:  No petechiae, ecchymosis, easy bruisability  Skin:  No rash, tattoos, scars, edema      PHYSICAL EXAM:   Vital Signs:  Vital Signs Last 24 Hrs  T(C): 36.8 (01 Dec 2022 05:54), Max: 37.1 (2022 16:23)  T(F): 98.3 (01 Dec 2022 05:54), Max: 98.7 (2022 16:23)  HR: 89 (01 Dec 2022 05:54) (70 - 89)  BP: 122/70 (01 Dec 2022 05:54) (95/61 - 133/82)  BP(mean): --  RR: 18 (01 Dec 2022 05:54) (18 - 20)  SpO2: 93% (01 Dec 2022 05:54) (93% - 94%)    Parameters below as of 01 Dec 2022 05:54  Patient On (Oxygen Delivery Method): room air      Daily     Daily I&O's Summary    2022 07:01  -  01 Dec 2022 07:00  --------------------------------------------------------  IN: 650 mL / OUT: 0 mL / NET: 650 mL        GENERAL:  Appears stated age, well-groomed, well-nourished, no distress  HEENT:  NC/AT,  conjunctivae clear and pink, no thyromegaly, nodules, adenopathy, no JVD, sclera -anicteric  CHEST:  Full & symmetric excursion, no increased effort, breath sounds clear  HEART:  Regular rhythm, S1, S2, no murmur/rub/S3/S4, no abdominal bruit, no edema  ABDOMEN:  Soft, non-tender, non-distended, normoactive bowel sounds,  no masses ,no hepato-splenomegaly, no signs of chronic liver disease  EXTEREMITIES:  no cyanosis,clubbing or edema  SKIN:  No rash/erythema/ecchymoses/petechiae/wounds/abscess/warm/dry  NEURO:  Alert, oriented, no asterixis, no tremor, no encephalopathy      LABS:                        9.2    5.71  )-----------( 217      ( 2022 20:33 )             29.8     11-    141  |  107  |  28<H>  ----------------------------<  104<H>  4.4   |  28  |  1.38<H>    Ca    9.3      2022 20:33    TPro  6.2  /  Alb  3.0<L>  /  TBili  0.3  /  DBili  x   /  AST  17  /  ALT  18  /  AlkPhos  65  -      Urinalysis Basic - ( 2022 22:07 )    Color: Yellow / Appearance: Clear / S.020 / pH: x  Gluc: x / Ketone: Negative  / Bili: Negative / Urobili: Negative mg/dL   Blood: x / Protein: 30 mg/dL / Nitrite: Positive   Leuk Esterase: Moderate / RBC: 0-2 /HPF / WBC 6-10   Sq Epi: x / Non Sq Epi: Few / Bacteria: Few      amylase   lipase  RADIOLOGY & ADDITIONAL TESTS:  
Patient is a 82y old  Female who presents with a chief complaint of uti symptoms with occ sob (02 Dec 2022 09:58)      INTERVAL HPI/OVERNIGHT EVENTS: stable, no new events for dc home, gi and sx eval noted    MEDICATIONS  (STANDING):  budesonide  80 MICROgram(s)/formoterol 4.5 MICROgram(s) Inhaler 2 Puff(s) Inhalation two times a day  buPROPion XL (24-Hour) . 150 milliGRAM(s) Oral daily  ciprofloxacin     Tablet 500 milliGRAM(s) Oral every 12 hours  citalopram 40 milliGRAM(s) Oral daily  gabapentin 100 milliGRAM(s) Oral three times a day  levothyroxine 88 MICROGram(s) Oral daily  losartan 25 milliGRAM(s) Oral daily  metoprolol succinate ER 25 milliGRAM(s) Oral daily  pantoprazole    Tablet 40 milliGRAM(s) Oral before breakfast  predniSONE   Tablet 20 milliGRAM(s) Oral daily  simvastatin 40 milliGRAM(s) Oral at bedtime  tiotropium 2.5 MICROgram(s) Inhaler 2 Puff(s) Inhalation daily    MEDICATIONS  (PRN):  acetaminophen     Tablet .. 650 milliGRAM(s) Oral every 6 hours PRN Temp greater or equal to 38C (100.4F), Moderate Pain (4 - 6)  albuterol    90 MICROgram(s) HFA Inhaler 2 Puff(s) Inhalation every 6 hours PRN Bronchospasm      Allergies    adhesives (Rash; Other)  Cat dander- wheezing, itchy throat, SOB (Other)  penicillin (Rash)  sulfa drugs (Rash)  tetracycline (Stomach Upset)    Intolerances        REVIEW OF SYSTEMS:  CONSTITUTIONAL: No fever, weight loss, or fatigue  EYES: No eye pain, visual disturbances  ENMT:  No difficulty hearing, tinnitus, vertigo; No sinus or throat pain  NECK: No pain or stiffness  RESPIRATORY: No cough, wheezing, chills or hemoptysis; No shortness of breath  CARDIOVASCULAR: No chest pain, palpitations, dizziness  GASTROINTESTINAL: No abdominal or epigastric pain. No nausea, vomiting, or hematemesis; No diarrhea or constipation. No melena or hematochezia.  GENITOURINARY: No dysuria, frequency, hematuria, or incontinence  NEUROLOGICAL: No headaches, memory loss, loss of strength, numbness, or tremors  SKIN: No itching, burning  LYMPH NODES: No enlarged glands  MUSCULOSKELETAL: No joint pain or swelling; No muscle, back, or extremity pain  PSYCHIATRIC: No depression, mood swings  HEME/LYMPH: No easy bruising, or bleeding gums  ALLERGY AND IMMUNOLOGIC: No hives    Vital Signs Last 24 Hrs  T(C): 36.7 (02 Dec 2022 11:36), Max: 36.7 (01 Dec 2022 16:23)  T(F): 98 (02 Dec 2022 11:36), Max: 98.1 (01 Dec 2022 16:23)  HR: 77 (02 Dec 2022 11:36) (77 - 100)  BP: 116/68 (02 Dec 2022 11:36) (112/67 - 134/74)  BP(mean): --  RR: 17 (02 Dec 2022 11:36) (17 - 18)  SpO2: 94% (02 Dec 2022 11:36) (92% - 94%)    Parameters below as of 02 Dec 2022 11:36  Patient On (Oxygen Delivery Method): room air        PHYSICAL EXAM:  GENERAL: NAD, well-groomed, well-developed  HEAD:  Atraumatic, Normocephalic  EYES: EOMI, PERRLA, conjunctiva and sclera clear  ENMT: No tonsillar erythema, exudates, or enlargement   NECK: Supple, No JVD  NERVOUS SYSTEM:  Alert & Oriented X3, Good concentration  CHEST/LUNG: Clear to auscultation bilaterally; No rales, rhonchi, wheezing  HEART: Regular rate and rhythm  ABDOMEN: Soft, Nontender, Nondistended; Bowel sounds present  EXTREMITIES:  2+ Peripheral Pulses   LYMPH: No lymphadenopathy noted  SKIN: No rashes     LABS:                        8.9    11.75 )-----------( 216      ( 02 Dec 2022 10:06 )             29.0     02 Dec 2022 10:06    140    |  106    |  26     ----------------------------<  123    4.4     |  23     |  1.13     Ca    8.4        02 Dec 2022 10:06          CAPILLARY BLOOD GLUCOSE        blood culture --   >100,000 CFU/ml Escherichia coli   11-29 @ 22:07      urine culture --  11-29 @ 22:07  results   >100,000 CFU/ml Escherichia coli 11-29 @ 22:07    wound with gram statin --    11-29 @ 22:07  organism  --   11-29 @ 22:07  specimen source Clean Catch Clean Catch (Midstream)  11-29 @ 22:07      RADIOLOGY & ADDITIONAL TESTS:      Consultant(s) Notes Reviewed:  [ x] YES  [ ] NO    Care Discussed with Consultants/Other Providers [x ] YES  [ ] NO    Advanced care planning discussed with patient and family, advanced care planning forms reviewed, discussed, and completed.  20 minutes spent.  
INTERVAL HPI/OVERNIGHT EVENTS:  No new overnight event.  No N/V/D.  Tolerating diet.  barium esophagogram noted    Allergies    adhesives (Rash; Other)  Cat dander- wheezing, itchy throat, SOB (Other)  penicillin (Rash)  sulfa drugs (Rash)  tetracycline (Stomach Upset)    Intolerances    General:  No wt loss, fevers, chills, night sweats, fatigue,   Eyes:  Good vision, no reported pain  ENT:  No sore throat, pain, runny nose, dysphagia  CV:  No pain, palpitations, hypo/hypertension  Resp:  No dyspnea, cough, tachypnea, wheezing  GI:  No pain, No nausea, No vomiting, No diarrhea, No constipation, No weight loss, No fever, No pruritis, No rectal bleeding, No tarry stools, No dysphagia,  :  No pain, bleeding, incontinence, nocturia  Muscle:  No pain, weakness  Neuro:  No weakness, tingling, memory problems  Psych:  No fatigue, insomnia, mood problems, depression  Endocrine:  No polyuria, polydipsia, cold/heat intolerance  Heme:  No petechiae, ecchymosis, easy bruisability  Skin:  No rash, tattoos, scars, edema      PHYSICAL EXAM:   Vital Signs:  Vital Signs Last 24 Hrs  T(C): 36.7 (02 Dec 2022 11:36), Max: 36.7 (01 Dec 2022 22:28)  T(F): 98 (02 Dec 2022 11:36), Max: 98.1 (02 Dec 2022 05:46)  HR: 77 (02 Dec 2022 11:36) (77 - 85)  BP: 113/70 (02 Dec 2022 13:48) (113/70 - 134/74)  BP(mean): --  RR: 17 (02 Dec 2022 11:36) (17 - 18)  SpO2: 92% (02 Dec 2022 13:48) (92% - 94%)    Parameters below as of 02 Dec 2022 13:48  Patient On (Oxygen Delivery Method): room air      Daily     Daily I&O's Summary      GENERAL:  Appears stated age, well-groomed, well-nourished, no distress  HEENT:  NC/AT,  conjunctivae clear and pink, no thyromegaly, nodules, adenopathy, no JVD, sclera -anicteric  CHEST:  Full & symmetric excursion, no increased effort, breath sounds clear  HEART:  Regular rhythm, S1, S2, no murmur/rub/S3/S4, no abdominal bruit, no edema  ABDOMEN:  Soft, non-tender, non-distended, normoactive bowel sounds,  no masses ,no hepato-splenomegaly, no signs of chronic liver disease  EXTEREMITIES:  no cyanosis,clubbing or edema  SKIN:  No rash/erythema/ecchymoses/petechiae/wounds/abscess/warm/dry  NEURO:  Alert, oriented, no asterixis, no tremor, no encephalopathy      LABS:                        8.9    11.75 )-----------( 216      ( 02 Dec 2022 10:06 )             29.0     12-02    140  |  106  |  26<H>  ----------------------------<  123<H>  4.4   |  23  |  1.13    Ca    8.4      02 Dec 2022 10:06          amylase   lipase  RADIOLOGY & ADDITIONAL TESTS:  
Patient is a 82y old  Female who presents with a chief complaint of uti symptoms with occ sob (03 Dec 2022 14:53)      INTERVAL HPI/OVERNIGHT EVENTS: no new events await dc to [facility]    MEDICATIONS  (STANDING):  budesonide  80 MICROgram(s)/formoterol 4.5 MICROgram(s) Inhaler 2 Puff(s) Inhalation two times a day  buPROPion XL (24-Hour) . 150 milliGRAM(s) Oral daily  ciprofloxacin     Tablet 500 milliGRAM(s) Oral every 12 hours  citalopram 40 milliGRAM(s) Oral daily  gabapentin 100 milliGRAM(s) Oral three times a day  levothyroxine 88 MICROGram(s) Oral daily  losartan 25 milliGRAM(s) Oral daily  metoprolol succinate ER 25 milliGRAM(s) Oral daily  pantoprazole    Tablet 40 milliGRAM(s) Oral before breakfast  predniSONE   Tablet 20 milliGRAM(s) Oral daily  simvastatin 40 milliGRAM(s) Oral at bedtime  tiotropium 2.5 MICROgram(s) Inhaler 2 Puff(s) Inhalation daily    MEDICATIONS  (PRN):  acetaminophen     Tablet .. 650 milliGRAM(s) Oral every 6 hours PRN Temp greater or equal to 38C (100.4F), Moderate Pain (4 - 6)  albuterol    90 MICROgram(s) HFA Inhaler 2 Puff(s) Inhalation every 6 hours PRN Bronchospasm      Allergies    adhesives (Rash; Other)  Cat dander- wheezing, itchy throat, SOB (Other)  penicillin (Rash)  sulfa drugs (Rash)  tetracycline (Stomach Upset)    Intolerances        REVIEW OF SYSTEMS:  CONSTITUTIONAL: No fever, weight loss, or fatigue  EYES: No eye pain, visual disturbances  ENMT:  No difficulty hearing, tinnitus, vertigo; No sinus or throat pain  NECK: No pain or stiffness  RESPIRATORY: No cough, wheezing, chills or hemoptysis; No shortness of breath  CARDIOVASCULAR: No chest pain, palpitations, dizziness  GASTROINTESTINAL: No abdominal or epigastric pain. No nausea, vomiting, or hematemesis; No diarrhea or constipation. No melena or hematochezia.  GENITOURINARY: No dysuria, frequency, hematuria, or incontinence  NEUROLOGICAL: No headaches, memory loss, loss of strength, numbness, or tremors  SKIN: No itching, burning  LYMPH NODES: No enlarged glands  MUSCULOSKELETAL: No joint pain or swelling; No muscle, back, or extremity pain  PSYCHIATRIC: No depression, mood swings  HEME/LYMPH: No easy bruising, or bleeding gums  ALLERGY AND IMMUNOLOGIC: No hives    Vital Signs Last 24 Hrs  T(C): 36.4 (04 Dec 2022 06:30), Max: 36.7 (03 Dec 2022 16:44)  T(F): 97.6 (04 Dec 2022 06:30), Max: 98.1 (03 Dec 2022 16:44)  HR: 71 (04 Dec 2022 06:30) (69 - 71)  BP: 125/70 (04 Dec 2022 06:30) (122/63 - 125/70)  BP(mean): --  RR: 16 (04 Dec 2022 06:30) (16 - 16)  SpO2: 96% (04 Dec 2022 06:30) (95% - 96%)    Parameters below as of 04 Dec 2022 06:30  Patient On (Oxygen Delivery Method): room air        PHYSICAL EXAM:  GENERAL: NAD, well-groomed, well-developed  HEAD:  Atraumatic, Normocephalic  EYES: EOMI, PERRLA, conjunctiva and sclera clear  ENMT: No tonsillar erythema, exudates, or enlargement   NECK: Supple, No JVD  NERVOUS SYSTEM:  Alert & Oriented X3, Good concentration  CHEST/LUNG: Clear to auscultation bilaterally; No rales, rhonchi, wheezing  HEART: Regular rate and rhythm  ABDOMEN: Soft, Nontender, Nondistended; Bowel sounds present  EXTREMITIES:  2+ Peripheral Pulses   LYMPH: No lymphadenopathy noted  SKIN: No rashes     LABS:      Ca    8.7        03 Dec 2022 06:00          CAPILLARY BLOOD GLUCOSE        blood culture --   >100,000 CFU/ml Escherichia coli   11-29 @ 22:07      urine culture --  11-29 @ 22:07  results   >100,000 CFU/ml Escherichia coli 11-29 @ 22:07    wound with gram statin --    11-29 @ 22:07  organism  Escherichia coli   11-29 @ 22:07  specimen source Clean Catch Clean Catch (Midstream)  11-29 @ 22:07      RADIOLOGY & ADDITIONAL TESTS:      Consultant(s) Notes Reviewed:  [x ] YES  [ ] NO    Care Discussed with Consultants/Other Providers [ x] YES  [ ] NO    Advanced care planning discussed with patient and family, advanced care planning forms reviewed, discussed, and completed.  20 minutes spent.  
INTERVAL HPI/OVERNIGHT EVENTS:    MEDICATIONS  (STANDING):  budesonide  80 MICROgram(s)/formoterol 4.5 MICROgram(s) Inhaler 2 Puff(s) Inhalation two times a day  buPROPion XL (24-Hour) . 150 milliGRAM(s) Oral daily  ciprofloxacin     Tablet 500 milliGRAM(s) Oral every 12 hours  citalopram 40 milliGRAM(s) Oral daily  gabapentin 100 milliGRAM(s) Oral three times a day  levothyroxine 88 MICROGram(s) Oral daily  losartan 25 milliGRAM(s) Oral daily  metoprolol succinate ER 25 milliGRAM(s) Oral daily  pantoprazole    Tablet 40 milliGRAM(s) Oral before breakfast  predniSONE   Tablet 20 milliGRAM(s) Oral daily  simvastatin 40 milliGRAM(s) Oral at bedtime  tiotropium 2.5 MICROgram(s) Inhaler 2 Puff(s) Inhalation daily    MEDICATIONS  (PRN):  acetaminophen     Tablet .. 650 milliGRAM(s) Oral every 6 hours PRN Temp greater or equal to 38C (100.4F), Moderate Pain (4 - 6)  albuterol    90 MICROgram(s) HFA Inhaler 2 Puff(s) Inhalation every 6 hours PRN Bronchospasm      Allergies    adhesives (Rash; Other)  Cat dander- wheezing, itchy throat, SOB (Other)  penicillin (Rash)  sulfa drugs (Rash)  tetracycline (Stomach Upset)    Intolerances        Review of Systems:    General:  No wt loss, fevers, chills, night sweats,fatigue,   Eyes:  Good vision, no reported pain  ENT:  No sore throat, pain, runny nose, dysphagia  CV:  No pain, palpitatioins, hypo/hypertension  Resp:  No dyspnea, cough, tachypnea, wheezing  GI:  No pain, No nausea, No vomiting, No diarrhea, No constipatiion, No weight loss, No fever, No pruritis, No rectal bleeding, No tarry stools, No dysphagia,  :  No pain, bleeding, incontinence, nocturia  Muscle:  No pain, weakness  Neuro:  No weakness, tingling, memory problems  Psych:  No fatigue, insomnia, mood problems, depression  Endocrine:  No polyuria, polydypsia, cold/heat intolerance  Heme:  No petechiae, ecchymosis, easy bruisability  Skin:  No rash, tattoos, scars, edema      Vital Signs Last 24 Hrs  T(C): 36.6 (05 Dec 2022 15:17), Max: 37 (04 Dec 2022 23:38)  T(F): 97.9 (05 Dec 2022 15:17), Max: 98.6 (04 Dec 2022 23:38)  HR: 68 (05 Dec 2022 17:10) (68 - 77)  BP: 103/62 (05 Dec 2022 17:10) (100/64 - 127/72)  BP(mean): --  RR: 18 (05 Dec 2022 17:10) (16 - 20)  SpO2: 95% (05 Dec 2022 17:10) (92% - 97%)    Parameters below as of 05 Dec 2022 17:10  Patient On (Oxygen Delivery Method): room air        PHYSICAL EXAM:    Constitutional: NAD, well-developed  HEENT: EOMI, throat clear  Neck: No LAD, supple  Respiratory: CTA and P  Cardiovascular: S1 and S2, RRR, no M  Gastrointestinal: BS+, soft, NT/ND, neg HSM,  Extremities: No peripheral edema, neg clubing, cyanosis  Vascular: 2+ peripheral pulses  Neurological: A/O x 3, no focal deficits  Psychiatric: Normal mood, normal affect  Skin: No rashes      LABS:                RADIOLOGY & ADDITIONAL TESTS:  
INTERVAL HPI/OVERNIGHT EVENTS:  No new overnight event.  No N/V/D.  Tolerating diet.    Allergies    adhesives (Rash; Other)  Cat dander- wheezing, itchy throat, SOB (Other)  penicillin (Rash)  sulfa drugs (Rash)  tetracycline (Stomach Upset)    Intolerances    General:  No wt loss, fevers, chills, night sweats, fatigue,   Eyes:  Good vision, no reported pain  ENT:  No sore throat, pain, runny nose, dysphagia  CV:  No pain, palpitations, hypo/hypertension  Resp:  No dyspnea, cough, tachypnea, wheezing  GI:  No pain, No nausea, No vomiting, No diarrhea, No constipation, No weight loss, No fever, No pruritis, No rectal bleeding, No tarry stools, No dysphagia,  :  No pain, bleeding, incontinence, nocturia  Muscle:  No pain, weakness  Neuro:  No weakness, tingling, memory problems  Psych:  No fatigue, insomnia, mood problems, depression  Endocrine:  No polyuria, polydipsia, cold/heat intolerance  Heme:  No petechiae, ecchymosis, easy bruisability  Skin:  No rash, tattoos, scars, edema      PHYSICAL EXAM:   Vital Signs:  Vital Signs Last 24 Hrs  T(C): 36.8 (04 Dec 2022 17:42), Max: 36.8 (04 Dec 2022 17:42)  T(F): 98.2 (04 Dec 2022 17:42), Max: 98.2 (04 Dec 2022 17:42)  HR: 76 (04 Dec 2022 17:42) (71 - 76)  BP: 159/78 (04 Dec 2022 17:42) (125/70 - 159/78)  BP(mean): --  RR: 16 (04 Dec 2022 17:42) (16 - 16)  SpO2: 94% (04 Dec 2022 17:42) (94% - 96%)    Parameters below as of 04 Dec 2022 06:30  Patient On (Oxygen Delivery Method): room air      Daily     Daily I&O's Summary      GENERAL:  Appears stated age, well-groomed, well-nourished, no distress  HEENT:  NC/AT,  conjunctivae clear and pink, no thyromegaly, nodules, adenopathy, no JVD, sclera -anicteric  CHEST:  Full & symmetric excursion, no increased effort, breath sounds clear  HEART:  Regular rhythm, S1, S2, no murmur/rub/S3/S4, no abdominal bruit, no edema  ABDOMEN:  Soft, non-tender, non-distended, normoactive bowel sounds,  no masses ,no hepato-splenomegaly, no signs of chronic liver disease  EXTEREMITIES:  no cyanosis,clubbing or edema  SKIN:  No rash/erythema/ecchymoses/petechiae/wounds/abscess/warm/dry  NEURO:  Alert, oriented, no asterixis, no tremor, no encephalopathy      LABS:                        8.4    7.89  )-----------( 210      ( 03 Dec 2022 06:00 )             27.8     12-03    141  |  108  |  30<H>  ----------------------------<  89  4.4   |  26  |  1.09    Ca    8.7      03 Dec 2022 06:00          amylase   lipase  RADIOLOGY & ADDITIONAL TESTS:  
SURGERY PA NOTE    81 y/o WF with PMHx of HTN, COPD, HLD, hiatal hernia admitted with UTI, SOB and CT findings of large hiatal hernia    SUBJECTIVE:  Patient seen at beside, eating breakfast, no overnight events, no complaints at this time.  Reports feeling "winded/SOB" with activity and bending.   Patient admits to tolerating diet, flatus, bowel movement, voiding independently, ambulating.  Patient denies chest pain, headache, dizziness, fever/chills, dysuria, nausea, vomiting, diarrhea.    OBJECTIVE:   T(F): 98.1 (12-02-22 @ 05:46), Max: 98.5 (12-01-22 @ 10:54)  HR: 81 (12-02-22 @ 05:46) (81 - 100)  BP: 134/74 (12-02-22 @ 05:46) (111/70 - 134/74)  RR: 18 (12-02-22 @ 05:46) (18 - 18)  SpO2: 94% (12-02-22 @ 05:46) (92% - 94%)      PHYSICAL EXAM:  General: A+O x 3, NAD  HEENT: PERRLA, EOMs intact, non-icteric  Abdomen: soft, non-distended, non-tender, BS x 4, no guarding, no rebound, no CVA noted  Extremities: nonedematous bilaterally, warm, no calf tenderness noted     MEDICATIONS  (STANDING):  budesonide  80 MICROgram(s)/formoterol 4.5 MICROgram(s) Inhaler 2 Puff(s) Inhalation two times a day  buPROPion XL (24-Hour) . 150 milliGRAM(s) Oral daily  ciprofloxacin     Tablet 500 milliGRAM(s) Oral every 12 hours  citalopram 40 milliGRAM(s) Oral daily  gabapentin 100 milliGRAM(s) Oral three times a day  levothyroxine 88 MICROGram(s) Oral daily  losartan 25 milliGRAM(s) Oral daily  metoprolol succinate ER 25 milliGRAM(s) Oral daily  pantoprazole    Tablet 40 milliGRAM(s) Oral before breakfast  predniSONE   Tablet 20 milliGRAM(s) Oral daily  simvastatin 40 milliGRAM(s) Oral at bedtime  tiotropium 2.5 MICROgram(s) Inhaler 2 Puff(s) Inhalation daily    MEDICATIONS  (PRN):  acetaminophen     Tablet .. 650 milliGRAM(s) Oral every 6 hours PRN Temp greater or equal to 38C (100.4F), Moderate Pain (4 - 6)  albuterol    90 MICROgram(s) HFA Inhaler 2 Puff(s) Inhalation every 6 hours PRN Bronchospasm      LABS:                        9.1    5.55  )-----------( 221      ( 01 Dec 2022 08:50 )             30.0     12-01    139  |  106  |  18  ----------------------------<  144<H>  4.8   |  25  |  1.00    Ca    8.9      01 Dec 2022 08:50            
Patient is a 82y old  Female who presents with a chief complaint of uti symptoms with occ sob (02 Dec 2022 17:58)      INTERVAL HPI/OVERNIGHT EVENTS: stable, no new events for dc to [place]    MEDICATIONS  (STANDING):  budesonide  80 MICROgram(s)/formoterol 4.5 MICROgram(s) Inhaler 2 Puff(s) Inhalation two times a day  buPROPion XL (24-Hour) . 150 milliGRAM(s) Oral daily  ciprofloxacin     Tablet 500 milliGRAM(s) Oral every 12 hours  citalopram 40 milliGRAM(s) Oral daily  gabapentin 100 milliGRAM(s) Oral three times a day  levothyroxine 88 MICROGram(s) Oral daily  losartan 25 milliGRAM(s) Oral daily  metoprolol succinate ER 25 milliGRAM(s) Oral daily  pantoprazole    Tablet 40 milliGRAM(s) Oral before breakfast  predniSONE   Tablet 20 milliGRAM(s) Oral daily  simvastatin 40 milliGRAM(s) Oral at bedtime  tiotropium 2.5 MICROgram(s) Inhaler 2 Puff(s) Inhalation daily    MEDICATIONS  (PRN):  acetaminophen     Tablet .. 650 milliGRAM(s) Oral every 6 hours PRN Temp greater or equal to 38C (100.4F), Moderate Pain (4 - 6)  albuterol    90 MICROgram(s) HFA Inhaler 2 Puff(s) Inhalation every 6 hours PRN Bronchospasm      Allergies    adhesives (Rash; Other)  Cat dander- wheezing, itchy throat, SOB (Other)  penicillin (Rash)  sulfa drugs (Rash)  tetracycline (Stomach Upset)    Intolerances        REVIEW OF SYSTEMS:  CONSTITUTIONAL: No fever, weight loss, or fatigue  EYES: No eye pain, visual disturbances  ENMT:  No difficulty hearing, tinnitus, vertigo; No sinus or throat pain  NECK: No pain or stiffness  RESPIRATORY: No cough, wheezing, chills or hemoptysis; No shortness of breath  CARDIOVASCULAR: No chest pain, palpitations, dizziness  GASTROINTESTINAL: No abdominal or epigastric pain. No nausea, vomiting, or hematemesis; No diarrhea or constipation. No melena or hematochezia.  GENITOURINARY: No dysuria, frequency, hematuria, or incontinence  NEUROLOGICAL: No headaches, memory loss, loss of strength, numbness, or tremors  SKIN: No itching, burning  LYMPH NODES: No enlarged glands  MUSCULOSKELETAL: No joint pain or swelling; No muscle, back, or extremity pain  PSYCHIATRIC: No depression, mood swings  HEME/LYMPH: No easy bruising, or bleeding gums  ALLERGY AND IMMUNOLOGIC: No hives    Vital Signs Last 24 Hrs  T(C): 37.2 (03 Dec 2022 05:55), Max: 37.2 (03 Dec 2022 05:55)  T(F): 98.9 (03 Dec 2022 05:55), Max: 98.9 (03 Dec 2022 05:55)  HR: 72 (03 Dec 2022 05:55) (72 - 79)  BP: 144/73 (03 Dec 2022 05:55) (107/67 - 144/73)  BP(mean): --  RR: 18 (03 Dec 2022 05:55) (18 - 18)  SpO2: 94% (03 Dec 2022 05:55) (92% - 95%)    Parameters below as of 02 Dec 2022 19:52  Patient On (Oxygen Delivery Method): room air        PHYSICAL EXAM:  GENERAL: NAD, well-groomed, well-developed  HEAD:  Atraumatic, Normocephalic  EYES: EOMI, PERRLA, conjunctiva and sclera clear  ENMT: No tonsillar erythema, exudates, or enlargement   NECK: Supple, No JVD  NERVOUS SYSTEM:  Alert & Oriented X3, Good concentration  CHEST/LUNG: Clear to auscultation bilaterally; No rales, rhonchi, wheezing  HEART: Regular rate and rhythm  ABDOMEN: Soft, Nontender, Nondistended; Bowel sounds present  EXTREMITIES:  2+ Peripheral Pulses   LYMPH: No lymphadenopathy noted  SKIN: No rashes     LABS:                        8.4    7.89  )-----------( 210      ( 03 Dec 2022 06:00 )             27.8     03 Dec 2022 06:00    141    |  108    |  30     ----------------------------<  89     4.4     |  26     |  1.09     Ca    8.7        03 Dec 2022 06:00          CAPILLARY BLOOD GLUCOSE        blood culture --   >100,000 CFU/ml Escherichia coli   11-29 @ 22:07      urine culture --  11-29 @ 22:07  results   >100,000 CFU/ml Escherichia coli 11-29 @ 22:07    wound with gram statin --    11-29 @ 22:07  organism  Escherichia coli   11-29 @ 22:07  specimen source Clean Catch Clean Catch (Midstream)  11-29 @ 22:07      RADIOLOGY & ADDITIONAL TESTS:      Consultant(s) Notes Reviewed:  [ x] YES  [ ] NO    Care Discussed with Consultants/Other Providers [x ] YES  [ ] NO    Advanced care planning discussed with patient and family, advanced care planning forms reviewed, discussed, and completed.  20 minutes spent.  
Patient is a 82y old  Female who presents with a chief complaint of uti symptoms with occ sob (01 Dec 2022 08:46)      INTERVAL HPI/OVERNIGHT EVENTS: pt does not want iv line, gi noted, for esophagram    MEDICATIONS  (STANDING):  albuterol/ipratropium for Nebulization 3 milliLiter(s) Nebulizer every 6 hours  budesonide  80 MICROgram(s)/formoterol 4.5 MICROgram(s) Inhaler 2 Puff(s) Inhalation two times a day  buPROPion XL (24-Hour) . 150 milliGRAM(s) Oral daily  ciprofloxacin     Tablet 500 milliGRAM(s) Oral every 12 hours  citalopram 40 milliGRAM(s) Oral daily  gabapentin 100 milliGRAM(s) Oral three times a day  levothyroxine 88 MICROGram(s) Oral daily  losartan 25 milliGRAM(s) Oral daily  metoprolol succinate ER 25 milliGRAM(s) Oral daily  pantoprazole    Tablet 40 milliGRAM(s) Oral before breakfast  predniSONE   Tablet 40 milliGRAM(s) Oral daily  simvastatin 40 milliGRAM(s) Oral at bedtime  tiotropium 2.5 MICROgram(s) Inhaler 2 Puff(s) Inhalation daily    MEDICATIONS  (PRN):  acetaminophen     Tablet .. 650 milliGRAM(s) Oral every 6 hours PRN Temp greater or equal to 38C (100.4F), Moderate Pain (4 - 6)  albuterol    90 MICROgram(s) HFA Inhaler 2 Puff(s) Inhalation every 6 hours PRN Bronchospasm      Allergies    adhesives (Rash; Other)  Cat dander- wheezing, itchy throat, SOB (Other)  penicillin (Rash)  sulfa drugs (Rash)  tetracycline (Stomach Upset)    Intolerances        REVIEW OF SYSTEMS:  CONSTITUTIONAL: No fever, weight loss, or fatigue  EYES: No eye pain, visual disturbances  ENMT:  No difficulty hearing, tinnitus, vertigo; No sinus or throat pain  NECK: No pain or stiffness  RESPIRATORY: No cough, wheezing, chills or hemoptysis; No shortness of breath  CARDIOVASCULAR: No chest pain, palpitations, dizziness  GASTROINTESTINAL: still with luq discomfort  GENITOURINARY: No dysuria, frequency, hematuria, or incontinence  NEUROLOGICAL: No headaches, memory loss, loss of strength, numbness, or tremors  SKIN: No itching, burning  LYMPH NODES: No enlarged glands  MUSCULOSKELETAL: No joint pain or swelling; No muscle, back, or extremity pain  PSYCHIATRIC: No depression, mood swings  HEME/LYMPH: No easy bruising, or bleeding gums  ALLERGY AND IMMUNOLOGIC: No hives    Vital Signs Last 24 Hrs  T(C): 36.8 (01 Dec 2022 05:54), Max: 37.1 (2022 16:23)  T(F): 98.3 (01 Dec 2022 05:54), Max: 98.7 (2022 16:23)  HR: 82 (01 Dec 2022 07:41) (70 - 89)  BP: 122/70 (01 Dec 2022 05:54) (95/61 - 133/82)  BP(mean): --  RR: 18 (01 Dec 2022 05:54) (18 - 20)  SpO2: 94% (01 Dec 2022 07:41) (93% - 94%)    Parameters below as of 01 Dec 2022 07:41  Patient On (Oxygen Delivery Method): room air        PHYSICAL EXAM:  GENERAL: NAD, well-groomed, well-developed  HEAD:  Atraumatic, Normocephalic  EYES: EOMI, PERRLA, conjunctiva and sclera clear  ENMT: No tonsillar erythema, exudates, or enlargement   NECK: Supple, No JVD  NERVOUS SYSTEM:  Alert & Oriented X3, Good concentration  CHEST/LUNG: Clear to auscultation bilaterally; No rales, rhonchi, wheezing  HEART: Regular rate and rhythm  ABDOMEN: Soft, Nontender, Nondistended; Bowel sounds present  EXTREMITIES:  2+ Peripheral Pulses   LYMPH: No lymphadenopathy noted  SKIN: No rashes     LABS:                        9.1    5.55  )-----------( 221      ( 01 Dec 2022 08:50 )             30.0     01 Dec 2022 08:50    139    |  106    |  18     ----------------------------<  144    4.8     |  25     |  1.00     Ca    8.9        01 Dec 2022 08:50        Urinalysis Basic - ( 2022 22:07 )    Color: Yellow / Appearance: Clear / S.020 / pH: x  Gluc: x / Ketone: Negative  / Bili: Negative / Urobili: Negative mg/dL   Blood: x / Protein: 30 mg/dL / Nitrite: Positive   Leuk Esterase: Moderate / RBC: 0-2 /HPF / WBC 6-10   Sq Epi: x / Non Sq Epi: Few / Bacteria: Few      CAPILLARY BLOOD GLUCOSE                RADIOLOGY & ADDITIONAL TESTS:      Consultant(s) Notes Reviewed:  [x ] YES  [ ] NO    Care Discussed with Consultants/Other Providers [x ] YES  [ ] NO    Advanced care planning discussed with patient and family, advanced care planning forms reviewed, discussed, and completed.  20 minutes spent.  
Patient is a 82y old  Female who presents with a chief complaint of uti symptoms with occ sob (04 Dec 2022 19:12)      INTERVAL HPI/OVERNIGHT EVENTS: stable, no new events, for dc planning    MEDICATIONS  (STANDING):  budesonide  80 MICROgram(s)/formoterol 4.5 MICROgram(s) Inhaler 2 Puff(s) Inhalation two times a day  buPROPion XL (24-Hour) . 150 milliGRAM(s) Oral daily  ciprofloxacin     Tablet 500 milliGRAM(s) Oral every 12 hours  citalopram 40 milliGRAM(s) Oral daily  gabapentin 100 milliGRAM(s) Oral three times a day  levothyroxine 88 MICROGram(s) Oral daily  losartan 25 milliGRAM(s) Oral daily  metoprolol succinate ER 25 milliGRAM(s) Oral daily  pantoprazole    Tablet 40 milliGRAM(s) Oral before breakfast  predniSONE   Tablet 20 milliGRAM(s) Oral daily  simvastatin 40 milliGRAM(s) Oral at bedtime  tiotropium 2.5 MICROgram(s) Inhaler 2 Puff(s) Inhalation daily    MEDICATIONS  (PRN):  acetaminophen     Tablet .. 650 milliGRAM(s) Oral every 6 hours PRN Temp greater or equal to 38C (100.4F), Moderate Pain (4 - 6)  albuterol    90 MICROgram(s) HFA Inhaler 2 Puff(s) Inhalation every 6 hours PRN Bronchospasm      Allergies    adhesives (Rash; Other)  Cat dander- wheezing, itchy throat, SOB (Other)  penicillin (Rash)  sulfa drugs (Rash)  tetracycline (Stomach Upset)    Intolerances        REVIEW OF SYSTEMS:  CONSTITUTIONAL: No fever, weight loss, or fatigue  EYES: No eye pain, visual disturbances  ENMT:  No difficulty hearing, tinnitus, vertigo; No sinus or throat pain  NECK: No pain or stiffness  RESPIRATORY: No cough, wheezing, chills or hemoptysis; No shortness of breath  CARDIOVASCULAR: No chest pain, palpitations, dizziness  GASTROINTESTINAL: No abdominal or epigastric pain. No nausea, vomiting, or hematemesis; No diarrhea or constipation. No melena or hematochezia.  GENITOURINARY: No dysuria, frequency, hematuria, or incontinence  NEUROLOGICAL: No headaches, memory loss, loss of strength, numbness, or tremors  SKIN: No itching, burning  LYMPH NODES: No enlarged glands  MUSCULOSKELETAL: No joint pain or swelling; No muscle, back, or extremity pain  PSYCHIATRIC: No depression, mood swings  HEME/LYMPH: No easy bruising, or bleeding gums  ALLERGY AND IMMUNOLOGIC: No hives    Vital Signs Last 24 Hrs  T(C): 36.6 (05 Dec 2022 07:53), Max: 37 (04 Dec 2022 23:38)  T(F): 97.8 (05 Dec 2022 07:53), Max: 98.6 (04 Dec 2022 23:38)  HR: 69 (05 Dec 2022 07:53) (69 - 77)  BP: 127/72 (05 Dec 2022 07:53) (126/72 - 159/78)  BP(mean): --  RR: 16 (05 Dec 2022 07:53) (16 - 16)  SpO2: 92% (05 Dec 2022 07:53) (92% - 94%)    Parameters below as of 05 Dec 2022 07:53  Patient On (Oxygen Delivery Method): room air        PHYSICAL EXAM:  GENERAL: NAD, well-groomed, well-developed  HEAD:  Atraumatic, Normocephalic  EYES: EOMI, PERRLA, conjunctiva and sclera clear  ENMT: No tonsillar erythema, exudates, or enlargement   NECK: Supple, No JVD  NERVOUS SYSTEM:  Alert & Oriented X3, Good concentration  CHEST/LUNG: Clear to auscultation bilaterally; No rales, rhonchi, wheezing  HEART: Regular rate and rhythm  ABDOMEN: Soft, Nontender, Nondistended; Bowel sounds present  EXTREMITIES:  2+ Peripheral Pulses   LYMPH: No lymphadenopathy noted  SKIN: No rashes     LABS:              CAPILLARY BLOOD GLUCOSE        blood culture --   >100,000 CFU/ml Escherichia coli   11-29 @ 22:07      urine culture --  11-29 @ 22:07  results   >100,000 CFU/ml Escherichia coli 11-29 @ 22:07    wound with gram statin --    11-29 @ 22:07  organism  Escherichia coli   11-29 @ 22:07  specimen source Clean Catch Clean Catch (Midstream)  11-29 @ 22:07      RADIOLOGY & ADDITIONAL TESTS:      Consultant(s) Notes Reviewed:  [x ] YES  [ ] NO    Care Discussed with Consultants/Other Providers [x ] YES  [ ] NO    Advanced care planning discussed with patient and family, advanced care planning forms reviewed, discussed, and completed.  20 minutes spent.

## 2022-12-06 NOTE — DIETITIAN INITIAL EVALUATION ADULT - PERTINENT MEDS FT
MEDICATIONS  (STANDING):  budesonide  80 MICROgram(s)/formoterol 4.5 MICROgram(s) Inhaler 2 Puff(s) Inhalation two times a day  buPROPion XL (24-Hour) . 150 milliGRAM(s) Oral daily  citalopram 40 milliGRAM(s) Oral daily  gabapentin 100 milliGRAM(s) Oral three times a day  levothyroxine 88 MICROGram(s) Oral daily  losartan 25 milliGRAM(s) Oral daily  metoprolol succinate ER 25 milliGRAM(s) Oral daily  pantoprazole    Tablet 40 milliGRAM(s) Oral before breakfast  simvastatin 40 milliGRAM(s) Oral at bedtime  tiotropium 2.5 MICROgram(s) Inhaler 2 Puff(s) Inhalation daily    MEDICATIONS  (PRN):  acetaminophen     Tablet .. 650 milliGRAM(s) Oral every 6 hours PRN Temp greater or equal to 38C (100.4F), Moderate Pain (4 - 6)  albuterol    90 MICROgram(s) HFA Inhaler 2 Puff(s) Inhalation every 6 hours PRN Bronchospasm

## 2023-02-21 ENCOUNTER — NON-APPOINTMENT (OUTPATIENT)
Age: 83
End: 2023-02-21

## 2023-03-15 ENCOUNTER — APPOINTMENT (OUTPATIENT)
Dept: CARDIOLOGY | Facility: CLINIC | Age: 83
End: 2023-03-15

## 2023-03-27 ENCOUNTER — APPOINTMENT (OUTPATIENT)
Dept: CARDIOLOGY | Facility: CLINIC | Age: 83
End: 2023-03-27
Payer: MEDICARE

## 2023-03-27 ENCOUNTER — NON-APPOINTMENT (OUTPATIENT)
Age: 83
End: 2023-03-27

## 2023-03-27 VITALS
OXYGEN SATURATION: 91 % | HEIGHT: 61 IN | HEART RATE: 84 BPM | WEIGHT: 158 LBS | DIASTOLIC BLOOD PRESSURE: 75 MMHG | BODY MASS INDEX: 29.83 KG/M2 | SYSTOLIC BLOOD PRESSURE: 126 MMHG

## 2023-03-27 DIAGNOSIS — E55.9 VITAMIN D DEFICIENCY, UNSPECIFIED: ICD-10-CM

## 2023-03-27 DIAGNOSIS — Z95.2 PRESENCE OF PROSTHETIC HEART VALVE: ICD-10-CM

## 2023-03-27 DIAGNOSIS — R60.9 EDEMA, UNSPECIFIED: ICD-10-CM

## 2023-03-27 DIAGNOSIS — R06.09 OTHER FORMS OF DYSPNEA: ICD-10-CM

## 2023-03-27 PROCEDURE — 99215 OFFICE O/P EST HI 40 MIN: CPT | Mod: 25

## 2023-03-27 PROCEDURE — 93000 ELECTROCARDIOGRAM COMPLETE: CPT

## 2023-03-27 RX ORDER — FAMOTIDINE 20 MG/1
20 TABLET, FILM COATED ORAL
Qty: 90 | Refills: 0 | Status: ACTIVE | COMMUNITY
Start: 2023-03-17

## 2023-03-29 ENCOUNTER — LABORATORY RESULT (OUTPATIENT)
Age: 83
End: 2023-03-29

## 2023-03-29 ENCOUNTER — APPOINTMENT (OUTPATIENT)
Dept: CARDIOLOGY | Facility: CLINIC | Age: 83
End: 2023-03-29
Payer: MEDICARE

## 2023-03-29 PROCEDURE — 93306 TTE W/DOPPLER COMPLETE: CPT

## 2023-04-02 PROBLEM — R06.09 DYSPNEA ON EXERTION: Status: ACTIVE | Noted: 2023-04-02

## 2023-04-02 PROBLEM — Z95.2 STATUS POST MECHANICAL AORTIC VALVE REPLACEMENT: Status: ACTIVE | Noted: 2023-04-02

## 2023-04-03 LAB
25(OH)D3 SERPL-MCNC: 90.9 NG/ML
ALBUMIN SERPL ELPH-MCNC: 4 G/DL
ALP BLD-CCNC: 62 U/L
ALT SERPL-CCNC: 11 U/L
ANION GAP SERPL CALC-SCNC: 13 MMOL/L
AST SERPL-CCNC: 17 U/L
BASOPHILS # BLD AUTO: 0.05 K/UL
BASOPHILS NFR BLD AUTO: 0.8 %
BILIRUB SERPL-MCNC: 0.2 MG/DL
BUN SERPL-MCNC: 17 MG/DL
CALCIUM SERPL-MCNC: 9.9 MG/DL
CHLORIDE SERPL-SCNC: 104 MMOL/L
CHOLEST SERPL-MCNC: 188 MG/DL
CO2 SERPL-SCNC: 24 MMOL/L
CREAT SERPL-MCNC: 1.05 MG/DL
EGFR: 52 ML/MIN/1.73M2
EOSINOPHIL # BLD AUTO: 0.82 K/UL
EOSINOPHIL NFR BLD AUTO: 13.4 %
GLUCOSE SERPL-MCNC: 86 MG/DL
HCT VFR BLD CALC: 38.9 %
HDLC SERPL-MCNC: 61 MG/DL
HGB BLD-MCNC: 11.9 G/DL
LDLC SERPL CALC-MCNC: 103 MG/DL
LYMPHOCYTES # BLD AUTO: 1.23 K/UL
LYMPHOCYTES NFR BLD AUTO: 20 %
MAN DIFF?: NORMAL
MCHC RBC-ENTMCNC: 29.9 PG
MCHC RBC-ENTMCNC: 30.6 GM/DL
MCV RBC AUTO: 97.7 FL
MONOCYTES # BLD AUTO: 0.31 K/UL
MONOCYTES NFR BLD AUTO: 5 %
NEUTROPHILS # BLD AUTO: 3.68 K/UL
NEUTROPHILS NFR BLD AUTO: 60 %
NONHDLC SERPL-MCNC: 127 MG/DL
PLATELET # BLD AUTO: 186 K/UL
POTASSIUM SERPL-SCNC: 4.5 MMOL/L
PROT SERPL-MCNC: 5.9 G/DL
RBC # BLD: 3.98 M/UL
RBC # FLD: 20.4 %
SODIUM SERPL-SCNC: 141 MMOL/L
TRIGL SERPL-MCNC: 119 MG/DL
TSH SERPL-ACNC: 0.15 UIU/ML
WBC # FLD AUTO: 6.13 K/UL

## 2023-05-04 NOTE — PROVIDER CONTACT NOTE (OTHER) - BACKGROUND
Admitted for weakness, SOB, COPD exacerbation. Ear Wedge Repair Text: A wedge excision was completed by carrying down an excision through the full thickness of the ear and cartilage with an inward facing Burow's triangle. The wound was then closed in a layered fashion.

## 2023-05-12 NOTE — PROGRESS NOTE ADULT - PROBLEM SELECTOR PROBLEM 1
Dr Sethi, Please resend medication into [facility].    
Groin pain, right

## 2023-05-24 NOTE — ED ADULT NURSE NOTE - BREATH SOUNDS, MLM
Clear Mirvaso Counseling: Mirvaso is a topical medication which can decrease superficial blood flow where applied. Side effects are uncommon and include stinging, redness and allergic reactions.

## 2023-05-25 NOTE — H&P ADULT - MUSCULOSKELETAL
Call to patient. Left message to call writer back at [phone]. Please ask to be put through to either of the triage nurses, Gilda or Dot.    negative

## 2023-06-27 ENCOUNTER — FORM ENCOUNTER (OUTPATIENT)
Age: 83
End: 2023-06-27

## 2023-06-27 ENCOUNTER — APPOINTMENT (OUTPATIENT)
Dept: PAIN MANAGEMENT | Facility: CLINIC | Age: 83
End: 2023-06-27

## 2023-06-28 ENCOUNTER — APPOINTMENT (OUTPATIENT)
Dept: MRI IMAGING | Facility: CLINIC | Age: 83
End: 2023-06-28
Payer: MEDICARE

## 2023-06-28 ENCOUNTER — APPOINTMENT (OUTPATIENT)
Dept: PAIN MANAGEMENT | Facility: CLINIC | Age: 83
End: 2023-06-28
Payer: MEDICARE

## 2023-06-28 VITALS — HEIGHT: 62 IN | BODY MASS INDEX: 30.91 KG/M2 | WEIGHT: 168 LBS

## 2023-06-28 DIAGNOSIS — M54.16 RADICULOPATHY, LUMBAR REGION: ICD-10-CM

## 2023-06-28 PROCEDURE — 72148 MRI LUMBAR SPINE W/O DYE: CPT

## 2023-06-28 PROCEDURE — 99203 OFFICE O/P NEW LOW 30 MIN: CPT

## 2023-06-28 NOTE — HISTORY OF PRESENT ILLNESS
[Neck] : neck [Upper back] : upper back [Mid-back] : mid-back [Lower back] : lower back [7] : 7 [6] : 6 [Burning] : burning [Shooting] : shooting [Constant] : constant [Household chores] : household chores [Rest] : rest [] : Post Surgical Visit: no [FreeTextEntry7] : WHOLE SPINE CS  [de-identified] : DEGSANTY

## 2023-06-28 NOTE — ASSESSMENT
[FreeTextEntry1] : Subjective findings\par This 83-year-old female presents to us with pain in the back with a rating component down both legs.  Patient says that this been a chronic issue.  She denies any bowel or bladder incontinence or any progressive weakness but says it has become problematic.  She had similar pain approximately 8 years ago and underwent epidural steroid injections with good success.  She was recently evaluated by an orthopedic surgeon who obtained x-rays and told her that she had spinal stenosis.  The CV/Pulm/GI/Heme/Renal/Hepatic//Psych/Endo systems are reviewed. A full ROS was performed and reviewed with the patient today, see intake form.  Average pain score for the month is 7 out of ten. The patient's current medications are documented to the best of their ability. The patient has failed conservative therapies to include medical management, and physical activity to treat the pain. The patient has decreased function secondary to the pain.\par Objective findings\par I there are no recent imaging studies lumbar spine.  Patient is in a wheelchair for comfort but is able to get to a standing position without assistance.  Motor and sensory exams appear grossly intact.\par Assessment\par Radiculopathy\par Plan\par Prior to any interventional pain management the patient will obtain an MRI lumbar spine to further elucidate the exact etiology of the pain.  Spoke to patient about epidural steroid injections. Explained risks, benefits and alternatives including but not limited to the risk of infection, bleeding, headache, syncopal episode, failure to resolve issues, allergic reaction, symptom recurrence, allergic reaction, nerve injury, and increased pain. The patient understands the risks. All questions were answered. The patient is willing to proceed. The importance of increasing exercise after the injection is also stressed. The use of the injection as a jump start so that the patient can do more exercise, but that the exercise is the long term answer for the patient is reviewed. The patient states understanding.\par

## 2023-07-13 ENCOUNTER — APPOINTMENT (OUTPATIENT)
Dept: ORTHOPEDIC SURGERY | Facility: HOSPITAL | Age: 83
End: 2023-07-13

## 2023-08-14 ENCOUNTER — RX RENEWAL (OUTPATIENT)
Age: 83
End: 2023-08-14

## 2023-08-24 ENCOUNTER — TRANSCRIPTION ENCOUNTER (OUTPATIENT)
Age: 83
End: 2023-08-24

## 2023-08-28 ENCOUNTER — APPOINTMENT (OUTPATIENT)
Dept: ORTHOPEDIC SURGERY | Facility: HOSPITAL | Age: 83
End: 2023-08-28

## 2023-09-14 ENCOUNTER — RX RENEWAL (OUTPATIENT)
Age: 83
End: 2023-09-14

## 2023-09-20 ENCOUNTER — TRANSCRIPTION ENCOUNTER (OUTPATIENT)
Age: 83
End: 2023-09-20

## 2023-09-20 NOTE — ASU PATIENT PROFILE, ADULT - NSICDXPASTMEDICALHX_GEN_ALL_CORE_FT
PAST MEDICAL HISTORY:  Acid reflux     Anemia Completed iron infusions every 2 months, now Hb stable    Aortic stenosis     Asthma with COPD with exacerbation Not on home O2    Calculus of ureter s/p R ureteral stent 12/20/2020    Carpal tunnel syndrome     Coronary atherosclerosis of native coronary artery     Depression     Diverticulosis of colon     Dyslipidemia     Emphysema     Former cigarette smoker     Graves disease s/p surgery    Hernia, hiatal     Hypertension     Leukemia (APL, s/p chemo in 2012, now in remission, followed by oncologist)    MRATINA on CPAP Pt admits to be non-compliant    Osteoporosis     Rotator cuff tear Right    Serum phosphorus decreased Last level 1.5 at PST 1/13

## 2023-09-20 NOTE — ASU DISCHARGE PLAN (ADULT/PEDIATRIC) - NS MD DC FALL RISK RISK
For information on Fall & Injury Prevention, visit: [email or web address] OR  [email or web address] OR  https://www.cdc.gov/steadi/patient.html

## 2023-09-25 ENCOUNTER — APPOINTMENT (OUTPATIENT)
Dept: ORTHOPEDIC SURGERY | Facility: HOSPITAL | Age: 83
End: 2023-09-25
Payer: MEDICARE

## 2023-09-25 ENCOUNTER — OUTPATIENT (OUTPATIENT)
Dept: OUTPATIENT SERVICES | Facility: HOSPITAL | Age: 83
LOS: 1 days | End: 2023-09-25
Payer: MEDICARE

## 2023-09-25 VITALS
OXYGEN SATURATION: 96 % | SYSTOLIC BLOOD PRESSURE: 139 MMHG | DIASTOLIC BLOOD PRESSURE: 95 MMHG | RESPIRATION RATE: 16 BRPM | TEMPERATURE: 98 F | HEART RATE: 71 BPM

## 2023-09-25 VITALS
OXYGEN SATURATION: 96 % | RESPIRATION RATE: 21 BRPM | WEIGHT: 167.99 LBS | TEMPERATURE: 98 F | SYSTOLIC BLOOD PRESSURE: 149 MMHG | HEIGHT: 62 IN | DIASTOLIC BLOOD PRESSURE: 77 MMHG | HEART RATE: 70 BPM

## 2023-09-25 DIAGNOSIS — Z41.9 ENCOUNTER FOR PROCEDURE FOR PURPOSES OTHER THAN REMEDYING HEALTH STATE, UNSPECIFIED: Chronic | ICD-10-CM

## 2023-09-25 DIAGNOSIS — Z98.890 OTHER SPECIFIED POSTPROCEDURAL STATES: Chronic | ICD-10-CM

## 2023-09-25 DIAGNOSIS — M54.16 RADICULOPATHY, LUMBAR REGION: ICD-10-CM

## 2023-09-25 DIAGNOSIS — Z96.0 PRESENCE OF UROGENITAL IMPLANTS: Chronic | ICD-10-CM

## 2023-09-25 PROCEDURE — 62323 NJX INTERLAMINAR LMBR/SAC: CPT

## 2023-09-25 RX ORDER — ERGOCALCIFEROL 1.25 MG/1
1 CAPSULE ORAL
Qty: 0 | Refills: 0 | DISCHARGE

## 2023-09-25 RX ORDER — CITALOPRAM 10 MG/1
1 TABLET, FILM COATED ORAL
Qty: 0 | Refills: 0 | DISCHARGE

## 2023-09-25 RX ORDER — FLUTICASONE FUROATE, UMECLIDINIUM BROMIDE AND VILANTEROL TRIFENATATE 200; 62.5; 25 UG/1; UG/1; UG/1
1 POWDER RESPIRATORY (INHALATION)
Qty: 1 | Refills: 0

## 2023-09-25 RX ORDER — BUPROPION HYDROCHLORIDE 150 MG/1
3 TABLET, EXTENDED RELEASE ORAL
Qty: 0 | Refills: 0 | DISCHARGE

## 2023-09-25 NOTE — ASU PREOP CHECKLIST - ISOLATION PRECAUTIONS
Pt due for physical - will schedule after school is done  Increase sertraline to 150mg daily - new rx sent  Continue adderall 
none

## 2023-11-09 NOTE — PHYSICAL THERAPY INITIAL EVALUATION ADULT - NS ASR RISK AREAS PT EVAL
Clarita Crews   34 year old    Patient Care Team:  aCndy Thakkar MD as PCP - General (Family Practice)   No ref. provider found    No chief complaint on file.   Ms. Crews is a 34 year old female established patient seen for high risk. 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 Here for exam.    23-Bilateral Breast Screening MRI-negative    GENERAL BREAST HISTORY:  Age at menarche: 11  Last menstrual period: pre  : 1. Para: 1  Age at first completed pregnancy: 32.  Breast-feeding history: yes current.  History of oral contraceptives: in past.  History of hormone replacement therapy or fertility assistance: no.  Previous Breast Biopsies or Surgeries: no  Ethnicity:  .       fall

## 2023-11-29 NOTE — DISCHARGE NOTE PROVIDER - PROVIDER RX CONTACT NUMBER
Problem: Diabetes  Goal: Achieves glycemic balance  Description: Goal is to maintain blood sugar within range with no episodes of hypoglycemia  Outcome: Outcome Not Met, Continue to Monitor  Goal: Verbalizes/demonstrates understanding of NEW diagnosis of diabetes and management  Description: Document on Patient Education Activity  Outcome: Outcome Not Met, Continue to Monitor  Goal: Verbalizes understanding of diabetes management including how to use HbA1C to evaluate status of blood sugar over time (Diabetes is NOT a new diagnosis)  Description: Diabetes Education  Outcome: Outcome Not Met, Continue to Monitor  Goal: Demonstrates ability to self-administer insulin  Description: Document on Patient Education Activity  Outcome: Outcome Not Met, Continue to Monitor     Problem: At Risk for Falls  Goal: # Patient does not fall  Outcome: Outcome Not Met, Continue to Monitor  Goal: # Takes action to control fall-related risks  Outcome: Outcome Not Met, Continue to Monitor  Goal: # Verbalizes understanding of fall risk/precautions  Description: Document education using the patient education activity  Outcome: Outcome Not Met, Continue to Monitor     Problem: At Risk for Injury Due to Fall  Goal: # Patient does not fall  Outcome: Outcome Not Met, Continue to Monitor  Goal: # Takes action to control condition specific risks  Outcome: Outcome Not Met, Continue to Monitor  Goal: # Verbalizes understanding of fall-related injury personal risks  Description: Document education using the patient education activity  Outcome: Outcome Not Met, Continue to Monitor     Problem: Fluid Volume Excess, Risk for  Goal: # Absence of Rapid Weight Gain (no more than 2kg in 24 hours)  Description: FVE Risk Patients may gain weight (but not more than 2 kg) but may not require aggressive treatment if in the absence of dyspnea; FVE (actual) patients should be monitored to achieve no weight gain.   Outcome: Outcome Not Met, Continue to  Monitor  Goal: # Absence of New Onset Dyspnea  Description: Dyspnea greater than SOB with Activity may be indicator of fluid volume excess  Outcome: Outcome Not Met, Continue to Monitor  Goal: # Verbalizes understanding of FVE prevention plan  Description: Document on Patient Education Activity  Outcome: Outcome Not Met, Continue to Monitor     Problem: Fluid Volume Excess  Goal: # Fluid Volume Excess Symptoms Resolved  Description: Treatment often consists of oxygen and respiratory support with diuretic therapy at doses that exceed usual dose (typically doubled).  Monitor patient response to treatment.    Acute dyspnea should resolve quickly if dose is adequate and kidney function is adequate. Dyspnea/SOB should only be observed with Activity after effective treatment. Patient should be able to lie down comfortably, without SOB.  Outcome: Outcome Not Met, Continue to Monitor  Goal: # Oxygenation is maintained (SpO2 greater than or equal to 90% or as ordered)  Outcome: Outcome Not Met, Continue to Monitor  Goal: # Verbalizes understanding of FVE management plan  Description: Document on Patient Education Activity  Outcome: Outcome Not Met, Continue to Monitor     Problem: Hemodialysis  Goal: Fistula/graft intact as evidenced by presence of bruit & thrill  Outcome: Outcome Not Met, Continue to Monitor  Goal: Vascular access device site free of signs & symptoms of infection  Outcome: Outcome Not Met, Continue to Monitor  Goal: Dialysis: Safe, effective, and comfortable hemodialysis treatment (Hemodialysis nurse only)  Outcome: Outcome Not Met, Continue to Monitor  Goal: Dialysis: Free of complications related to initiation/termination of dialysis (Hemodialysis nurse only)  Outcome: Outcome Not Met, Continue to Monitor  Goal: Verbalizes understanding of hemodialysis care  Description: Document on Patient Education Activity  Outcome: Outcome Not Met, Continue to Monitor     Problem: Impaired Physical Mobility  Goal: #  Bed mobility, ambulation, and ADLs are maintained or returned to baseline during hospitalization  Outcome: Outcome Not Met, Continue to Monitor     Problem: Pain  Goal: #Acceptable pain level achieved/maintained at rest using NRS/Faces  Description: This goal is used for patients who can self-report.  Acceptable means the level is at or below the identified comfort/function goal.  Outcome: Outcome Not Met, Continue to Monitor  Goal: # Acceptable pain level achieved/maintained at rest using NRS/Faces without oversedation (opioid naive or PCA/Epidural infusion)  Description: This goal is used if Opioid-naïve or on PCA/Epidural Infusion.  Outcome: Outcome Not Met, Continue to Monitor  Goal: # Acceptable pain level achieved/maintained with activity using NRS/Faces  Description: This goal is used for patients who can self-report and are not achieving acceptable pain control during activity.  Outcome: Outcome Not Met, Continue to Monitor  Goal: Acceptable pain/comfort level is achieved/maintained at rest (based on Pain Behaviors Scale)  Description: This goal is used for patients who are not able to self-report pain and are assessed for pain using the Pain Behaviors Scale  Outcome: Outcome Not Met, Continue to Monitor  Goal: Acceptable pain/comfort level is achieved/maintained at rest based on PAINAID scale (Dementia)  Description: This goal is used for patients who are not able to self-report pain, have dementia, and assessed using the PAINAD scale.  Outcome: Outcome Not Met, Continue to Monitor  Goal: Acceptable pain/comfort level is achieved/maintained at rest (based on pediatric behavior tool: NIPS, NPASS, or FLACC)  Description: This goal is used for pediatric patients who are not able to self report pain.  Outcome: Outcome Not Met, Continue to Monitor  Goal: # Verbalizes understanding of pain management  Description: Documented in Patient Education Activity  Outcome: Outcome Not Met, Continue to Monitor  Goal:  Verbalizes understanding and effective use of Patient Controlled Analgesia (PCA)  Description: Documented in Patient Education Activity  This goal is used for patients with PCA  Outcome: Outcome Not Met, Continue to Monitor  Goal: Maximum comfort achieved/maintained at end of life (Hospice)  Outcome: Outcome Not Met, Continue to Monitor     Problem: Pressure Injury, Risk for  Goal: # Skin remains intact  Outcome: Outcome Not Met, Continue to Monitor  Goal: No new pressure injury (PI) development  Outcome: Outcome Not Met, Continue to Monitor  Goal: # Verbalizes understanding of PI risk factors and prevention strategies  Description: Document education using the patient education activity.   Outcome: Outcome Not Met, Continue to Monitor  Goal: Comfort optimized with pressure injury prevention strategies guided by patient/family preference. (Hospice)  Outcome: Outcome Not Met, Continue to Monitor     Problem: Pressure Injury Actual  Goal: # No deterioration in pressure injury (PI)  Outcome: Outcome Not Met, Continue to Monitor  Goal: # Verbalizes pressure injury management  Description: Document education using the patient education activity.  Outcome: Outcome Not Met, Continue to Monitor  Goal: Wound care provided to promote comfort needs (Hospice)  Outcome: Outcome Not Met, Continue to Monitor     Problem: Stroke: Ischemic (Transient/Permanent)  Goal: Neurological status is maintained/restored to status at baseline  Description: Monitor neurological and mental status including symptoms of increasing intracranial pressure (headache, nausea/vomiting, or change in behavior). Hypertension (greater than 180 systolic) may also indicate a change in status related to stroke.  Outcome: Outcome Not Met, Continue to Monitor  Goal: Normal temperature is maintained  Outcome: Outcome Not Met, Continue to Monitor  Goal: Elimination status is maintained/returned to baseline  Description: Remove indwelling urinary catheter as soon  as possible or collaborate with provider for order/reason for continued use.   Outcome: Outcome Not Met, Continue to Monitor  Goal: #Depressive s/s (self-reported/observed) are recognized and monitored  Outcome: Outcome Not Met, Continue to Monitor  Goal: Personal stroke risk factors are identified with initial plan for risk reduction  Description: Stroke risk reduction involves taking medication, changing diet, increasing physical exercise, smoking cessation, or alcohol/drug use reduction/cessation based on identified risks.  Outcome: Outcome Not Met, Continue to Monitor  Goal: Verbalizes understanding of condition and treatment plan  Description: Document on Patient Education Activity  Outcome: Outcome Not Met, Continue to Monitor     Problem: VTE, Risk for  Goal: # No s/s of VTE  Outcome: Outcome Not Met, Continue to Monitor  Goal: # Verbalizes understanding of VTE risk factors and prevention  Description: Document education using the patient education activity.   Outcome: Outcome Not Met, Continue to Monitor  Goal: Demonstrates ability to administer injectable anticoagulants if ordered for d/c  Description: Document education using the patient education activity.  Outcome: Outcome Not Met, Continue to Monitor     Problem: VTE (Actual)  Goal: # Verbalizes understanding of VTE and treatment plan  Description: Document education using the patient education activity.   Outcome: Outcome Not Met, Continue to Monitor      [phone]

## 2024-02-21 NOTE — HISTORY OF PRESENT ILLNESS
[FreeTextEntry1] : I saw Keisha Marcum in the office today for a followup visit, She is an 81 y-0 female who is status post aortic and mitral valve replacement for aortic stenosis and mitral insufficiency respectively on March 2012 at [facility]. It was performed by Dr. Wood. Echo 7/20 demonstrated an ejection fraction of 55%. There was a peak gradient across the mitral prosthesis of 16 mm mercury. Normally functioning aortic valve prosthesis. LVH. Mild to moderate TR with a PA pressure of 35..\par \par She is being treated for hypertension, and hyperlipidemia. Fortunately she had no coronary disease. She also has a history of depression, anxiety, and some degree of chronic obstructive pulmonary disease with MARTINA. She is using her mask.. She also is hypothyroid.\par \par She has been diagnosed with promyelocytic leukemia and is undergoing chemotherapy at [facility]. The first treatment 5/13 was complicated by an episode of torsades with cardiac arrest, in the setting of MSSA bacteremia. QT prolongation by Arsenic. Echo showed normal LV systolic function. She subsequently underwent a second and third treatment treatment 10/13 without any trouble. Finished chemotherapy 11/13.  She's been in remission for approximately one year..\par \par The patient is physically limited by shortness of breath. Everything she does is a great effort. She has not seen a pulmonologist for some time.  She is receiving iron infusions for anemia. She is no longer taking the Lasix. Her leg edema has improved and she has no volume overload.She is wearing support stockings.\par \par The patient has been anemic and has some blood in her stool and had an endoscopy and colonoscopy at [place] on 1/5/17. This showed acid reflux. Cauterized bleeding.With the iron infusions her hemoglobin has been above 9 and she is feeling much better. She is able to walk and hopefully now and start losing weight.She has been off of low-dose aspirin for several years.\par \par She has recurrent kidney stones and had a stent placed in September. She is now scheduled for cystoscopy with laser therapy to treat her stones. She had stents placed for the kidney stones that have been removed.\par \par She does have significant COPD and has been complaining of increasing dyspnea on exertion.\par \par 11/20 the patient went to the emergency room with shortness of breath. Workup demonstrated a PE in the right lower lobe with a left below-knee DVT. She was treated with heparin and discharged on Eliquis. Echocardiogram showed ejection fraction of 55-60%. There was a bio aortic and mitral valve. There was mild MS, and mild AI. There was stage II diastolic dysfunction with LVH.\par \par She continued to have GI bleeding and she had an IVC filter placed and was changed from Eliquis to aspirin.\par \par Patient is feeling better. She still is short of breath but does have significant anemia and COPD. She has no pleuritic chest pain and no increasing swelling of her legs. O2 sat on room air is 97%. She is tolerating medicines well. She sees Dr. Escalera about the anemia and is getting IV Iron and is seeing  Dr. Mcmahon, Pulmonologist.\par \par Hemoglobin is still low and she is to be scheduled for an endoscopy/colonoscopy with Dr. Colunga. She will check with Dr. Mcmahon about when it would be safe to temporarily stop the anticoagulation. He is now more than 3 months on the Eliquis. \par \par \par  Essential hypertension

## 2024-04-22 NOTE — H&P ADULT - PROBLEM/PLAN-3
Patient and responsible adult verbalized understanding of discharge instructions, sedation medication, and potential complications including pain. Patient instructed to call Doctor if complications occur.     DISPLAY PLAN FREE TEXT

## 2024-06-13 NOTE — PATIENT PROFILE ADULT - LAST TOBACCO USE (DD-MM-YY)
New Patient Office Visit      Patient Name: Krzysztof Dowling  : 2007   MRN: [id number]     Referring Provider: Harris Cruz APRN    Chief Complaint:      ICD-10-CM ICD-9-CM   1. Attention deficit  R41.840 799.51   2. Autism spectrum disorder  F84.0 299.00        History of Present Illness:   Krzysztof Dowling is a 16 y.o. male who is here today with mom for adhd evaluation    Medications: claritin    Therapy: none    Subjective      Mom didn't want to treat him for adhd as a young child but was suggested by teachers.  Would like to get something started before his senior year so he can be successful.  Would like to improve his organizational skills.    I create my own system for organization and then forget what I was doing.  My backpack is organized chronologically.  Not good at completing tasks.  Would get overwhelmed with multiple tasks.  Trouble interpreting directions.  Hyperfocuses on things, topics, authors.  Hard time focusing in class and gets distracted with repetitive noises.  Mom says patient will forget 2 out 3 tasks you will tell him to do.  Bad at remembering things that seem unimportant to him.  Will forget to eat sometimes.  Procrastinating is an issue.  Hygiene has improved per mom.  Very calm and relaxed most of the time.  I don't stay mad or angry with people.  Mom says ever since he was a child has had a thirst for knowledge.  Knows a lot of trivia.  Grades are A's and B's with one C this year.      Review of Systems:   Review of Systems   Psychiatric/Behavioral:  Positive for decreased concentration.        Past Medical History:   History reviewed. No pertinent past medical history.    Past Surgical History:   History reviewed. No pertinent surgical history.    Family History:   History reviewed. No pertinent family history.      Medications:     Current Outpatient Medications:     EPINEPHrine (EpiPen 2-Narciso) 0.3 MG/0.3ML solution auto-injector injection, Inject 0.3 mL into  "the appropriate muscle as directed by prescriber 1 (One) Time As Needed (anaphylaxis) for up to 1 dose., Disp: 1 each, Rfl: 0    Loratadine (CLARITIN PO), Take  by mouth. OTC, Disp: , Rfl:     Medication Considerations:  SASKIA reviewed and appropriate.      Allergies:   Allergies   Allergen Reactions    Tree Nut Unknown - High Severity       Objective     Physical Exam:  Vital Signs:   Vitals:    06/13/24 1353   BP: 118/80   Pulse: 77   SpO2: 98%   Weight: 97.1 kg (214 lb)   Height: 170.2 cm (67\")     Body mass index is 33.52 kg/m².     Mental Status Exam:   Hygiene:   fair  Cooperation:  Cooperative  Eye Contact:  Good  Psychomotor Behavior:  Appropriate  Affect:  Restricted  Mood: anxious  Speech:  Normal  Thought Process:  Linear and Circum  Thought Content:  Mood congruent  Suicidal:  None  Homicidal:  None  Hallucinations:  None  Delusion:  None  Memory:  Deficits  Orientation:  Grossly intact  Reliability:  good  Insight:  Fair  Judgement:  Fair  Impulse Control:  Good  Physical/Medical Issues:  No      Assessment / Plan      Visit Diagnosis/Orders Placed This Visit:  Diagnoses and all orders for this visit:    1. Attention deficit (Primary)    2. Autism spectrum disorder  -     Ambulatory Referral to Psychology         Functional Status: Mild impairment     Prognosis: Good with Ongoing Treatment     Impression/Formulation:  Patient appeared alert and oriented, pleasant and well spoken.  Patient and mom are  voluntarily requesting to begin psychiatric medication management at [facility].  Patient is receptive to assistance with maintaining a stable lifestyle.  Patient presents with history of     ICD-10-CM ICD-9-CM   1. Attention deficit  R41.840 799.51   2. Autism spectrum disorder  F84.0 299.00   Patient is not positive for depression based on a PHQ-9 score of 1 on 6/13/2024.   Discussed with mom a possibility for an autism spectrum diagnosis.  [place] screeners by parents and " teachers were unremarkable.  Patient has an attention deficit but only to the degree at which he feels things are important to him.  Low motivation with things that don't interest him.  History of being picked on at school.  Uses humor to deflect and defuse.  Discussed therapeutic options to help with organization, time management, priorities, setting goals, personal success.  Mom would like to explore therapy before medication.      Treatment Plan:   Referred for therapy with Azar Pereira  Return for medication if needed.  Referred to psychology for autism testing.    Patient will continue supportive psychotherapy efforts and medications as indicated. Clinic will obtain release of information for current treatment team for continuity of care as needed. Patient will contact this office, call 911 or present to the nearest emergency room should suicidal or homicidal ideations occur. Discussed medication options and treatment plan of prescribed medication(s) as well as the risks, benefits, and potential side effects. Patient ackowledged and verbally consented to continue with current treatment plan and was educated on the importance of compliance with treatment and follow-up appointments.     Follow Up:   Return in about 2 months (around 8/13/2024) for Med Check.        DAE Jimenez, PMHNP-BC  [facility]     This is electronically signed by DAE Jimenez, JUSTIN-BC  [unfilled] 18:41 EDT   28-Aug-2019

## 2024-06-18 ENCOUNTER — RX RENEWAL (OUTPATIENT)
Age: 84
End: 2024-06-18

## 2024-07-26 NOTE — PROGRESS NOTE ADULT - ASSESSMENT
A/P: 81 y/o WF with multiple medical issues, known large hiatal hernia,    Follow-up to lengthy discussion yesterday with Dr. FREDA Melendez, patient still apprehensive about surgery, expressing concerns about recovery and general risk given her medical conditions.  Patient reports that she will be discharged today.  Instructed to follow-up with Dr. FREDA Melendez as outpatient for re-evaluation/discussion of surgical management of hiatal hernia.    Case & plan discussed with FREDA Melendez and patient's nurse.
Neurologic Initial Consultation      REFERRING PHYSICIAN: CHAY Brenner      CHIEF COMPLAINT:  Headache, groin pain.    HISTORY OF PRESENT ILLNESS:  This is a 72-year-old Macedonian speaking female for approximately 10-15 years who has had headaches, which are recurrent and frequent, they typically occur on the top of the head, sometimes radiating towards the back, sometimes associated with nausea without vomiting.  She has no visual complaints.  No photophobia and no phonophobia.  She thinks stress might be the cause for the headache.  She has had no cognitive decline and has never had a stroke.  She is unaware of any bleeding in the brain.  She has had no significant trauma and no known central nervous system infections.  She had an MRI of the brain performed for her headaches on October 25, 2023, which revealed severe innumerable cortically based microhemorrhages.  She denies having any headaches in the last 6 months.  She does not take aspirin or any blood thinners.  She does take NSAIDs and Tylenol.  There is no known family history of stroke or dementia.  She says sometimes her blood pressure is high, but most of the time it is not.  She was given sumatriptan for headaches.  As regard to groin pain, it has been noted and monitored for a year and half.  She has had 3 episodes where the groin pain is associated with pain in the anterior aspect of the right leg, sometimes radiating to just below the knee.  At the present time, she has had no pain and has had no pain for a number of weeks.  She denies any back pain.  She had an x-ray of the pelvis performed in April of 2024, which was normal.  Current Outpatient Medications   Medication Sig Dispense Refill    acetaminophen (TYLENOL) 500 MG tablet Take 500 mg by mouth every 4 hours as needed for Pain.      ibuprofen (MOTRIN) 600 MG tablet Take 600 mg by mouth every 6 hours as needed for Pain.      SUMAtriptan (IMITREX) 50 MG tablet Take 1 tablet by mouth daily 
a/p large hiatal hernia  intrathotracic stomach  gerd    plan  gerd precautions w/PO intake  ppi once a day, may increase to twice a day   maalox as needed   will monitor clinically and if no improvement may need EGD  diet as tolerated  surgery to see pt  adv diet  ppi and carafate on d/c  will need outpt  upper gastrointestinal endoscopy and workup for hernia repair    Advanced care planning was discussed with patient and family.  Advanced care planning forms were reviewed and discussed.  Risks, benefits and alternatives of gastroenterologic procedures were discussed in detail and all questions were answered.    30 minutes spent.  
as needed for Migraine. Take 1 tablet by mouth at onset of migraine. May repeat after 2 hours if needed. 12 tablet 1     No current facility-administered medications for this visit.       PHYSICAL EXAMINATION:  GENERAL:  She is awake and alert.   HEENT:  Her eye movement is full.  Face is symmetric.   NECK:  Supple.   NEUROLOGICAL:  Extended arms have no drift, tremor, or dysmetria.  Her gait is normal.  She has negative snout reflex.  Her cognition which was difficult to assess through an  seemed intact.  Reflex is symmetric and the toes are downgoing.    ASSESSMENT AND PLAN:  Groin pain with leg pain possibly related to some irritation of the genitofemoral or ilioinguinal nerve.  Palpation in the groin failed to reveal any masses or lymph nodes.  Her strength in the right lower extremity is completely intact, has a straight leg raising.  She has no percussible pain on her back.  At the present time, her right leg is asymptomatic.  However, far more important on today's visit is the findings of her MRI, which is strongly suspicious for cerebral amyloid angiopathy, which causes microhemorrhages to the brain and can lead to stroke symptoms including severe hemorrhages of the brain.  She was advised to keep a close watch on the blood pressure and it ought to be regulated at 130 or less.  Taking any medication that cause bleeding including aspirin, blood thinners, or NSAIDs should be avoided and discussed with her attending physician.  An MRI will be repeated in October of this year in relationship of cerebral amyloid angiopathy, dementia stroke, and headaches were all discussed in great detail.  It was not clear how much the patient understood as this was all done through an .  We will supply her literature in English which can be reviewed with a friend or a relative and the importance of the implications of cerebral amyloid angiopathy discussed.  An MRI will be ordered.  Full coagulation and 
a/p large hiatal hernia  intrathotracic stomach  gerd    plan  gerd precautions w/PO intake  ppi once a day, may increase to twice a day   maalox as needed   will monitor clinically and if no improvement may need EGD  diet as tolerated  surgery to see pt  adv diet  ppi and carafate on d/c  will need outpt  upper gastrointestinal endoscopy and workup for hernia repair    Advanced care planning was discussed with patient and family.  Advanced care planning forms were reviewed and discussed.  Risks, benefits and alternatives of gastroenterologic procedures were discussed in detail and all questions were answered.    30 minutes spent.  
vascular profile will be ordered.  Again, we emphasized the importance of keeping her blood pressure under control and avoiding any medications that might cause bleeding.  I will be glad to see this patient in 6 months' time.  This consultation lasted 45 minutes.    This initial neurologic evaluation occurred  in our [facility] clinic.  I spent a total of 45 minutes on the day of the visit which included pre-charting,H&P,discussion with patient chart review and documentation.      Dictated By:  Jose Angel MD        D:  07/26/2024 10:12:03  T:  07/26/2024 10:45:01  MDW/dorcas  Job:  [id number]      cc:  CHAY Brenner

## 2024-08-09 NOTE — PHYSICAL THERAPY INITIAL EVALUATION ADULT - PREDICTED DURATION OF THERAPY (DAYS/WKS), PT EVAL
GI: pt with worsening pain after trying solid food. Was in tears. Not as bad now but not passing flatus or stool, feels more distended. Still quite tender on exam.     Will repeat CT today. Clear liquid diet, restarted IVF    Trinidad Steen MD  [facility]  Phone [phone] until 5 pm  Office [phone] for after hours on call provider     1 week

## 2024-08-24 ENCOUNTER — NON-APPOINTMENT (OUTPATIENT)
Age: 84
End: 2024-08-24

## 2024-09-20 NOTE — H&P ADULT - GASTROINTESTINAL
Soft, non-tender, no hepatosplenomegaly, normal bowel sounds negative What Type Of Note Output Would You Prefer (Optional)?: Standard Output How Severe Is Your Skin Lesion?: mild Has Your Skin Lesion Been Treated?: not been treated Is This A New Presentation, Or A Follow-Up?: Skin Lesion

## 2024-09-23 ENCOUNTER — RX RENEWAL (OUTPATIENT)
Age: 84
End: 2024-09-23

## 2024-11-25 NOTE — H&P PST ADULT - PAIN SCALE PREFERRED, PROFILE
no edema, no murmurs, regular rate and rhythm , no edema, no murmurs, regular rate and rhythm numerical 0-10

## 2024-12-09 ENCOUNTER — RX RENEWAL (OUTPATIENT)
Age: 84
End: 2024-12-09

## 2024-12-12 ENCOUNTER — RX RENEWAL (OUTPATIENT)
Age: 84
End: 2024-12-12

## 2025-01-21 ENCOUNTER — APPOINTMENT (OUTPATIENT)
Dept: CARDIOLOGY | Facility: CLINIC | Age: 85
End: 2025-01-21

## 2025-02-07 NOTE — DISCHARGE NOTE NURSING/CASE MANAGEMENT/SOCIAL WORK - PATIENT PORTAL LINK FT
show
You can access the FollowMyHealth Patient Portal offered by [facility] by registering at the following website: [email or web address]. By joining [employer]s FollowMyHealth portal, you will also be able to view your health information using other applications (apps) compatible with our system.

## 2025-03-03 NOTE — ED PROVIDER NOTE - CONSTITUTIONAL, MLM
No normal... Well appearing, obese white elderly female, awake, alert, oriented to person, place, time/situation and in no apparent distress.

## 2025-03-10 NOTE — ED PROVIDER NOTE - CCCP TRG CHIEF CMPLNT
1220 Pt here for ferrlecit infusion, resting in recliner, TP released and pharmacy notified   1231 Ferrlecit infusion started to infuse over 1 hr  1328 Ferrlecit infusion complete, tolerated well, will continue to monitor  1345 pt discharged ambulatory with no adverse reaction noted, pt notified to go to ER with any adverse reactions, AVS given along with medication information  Follow up appt made 1 week     see chief complaint quote

## 2025-05-01 ENCOUNTER — APPOINTMENT (OUTPATIENT)
Dept: CARDIOLOGY | Facility: CLINIC | Age: 85
End: 2025-05-01

## 2025-06-16 ENCOUNTER — NON-APPOINTMENT (OUTPATIENT)
Age: 85
End: 2025-06-16

## 2025-06-16 ENCOUNTER — APPOINTMENT (OUTPATIENT)
Dept: CARDIOLOGY | Facility: CLINIC | Age: 85
End: 2025-06-16
Payer: MEDICARE

## 2025-06-16 VITALS
HEART RATE: 84 BPM | SYSTOLIC BLOOD PRESSURE: 135 MMHG | OXYGEN SATURATION: 92 % | BODY MASS INDEX: 27.6 KG/M2 | HEIGHT: 62 IN | WEIGHT: 150 LBS | DIASTOLIC BLOOD PRESSURE: 70 MMHG

## 2025-06-16 PROCEDURE — 99214 OFFICE O/P EST MOD 30 MIN: CPT | Mod: 25

## 2025-06-16 PROCEDURE — 93000 ELECTROCARDIOGRAM COMPLETE: CPT

## 2025-07-13 ENCOUNTER — EMERGENCY (EMERGENCY)
Facility: HOSPITAL | Age: 85
LOS: 1 days | End: 2025-07-13
Attending: EMERGENCY MEDICINE | Admitting: EMERGENCY MEDICINE
Payer: MEDICARE

## 2025-07-13 VITALS
DIASTOLIC BLOOD PRESSURE: 84 MMHG | HEART RATE: 80 BPM | SYSTOLIC BLOOD PRESSURE: 157 MMHG | RESPIRATION RATE: 20 BRPM | TEMPERATURE: 98 F | OXYGEN SATURATION: 98 %

## 2025-07-13 VITALS
HEART RATE: 86 BPM | DIASTOLIC BLOOD PRESSURE: 80 MMHG | HEIGHT: 62 IN | WEIGHT: 149.91 LBS | RESPIRATION RATE: 22 BRPM | SYSTOLIC BLOOD PRESSURE: 186 MMHG | TEMPERATURE: 99 F

## 2025-07-13 DIAGNOSIS — Z41.9 ENCOUNTER FOR PROCEDURE FOR PURPOSES OTHER THAN REMEDYING HEALTH STATE, UNSPECIFIED: Chronic | ICD-10-CM

## 2025-07-13 DIAGNOSIS — Z98.890 OTHER SPECIFIED POSTPROCEDURAL STATES: Chronic | ICD-10-CM

## 2025-07-13 DIAGNOSIS — Z96.0 PRESENCE OF UROGENITAL IMPLANTS: Chronic | ICD-10-CM

## 2025-07-13 LAB
ALBUMIN SERPL ELPH-MCNC: 3.3 G/DL — SIGNIFICANT CHANGE UP (ref 3.3–5)
ALP SERPL-CCNC: 82 U/L — SIGNIFICANT CHANGE UP (ref 30–120)
ALT FLD-CCNC: 15 U/L — SIGNIFICANT CHANGE UP (ref 10–60)
ANION GAP SERPL CALC-SCNC: 8 MMOL/L — SIGNIFICANT CHANGE UP (ref 5–17)
APTT BLD: 27.9 SEC — SIGNIFICANT CHANGE UP (ref 26.1–36.8)
AST SERPL-CCNC: 16 U/L — SIGNIFICANT CHANGE UP (ref 10–40)
BASOPHILS # BLD AUTO: 0.05 K/UL — SIGNIFICANT CHANGE UP (ref 0–0.2)
BASOPHILS NFR BLD AUTO: 0.8 % — SIGNIFICANT CHANGE UP (ref 0–2)
BILIRUB SERPL-MCNC: 0.4 MG/DL — SIGNIFICANT CHANGE UP (ref 0.2–1.2)
BUN SERPL-MCNC: 16 MG/DL — SIGNIFICANT CHANGE UP (ref 7–23)
CALCIUM SERPL-MCNC: 9.6 MG/DL — SIGNIFICANT CHANGE UP (ref 8.4–10.5)
CHLORIDE SERPL-SCNC: 104 MMOL/L — SIGNIFICANT CHANGE UP (ref 96–108)
CO2 SERPL-SCNC: 28 MMOL/L — SIGNIFICANT CHANGE UP (ref 22–31)
CREAT SERPL-MCNC: 1.04 MG/DL — SIGNIFICANT CHANGE UP (ref 0.5–1.3)
EGFR: 52 ML/MIN/1.73M2 — LOW
EGFR: 52 ML/MIN/1.73M2 — LOW
EOSINOPHIL # BLD AUTO: 0.36 K/UL — SIGNIFICANT CHANGE UP (ref 0–0.5)
EOSINOPHIL NFR BLD AUTO: 5.6 % — SIGNIFICANT CHANGE UP (ref 0–6)
GLUCOSE SERPL-MCNC: 102 MG/DL — HIGH (ref 70–99)
HCT VFR BLD CALC: 33.8 % — LOW (ref 34.5–45)
HGB BLD-MCNC: 10.2 G/DL — LOW (ref 11.5–15.5)
IMM GRANULOCYTES # BLD AUTO: 0.02 K/UL — SIGNIFICANT CHANGE UP (ref 0–0.07)
IMM GRANULOCYTES NFR BLD AUTO: 0.3 % — SIGNIFICANT CHANGE UP (ref 0–0.9)
INR BLD: 1.09 RATIO — SIGNIFICANT CHANGE UP (ref 0.85–1.16)
LYMPHOCYTES # BLD AUTO: 1.27 K/UL — SIGNIFICANT CHANGE UP (ref 1–3.3)
LYMPHOCYTES NFR BLD AUTO: 19.8 % — SIGNIFICANT CHANGE UP (ref 13–44)
MCHC RBC-ENTMCNC: 25.8 PG — LOW (ref 27–34)
MCHC RBC-ENTMCNC: 30.2 G/DL — LOW (ref 32–36)
MCV RBC AUTO: 85.6 FL — SIGNIFICANT CHANGE UP (ref 80–100)
MONOCYTES # BLD AUTO: 0.87 K/UL — SIGNIFICANT CHANGE UP (ref 0–0.9)
MONOCYTES NFR BLD AUTO: 13.6 % — SIGNIFICANT CHANGE UP (ref 2–14)
NEUTROPHILS # BLD AUTO: 3.83 K/UL — SIGNIFICANT CHANGE UP (ref 1.8–7.4)
NEUTROPHILS NFR BLD AUTO: 59.9 % — SIGNIFICANT CHANGE UP (ref 43–77)
NRBC # BLD AUTO: 0 K/UL — SIGNIFICANT CHANGE UP (ref 0–0)
NRBC # FLD: 0 K/UL — SIGNIFICANT CHANGE UP (ref 0–0)
NRBC BLD AUTO-RTO: 0 /100 WBCS — SIGNIFICANT CHANGE UP (ref 0–0)
PLATELET # BLD AUTO: 241 K/UL — SIGNIFICANT CHANGE UP (ref 150–400)
PMV BLD: 10.2 FL — SIGNIFICANT CHANGE UP (ref 7–13)
POTASSIUM SERPL-MCNC: 4.1 MMOL/L — SIGNIFICANT CHANGE UP (ref 3.5–5.3)
POTASSIUM SERPL-SCNC: 4.1 MMOL/L — SIGNIFICANT CHANGE UP (ref 3.5–5.3)
PROT SERPL-MCNC: 6.8 G/DL — SIGNIFICANT CHANGE UP (ref 6–8.3)
PROTHROM AB SERPL-ACNC: 12.6 SEC — SIGNIFICANT CHANGE UP (ref 9.9–13.4)
RBC # BLD: 3.95 M/UL — SIGNIFICANT CHANGE UP (ref 3.8–5.2)
RBC # FLD: 15.9 % — HIGH (ref 10.3–14.5)
SODIUM SERPL-SCNC: 140 MMOL/L — SIGNIFICANT CHANGE UP (ref 135–145)
TROPONIN I, HIGH SENSITIVITY RESULT: 6.7 NG/L — SIGNIFICANT CHANGE UP
TROPONIN I, HIGH SENSITIVITY RESULT: 9.4 NG/L — SIGNIFICANT CHANGE UP
WBC # BLD: 6.4 K/UL — SIGNIFICANT CHANGE UP (ref 3.8–10.5)
WBC # FLD AUTO: 6.4 K/UL — SIGNIFICANT CHANGE UP (ref 3.8–10.5)

## 2025-07-13 PROCEDURE — 85025 COMPLETE CBC W/AUTO DIFF WBC: CPT

## 2025-07-13 PROCEDURE — 36415 COLL VENOUS BLD VENIPUNCTURE: CPT

## 2025-07-13 PROCEDURE — 71045 X-RAY EXAM CHEST 1 VIEW: CPT

## 2025-07-13 PROCEDURE — 93010 ELECTROCARDIOGRAM REPORT: CPT

## 2025-07-13 PROCEDURE — 80053 COMPREHEN METABOLIC PANEL: CPT

## 2025-07-13 PROCEDURE — 99285 EMERGENCY DEPT VISIT HI MDM: CPT | Mod: 25

## 2025-07-13 PROCEDURE — 85610 PROTHROMBIN TIME: CPT

## 2025-07-13 PROCEDURE — 71045 X-RAY EXAM CHEST 1 VIEW: CPT | Mod: 26

## 2025-07-13 PROCEDURE — 71275 CT ANGIOGRAPHY CHEST: CPT | Mod: 26

## 2025-07-13 PROCEDURE — 93005 ELECTROCARDIOGRAM TRACING: CPT

## 2025-07-13 PROCEDURE — 84484 ASSAY OF TROPONIN QUANT: CPT

## 2025-07-13 PROCEDURE — 99285 EMERGENCY DEPT VISIT HI MDM: CPT

## 2025-07-13 PROCEDURE — 85730 THROMBOPLASTIN TIME PARTIAL: CPT

## 2025-07-13 PROCEDURE — 71275 CT ANGIOGRAPHY CHEST: CPT

## 2025-07-13 NOTE — CONSULT NOTE ADULT - SUBJECTIVE AND OBJECTIVE BOX
History of Present Illness: The patient is an 82 year old female with a history of HTN, HL, prior leukemia, MARTINA on CPAP, COPD, bioprosthetic AVR and MVR who presents with chest pain. She noted last night and this morning left-sided chest discomfort described as intermittent pinching. She also had bilateral arm weakness/discomfort. She has had worsening shortness of breath over months due to her COPD. She has chronic leg swelling.    Past Medical/Surgical History:  HTN, HL, prior leukemia, MARTINA on CPAP, COPD, bioprosthetic AVR and MVR    Medications:  Home Medications:  acetaminophen 325 mg oral tablet: 2 tab(s) orally every 6 hours, As needed, Temp greater or equal to 38C (100.4F), Moderate Pain (4 - 6) (25 Sep 2023 08:37)  buPROPion 75 mg oral tablet: 3 tab(s) orally 2 times a day (25 Sep 2023 08:37)  CeleXA 40 mg oral tablet: 1 tab(s) orally once a day (25 Sep 2023 08:37)  losartan 25 mg oral tablet: 1 tab(s) orally once a day (25 Sep 2023 08:37)  Vitamin D2 50 mcg (2000 intl units) oral capsule: 1 cap(s) orally once a day (25 Sep 2023 08:37)      Family History: Non-contributory family history of premature cardiovascular atherosclerotic disease    Social History: No tobacco, alcohol or drug use    Review of Systems:  General: No fevers, chills, weight gain  Skin: No rashes, color changes  Cardiovascular: +chest pain, orthopnea  Respiratory: +shortness of breath, cough  Gastrointestinal: No nausea, abdominal pain  Genitourinary: No incontinence, pain with urination  Musculoskeletal: No pain, swelling, decreased range of motion  Neurological: No headache, weakness  Psychiatric: No depression, anxiety  Endocrine: No weight gain, increased thirst  All other systems are comprehensively negative.    Physical Exam:  Vitals:        Vital Signs Last 24 Hrs  T(C): 37.1 (13 Jul 2025 08:18), Max: 37.1 (13 Jul 2025 08:18)  T(F): 98.8 (13 Jul 2025 08:18), Max: 98.8 (13 Jul 2025 08:18)  HR: 86 (13 Jul 2025 08:18) (86 - 86)  BP: 186/80 (13 Jul 2025 08:18) (186/80 - 186/80)  BP(mean): --  RR: 22 (13 Jul 2025 08:18) (22 - 22)  SpO2: --      General: NAD  HEENT: MMM  Neck: No JVD, no carotid bruit  Lungs: CTAB  CV: RRR, nl S1/S2, no M/R/G  Abdomen: S/NT/ND, +BS  Extremities: 1+ LE edema, no cyanosis  Neuro: AAOx3, non-focal  Skin: No rash    Labs:                        10.2   6.40  )-----------( 241      ( 13 Jul 2025 08:17 )             33.8     07-13    140  |  104  |  16  ----------------------------<  102[H]  4.1   |  28  |  1.04    Ca    9.6      13 Jul 2025 08:17    TPro  6.8  /  Alb  3.3  /  TBili  0.4  /  DBili  x   /  AST  16  /  ALT  15  /  AlkPhos  82  07-13        PT/INR - ( 13 Jul 2025 08:22 )   PT: 12.6 sec;   INR: 1.09 ratio         PTT - ( 13 Jul 2025 08:22 )  PTT:27.9 sec    ECG/Telemetry: NSR, normal axis, anterior mild ST depressions

## 2025-07-13 NOTE — ED PROVIDER NOTE - NSFOLLOWUPINSTRUCTIONS_ED_ALL_ED_FT
CHEST PAIN - AfterCare(R) Instructions(ER/ED)    Chest Pain    WHAT YOU NEED TO KNOW:    Chest pain can be caused by a range of conditions, from not serious to life-threatening. Chest pain can be a symptom of a digestive problem, such as acid reflux or a stomach ulcer. An anxiety attack or a strong emotion, such as anger, can also cause chest pain. Infection, inflammation, or a fracture in the bones or cartilage in your chest can cause pain or discomfort. Sometimes chest pain or pressure is caused by poor blood flow to your heart (angina). Chest pain may also be caused by life-threatening conditions such as a heart attack or blood clot in your lungs.    DISCHARGE INSTRUCTIONS:    Call your local emergency number (911 in the [place]) or have someone call if:    You have any of the following signs of a heart attack:  Squeezing, pressure, or pain in your chest    You may also have any of the following:  Discomfort or pain in your back, neck, jaw, stomach, or arm    Shortness of breath    Nausea or vomiting    Lightheadedness or a sudden cold sweat    Return to the emergency department if:    You have chest discomfort that gets worse, even with medicine.    You cough or vomit blood.    Your bowel movements are black or bloody.    You cannot stop vomiting, or it hurts to swallow.  Call your doctor if:    You have questions or concerns about your condition or care.    Medicines:    Medicines may be given to treat the cause of your chest pain. Examples include pain medicine, anxiety medicine, or medicines to increase blood flow to your heart.    Do not take certain medicines without asking your healthcare provider first. These include NSAIDs, herbal or vitamin supplements, and hormones, such as estrogen or progestin.    Take your medicine as directed. Contact your healthcare provider if you think your medicine is not helping or if you have side effects. Tell your provider if you are allergic to any medicine. Keep a list of the medicines, vitamins, and herbs you take. Include the amounts, and when and why you take them. Bring the list or the pill bottles to follow-up visits. Carry your medicine list with you in case of an emergency.  Healthy living tips: If the cause of your chest pain is known, your healthcare provider will give you specific guidelines to follow. The following are general healthy guidelines:    Do not smoke. Nicotine and other chemicals in cigarettes and cigars can cause lung and heart damage. Ask your provider for information if you currently smoke and need help to quit. E-cigarettes or smokeless tobacco still contain nicotine. Talk to your provider before you use these products.    Choose a variety of healthy foods as often as possible. Include fresh, frozen, or canned fruits and vegetables. Also include low-fat dairy products, fish, chicken (without skin), and lean meats. Your provider or a dietitian can help you create meal plans. You may need to avoid certain foods or drinks if your pain is caused by a digestion problem.  Healthy Foods      Lower your sodium (salt) intake. Limit foods that are high in sodium, such as canned foods, salty snacks, and cold cuts. If you add salt when you cook food, do not add more at the table. Choose low-sodium canned foods as much as possible.        Drink plenty of water every day. Water helps your body to control your temperature and blood pressure. Ask your provider how much water you should drink every day.    Ask about activity. Your provider will tell you which activities to limit or avoid. Ask when you can drive, return to work, and have sex. Ask about the best exercise plan for you.    Maintain a healthy weight. Ask your provider what a healthy weight is for you. Ask your provider to help you create a safe weight loss plan if you are overweight.    Ask about vaccines you may need. Your provider can tell you if you also need vaccines not listed below, and when to get them:  Ask your healthcare provider about the flu and pneumonia vaccines. All adults should get the flu (influenza) vaccine as soon as recommended each year, usually in September or October. The pneumonia vaccine is recommended for all adults aged 50 or older to prevent pneumococcal disease, such as pneumonia. Adults aged 19 to 49 years who are at high risk for pneumococcal disease should also receive the vaccine. You may need 1 dose or 2. The number depends on the vaccine used and your risk factors.    COVID-19 vaccines are given to adults as a shot. At least 1 dose of an updated vaccine is recommended for all adults. COVID-19 vaccines are updated throughout the year. Adults 65 or older need a second dose of updated vaccine at least 4 months after the first dose. Your healthcare provider can help you schedule all needed doses as updated vaccines become available.  Prevent Heart Disease   Follow up with your doctor within 72 hours, or as directed: You may need to return for more tests to find the cause of your chest pain. You may be referred to a specialist, such as a cardiologist or gastroenterologist. Write down your questions so you remember to ask them during your visits.

## 2025-07-13 NOTE — CONSULT NOTE ADULT - ASSESSMENT
The patient is an 82 year old female with a history of HTN, HL, prior leukemia, MARTINA on CPAP, COPD, bioprosthetic AVR and MVR who presents with chest pain.     Plan:  - ECG with sinus rhythm and nonspecific anterior mild ST depressions - unchanged from prior ECG  - Rule out an acute MI with two sets of cardiac enzymes  - CTA chest with no pulmonary embolism  - No rash to suggest shingles as an etiology of her chest discomfort  - From a cardiac standpoint, if repeat troponin levels are normal, she can follow-up with her cardiologist as outpatient. She has a scheduled upcoming echo and stress test.

## 2025-07-13 NOTE — ED ADULT TRIAGE NOTE - CHIEF COMPLAINT QUOTE
" I have chest pains started last nigh - woke up this morning with still with pain and burning pains on both of my arms " PMH COPD Htn Leukemia Anxiety

## 2025-07-13 NOTE — ED PROVIDER NOTE - DIFFERENTIAL DIAGNOSIS
Differential Diagnosis Differential including but not limited to MI ACS hypertensive urgency pneumothorax effusion PE

## 2025-07-13 NOTE — ED PROVIDER NOTE - OBJECTIVE STATEMENT
Patient brought in by EMS for chest pain radiating to bilateral arms since last night.  Per EMS report patient related pain resolved after taking 81 mg aspirin at home.  Patient reports last night she developed pinching pain to her chest which began to radiate to her arms.  Patient relates chest pain resolved after she took aspirin this morning.  Patient reports chronic shortness of breath from her COPD for many months unchanged at this time.  Patient relates history of DVT and [place] filter.  Patient denies fever chills cough abdominal pain nausea vomiting edema calf pain.  PMD Sammy Man cardiologist Brandy

## 2025-07-13 NOTE — ED PROVIDER NOTE - CLINICAL SUMMARY MEDICAL DECISION MAKING FREE TEXT BOX
Patient brought in by EMS for chest pain radiating to bilateral arms since last night.  Per EMS report patient related pain resolved after taking 81 mg aspirin at home.  Patient reports last night she developed pinching pain to her chest which began to radiate to her arms.  Patient relates chest pain resolved after she took aspirin this morning.  Patient reports chronic shortness of breath from her COPD for many months unchanged at this time.  Patient relates history of DVT and [place] filter.  Patient denies fever chills cough abdominal pain nausea vomiting edema calf pain.  PMD Sammy Man cardiologist Brandy    Plan EKG chest x-ray labs    Differential including but not limited to MI ACS hypertensive urgency pneumothorax effusion PE

## 2025-07-13 NOTE — ED PROVIDER NOTE - PROVIDER TOKENS
PROVIDER:[TOKEN:[[id number]],FOLLOWUP:[1-3 Days]],PROVIDER:[TOKEN:[[id number]],FOLLOWUP:[1-3 Days]]

## 2025-07-13 NOTE — ED PROVIDER NOTE - PROGRESS NOTE DETAILS
no Discussed with Dr. Del Real (attending cardiologist) he saw and evaluated patient cleared for discharge and outpatient follow-up Reevaluated patient at bedside.  Patient feeling well.  Discussed the results of all diagnostic testing in ED and copies of all reports given.   An opportunity to ask questions was given.  Discussed the importance of prompt, close medical follow-up.  Patient will return with any changes, concerns or persistent / worsening symptoms.  Understanding of all instructions verbalized.

## 2025-07-13 NOTE — ED PROVIDER NOTE - PATIENT PORTAL LINK FT
You can access the FollowMyHealth Patient Portal offered by [facility] by registering at the following website: [email or web address]. By joining [employer]’s FollowMyHealth portal, you will also be able to view your health information using other applications (apps) compatible with our system.

## 2025-07-13 NOTE — ED PROVIDER NOTE - CARE PROVIDER_API CALL
Memo Nava  Cardiovascular Disease  [address] [place] [address]  Phone: [phone]  Fax: [phone]  Follow Up Time: 1-3 Days    Sammy Man  Internal Medicine  [address] [place] [id number]  Phone: [phone]  Fax: [phone]  Follow Up Time: 1-3 Days

## 2025-07-16 NOTE — ASU DISCHARGE PLAN (ADULT/PEDIATRIC) - SIGNS AND SYMPTOMS OF INFECTION: FEVER, REDNESS, SWELLING, FOUL SMELLING DISCHARGE
Follow up with your primary care provider in 1-2 days    Return to the emergency room for worsening symptoms   Statement Selected [Weight Loss (___ Lbs)] : [unfilled] ~Ulb weight loss [Negative] : Psychiatric [FreeTextEntry5] : see hpi

## (undated) DEVICE — STYLET  ENDOTRACH 7.5MM X 10MM

## (undated) DEVICE — NDL SPINAL 22G X 3.5" QUINCKE

## (undated) DEVICE — GLV 8.5 PROTEXIS (WHITE)

## (undated) DEVICE — TRAY EPIDURAL SINGLE DOSE